# Patient Record
Sex: FEMALE | Race: WHITE | Employment: OTHER | URBAN - METROPOLITAN AREA
[De-identification: names, ages, dates, MRNs, and addresses within clinical notes are randomized per-mention and may not be internally consistent; named-entity substitution may affect disease eponyms.]

---

## 2017-02-09 PROBLEM — I34.0 NON-RHEUMATIC MITRAL REGURGITATION: Status: ACTIVE | Noted: 2017-02-09

## 2018-01-17 PROBLEM — T82.898A PROBLEM WITH DIALYSIS ACCESS (HCC): Chronic | Status: ACTIVE | Noted: 2018-01-17

## 2018-03-07 PROBLEM — N18.6 ESRD (END STAGE RENAL DISEASE) (HCC): Status: ACTIVE | Noted: 2018-03-07

## 2018-03-16 RX ORDER — SODIUM BICARBONATE 650 MG/1
650 TABLET ORAL 2 TIMES DAILY
COMMUNITY

## 2018-03-16 NOTE — PROGRESS NOTES
Álvaro 36 PRE-ADMISSION TESTING GENERAL INSTRUCTIONS- [facility]-phone number:[phone]    GENERAL INSTRUCTIONS  [x] Antibacterial Soap shower Night before and/or AM of Surgery  [] Abiodun wipe instruction sheet and wipes given. [x] Nothing by mouth after midnight, including gum, candy, mints, or water.   [] You may brush your teeth, gargle, but do NOT swallow water. []Hibiclens shower  the night before and the morning of surgery. Do not use             Hibiclens on your face or head. []No smoking, chewing tobacco, illegal drugs, or alcohol within 24 hours of your surgery. [x] Jewelry, valuables or body piercing's should not be brought to the hospital. All body and/or tongue piercing's must be removed prior to arriving to hospital.  ALL hair pins must be removed. [x] Do not wear makeup, lotions, powders, deodorant. Nail polish as directed by the nurse. [x] Arrange transportation to and from the hospital.  Arrange for someone to be with you for the remainder of the day and for 24 hours after your procedure due to having had anesthesia. [] Bring insurance card and photo ID.  [] Transfusion Bracelet: Please bring with you to hospital, day of surgery  [] Bring urine specimen day of surgery. Any small container is acceptable. [x] Use inhalers the morning of surgery and bring with you to hospital.   []Bring copy of living will or healthcare power of  papers to be placed in your electronic record. [] CPAP/BI-PAP: Please bring your machine if you are to spend the night in the hospital.     ENDOSCOPY INSTRUCTIONS:   [] Bowel prep instructions reviewed. [] Nothing by mouth after midnight, including gum, candy, mints, or water.  [] You may brush your teeth, gargle, but do NOT swallow water. [] Do not wear makeup, lotions, powders, deodorant. Nail polish as directed by the nurse.   [] Arrange transportation to and from the hospital.  Arrange for someone to be with you for the remainder of the day due to having had anesthesia. PARKING INSTRUCTIONS:   [x] Arrival [id number]  · [x] Parking lot 1 is located on [facility] (the corner of [facility] and [facility]). To enter, press the button and the gate will lift. A free token will be provided to exit the lot. One car per patient is allowed to park in this lot. All other cars are to park on [address] [place] [address] Street either in the parking garage or the handicap lot. [] Free  parking is available on [address] [place] [address]. · [] To reach the [facility] lobby from [address] [place] [address], upon entering the hospital, take elevator B to the 3rd floor. EDUCATION INSTRUCTIONS:      [] Knee or hip replacement booklet & exercise pamphlets given. [] Josias 77 placed in chart. [] Pre-admission Testing educational folder given  [] Incentive Spirometry,coughing & deep breathing exercises reviewed. []Medication information sheet(s)   []Fluoroscopy-Xray used in surgery reviewed with patient. Educational pamphlet placed in chart. [x]Pain: Post-op pain is normal and to be expected. You will be asked to rate your pain from 0-10(a zero is not acceptable-education is needed). Your post-op pain goal is:4  [x] Ask your nurse for your pain medication. [] Joint camp offered. [] Joint replacement booklets given. []Other     MEDICATION INSTRUCTIONS:   [x]Bring a complete list of your medications, please write the last time you took the medicine, give this list to the nurse. [x] Take the following medications the morning of surgery with 1-2 ounces of water: see med list  [] Stop herbal supplements and vitamins 5 days before your surgery. [] DO NOT take any diabetic medicine the morning of surgery. Follow instructions for insulin the day before surgery. [] If you are diabetic and your blood sugar is low or you feel symptomatic, you may drink 1-2 ounces of apple juice or take a glucose tablet.   The morning of your

## 2018-03-21 ENCOUNTER — ANESTHESIA EVENT (OUTPATIENT)
Dept: OPERATING ROOM | Age: 63
End: 2018-03-21
Payer: MEDICARE

## 2018-03-22 ENCOUNTER — ANESTHESIA (OUTPATIENT)
Dept: OPERATING ROOM | Age: 63
End: 2018-03-22
Payer: MEDICARE

## 2018-03-22 ENCOUNTER — TELEPHONE (OUTPATIENT)
Dept: VASCULAR SURGERY | Age: 63
End: 2018-03-22

## 2018-03-22 ENCOUNTER — HOSPITAL ENCOUNTER (OUTPATIENT)
Age: 63
Setting detail: OUTPATIENT SURGERY
Discharge: HOME | End: 2018-03-22
Attending: SURGERY | Admitting: SURGERY
Payer: MEDICARE

## 2018-03-22 VITALS
BODY MASS INDEX: 38.98 KG/M2 | WEIGHT: 220 LBS | OXYGEN SATURATION: 92 % | SYSTOLIC BLOOD PRESSURE: 109 MMHG | DIASTOLIC BLOOD PRESSURE: 65 MMHG | RESPIRATION RATE: 20 BRPM | HEIGHT: 63 IN | HEART RATE: 75 BPM | TEMPERATURE: 97.5 F

## 2018-03-22 VITALS
SYSTOLIC BLOOD PRESSURE: 104 MMHG | OXYGEN SATURATION: 99 % | RESPIRATION RATE: 21 BRPM | DIASTOLIC BLOOD PRESSURE: 61 MMHG

## 2018-03-22 DIAGNOSIS — N18.5 CRF (CHRONIC RENAL FAILURE), STAGE 5 (HCC): ICD-10-CM

## 2018-03-22 DIAGNOSIS — T82.898D PROBLEM WITH DIALYSIS ACCESS, SUBSEQUENT ENCOUNTER: Primary | Chronic | ICD-10-CM

## 2018-03-22 LAB
ANION GAP SERPL CALCULATED.3IONS-SCNC: 18 MMOL/L (ref 7–16)
BUN BLDV-MCNC: 24 MG/DL (ref 8–23)
CALCIUM SERPL-MCNC: 8 MG/DL (ref 8.6–10.2)
CHLORIDE BLD-SCNC: 94 MMOL/L (ref 98–107)
CO2: 30 MMOL/L (ref 22–29)
CREAT SERPL-MCNC: 3.2 MG/DL (ref 0.5–1)
GFR AFRICAN AMERICAN: 18
GFR NON-AFRICAN AMERICAN: 15 ML/MIN/1.73
GLUCOSE BLD-MCNC: 87 MG/DL (ref 74–109)
HCT VFR BLD CALC: 30.4 % (ref 34–48)
HEMOGLOBIN: 9.2 G/DL (ref 11.5–15.5)
MCH RBC QN AUTO: 32.5 PG (ref 26–35)
MCHC RBC AUTO-ENTMCNC: 30.3 % (ref 32–34.5)
MCV RBC AUTO: 107.4 FL (ref 80–99.9)
PDW BLD-RTO: 15.6 FL (ref 11.5–15)
PLATELET # BLD: 164 E9/L (ref 130–450)
PMV BLD AUTO: 10.5 FL (ref 7–12)
POTASSIUM REFLEX MAGNESIUM: 4 MMOL/L (ref 3.5–5)
RBC # BLD: 2.83 E12/L (ref 3.5–5.5)
SODIUM BLD-SCNC: 142 MMOL/L (ref 132–146)
WBC # BLD: 5.3 E9/L (ref 4.5–11.5)

## 2018-03-22 PROCEDURE — 6360000002 HC RX W HCPCS: Performed by: ANESTHESIOLOGY

## 2018-03-22 PROCEDURE — 2580000003 HC RX 258

## 2018-03-22 PROCEDURE — 6360000002 HC RX W HCPCS: Performed by: NURSE ANESTHETIST, CERTIFIED REGISTERED

## 2018-03-22 PROCEDURE — A4565 SLINGS: HCPCS | Performed by: SURGERY

## 2018-03-22 PROCEDURE — 7100000010 HC PHASE II RECOVERY - FIRST 15 MIN: Performed by: SURGERY

## 2018-03-22 PROCEDURE — 6360000002 HC RX W HCPCS: Performed by: SURGERY

## 2018-03-22 PROCEDURE — 36832 AV FISTULA REVISION OPEN: CPT | Performed by: SURGERY

## 2018-03-22 PROCEDURE — 3600000012 HC SURGERY LEVEL 2 ADDTL 15MIN: Performed by: SURGERY

## 2018-03-22 PROCEDURE — 7100000011 HC PHASE II RECOVERY - ADDTL 15 MIN: Performed by: SURGERY

## 2018-03-22 PROCEDURE — 3700000000 HC ANESTHESIA ATTENDED CARE: Performed by: SURGERY

## 2018-03-22 PROCEDURE — 3700000001 HC ADD 15 MINUTES (ANESTHESIA): Performed by: SURGERY

## 2018-03-22 PROCEDURE — 6360000002 HC RX W HCPCS

## 2018-03-22 PROCEDURE — 80048 BASIC METABOLIC PNL TOTAL CA: CPT

## 2018-03-22 PROCEDURE — 3600000002 HC SURGERY LEVEL 2 BASE: Performed by: SURGERY

## 2018-03-22 PROCEDURE — 2500000003 HC RX 250 WO HCPCS: Performed by: SURGERY

## 2018-03-22 PROCEDURE — 2580000003 HC RX 258: Performed by: NURSE ANESTHETIST, CERTIFIED REGISTERED

## 2018-03-22 PROCEDURE — 36415 COLL VENOUS BLD VENIPUNCTURE: CPT

## 2018-03-22 PROCEDURE — 85027 COMPLETE CBC AUTOMATED: CPT

## 2018-03-22 RX ORDER — OXYCODONE HYDROCHLORIDE AND ACETAMINOPHEN 5; 325 MG/1; MG/1
1 TABLET ORAL EVERY 4 HOURS PRN
Status: DISCONTINUED | OUTPATIENT
Start: 2018-03-22 | End: 2018-03-22 | Stop reason: HOSPADM

## 2018-03-22 RX ORDER — MIDAZOLAM HYDROCHLORIDE 1 MG/ML
2 INJECTION INTRAMUSCULAR; INTRAVENOUS ONCE
Status: COMPLETED | OUTPATIENT
Start: 2018-03-22 | End: 2018-03-22

## 2018-03-22 RX ORDER — OXYCODONE HYDROCHLORIDE AND ACETAMINOPHEN 5; 325 MG/1; MG/1
1 TABLET ORAL EVERY 6 HOURS PRN
Qty: 20 TABLET | Refills: 0 | Status: SHIPPED | OUTPATIENT
Start: 2018-03-22 | End: 2018-03-29

## 2018-03-22 RX ORDER — SODIUM CHLORIDE 9 MG/ML
INJECTION, SOLUTION INTRAVENOUS CONTINUOUS
Status: DISCONTINUED | OUTPATIENT
Start: 2018-03-22 | End: 2018-03-22 | Stop reason: HOSPADM

## 2018-03-22 RX ORDER — BUPIVACAINE HYDROCHLORIDE 2.5 MG/ML
INJECTION, SOLUTION EPIDURAL; INFILTRATION; INTRACAUDAL PRN
Status: DISCONTINUED | OUTPATIENT
Start: 2018-03-22 | End: 2018-03-22 | Stop reason: HOSPADM

## 2018-03-22 RX ORDER — QUINIDINE GLUCONATE 324 MG
2 TABLET, EXTENDED RELEASE ORAL DAILY
COMMUNITY
End: 2018-07-25 | Stop reason: HOSPADM

## 2018-03-22 RX ORDER — OXYCODONE HYDROCHLORIDE AND ACETAMINOPHEN 5; 325 MG/1; MG/1
2 TABLET ORAL EVERY 4 HOURS PRN
Status: DISCONTINUED | OUTPATIENT
Start: 2018-03-22 | End: 2018-03-22 | Stop reason: HOSPADM

## 2018-03-22 RX ORDER — SODIUM CHLORIDE 0.9 % (FLUSH) 0.9 %
10 SYRINGE (ML) INJECTION EVERY 12 HOURS SCHEDULED
Status: DISCONTINUED | OUTPATIENT
Start: 2018-03-22 | End: 2018-03-22 | Stop reason: HOSPADM

## 2018-03-22 RX ORDER — LIDOCAINE HYDROCHLORIDE 10 MG/ML
INJECTION, SOLUTION INFILTRATION; PERINEURAL PRN
Status: DISCONTINUED | OUTPATIENT
Start: 2018-03-22 | End: 2018-03-22 | Stop reason: HOSPADM

## 2018-03-22 RX ORDER — ROPIVACAINE HYDROCHLORIDE 5 MG/ML
30 INJECTION, SOLUTION EPIDURAL; INFILTRATION; PERINEURAL ONCE
Status: COMPLETED | OUTPATIENT
Start: 2018-03-22 | End: 2018-03-22

## 2018-03-22 RX ORDER — ONDANSETRON 2 MG/ML
4 INJECTION INTRAMUSCULAR; INTRAVENOUS EVERY 8 HOURS PRN
Status: DISCONTINUED | OUTPATIENT
Start: 2018-03-22 | End: 2018-03-22 | Stop reason: HOSPADM

## 2018-03-22 RX ORDER — CHOLECALCIFEROL (VITAMIN D3) 125 MCG
15 CAPSULE ORAL NIGHTLY
COMMUNITY

## 2018-03-22 RX ORDER — ACETAMINOPHEN 325 MG/1
650 TABLET ORAL EVERY 4 HOURS PRN
Status: DISCONTINUED | OUTPATIENT
Start: 2018-03-22 | End: 2018-03-22 | Stop reason: HOSPADM

## 2018-03-22 RX ORDER — PROPOFOL 10 MG/ML
INJECTION, EMULSION INTRAVENOUS CONTINUOUS PRN
Status: DISCONTINUED | OUTPATIENT
Start: 2018-03-22 | End: 2018-03-22 | Stop reason: SDUPTHER

## 2018-03-22 RX ORDER — SODIUM CHLORIDE 9 MG/ML
INJECTION, SOLUTION INTRAVENOUS CONTINUOUS PRN
Status: DISCONTINUED | OUTPATIENT
Start: 2018-03-22 | End: 2018-03-22 | Stop reason: SDUPTHER

## 2018-03-22 RX ORDER — SODIUM CHLORIDE 0.9 % (FLUSH) 0.9 %
10 SYRINGE (ML) INJECTION PRN
Status: DISCONTINUED | OUTPATIENT
Start: 2018-03-22 | End: 2018-03-22 | Stop reason: HOSPADM

## 2018-03-22 RX ORDER — FENTANYL CITRATE 50 UG/ML
100 INJECTION, SOLUTION INTRAMUSCULAR; INTRAVENOUS ONCE
Status: COMPLETED | OUTPATIENT
Start: 2018-03-22 | End: 2018-03-22

## 2018-03-22 RX ADMIN — SODIUM CHLORIDE: 9 INJECTION, SOLUTION INTRAVENOUS at 08:50

## 2018-03-22 RX ADMIN — ROPIVACAINE HYDROCHLORIDE 30 ML: 5 INJECTION, SOLUTION EPIDURAL; INFILTRATION; PERINEURAL at 08:44

## 2018-03-22 RX ADMIN — CEFAZOLIN SODIUM 2 G: 2 SOLUTION INTRAVENOUS at 08:52

## 2018-03-22 RX ADMIN — FENTANYL CITRATE 100 MCG: 50 INJECTION, SOLUTION INTRAMUSCULAR; INTRAVENOUS at 08:43

## 2018-03-22 RX ADMIN — PROPOFOL 25 MCG/KG/MIN: 10 INJECTION, EMULSION INTRAVENOUS at 08:50

## 2018-03-22 RX ADMIN — MIDAZOLAM 2 MG: 1 INJECTION INTRAMUSCULAR; INTRAVENOUS at 08:43

## 2018-03-22 ASSESSMENT — PULMONARY FUNCTION TESTS
PIF_VALUE: 28
PIF_VALUE: 19
PIF_VALUE: 0
PIF_VALUE: 19
PIF_VALUE: 0
PIF_VALUE: 54
PIF_VALUE: 0
PIF_VALUE: 19
PIF_VALUE: 0
PIF_VALUE: 0
PIF_VALUE: 19
PIF_VALUE: 0
PIF_VALUE: 19
PIF_VALUE: 0
PIF_VALUE: 19
PIF_VALUE: 18
PIF_VALUE: 1
PIF_VALUE: 0
PIF_VALUE: 1
PIF_VALUE: 19
PIF_VALUE: 0
PIF_VALUE: 19
PIF_VALUE: 0
PIF_VALUE: 19
PIF_VALUE: 19

## 2018-03-22 ASSESSMENT — PAIN - FUNCTIONAL ASSESSMENT: PAIN_FUNCTIONAL_ASSESSMENT: 0-10

## 2018-03-22 ASSESSMENT — PAIN SCALES - GENERAL
PAINLEVEL_OUTOF10: 0

## 2018-03-22 ASSESSMENT — PAIN DESCRIPTION - DESCRIPTORS: DESCRIPTORS: ACHING

## 2018-03-22 ASSESSMENT — PAIN DESCRIPTION - PAIN TYPE: TYPE: SURGICAL PAIN

## 2018-03-22 NOTE — H&P
Vascular Surgery History & Physical Exam      Chief Complaint: CRF    HISTORY OF PRESENT ILLNESS:                The patient is a 57 y.o. female who presents to the hospital for elective creation of a arteriovenous fistula. The patient denies any problems since the last office visit. IMPRESSION:   Active Hospital Problems    Diagnosis    Problem with dialysis access ([facility] [T82.898A]    CRF (chronic renal failure), stage 5 ([address] [N18.5]       PLAN:  Revision of a left arm arteriovenous fistula. I reviewed the procedure with the patient. I discussed the risks, benefits, and alternatives of the procedure. The patient understands and consents. All questions were answered. Patient Active Problem List   Diagnosis Code    Exacerbation of asthma J45. 0    Renal failure (ARF), acute on chronic (HCC) N17.9, N18.9    HTN (hypertension) I10    YUNIOR on CPAP G47.33, Z99.89    Hypomagnesemia E83.42    Hypocalcemia E83.51    Hypothyroid E03.9    Cellulitis of right lower extremity L03.115    Non-rheumatic mitral regurgitation I34.0    Respiratory arrest ([facility]) R09.2    Hypervolemia E87.70    Acute on chronic diastolic congestive heart failure ([facility]) I50.33    S/P MVR (mitral valve repair) Z98.890    S/P TVR (tricuspid valve repair) Z98.890    MANUEL (acute kidney injury) ([facility]) N17.9    CRF (chronic renal failure), stage 5 ([facility]) N18.5    Problem with dialysis access ([facility]) T82.898A    ESRD (end stage renal disease) ([facility]) N18.6       Past Medical History:   Diagnosis Date    (HFpEF) heart failure with preserved ejection fraction ([facility] [facility] 01/25/2017 4/11/17- echo- LVEF 55-60%, stage II DD, severely dilated LA, severe MR, mild TR, LVDD: 5.6 cm    Anemia     hx  / takes aranesp injections every 2 weeks    Asthma     well controlled with inhalers     Chronic kidney disease     stage 4    COPD (chronic obstructive pulmonary disease) ([facility])     Fibromyalgia     GERD (gastroesophageal reflux disease)     Hemodialysis patient [facility])     chato kim [place]    History of blood transfusion spring 2017    Hx of blood clots     h/o DVT in leg at age 25yrs    Hyperlipidemia     Hypertension     well controlled with medications     Kidney failure     Kidney stone     multiple issues    Polymyalgia rheumatica (HCC)     Restless leg syndrome     Short gut syndrome     Sleep apnea     uses CPAP    Thyroid disease     Wears dentures     upper partial        Past Surgical History:   Procedure Laterality Date    APPENDECTOMY      BREAST BIOPSY Right 2000    BRONCHOSCOPY  2010    CARDIAC CATHETERIZATION  02/17/2017    Dr Kym Light VALVE REPLACEMENT  03/21/2017    MVR, TVR    COLONOSCOPY      COLONOSCOPY  4/21/15    DIALYSIS FISTULA CREATION Left 77/82/6059    radiocephalic, [place]    DIALYSIS FISTULA CREATION Left 01/25/2018    brachioecephalic, ligation radiocephalic, [place]    ENDOSCOPY, COLON, DIAGNOSTIC      EYE SURGERY Right     CATARACT    INTESTINAL BYPASS  1973    for weight loss    KIDNEY STONE SURGERY      x 3    OTHER SURGICAL HISTORY Left 11/16/2017    AV fistula creation left arm    OTHER SURGICAL HISTORY  01/17/2018    Left arm fistulogram by Dr Kyara Pate.     SKIN BIOPSY Right 2000    RIGHT BREAST MOLE REMOVED    TRANSESOPHAGEAL ECHOCARDIOGRAM  02/03/2017    TUNNELED VENOUS CATHETER PLACEMENT Right     TUNNELED VENOUS PORT PLACEMENT Right     Powerport / x 4 / at right chest; since short gut syndrome received magnesium & sodium bicarb in past       Current Medications:     Current Facility-Administered Medications:     0.9 % sodium chloride infusion, , Intravenous, Continuous, Margie Monzon MD    sodium chloride flush 0.9 % injection 10 mL, 10 mL, Intravenous, 2 times per day, Margie Monzon MD    sodium chloride flush 0.9 % injection 10 mL, 10 mL, Intravenous, PRN, Margie Monzon MD    ceFAZolin (ANCEF) 2 g in dextrose 3 % 50 mL IVPB (duplex), 2 g, Intravenous, On Call to Harjit Rodrigez MD    Allergies:  Coumadin [warfarin sodium]; Quinine derivatives; and Other    Social History     Social History    Marital status:      Spouse name: N/A    Number of children: N/A    Years of education: N/A     Occupational History    retired      Social History Main Topics    Smoking status: Former Smoker     Packs/day: 2.00     Years: 10.00     Types: Cigarettes     Start date: 1/6/1989     Quit date: 2/1/1997    Smokeless tobacco: Never Used    Alcohol use No    Drug use: No    Sexual activity: Not on file     Other Topics Concern    Not on file     Social History Narrative    No narrative on file        Family History   Problem Relation Age of Onset    Obesity Mother     Cirrhosis Mother     Heart Failure Mother     High Blood Pressure Mother     Diabetes Father     High Blood Pressure Sister     Depression Sister     Diabetes Brother     High Blood Pressure Brother        REVIEW OF SYSTEMS:  The chart was reviewed.     PHYSICAL EXAM:    Vitals:    03/22/18 0635   BP: 107/62   Pulse: 76   Resp: 20   Temp: 97.3 °F (36.3 °C)   SpO2: 96%     CONSTITUTIONAL:  awake, alert, cooperative, no apparent distress, and appears stated age  NECK:  supple, symmetrical, trachea midline  LUNGS:  no increased work of breathing, good air exchange and clear to auscultation  CARDIOVASCULAR:  regular rate and rhythm  ABDOMEN:  soft, non-distended and non-tender    LABS:    Lab Results   Component Value Date    WBC 5.3 03/22/2018    HGB 9.2 (L) 03/22/2018    HCT 30.4 (L) 03/22/2018     03/22/2018    PROTIME 14.3 (H) 04/12/2017    INR 1.3 04/12/2017    APTT 30.4 04/10/2017    K 4.4 01/25/2018    BUN 23 01/25/2018    CREATININE 3.7 (H) 01/25/2018       RADIOLOGY:

## 2018-03-22 NOTE — OP NOTE
Miller Blair  1955      DATE OF PROCEDURE: 3/22/2018     SURGEON: Thea Handy M.D.     ASSISTANT: Sangeetha Moreno CFA     PREOPERATIVE DIAGNOSIS: Chronic renal failure, Stage 5, Problem with dialysis access. POSTOPERATIVE DIAGNOSIS: Same    OPERATION: Revision of left brachiocephalic arteriovenous fistula without thrombectomy     ANESTHESIA: Regional, MAC     ESTIMATED BLOOD LOSS: Minimal     COMPLICATIONS: None    DESCRIPTION OF PROCEDURE: The patient was identified and the procedure was confirmed. The left arm was prepped and draped in the usual sterile fashion. Lidocaine 1% mixed with 0.25% Marcaine was used for local anesthesia. A longitudinal skin incision was made in the upper arm over the cephalic vein fistula and carried down through the subcutaneous tissue. The cephalic vein was identified and dissected free from the surrounding tissues and branches were divided between silk ties. Medially and laterally, the subcutaneous tissue was incised and approximated under the vein to elevate it using a running Vicryl suture. Excess adipose tissue was excised under the skin. Hemostasis was obtained and the incision was irrigated with saline solution. The incision was approximated with Vicryl sutures and skin adhesive was applied over the incision in the operating room. Needle, sponge and instrument counts were reported as correct x2. The patient tolerated the procedure and was transferred to the recovery area in satisfactory condition.

## 2018-03-22 NOTE — ANESTHESIA PRE PROCEDURE
BY MOUTH at night. stop the lower dose 6/19/17  Yes Historical Provider, MD   furosemide (LASIX) 40 MG tablet Take 1 tablet by mouth daily  Patient taking differently: Take 40 mg by mouth nightly  4/14/17  Yes Elena Gupta MD   liothyronine (CYTOMEL) 5 MCG tablet Take 5 mcg by mouth daily   Yes Historical Provider, MD   acetaminophen (TYLENOL) 325 MG tablet Take 2 tablets by mouth every 4 hours as needed for Pain or Fever 3/30/17  Yes Madelin Willard CNP   carvedilol (COREG) 6.25 MG tablet Take 1 tablet by mouth 2 times daily (with meals)  Patient taking differently: Take 6.25 mg by mouth 2 times daily (with meals) Take morning of surgery with a sip of water 3/30/17  Yes Madelin Willard CNP   atorvastatin (LIPITOR) 20 MG tablet Take 20 mg by mouth nightly   Yes Historical Provider, MD   Coenzyme Q10 (COQ10 PO) Take by mouth nightly LD 3-16-18   Yes Historical Provider, MD   BIOTIN PO Take by mouth 2 times daily With keratin LD 3-16-18   Yes Historical Provider, MD   vitamin B-12 (CYANOCOBALAMIN) 1000 MCG tablet Take 1,000 mcg by mouth daily LD 3-16-18   Yes Historical Provider, MD   darbepoetin izabella-polysorbate (ARANESP, ALBUMIN FREE,) 60 MCG/ML injection Inject into the skin every 14 days Twice/per month  Dose given 3-16-18 at dialysis   Yes Historical Provider, MD   CPAP Machine MISC Please replace patients CPAP at 8 cm water pressure with heated humidification and C-Flex of 3. Patient states she is due for a new machine and hers is not working. Also provide masks, tubing, filters, head gear, and water chambers as needed. Diagnosis-YUNIOR  Faxed to [facility]  Length of need 99 months 9/23/16  Yes June Austin MD   folic acid (FOLVITE) 1 MG tablet Take 1 mg by mouth daily 3-16-18   Yes Historical Provider, MD   pregabalin (LYRICA) 50 MG capsule Take 50 mg by mouth 3 times daily Instructed to take am of procedure.    Yes Historical Provider, MD   Aspirin-Acetaminophen-Caffeine (EXCEDRIN EXTRA STRENGTH PO) Take by mouth as needed Ld 3-16-18    Historical Provider, MD   Omega-3 Fatty Acids (FISH OIL) 1200 MG CAPS Take by mouth daily LD 3-16-18    Historical Provider, MD   loperamide (IMODIUM) 2 MG capsule Take 2 mg by mouth as needed for Diarrhea    Historical Provider, MD   aspirin 81 MG EC tablet Take 1 tablet by mouth daily  Patient taking differently: Take 81 mg by mouth daily LD 3-21-18 3/30/17   Tam Flores CNP   vitamin D-3 (CHOLECALCIFEROL) 5000 UNITS TABS Take 5,000 Units by mouth 2 times daily LD 3-16-18    Historical Provider, MD       Current medications:    Current Facility-Administered Medications   Medication Dose Route Frequency Provider Last Rate Last Dose    0.9 % sodium chloride infusion   Intravenous Continuous Yadira Brown MD        sodium chloride flush 0.9 % injection 10 mL  10 mL Intravenous 2 times per day Yadira Brown MD        sodium chloride flush 0.9 % injection 10 mL  10 mL Intravenous PRN Yadira Brown MD        ceFAZolin (ANCEF) 2 g in dextrose 3 % 50 mL IVPB (duplex)  2 g Intravenous On Call to [address] [place] [address] [address], MD           Allergies: Allergies   Allergen Reactions    Coumadin [Warfarin Sodium] Hives    Quinine Derivatives Hives    Other      Patient states cannot take oral antibiotic dt she has short gut syndrome. ... Puts her into electrolyte inbalance       Problem List:    Patient Active Problem List   Diagnosis Code    Exacerbation of asthma J45. 0    Renal failure (ARF), acute on chronic (HCC) N17.9, N18.9    HTN (hypertension) I10    YUNIOR on CPAP G47.33, Z99.89    Hypomagnesemia E83.42    Hypocalcemia E83.51    Hypothyroid E03.9    Cellulitis of right lower extremity L03.115    Non-rheumatic mitral regurgitation I34.0    Respiratory arrest (HCC) R09.2    Hypervolemia E87.70    Acute on chronic diastolic congestive heart failure (HCC) I50.33    S/P MVR (mitral valve repair) Z98.890    S/P TVR (tricuspid valve repair) Z98.890    MANUEL (acute TRANSESOPHAGEAL ECHOCARDIOGRAM  02/03/2017    TUNNELED VENOUS CATHETER PLACEMENT Right     TUNNELED VENOUS PORT PLACEMENT Right     Powerport / x 4 / at right chest; since short gut syndrome received magnesium & sodium bicarb in past       Social History:    Social History   Substance Use Topics    Smoking status: Former Smoker     Packs/day: 2.00     Years: 10.00     Types: Cigarettes     Start date: 1/6/1989     Quit date: 2/1/1997    Smokeless tobacco: Never Used    Alcohol use No                                Counseling given: Not Answered      Vital Signs (Current):   Vitals:    03/16/18 1236 03/22/18 0635   BP:  107/62   Pulse:  76   Resp:  20   Temp:  36.3 °C (97.3 °F)   TempSrc:  Temporal   SpO2:  96%   Weight: 220 lb (99.8 kg) 220 lb (99.8 kg)   Height: 5' 3\" (1.6 m) 5' 3\" (1.6 m)                                              BP Readings from Last 3 Encounters:   03/22/18 107/62   03/15/18 112/64   03/07/18 (!) 98/58       NPO Status: Time of last liquid consumption: 2200                        Time of last solid consumption: 1800                        Date of last liquid consumption: 03/21/18                        Date of last solid food consumption: 03/21/18    BMI:   Wt Readings from Last 3 Encounters:   03/22/18 220 lb (99.8 kg)   03/15/18 220 lb (99.8 kg)   03/07/18 219 lb 12.8 oz (99.7 kg)     Body mass index is 38.97 kg/m².     CBC:   Lab Results   Component Value Date    WBC 5.3 03/22/2018    RBC 2.83 03/22/2018    HGB 9.2 03/22/2018    HCT 30.4 03/22/2018    .4 03/22/2018    RDW 15.6 03/22/2018     03/22/2018       CMP:   Lab Results   Component Value Date     01/25/2018    K 4.4 01/25/2018    CL 98 01/25/2018    CO2 29 01/25/2018    BUN 23 01/25/2018    CREATININE 3.7 01/25/2018    GFRAA 15 01/25/2018    LABGLOM 12 01/25/2018    GLUCOSE 87 01/25/2018    PROT 6.8 07/10/2017    CALCIUM 8.1 01/25/2018    BILITOT 0.2 07/10/2017    ALKPHOS 248 07/10/2017    AST 20 07/10/2017

## 2018-04-03 ENCOUNTER — OFFICE VISIT (OUTPATIENT)
Dept: VASCULAR SURGERY | Age: 63
End: 2018-04-03

## 2018-04-03 DIAGNOSIS — N18.5 CRF (CHRONIC RENAL FAILURE), STAGE 5 (HCC): Primary | ICD-10-CM

## 2018-04-03 PROCEDURE — 99024 POSTOP FOLLOW-UP VISIT: CPT | Performed by: SURGERY

## 2018-05-15 ENCOUNTER — TELEPHONE (OUTPATIENT)
Dept: VASCULAR SURGERY | Age: 63
End: 2018-05-15

## 2018-05-15 ENCOUNTER — OFFICE VISIT (OUTPATIENT)
Dept: VASCULAR SURGERY | Age: 63
End: 2018-05-15

## 2018-05-15 DIAGNOSIS — N18.6 ENCOUNTER REGARDING VASCULAR ACCESS FOR DIALYSIS FOR END-STAGE RENAL DISEASE (HCC): ICD-10-CM

## 2018-05-15 DIAGNOSIS — N18.5 CRF (CHRONIC RENAL FAILURE), STAGE 5 (HCC): Primary | ICD-10-CM

## 2018-05-15 DIAGNOSIS — Z99.2 ENCOUNTER REGARDING VASCULAR ACCESS FOR DIALYSIS FOR END-STAGE RENAL DISEASE (HCC): ICD-10-CM

## 2018-05-15 PROCEDURE — 99024 POSTOP FOLLOW-UP VISIT: CPT | Performed by: NURSE PRACTITIONER

## 2018-05-29 ENCOUNTER — OFFICE VISIT (OUTPATIENT)
Dept: NEUROLOGY | Age: 63
End: 2018-05-29
Payer: MEDICARE

## 2018-05-29 VITALS
OXYGEN SATURATION: 93 % | DIASTOLIC BLOOD PRESSURE: 78 MMHG | WEIGHT: 213.3 LBS | HEART RATE: 82 BPM | HEIGHT: 64 IN | SYSTOLIC BLOOD PRESSURE: 124 MMHG | BODY MASS INDEX: 36.41 KG/M2

## 2018-05-29 DIAGNOSIS — I63.10 CEREBROVASCULAR ACCIDENT (CVA) DUE TO EMBOLISM OF PRECEREBRAL ARTERY (HCC): ICD-10-CM

## 2018-05-29 DIAGNOSIS — M54.17 L-S RADICULOPATHY: Primary | ICD-10-CM

## 2018-05-29 PROCEDURE — 99215 OFFICE O/P EST HI 40 MIN: CPT | Performed by: CLINICAL NURSE SPECIALIST

## 2018-06-01 ENCOUNTER — HOSPITAL ENCOUNTER (EMERGENCY)
Age: 63
Discharge: ANOTHER HOSPITAL | End: 2018-06-02
Attending: EMERGENCY MEDICINE
Payer: MEDICARE

## 2018-06-01 ENCOUNTER — APPOINTMENT (OUTPATIENT)
Dept: ULTRASOUND IMAGING | Age: 63
End: 2018-06-01
Payer: MEDICARE

## 2018-06-01 DIAGNOSIS — T82.9XXA COMPLICATION OF ARTERIOVENOUS DIALYSIS FISTULA, INITIAL ENCOUNTER: Primary | ICD-10-CM

## 2018-06-01 DIAGNOSIS — R58 EXTRAVASATION OF BLOOD: ICD-10-CM

## 2018-06-01 LAB
ALBUMIN SERPL-MCNC: 3.2 G/DL (ref 3.5–5.2)
ALP BLD-CCNC: 174 U/L (ref 35–104)
ALT SERPL-CCNC: 11 U/L (ref 0–32)
ANION GAP SERPL CALCULATED.3IONS-SCNC: 10 MMOL/L (ref 7–16)
AST SERPL-CCNC: 21 U/L (ref 0–31)
BASOPHILS ABSOLUTE: 0.01 E9/L (ref 0–0.2)
BASOPHILS RELATIVE PERCENT: 0.3 % (ref 0–2)
BILIRUB SERPL-MCNC: 0.3 MG/DL (ref 0–1.2)
BUN BLDV-MCNC: 14 MG/DL (ref 8–23)
CALCIUM SERPL-MCNC: 7.9 MG/DL (ref 8.6–10.2)
CHLORIDE BLD-SCNC: 98 MMOL/L (ref 98–107)
CO2: 36 MMOL/L (ref 22–29)
CREAT SERPL-MCNC: 2.5 MG/DL (ref 0.5–1)
EOSINOPHILS ABSOLUTE: 0.05 E9/L (ref 0.05–0.5)
EOSINOPHILS RELATIVE PERCENT: 1.3 % (ref 0–6)
GFR AFRICAN AMERICAN: 23
GFR NON-AFRICAN AMERICAN: 19 ML/MIN/1.73
GLUCOSE BLD-MCNC: 85 MG/DL (ref 74–109)
HCT VFR BLD CALC: 29.8 % (ref 34–48)
HEMOGLOBIN: 9.2 G/DL (ref 11.5–15.5)
IMMATURE GRANULOCYTES #: 0.01 E9/L
IMMATURE GRANULOCYTES %: 0.3 % (ref 0–5)
LYMPHOCYTES ABSOLUTE: 1.11 E9/L (ref 1.5–4)
LYMPHOCYTES RELATIVE PERCENT: 29.7 % (ref 20–42)
MCH RBC QN AUTO: 33.5 PG (ref 26–35)
MCHC RBC AUTO-ENTMCNC: 30.9 % (ref 32–34.5)
MCV RBC AUTO: 108.4 FL (ref 80–99.9)
MONOCYTES ABSOLUTE: 0.35 E9/L (ref 0.1–0.95)
MONOCYTES RELATIVE PERCENT: 9.4 % (ref 2–12)
NEUTROPHILS ABSOLUTE: 2.21 E9/L (ref 1.8–7.3)
NEUTROPHILS RELATIVE PERCENT: 59 % (ref 43–80)
PDW BLD-RTO: 15.1 FL (ref 11.5–15)
PLATELET # BLD: 123 E9/L (ref 130–450)
PMV BLD AUTO: 11.6 FL (ref 7–12)
POTASSIUM SERPL-SCNC: 3.3 MMOL/L (ref 3.5–5)
RBC # BLD: 2.75 E12/L (ref 3.5–5.5)
SODIUM BLD-SCNC: 144 MMOL/L (ref 132–146)
TOTAL PROTEIN: 6.5 G/DL (ref 6.4–8.3)
WBC # BLD: 3.7 E9/L (ref 4.5–11.5)

## 2018-06-01 PROCEDURE — 93971 EXTREMITY STUDY: CPT

## 2018-06-01 PROCEDURE — 80053 COMPREHEN METABOLIC PANEL: CPT

## 2018-06-01 PROCEDURE — 99291 CRITICAL CARE FIRST HOUR: CPT

## 2018-06-01 PROCEDURE — 85025 COMPLETE CBC W/AUTO DIFF WBC: CPT

## 2018-06-01 ASSESSMENT — PAIN SCALES - GENERAL: PAINLEVEL_OUTOF10: 5

## 2018-06-02 ENCOUNTER — HOSPITAL ENCOUNTER (OUTPATIENT)
Age: 63
Discharge: HOME | End: 2018-06-02
Payer: MEDICARE

## 2018-06-02 ENCOUNTER — APPOINTMENT (OUTPATIENT)
Dept: CT IMAGING | Age: 63
End: 2018-06-02
Payer: MEDICARE

## 2018-06-02 ENCOUNTER — HOSPITAL ENCOUNTER (OUTPATIENT)
Age: 63
Setting detail: OBSERVATION
Discharge: HOME | End: 2018-06-03
Attending: EMERGENCY MEDICINE | Admitting: INTERNAL MEDICINE
Payer: MEDICARE

## 2018-06-02 VITALS
BODY MASS INDEX: 36.19 KG/M2 | OXYGEN SATURATION: 94 % | SYSTOLIC BLOOD PRESSURE: 110 MMHG | TEMPERATURE: 98.4 F | DIASTOLIC BLOOD PRESSURE: 66 MMHG | HEART RATE: 69 BPM | HEIGHT: 64 IN | RESPIRATION RATE: 18 BRPM | WEIGHT: 212 LBS

## 2018-06-02 DIAGNOSIS — T82.590A DIALYSIS AV FISTULA MALFUNCTION, INITIAL ENCOUNTER (HCC): ICD-10-CM

## 2018-06-02 DIAGNOSIS — M79.81 HEMATOMA, NONTRAUMATIC, SOFT TISSUE: ICD-10-CM

## 2018-06-02 DIAGNOSIS — D69.6 THROMBOCYTOPENIA (HCC): Primary | ICD-10-CM

## 2018-06-02 PROBLEM — T14.8XXA HEMATOMA: Status: ACTIVE | Noted: 2018-06-02

## 2018-06-02 PROBLEM — T82.9XXA COMPLICATION OF AV DIALYSIS FISTULA, INITIAL ENCOUNTER: Status: ACTIVE | Noted: 2018-06-02

## 2018-06-02 PROBLEM — S40.022A TRAUMATIC HEMATOMA OF LEFT UPPER ARM: Status: ACTIVE | Noted: 2018-06-02

## 2018-06-02 LAB
ABO/RH: NORMAL
ANTIBODY SCREEN: NORMAL
APTT: 36.6 SEC (ref 24.5–35.1)
BACTERIA: ABNORMAL /HPF
BASOPHILS ABSOLUTE: 0.01 E9/L (ref 0–0.2)
BASOPHILS ABSOLUTE: 0.02 E9/L (ref 0–0.2)
BASOPHILS RELATIVE PERCENT: 0.2 % (ref 0–2)
BASOPHILS RELATIVE PERCENT: 0.4 % (ref 0–2)
BILIRUBIN URINE: NEGATIVE
BLOOD, URINE: ABNORMAL
CLARITY: CLEAR
COLOR: YELLOW
EOSINOPHILS ABSOLUTE: 0.06 E9/L (ref 0.05–0.5)
EOSINOPHILS ABSOLUTE: 0.07 E9/L (ref 0.05–0.5)
EOSINOPHILS RELATIVE PERCENT: 1.4 % (ref 0–6)
EOSINOPHILS RELATIVE PERCENT: 1.5 % (ref 0–6)
EPITHELIAL CELLS, UA: ABNORMAL /HPF
FOLATE: >20 NG/ML (ref 4.8–24.2)
GLUCOSE URINE: NEGATIVE MG/DL
HCT VFR BLD CALC: 30.7 % (ref 34–48)
HCT VFR BLD CALC: 30.9 % (ref 34–48)
HEMOGLOBIN: 9 G/DL (ref 11.5–15.5)
HEMOGLOBIN: 9.4 G/DL (ref 11.5–15.5)
IMMATURE GRANULOCYTES #: 0.01 E9/L
IMMATURE GRANULOCYTES #: 0.02 E9/L
IMMATURE GRANULOCYTES %: 0.2 % (ref 0–5)
IMMATURE GRANULOCYTES %: 0.4 % (ref 0–5)
INR BLD: 1.6
KETONES, URINE: NEGATIVE MG/DL
LACTATE DEHYDROGENASE: 193 U/L (ref 135–214)
LEUKOCYTE ESTERASE, URINE: ABNORMAL
LYMPHOCYTES ABSOLUTE: 1.32 E9/L (ref 1.5–4)
LYMPHOCYTES ABSOLUTE: 1.4 E9/L (ref 1.5–4)
LYMPHOCYTES RELATIVE PERCENT: 28.4 % (ref 20–42)
LYMPHOCYTES RELATIVE PERCENT: 33.8 % (ref 20–42)
MCH RBC QN AUTO: 31.7 PG (ref 26–35)
MCH RBC QN AUTO: 32.9 PG (ref 26–35)
MCHC RBC AUTO-ENTMCNC: 29.1 % (ref 32–34.5)
MCHC RBC AUTO-ENTMCNC: 30.6 % (ref 32–34.5)
MCV RBC AUTO: 107.3 FL (ref 80–99.9)
MCV RBC AUTO: 108.8 FL (ref 80–99.9)
MONOCYTES ABSOLUTE: 0.36 E9/L (ref 0.1–0.95)
MONOCYTES ABSOLUTE: 0.4 E9/L (ref 0.1–0.95)
MONOCYTES RELATIVE PERCENT: 8.6 % (ref 2–12)
MONOCYTES RELATIVE PERCENT: 8.7 % (ref 2–12)
NEUTROPHILS ABSOLUTE: 2.3 E9/L (ref 1.8–7.3)
NEUTROPHILS ABSOLUTE: 2.81 E9/L (ref 1.8–7.3)
NEUTROPHILS RELATIVE PERCENT: 55.7 % (ref 43–80)
NEUTROPHILS RELATIVE PERCENT: 60.7 % (ref 43–80)
NITRITE, URINE: NEGATIVE
PDW BLD-RTO: 15 FL (ref 11.5–15)
PDW BLD-RTO: 15.1 FL (ref 11.5–15)
PH UA: 8.5 (ref 5–9)
PLATELET # BLD: 116 E9/L (ref 130–450)
PLATELET # BLD: 127 E9/L (ref 130–450)
PMV BLD AUTO: 10.5 FL (ref 7–12)
PMV BLD AUTO: 11.3 FL (ref 7–12)
PROTEIN UA: 100 MG/DL
PROTHROMBIN TIME: 18.3 SEC (ref 9.3–12.4)
RBC # BLD: 2.84 E12/L (ref 3.5–5.5)
RBC # BLD: 2.86 E12/L (ref 3.5–5.5)
RBC UA: ABNORMAL /HPF (ref 0–2)
SPECIFIC GRAVITY UA: 1.01 (ref 1–1.03)
UROBILINOGEN, URINE: 0.2 E.U./DL
VITAMIN B-12: 695 PG/ML (ref 211–946)
WBC # BLD: 4.1 E9/L (ref 4.5–11.5)
WBC # BLD: 4.6 E9/L (ref 4.5–11.5)
WBC UA: ABNORMAL /HPF (ref 0–5)

## 2018-06-02 PROCEDURE — 82607 VITAMIN B-12: CPT

## 2018-06-02 PROCEDURE — 99222 1ST HOSP IP/OBS MODERATE 55: CPT | Performed by: SURGERY

## 2018-06-02 PROCEDURE — 83615 LACTATE (LD) (LDH) ENZYME: CPT

## 2018-06-02 PROCEDURE — G0378 HOSPITAL OBSERVATION PER HR: HCPCS

## 2018-06-02 PROCEDURE — 85610 PROTHROMBIN TIME: CPT

## 2018-06-02 PROCEDURE — 6360000004 HC RX CONTRAST MEDICATION: Performed by: RADIOLOGY

## 2018-06-02 PROCEDURE — 73206 CT ANGIO UPR EXTRM W/O&W/DYE: CPT

## 2018-06-02 PROCEDURE — 81001 URINALYSIS AUTO W/SCOPE: CPT

## 2018-06-02 PROCEDURE — 85025 COMPLETE CBC W/AUTO DIFF WBC: CPT

## 2018-06-02 PROCEDURE — A0428 BLS: HCPCS

## 2018-06-02 PROCEDURE — 85730 THROMBOPLASTIN TIME PARTIAL: CPT

## 2018-06-02 PROCEDURE — 36415 COLL VENOUS BLD VENIPUNCTURE: CPT

## 2018-06-02 PROCEDURE — 99285 EMERGENCY DEPT VISIT HI MDM: CPT

## 2018-06-02 PROCEDURE — A0425 GROUND MILEAGE: HCPCS

## 2018-06-02 PROCEDURE — 86901 BLOOD TYPING SEROLOGIC RH(D): CPT

## 2018-06-02 PROCEDURE — 86900 BLOOD TYPING SEROLOGIC ABO: CPT

## 2018-06-02 PROCEDURE — 90935 HEMODIALYSIS ONE EVALUATION: CPT | Performed by: INTERNAL MEDICINE

## 2018-06-02 PROCEDURE — 82746 ASSAY OF FOLIC ACID SERUM: CPT

## 2018-06-02 PROCEDURE — 6370000000 HC RX 637 (ALT 250 FOR IP): Performed by: INTERNAL MEDICINE

## 2018-06-02 PROCEDURE — 86850 RBC ANTIBODY SCREEN: CPT

## 2018-06-02 PROCEDURE — 86022 PLATELET ANTIBODIES: CPT

## 2018-06-02 PROCEDURE — 84165 PROTEIN E-PHORESIS SERUM: CPT

## 2018-06-02 RX ORDER — LOPERAMIDE HYDROCHLORIDE 2 MG/1
2 CAPSULE ORAL PRN
Status: DISCONTINUED | OUTPATIENT
Start: 2018-06-02 | End: 2018-06-03 | Stop reason: HOSPADM

## 2018-06-02 RX ORDER — CLOPIDOGREL BISULFATE 75 MG/1
75 TABLET ORAL DAILY
Status: DISCONTINUED | OUTPATIENT
Start: 2018-06-02 | End: 2018-06-03 | Stop reason: HOSPADM

## 2018-06-02 RX ORDER — LANOLIN ALCOHOL/MO/W.PET/CERES
1600 CREAM (GRAM) TOPICAL 2 TIMES DAILY
Status: DISCONTINUED | OUTPATIENT
Start: 2018-06-02 | End: 2018-06-03 | Stop reason: HOSPADM

## 2018-06-02 RX ORDER — CALCIUM CARBONATE 200(500)MG
2 TABLET,CHEWABLE ORAL 2 TIMES DAILY
Status: DISCONTINUED | OUTPATIENT
Start: 2018-06-02 | End: 2018-06-03 | Stop reason: HOSPADM

## 2018-06-02 RX ORDER — CHOLECALCIFEROL (VITAMIN D3) 50 MCG
5000 TABLET ORAL 2 TIMES DAILY
Status: DISCONTINUED | OUTPATIENT
Start: 2018-06-02 | End: 2018-06-03 | Stop reason: HOSPADM

## 2018-06-02 RX ORDER — LIOTHYRONINE SODIUM 5 UG/1
5 TABLET ORAL DAILY
Status: DISCONTINUED | OUTPATIENT
Start: 2018-06-02 | End: 2018-06-03 | Stop reason: HOSPADM

## 2018-06-02 RX ORDER — ATORVASTATIN CALCIUM 20 MG/1
20 TABLET, FILM COATED ORAL NIGHTLY
Status: DISCONTINUED | OUTPATIENT
Start: 2018-06-02 | End: 2018-06-03 | Stop reason: HOSPADM

## 2018-06-02 RX ORDER — CARVEDILOL 6.25 MG/1
6.25 TABLET ORAL 2 TIMES DAILY WITH MEALS
Status: DISCONTINUED | OUTPATIENT
Start: 2018-06-02 | End: 2018-06-03 | Stop reason: HOSPADM

## 2018-06-02 RX ORDER — LANOLIN ALCOHOL/MO/W.PET/CERES
1000 CREAM (GRAM) TOPICAL DAILY
Status: DISCONTINUED | OUTPATIENT
Start: 2018-06-02 | End: 2018-06-03 | Stop reason: HOSPADM

## 2018-06-02 RX ORDER — PREGABALIN 50 MG/1
50 CAPSULE ORAL 3 TIMES DAILY
Status: DISCONTINUED | OUTPATIENT
Start: 2018-06-02 | End: 2018-06-03 | Stop reason: HOSPADM

## 2018-06-02 RX ORDER — SODIUM BICARBONATE 650 MG/1
1950 TABLET ORAL 2 TIMES DAILY
Status: DISCONTINUED | OUTPATIENT
Start: 2018-06-02 | End: 2018-06-03 | Stop reason: HOSPADM

## 2018-06-02 RX ORDER — MONTELUKAST SODIUM 10 MG/1
10 TABLET ORAL NIGHTLY
Status: DISCONTINUED | OUTPATIENT
Start: 2018-06-02 | End: 2018-06-03 | Stop reason: HOSPADM

## 2018-06-02 RX ORDER — TORSEMIDE 20 MG/1
20 TABLET ORAL DAILY
Status: DISCONTINUED | OUTPATIENT
Start: 2018-06-02 | End: 2018-06-03 | Stop reason: HOSPADM

## 2018-06-02 RX ORDER — FUROSEMIDE 40 MG/1
40 TABLET ORAL DAILY
Status: DISCONTINUED | OUTPATIENT
Start: 2018-06-02 | End: 2018-06-03 | Stop reason: HOSPADM

## 2018-06-02 RX ORDER — ASPIRIN 81 MG/1
81 TABLET ORAL DAILY
Status: DISCONTINUED | OUTPATIENT
Start: 2018-06-02 | End: 2018-06-03 | Stop reason: HOSPADM

## 2018-06-02 RX ORDER — CALCIUM CARBONATE 200(500)MG
3 TABLET,CHEWABLE ORAL 2 TIMES DAILY
COMMUNITY

## 2018-06-02 RX ORDER — FOLIC ACID 1 MG/1
1 TABLET ORAL DAILY
Status: DISCONTINUED | OUTPATIENT
Start: 2018-06-02 | End: 2018-06-03 | Stop reason: HOSPADM

## 2018-06-02 RX ORDER — ACETAMINOPHEN 325 MG/1
650 TABLET ORAL EVERY 4 HOURS PRN
Status: DISCONTINUED | OUTPATIENT
Start: 2018-06-02 | End: 2018-06-03 | Stop reason: HOSPADM

## 2018-06-02 RX ORDER — LANOLIN ALCOHOL/MO/W.PET/CERES
3 CREAM (GRAM) TOPICAL NIGHTLY PRN
Status: DISCONTINUED | OUTPATIENT
Start: 2018-06-02 | End: 2018-06-03 | Stop reason: HOSPADM

## 2018-06-02 RX ORDER — SODIUM CHLORIDE 0.9 % (FLUSH) 0.9 %
10 SYRINGE (ML) INJECTION PRN
Status: DISCONTINUED | OUTPATIENT
Start: 2018-06-02 | End: 2018-06-03 | Stop reason: HOSPADM

## 2018-06-02 RX ORDER — HYDROCODONE BITARTRATE AND ACETAMINOPHEN 5; 325 MG/1; MG/1
1 TABLET ORAL EVERY 4 HOURS PRN
Status: DISCONTINUED | OUTPATIENT
Start: 2018-06-02 | End: 2018-06-03 | Stop reason: HOSPADM

## 2018-06-02 RX ORDER — ROPINIROLE 0.5 MG/1
0.5 TABLET, FILM COATED ORAL NIGHTLY
Status: DISCONTINUED | OUTPATIENT
Start: 2018-06-02 | End: 2018-06-03 | Stop reason: HOSPADM

## 2018-06-02 RX ORDER — FENOFIBRATE 160 MG/1
160 TABLET ORAL NIGHTLY
Status: DISCONTINUED | OUTPATIENT
Start: 2018-06-02 | End: 2018-06-03 | Stop reason: HOSPADM

## 2018-06-02 RX ORDER — HYDRALAZINE HYDROCHLORIDE 20 MG/ML
10 INJECTION INTRAMUSCULAR; INTRAVENOUS ONCE
Status: DISCONTINUED | OUTPATIENT
Start: 2018-06-02 | End: 2018-06-02 | Stop reason: HOSPADM

## 2018-06-02 RX ADMIN — MAGNESIUM GLUCONATE 500 MG ORAL TABLET 1600 MG: 500 TABLET ORAL at 21:33

## 2018-06-02 RX ADMIN — CALCIUM CARBONATE (ANTACID) CHEW TAB 500 MG 1000 MG: 500 CHEW TAB at 21:33

## 2018-06-02 RX ADMIN — LEVOTHYROXINE SODIUM 137 MCG: 112 TABLET ORAL at 09:18

## 2018-06-02 RX ADMIN — ATORVASTATIN CALCIUM 20 MG: 20 TABLET, FILM COATED ORAL at 21:34

## 2018-06-02 RX ADMIN — ROPINIROLE 0.5 MG: 0.5 TABLET ORAL at 21:32

## 2018-06-02 RX ADMIN — PREGABALIN 50 MG: 50 CAPSULE ORAL at 09:18

## 2018-06-02 RX ADMIN — CALCIUM CARBONATE (ANTACID) CHEW TAB 500 MG 1000 MG: 500 CHEW TAB at 09:18

## 2018-06-02 RX ADMIN — Medication 1 MG: at 09:18

## 2018-06-02 RX ADMIN — MOMETASONE FUROATE AND FORMOTEROL FUMARATE DIHYDRATE 2 PUFF: 200; 5 AEROSOL RESPIRATORY (INHALATION) at 21:31

## 2018-06-02 RX ADMIN — CARVEDILOL 6.25 MG: 6.25 TABLET, FILM COATED ORAL at 09:17

## 2018-06-02 RX ADMIN — MELATONIN TAB 3 MG 3 MG: 3 TAB at 21:31

## 2018-06-02 RX ADMIN — Medication 5000 UNITS: at 21:32

## 2018-06-02 RX ADMIN — MOMETASONE FUROATE AND FORMOTEROL FUMARATE DIHYDRATE 2 PUFF: 200; 5 AEROSOL RESPIRATORY (INHALATION) at 09:17

## 2018-06-02 RX ADMIN — FUROSEMIDE 40 MG: 40 TABLET ORAL at 17:18

## 2018-06-02 RX ADMIN — PREGABALIN 50 MG: 50 CAPSULE ORAL at 21:30

## 2018-06-02 RX ADMIN — TORSEMIDE 20 MG: 20 TABLET ORAL at 15:29

## 2018-06-02 RX ADMIN — CARVEDILOL 6.25 MG: 6.25 TABLET, FILM COATED ORAL at 17:18

## 2018-06-02 RX ADMIN — CALCIUM ACETATE 667 MG: 667 CAPSULE ORAL at 17:18

## 2018-06-02 RX ADMIN — MONTELUKAST SODIUM 10 MG: 10 TABLET, FILM COATED ORAL at 21:33

## 2018-06-02 RX ADMIN — SODIUM BICARBONATE 1950 MG: 650 TABLET ORAL at 03:19

## 2018-06-02 RX ADMIN — SODIUM BICARBONATE 1950 MG: 650 TABLET ORAL at 21:33

## 2018-06-02 RX ADMIN — LIOTHYRONINE SODIUM 5 MCG: 5 TABLET ORAL at 09:17

## 2018-06-02 RX ADMIN — PREGABALIN 50 MG: 50 CAPSULE ORAL at 15:29

## 2018-06-02 RX ADMIN — IOPAMIDOL 100 ML: 755 INJECTION, SOLUTION INTRAVENOUS at 03:36

## 2018-06-02 ASSESSMENT — ENCOUNTER SYMPTOMS
COUGH: 0
BACK PAIN: 0
COLOR CHANGE: 1
BACK PAIN: 0
SHORTNESS OF BREATH: 0
COUGH: 0
CONSTIPATION: 0
NAUSEA: 0
SORE THROAT: 0
SHORTNESS OF BREATH: 0
ABDOMINAL PAIN: 0
ABDOMINAL PAIN: 0
VOMITING: 0
DIARRHEA: 0
BLOOD IN STOOL: 0
NAUSEA: 0
COLOR CHANGE: 1
VOMITING: 0

## 2018-06-02 ASSESSMENT — PAIN DESCRIPTION - LOCATION
LOCATION: ARM

## 2018-06-02 ASSESSMENT — PAIN DESCRIPTION - ORIENTATION
ORIENTATION: LEFT

## 2018-06-02 ASSESSMENT — PAIN DESCRIPTION - DESCRIPTORS
DESCRIPTORS: TENDER;DISCOMFORT
DESCRIPTORS: TENDER;DISCOMFORT
DESCRIPTORS: DISCOMFORT

## 2018-06-02 ASSESSMENT — PAIN SCALES - GENERAL
PAINLEVEL_OUTOF10: 8
PAINLEVEL_OUTOF10: 0
PAINLEVEL_OUTOF10: 7
PAINLEVEL_OUTOF10: 0
PAINLEVEL_OUTOF10: 3

## 2018-06-02 ASSESSMENT — PAIN DESCRIPTION - PAIN TYPE
TYPE: ACUTE PAIN

## 2018-06-02 NOTE — ED NOTES
Resting quietly at this time, resps easy and unlabored with no distress noted,  at bedside, awaiting results and disposition     Rebecca Su  06/02/18 0000

## 2018-06-03 VITALS
TEMPERATURE: 97.7 F | WEIGHT: 209.4 LBS | HEART RATE: 72 BPM | SYSTOLIC BLOOD PRESSURE: 122 MMHG | OXYGEN SATURATION: 95 % | HEIGHT: 64 IN | DIASTOLIC BLOOD PRESSURE: 58 MMHG | BODY MASS INDEX: 35.75 KG/M2 | RESPIRATION RATE: 20 BRPM

## 2018-06-03 LAB
BASOPHILS ABSOLUTE: 0.02 E9/L (ref 0–0.2)
BASOPHILS RELATIVE PERCENT: 0.5 % (ref 0–2)
EOSINOPHILS ABSOLUTE: 0.05 E9/L (ref 0.05–0.5)
EOSINOPHILS RELATIVE PERCENT: 1.3 % (ref 0–6)
HCT VFR BLD CALC: 30.4 % (ref 34–48)
HEMOGLOBIN: 9.2 G/DL (ref 11.5–15.5)
IMMATURE GRANULOCYTES #: 0.01 E9/L
IMMATURE GRANULOCYTES %: 0.3 % (ref 0–5)
LYMPHOCYTES ABSOLUTE: 0.92 E9/L (ref 1.5–4)
LYMPHOCYTES RELATIVE PERCENT: 23.3 % (ref 20–42)
MCH RBC QN AUTO: 32.5 PG (ref 26–35)
MCHC RBC AUTO-ENTMCNC: 30.3 % (ref 32–34.5)
MCV RBC AUTO: 107.4 FL (ref 80–99.9)
MONOCYTES ABSOLUTE: 0.32 E9/L (ref 0.1–0.95)
MONOCYTES RELATIVE PERCENT: 8.1 % (ref 2–12)
NEUTROPHILS ABSOLUTE: 2.63 E9/L (ref 1.8–7.3)
NEUTROPHILS RELATIVE PERCENT: 66.5 % (ref 43–80)
PDW BLD-RTO: 15.2 FL (ref 11.5–15)
PLATELET # BLD: 109 E9/L (ref 130–450)
PMV BLD AUTO: 11.5 FL (ref 7–12)
RBC # BLD: 2.83 E12/L (ref 3.5–5.5)
WBC # BLD: 4 E9/L (ref 4.5–11.5)

## 2018-06-03 PROCEDURE — 6370000000 HC RX 637 (ALT 250 FOR IP): Performed by: INTERNAL MEDICINE

## 2018-06-03 PROCEDURE — 99231 SBSQ HOSP IP/OBS SF/LOW 25: CPT | Performed by: SURGERY

## 2018-06-03 PROCEDURE — G0378 HOSPITAL OBSERVATION PER HR: HCPCS

## 2018-06-03 PROCEDURE — 36415 COLL VENOUS BLD VENIPUNCTURE: CPT

## 2018-06-03 PROCEDURE — 85025 COMPLETE CBC W/AUTO DIFF WBC: CPT

## 2018-06-03 RX ADMIN — CYANOCOBALAMIN TAB 1000 MCG 1000 MCG: 1000 TAB at 10:09

## 2018-06-03 RX ADMIN — CALCIUM ACETATE 667 MG: 667 CAPSULE ORAL at 10:06

## 2018-06-03 RX ADMIN — CLOPIDOGREL 75 MG: 75 TABLET, FILM COATED ORAL at 10:07

## 2018-06-03 RX ADMIN — ASPIRIN 81 MG: 81 TABLET, COATED ORAL at 10:07

## 2018-06-03 RX ADMIN — CARVEDILOL 6.25 MG: 6.25 TABLET, FILM COATED ORAL at 10:06

## 2018-06-03 RX ADMIN — Medication 1 MG: at 10:08

## 2018-06-03 RX ADMIN — LIOTHYRONINE SODIUM 5 MCG: 5 TABLET ORAL at 10:07

## 2018-06-03 RX ADMIN — MOMETASONE FUROATE AND FORMOTEROL FUMARATE DIHYDRATE 2 PUFF: 200; 5 AEROSOL RESPIRATORY (INHALATION) at 10:05

## 2018-06-03 RX ADMIN — SODIUM BICARBONATE 1950 MG: 650 TABLET ORAL at 10:05

## 2018-06-03 RX ADMIN — Medication 5000 UNITS: at 10:06

## 2018-06-03 RX ADMIN — CALCIUM CARBONATE (ANTACID) CHEW TAB 500 MG 1000 MG: 500 CHEW TAB at 10:06

## 2018-06-03 RX ADMIN — LEVOTHYROXINE SODIUM 137 MCG: 112 TABLET ORAL at 06:49

## 2018-06-03 RX ADMIN — TORSEMIDE 20 MG: 20 TABLET ORAL at 10:07

## 2018-06-03 RX ADMIN — PREGABALIN 50 MG: 50 CAPSULE ORAL at 10:07

## 2018-06-03 RX ADMIN — MAGNESIUM GLUCONATE 500 MG ORAL TABLET 1600 MG: 500 TABLET ORAL at 10:07

## 2018-06-03 ASSESSMENT — PAIN SCALES - GENERAL
PAINLEVEL_OUTOF10: 0
PAINLEVEL_OUTOF10: 0

## 2018-06-04 LAB — PATHOLOGIST REVIEW: NORMAL

## 2018-06-05 LAB — HEPARIN PF4 ANTIBODY: 0.13 OD

## 2018-06-06 LAB
ALBUMIN SERPL-MCNC: 3.2 G/DL (ref 3.5–4.7)
ALPHA-1-GLOBULIN: 0.3 G/DL (ref 0.2–0.4)
ALPHA-2-GLOBULIN: 0.5 G/FL (ref 0.5–1)
BETA GLOBULIN: 1.2 G/DL (ref 0.8–1.3)
ELECTROPHORESIS: ABNORMAL
GAMMA GLOBULIN: 1.5 G/DL (ref 0.7–1.6)
TOTAL PROTEIN: 6.7 G/DL (ref 6.4–8.3)

## 2018-06-14 ENCOUNTER — HOSPITAL ENCOUNTER (OUTPATIENT)
Dept: NEUROLOGY | Age: 63
Discharge: HOME | End: 2018-06-14
Payer: MEDICARE

## 2018-06-14 DIAGNOSIS — M54.17 L-S RADICULOPATHY: ICD-10-CM

## 2018-06-14 PROCEDURE — 95886 MUSC TEST DONE W/N TEST COMP: CPT

## 2018-06-14 PROCEDURE — 95912 NRV CNDJ TEST 11-12 STUDIES: CPT | Performed by: PSYCHIATRY & NEUROLOGY

## 2018-06-14 PROCEDURE — 95912 NRV CNDJ TEST 11-12 STUDIES: CPT

## 2018-06-14 PROCEDURE — 95886 MUSC TEST DONE W/N TEST COMP: CPT | Performed by: PSYCHIATRY & NEUROLOGY

## 2018-06-19 ENCOUNTER — OFFICE VISIT (OUTPATIENT)
Dept: VASCULAR SURGERY | Age: 63
End: 2018-06-19

## 2018-06-19 DIAGNOSIS — T82.898D PROBLEM WITH DIALYSIS ACCESS, SUBSEQUENT ENCOUNTER: Primary | ICD-10-CM

## 2018-06-19 PROCEDURE — 99024 POSTOP FOLLOW-UP VISIT: CPT | Performed by: SURGERY

## 2018-06-19 RX ORDER — ALBUTEROL SULFATE 0.63 MG/3ML
1 SOLUTION RESPIRATORY (INHALATION) EVERY 6 HOURS PRN
COMMUNITY
End: 2018-06-28 | Stop reason: SDUPTHER

## 2018-07-02 ENCOUNTER — HOSPITAL ENCOUNTER (EMERGENCY)
Age: 63
Discharge: HOME | End: 2018-07-02
Attending: EMERGENCY MEDICINE
Payer: MEDICARE

## 2018-07-02 ENCOUNTER — APPOINTMENT (OUTPATIENT)
Dept: GENERAL RADIOLOGY | Age: 63
End: 2018-07-02
Payer: MEDICARE

## 2018-07-02 ENCOUNTER — APPOINTMENT (OUTPATIENT)
Dept: CT IMAGING | Age: 63
End: 2018-07-02
Payer: MEDICARE

## 2018-07-02 VITALS
DIASTOLIC BLOOD PRESSURE: 55 MMHG | HEART RATE: 75 BPM | OXYGEN SATURATION: 96 % | WEIGHT: 211 LBS | RESPIRATION RATE: 16 BRPM | BODY MASS INDEX: 37.39 KG/M2 | SYSTOLIC BLOOD PRESSURE: 105 MMHG | HEIGHT: 63 IN | TEMPERATURE: 98.4 F

## 2018-07-02 DIAGNOSIS — S00.12XA PERIORBITAL ECCHYMOSIS OF LEFT EYE, INITIAL ENCOUNTER: ICD-10-CM

## 2018-07-02 DIAGNOSIS — S09.90XA CLOSED HEAD INJURY, INITIAL ENCOUNTER: Primary | ICD-10-CM

## 2018-07-02 DIAGNOSIS — N64.89 BREAST HEMATOMA: ICD-10-CM

## 2018-07-02 PROCEDURE — 72125 CT NECK SPINE W/O DYE: CPT

## 2018-07-02 PROCEDURE — 99284 EMERGENCY DEPT VISIT MOD MDM: CPT

## 2018-07-02 PROCEDURE — 70486 CT MAXILLOFACIAL W/O DYE: CPT

## 2018-07-02 PROCEDURE — 70450 CT HEAD/BRAIN W/O DYE: CPT

## 2018-07-02 PROCEDURE — 6370000000 HC RX 637 (ALT 250 FOR IP): Performed by: EMERGENCY MEDICINE

## 2018-07-02 RX ORDER — HYDROCODONE BITARTRATE AND ACETAMINOPHEN 5; 325 MG/1; MG/1
1-2 TABLET ORAL EVERY 6 HOURS PRN
Qty: 10 TABLET | Refills: 0 | Status: SHIPPED | OUTPATIENT
Start: 2018-07-02 | End: 2018-07-05

## 2018-07-02 RX ORDER — ACETAMINOPHEN 325 MG/1
650 TABLET ORAL ONCE
Status: COMPLETED | OUTPATIENT
Start: 2018-07-02 | End: 2018-07-02

## 2018-07-02 RX ORDER — HYDROCODONE BITARTRATE AND ACETAMINOPHEN 5; 325 MG/1; MG/1
1 TABLET ORAL ONCE
Status: COMPLETED | OUTPATIENT
Start: 2018-07-02 | End: 2018-07-02

## 2018-07-02 RX ADMIN — ACETAMINOPHEN 650 MG: 325 TABLET ORAL at 15:28

## 2018-07-02 RX ADMIN — HYDROCODONE BITARTRATE AND ACETAMINOPHEN 1 TABLET: 5; 325 TABLET ORAL at 15:28

## 2018-07-02 ASSESSMENT — PAIN SCALES - GENERAL: PAINLEVEL_OUTOF10: 8

## 2018-07-02 NOTE — ED NOTES
Patient verbally consented to being treated in hallway bed during triage questions.         Tara Angel RN  07/02/18 6177

## 2018-07-02 NOTE — ED PROVIDER NOTES
FIRST PROVIDER CONTACT ASSESSMENT NOTE      Department of Emergency Medicine   7/2/18  2:13 PM    Chief Complaint: Fall (reports fell over rug lastnight, +head -loc +bloodthinners (plavix). left shoulder, rib pain, neck, leg, and back pain)      History of Present Illness:   Tanya Coker is a 70 y.o. female who presents to the ED for A mechanical fall last night. Patient states that she hit the left side of her face. Patient denies LOC. Patient states that she does have a headache. Patient denies visual changes, nausea or vomiting. Patient is on Plavix. She is also complaining of left shoulder pain rib pain neck pain and back pain. Patient has been ambulatory since the fall. Medical History:  has a past medical history of (HFpEF) heart failure with preserved ejection fraction ([address] Anemia; Asthma; Chronic kidney disease; Complication of AV dialysis fistula, initial encounter; COPD (chronic obstructive pulmonary disease) ([facility] Fibromyalgia; GERD (gastroesophageal reflux disease); Hemodialysis patient [facility]); History of blood transfusion; Hx of blood clots; Hyperlipidemia; Hypertension; Kidney failure; Kidney stone; Polymyalgia rheumatica ([address] Restless leg syndrome; Short gut syndrome; Sleep apnea; Thyroid disease; Traumatic hematoma of left upper arm; and Wears dentures. Surgical History:  has a past surgical history that includes Appendectomy; Intestinal Bypass (1973); Kidney stone surgery; Colonoscopy; Endoscopy, colon, diagnostic; bronchoscopy (2010); Colonoscopy (4/21/15); Tunneled venous port placement (Right); transesophageal echocardiogram (02/03/2017); Cardiac catheterization (02/17/2017); eye surgery (Right); skin biopsy (Right, 2000); Breast biopsy (Right, 2000); Cardiac valve replacement (03/21/2017); Tunneled venous catheter placement (Right); other surgical history (Left, 11/16/2017); Dialysis fistula creation (Left, 11/16/2017); other surgical history (01/17/2018);  Dialysis fistula creation (Left, 01/25/2018); AV fistula repair (Left, 03/22/2018); and pr creat av fistula,autogenous graft (Left, 3/22/2018). Social History:  reports that she quit smoking about 21 years ago. Her smoking use included Cigarettes. She started smoking about 29 years ago. She has a 20.00 pack-year smoking history. She has never used smokeless tobacco. She reports that she does not drink alcohol or use drugs. Family History: family history includes Cirrhosis in her mother; Depression in her sister; Diabetes in her brother and father; Heart Failure in her mother; High Blood Pressure in her brother, mother, and sister; Obesity in her mother. *ALLERGIES*     Coumadin [warfarin sodium];  Quinine derivatives; and Other     Physical Exam:      VS:  BP (!) 105/55   Pulse 76   Temp 98.4 °F (36.9 °C) (Oral)   Resp 20   Ht 5' 3\" (1.6 m)   Wt 211 lb (95.7 kg)   SpO2 98%   BMI 37.38 kg/m²      Initial Plan of Care:  Initiate Treatment-Testing, Proceed toTreatment Area When Bed Available for ED Attending/MLP to Continue Care    -----------------END OF FIRST PROVIDER CONTACT ASSESSMENT NOTE--------------  Electronically signed by GABRIELLA Stephens CNP   DD: 7/2/18             GABRIELLA Stephens CNP  07/02/18 1417

## 2018-07-02 NOTE — ED NOTES
Bed:  HALL-02  Expected date:   Expected time:   Means of arrival:   Comments:  Bette Sandy, MALU  07/02/18 3490

## 2018-07-02 NOTE — ED PROVIDER NOTES
HPI:  7/2/18,   Time: 4:19 PM         Matthias Lopez is a 70 y.o. female presenting to the ED for Valuation after suffering a fall within the past day. Patient was walking and she tripped a carpet landing forward landing on her face. She complains of left-sided facial pain, mild headache and neck stiffness. She denies loss of consciousness, blurred vision, chest pain, palpitations, abdominal pain. She has some slight pain in the left breast where she developed a bruise. She does take Plavix and she denies abdominal pain, back pain, numbness or weakness in her upper or lower extremities. ROS:   Pertinent positives and negatives are stated within HPI, all other systems reviewed and are negative.  --------------------------------------------- PAST HISTORY ---------------------------------------------  Past Medical History:  has a past medical history of (HFpEF) heart failure with preserved ejection fraction ([facility] Anemia; Asthma; Chronic kidney disease; Complication of AV dialysis fistula, initial encounter; COPD (chronic obstructive pulmonary disease) ([facility] Fibromyalgia; GERD (gastroesophageal reflux disease); Hemodialysis patient [facility]); History of blood transfusion; Hx of blood clots; Hyperlipidemia; Hypertension; Kidney failure; Kidney stone; Polymyalgia rheumatica ([facility] Restless leg syndrome; Short gut syndrome; Sleep apnea; Thyroid disease; Traumatic hematoma of left upper arm; and Wears dentures. Past Surgical History:  has a past surgical history that includes Appendectomy; Intestinal Bypass (1973); Kidney stone surgery; Colonoscopy; Endoscopy, colon, diagnostic; bronchoscopy (2010); Colonoscopy (4/21/15); Tunneled venous port placement (Right); transesophageal echocardiogram (02/03/2017); Cardiac catheterization (02/17/2017); eye surgery (Right); skin biopsy (Right, 2000); Breast biopsy (Right, 2000); Cardiac valve replacement (03/21/2017);  Tunneled venous catheter placement (Right); other surgical history (Left, 11/16/2017); Dialysis fistula creation (Left, 11/16/2017); other surgical history (01/17/2018); Dialysis fistula creation (Left, 01/25/2018); AV fistula repair (Left, 03/22/2018); and pr creat av fistula,autogenous graft (Left, 3/22/2018). Social History:  reports that she quit smoking about 21 years ago. Her smoking use included Cigarettes. She started smoking about 29 years ago. She has a 20.00 pack-year smoking history. She has never used smokeless tobacco. She reports that she does not drink alcohol or use drugs. Family History: family history includes Cirrhosis in her mother; Depression in her sister; Diabetes in her brother and father; Heart Failure in her mother; High Blood Pressure in her brother, mother, and sister; Obesity in her mother. The patients home medications have been reviewed. Allergies: Coumadin [warfarin sodium]; Quinine derivatives; and Other    -------------------------------------------------- RESULTS -------------------------------------------------  All laboratory and radiology results have been personally reviewed by myself   LABS:  No results found for this visit on 07/02/18. RADIOLOGY:  Interpreted by Radiologist.  CT Facial Bones WO Contrast   Final Result   No fracture of the maxillofacial bones. CT Cervical Spine WO Contrast   Final Result   No evidence of fracture or dislocation of cervical spine. CT Head WO Contrast   Final Result   1. No evidence of intracranial hemorrhage, extra axial collection,   space-occupying mass lesion or mass effect, midline shift, or acute   territorial infarction. If there is strong clinical suspicion for   acute infarct of the brain, then MR imaging of the brain with   diffusion-weighted sequence may be obtained for further evaluation. 2. Mild soft tissue swelling overlying the left zygomatic process and   the left lateral periorbital region.    3. Chronic involutional changes and mild (NORCO) 5-325 MG per tablet 1 tablet (1 tablet Oral Given 7/2/18 9980)         Medical Decision Making:    Patient remains clinically stable and neurologically intact. Radiographic studies show no evidence of intracranial, cervical or facial abnormality. She'll be discharged home small prescription for analgesia and instructions to return should she develop worsening symptoms. Patient advised to return to the emergency department should symptoms worsen. Advised to contact primary care physician to secure follow-up appointment within the next 1-2 days. --------------------------------- IMPRESSION AND DISPOSITION ---------------------------------    IMPRESSION  1. Closed head injury, initial encounter    2. Breast hematoma    3.  Periorbital ecchymosis of left eye, initial encounter        DISPOSITION  Disposition: Discharge to home  Patient condition is good        Fahad Gorman DO  07/02/18 5841

## 2018-07-10 ENCOUNTER — TELEPHONE (OUTPATIENT)
Dept: VASCULAR SURGERY | Age: 63
End: 2018-07-10

## 2018-07-10 ENCOUNTER — OFFICE VISIT (OUTPATIENT)
Dept: VASCULAR SURGERY | Age: 63
End: 2018-07-10
Payer: MEDICARE

## 2018-07-10 DIAGNOSIS — Z99.2 ENCOUNTER REGARDING VASCULAR ACCESS FOR DIALYSIS FOR END-STAGE RENAL DISEASE (HCC): Primary | ICD-10-CM

## 2018-07-10 DIAGNOSIS — N18.6 ENCOUNTER REGARDING VASCULAR ACCESS FOR DIALYSIS FOR END-STAGE RENAL DISEASE (HCC): Primary | ICD-10-CM

## 2018-07-10 PROCEDURE — 1123F ACP DISCUSS/DSCN MKR DOCD: CPT | Performed by: NURSE PRACTITIONER

## 2018-07-10 PROCEDURE — G8400 PT W/DXA NO RESULTS DOC: HCPCS | Performed by: NURSE PRACTITIONER

## 2018-07-10 PROCEDURE — 1090F PRES/ABSN URINE INCON ASSESS: CPT | Performed by: NURSE PRACTITIONER

## 2018-07-10 PROCEDURE — G8417 CALC BMI ABV UP PARAM F/U: HCPCS | Performed by: NURSE PRACTITIONER

## 2018-07-10 PROCEDURE — G8598 ASA/ANTIPLAT THER USED: HCPCS | Performed by: SURGERY

## 2018-07-10 PROCEDURE — G8428 CUR MEDS NOT DOCUMENT: HCPCS | Performed by: NURSE PRACTITIONER

## 2018-07-10 PROCEDURE — G8400 PT W/DXA NO RESULTS DOC: HCPCS | Performed by: SURGERY

## 2018-07-10 PROCEDURE — 1036F TOBACCO NON-USER: CPT | Performed by: NURSE PRACTITIONER

## 2018-07-10 PROCEDURE — 99212 OFFICE O/P EST SF 10 MIN: CPT | Performed by: NURSE PRACTITIONER

## 2018-07-10 PROCEDURE — G8598 ASA/ANTIPLAT THER USED: HCPCS | Performed by: NURSE PRACTITIONER

## 2018-07-10 PROCEDURE — G8417 CALC BMI ABV UP PARAM F/U: HCPCS | Performed by: SURGERY

## 2018-07-10 PROCEDURE — G8428 CUR MEDS NOT DOCUMENT: HCPCS | Performed by: SURGERY

## 2018-07-10 PROCEDURE — 1123F ACP DISCUSS/DSCN MKR DOCD: CPT | Performed by: SURGERY

## 2018-07-10 PROCEDURE — 4040F PNEUMOC VAC/ADMIN/RCVD: CPT | Performed by: SURGERY

## 2018-07-10 PROCEDURE — 4040F PNEUMOC VAC/ADMIN/RCVD: CPT | Performed by: NURSE PRACTITIONER

## 2018-07-10 PROCEDURE — 1090F PRES/ABSN URINE INCON ASSESS: CPT | Performed by: SURGERY

## 2018-07-10 PROCEDURE — 3017F COLORECTAL CA SCREEN DOC REV: CPT | Performed by: SURGERY

## 2018-07-10 PROCEDURE — 1036F TOBACCO NON-USER: CPT | Performed by: SURGERY

## 2018-07-10 PROCEDURE — 3017F COLORECTAL CA SCREEN DOC REV: CPT | Performed by: NURSE PRACTITIONER

## 2018-07-10 RX ORDER — FENOFIBRATE 145 MG/1
145 TABLET, COATED ORAL
COMMUNITY
Start: 2018-06-19

## 2018-07-10 NOTE — PROGRESS NOTES
Vascular Surgery Outpatient Progress Note      Chief Complaint   Patient presents with    Vascular Access Problem     f/u fistula infiltration       HISTORY OF PRESENT ILLNESS:                The patient is a 70 y.o. female who returns for follow-up evaluation of her dialysis access following an infiltration at her dialysis facility. She reports that the pain and swelling have significantly improved since her last visit.      Past Medical History:        Diagnosis Date    (HFpEF) heart failure with preserved ejection fraction ([facility] [facility] 01/25/2017 4/11/17- echo- LVEF 55-60%, stage II DD, severely dilated LA, severe MR, mild TR, LVDD: 5.6 cm    Anemia     hx  / takes aranesp injections every 2 weeks    Asthma     well controlled with inhalers     Chronic kidney disease     stage 4    Complication of AV dialysis fistula, initial encounter 6/2/2018    COPD (chronic obstructive pulmonary disease) (HCC)     Fibromyalgia     GERD (gastroesophageal reflux disease)     Hemodialysis patient ([facility]     chato [place]    History of blood transfusion spring 2017    Hx of blood clots     h/o DVT in leg at age 25yrs    Hyperlipidemia     Hypertension     well controlled with medications     Kidney failure     Kidney stone     multiple issues    Polymyalgia rheumatica (HCC)     Restless leg syndrome     Short gut syndrome     Sleep apnea     uses CPAP    Thyroid disease     Traumatic hematoma of left upper arm 6/2/2018    Wears dentures     upper partial     Past Surgical History:        Procedure Laterality Date    APPENDECTOMY      AV FISTULA REPAIR Left 03/22/2018    Superficialization, Delatore    BREAST BIOPSY Right 2000    BRONCHOSCOPY  2010    CARDIAC CATHETERIZATION  02/17/2017    Dr Rajani Montesinos REPLACEMENT  03/21/2017    MVR, TVR    COLONOSCOPY      COLONOSCOPY  4/21/15    DIALYSIS FISTULA CREATION Left 51/58/5942    radiocephalic, Delatore    DIALYSIS FISTULA CREATION tablet Take 20 mg by mouth daily 2/18/18  Yes Historical Provider, MD   montelukast (SINGULAIR) 10 MG tablet Take 1 tablet by mouth nightly 3/1/18  Yes Neisha Villalobos MD   mometasone-formoterol (DULERA) 200-5 MCG/ACT inhaler Inhale 2 puffs into the lungs every 12 hours Use am of surgery   Yes Historical Provider, MD   Aspirin-Acetaminophen-Caffeine (EXCEDRIN EXTRA STRENGTH PO) Take by mouth as needed Ld 3-16-18   Yes Historical Provider, MD   Omega-3 Fatty Acids (FISH OIL) 1200 MG CAPS Take by mouth daily LD 3-16-18   Yes Historical Provider, MD   iron dextran complex (INFED) 50 MG/ML injection Inject 100 mg into the muscle once Twice per month   Yes Historical Provider, MD   loperamide (IMODIUM) 2 MG capsule Take 2 mg by mouth as needed for Diarrhea   Yes Historical Provider, MD   magnesium oxide (MAG-OX) 400 MG tablet Take 1,500 mg by mouth 2 times daily    Yes Historical Provider, MD   levothyroxine (SYNTHROID) 137 MCG tablet Take 137 mcg by mouth Daily   Yes Historical Provider, MD   calcium acetate (PHOSLO) 667 MG capsule Take by mouth 4 times daily   Yes Historical Provider, MD   clopidogrel (PLAVIX) 75 MG tablet TAKE ONE TABLET BY MOUTH EVERY DAY 5/23/17  Yes Historical Provider, MD   furosemide (LASIX) 40 MG tablet Take 1 tablet by mouth daily  Patient taking differently: Take 40 mg by mouth nightly  4/14/17  Yes Anjelica Roche MD   liothyronine (CYTOMEL) 5 MCG tablet Take 5 mcg by mouth daily   Yes Historical Provider, MD   acetaminophen (TYLENOL) 325 MG tablet Take 2 tablets by mouth every 4 hours as needed for Pain or Fever 3/30/17  Yes GABRIELLA Potts CNP   aspirin 81 MG EC tablet Take 1 tablet by mouth daily  Patient taking differently: Take 81 mg by mouth daily LD 3-21-18 3/30/17  Yes GABRIELLA Potts CNP   carvedilol (COREG) 6.25 MG tablet Take 1 tablet by mouth 2 times daily (with meals)  Patient taking differently: Take 6.25 mg by mouth Take morning of surgery with a sip of water No narrative on file        Family History   Problem Relation Age of Onset    Obesity Mother     Cirrhosis Mother     Heart Failure Mother     High Blood Pressure Mother     Diabetes Father     High Blood Pressure Sister     Depression Sister     Diabetes Brother     High Blood Pressure Brother        REVIEW OF SYSTEMS (New symptoms):    Eyes:      Blurred vision:  No [x]/Yes []               Diplopia:   No [x]/Yes []               Vision loss:       No [x]/Yes []   Ears, nose, throat:             Hearing loss:    No [x]/Yes []      Vertigo:   No [x]/Yes []                       Swallowing problem:  No [x]/Yes []               Nose bleeds:   No [x]/Yes []      Voice hoarseness:  No [x]/Yes []  Respiratory:             Cough:   No [x]/Yes []      Pleuritic chest pain:  No [x]/Yes []                        Dyspnea:   No [x]/Yes []      Wheezing:   No [x]/Yes []  Cardiovascular:             Angina:   No [x]/Yes []      Palpitations:   No [x]/Yes []          Claudication:    No [x]/Yes []      Leg swelling:   No [x]/Yes []  Gastrointestinal:             Nausea or vomiting:  No [x]/Yes []               Abdominal pain:  No [x]/Yes []                     Intestinal bleeding: No [x]/Yes []  Musculoskeletal:             Leg pain:   No [x]/Yes []      Back pain:   No [x]/Yes []                    Weakness:   No [x]/Yes []  Neurologic:             Numbness:   No [x]/Yes []      Paralysis:   No [x]/Yes []                       Headaches:   No [x]/Yes []  Hematologic, lymphatic:   Anemia:   No [x]/Yes []              Bleeding or bruising:  No []/Yes [x]              Fevers or chills: No [x]/Yes []  Endocrine:             Temp intolerance:   No [x]/Yes []                       Polydipsia, polyuria:  No [x]/Yes []  Skin:              Rash:    No [x]/Yes []      Ulcers:   No [x]/Yes []              Abnorm pigment: No [x]/Yes []  :              Frequency/urgency:  No [x]/Yes []      Hematuria:    No [x]/Yes []

## 2018-07-13 ENCOUNTER — OFFICE VISIT (OUTPATIENT)
Dept: NEUROLOGY | Age: 63
End: 2018-07-13
Payer: MEDICARE

## 2018-07-13 VITALS
HEIGHT: 63 IN | BODY MASS INDEX: 37.21 KG/M2 | OXYGEN SATURATION: 95 % | DIASTOLIC BLOOD PRESSURE: 66 MMHG | SYSTOLIC BLOOD PRESSURE: 118 MMHG | WEIGHT: 210 LBS | HEART RATE: 72 BPM

## 2018-07-13 DIAGNOSIS — M54.17 L-S RADICULOPATHY: Primary | ICD-10-CM

## 2018-07-13 DIAGNOSIS — I63.10 CEREBROVASCULAR ACCIDENT (CVA) DUE TO EMBOLISM OF PRECEREBRAL ARTERY (HCC): ICD-10-CM

## 2018-07-13 PROCEDURE — 1123F ACP DISCUSS/DSCN MKR DOCD: CPT | Performed by: CLINICAL NURSE SPECIALIST

## 2018-07-13 PROCEDURE — G8400 PT W/DXA NO RESULTS DOC: HCPCS | Performed by: CLINICAL NURSE SPECIALIST

## 2018-07-13 PROCEDURE — 1090F PRES/ABSN URINE INCON ASSESS: CPT | Performed by: CLINICAL NURSE SPECIALIST

## 2018-07-13 PROCEDURE — 99214 OFFICE O/P EST MOD 30 MIN: CPT | Performed by: CLINICAL NURSE SPECIALIST

## 2018-07-13 PROCEDURE — 4040F PNEUMOC VAC/ADMIN/RCVD: CPT | Performed by: CLINICAL NURSE SPECIALIST

## 2018-07-13 PROCEDURE — 1101F PT FALLS ASSESS-DOCD LE1/YR: CPT | Performed by: CLINICAL NURSE SPECIALIST

## 2018-07-13 PROCEDURE — 3017F COLORECTAL CA SCREEN DOC REV: CPT | Performed by: CLINICAL NURSE SPECIALIST

## 2018-07-13 PROCEDURE — 1036F TOBACCO NON-USER: CPT | Performed by: CLINICAL NURSE SPECIALIST

## 2018-07-13 PROCEDURE — G8417 CALC BMI ABV UP PARAM F/U: HCPCS | Performed by: CLINICAL NURSE SPECIALIST

## 2018-07-13 PROCEDURE — G8598 ASA/ANTIPLAT THER USED: HCPCS | Performed by: CLINICAL NURSE SPECIALIST

## 2018-07-13 PROCEDURE — G8428 CUR MEDS NOT DOCUMENT: HCPCS | Performed by: CLINICAL NURSE SPECIALIST

## 2018-07-13 NOTE — PROGRESS NOTES
Provider, MD   acetaminophen (TYLENOL) 325 MG tablet Take 2 tablets by mouth every 4 hours as needed for Pain or Fever Yes GABRIELLA Samuels CNP   aspirin 81 MG EC tablet Take 1 tablet by mouth daily  Patient taking differently: Take 81 mg by mouth daily LD 3-21-18 Yes GABRIELLA Samuels CNP   carvedilol (COREG) 6.25 MG tablet Take 1 tablet by mouth 2 times daily (with meals)  Patient taking differently: Take 6.25 mg by mouth Take morning of surgery with a sip of water Yes GABRIELLA Samuels CNP   atorvastatin (LIPITOR) 20 MG tablet Take 20 mg by mouth nightly  Yes Historical Provider, MD   Coenzyme Q10 (COQ10 PO) Take by mouth nightly LD 3-16-18 Yes Historical Provider, MD   BIOTIN PO Take by mouth 2 times daily With keratin LD 3-16-18 Yes Historical Provider, MD   vitamin B-12 (CYANOCOBALAMIN) 1000 MCG tablet Take 1,000 mcg by mouth daily LD 3-16-18 Yes Historical Provider, MD   darbepoetin izabella-polysorbate (ARANESP, ALBUMIN FREE,) 60 MCG/ML injection Inject into the skin every 14 days Twice/per month  Dose given 3-16-18 at dialysis Yes Historical Provider, MD   CPAP Machine MISC Please replace patients CPAP at 8 cm water pressure with heated humidification and C-Flex of 3. Patient states she is due for a new machine and hers is not working. Also provide masks, tubing, filters, head gear, and water chambers as needed. Diagnosis-YUNIOR  Faxed to [facility]  Length of need 99 months Yes Lencho Patterson MD   vitamin D-3 (CHOLECALCIFEROL) 5000 UNITS TABS Take 5,000 Units by mouth 2 times daily LD 3-16-18 Yes Historical Provider, MD   folic acid (FOLVITE) 1 MG tablet Take 1 mg by mouth daily 3-16-18 Yes Historical Provider, MD   pregabalin (LYRICA) 50 MG capsule Take 50 mg by mouth 3 times daily Instructed to take am of procedure.  Yes Historical Provider, MD   predniSONE (DELTASONE) 10 MG tablet 4 tabs x 2 days, 2 tabs x 2 days, 1 tab x 2 days  Lencho Patterson MD   Aspirin-Acetaminophen-Caffeine [facility] EXTRA STRENGTH PO) Take by mouth as needed Ld 3-16-18  Historical Provider, MD     Allergies as of 07/13/2018 - Review Complete 07/13/2018   Allergen Reaction Noted    Coumadin [warfarin sodium] Hives 08/24/2014    Quinine derivatives Hives 08/24/2014    Other  01/31/2017       Objective:   /66 (Site: Right Arm, Position: Sitting, Cuff Size: Medium Adult)   Pulse 72   Ht 5' 3\" (1.6 m)   Wt 210 lb (95.3 kg)   SpO2 95%   BMI 37.20 kg/m²      General appearance: alert, appears stated age and cooperative  Head: Normocephalic, without obvious abnormality, atraumatic  Neck: no adenopathy, no carotid bruit and limited ROM - dressing secure right chest  Lungs: clear to auscultation bilaterally  Heart: regular rate and rhythm  Extremities: no cyanosis or edema  Pulses: 2+ and symmetric  Skin: no rashes or lesions      Wearing sandals today with small heel    Mental Status: Alert, oriented, thought content appropriate    Speech: clear  Language: appropriate     Cranial Nerves:  I: smell    II: visual acuity     II: visual fields Full    II: pupils KELLY   III,VII: ptosis None   III,IV,VI: extraocular muscles  EOMI without nystagmus    V: mastication Normal   V: facial light touch sensation  Normal   V,VII: corneal reflex  Present   VII: facial muscle function - upper     VII: facial muscle function - lower Normal   VIII: hearing Normal   IX: soft palate elevation  Normal   IX,X: gag reflex Present   XI: trapezius strength  5/5   XI: sternocleidomastoid strength 5/5   XI: neck extension strength  5/5   XII: tongue strength  Normal     Motor:  Right   5/5              Left   5/5               Right Bicep  5/5           Left Bicep  5/5              Right Triceps   5/5       Left Triceps  5/5          Right Deltoid  5/5     Left Deltoid  5/5        Right IPS  5/5            Left IPS  4/5               Right Quadriceps  5/5          Left Quadriceps    4/5           Right Gastrocnemius    5/5    Left

## 2018-07-17 ENCOUNTER — TELEPHONE (OUTPATIENT)
Dept: NEUROLOGY | Age: 63
End: 2018-07-17

## 2018-07-17 NOTE — TELEPHONE ENCOUNTER
Per Malathi Muller [phone] MRI Lumbar Spine needs Clinical review notes sent with copy of card to [facility] [phone] ref# [id number]. Scheduled at [facility] for 7/26 @ 12:00. Patient notified of date/time/instructions and understood to arrive at 11:45.   Electronically signed by Asia Mathis on 7/17/2018 at 10:31 AM

## 2018-07-24 ENCOUNTER — OFFICE VISIT (OUTPATIENT)
Dept: VASCULAR SURGERY | Age: 63
End: 2018-07-24

## 2018-07-24 DIAGNOSIS — M79.89 LEFT UPPER EXTREMITY SWELLING: ICD-10-CM

## 2018-07-24 DIAGNOSIS — T82.898D PROBLEM WITH DIALYSIS ACCESS, SUBSEQUENT ENCOUNTER: Primary | ICD-10-CM

## 2018-07-24 PROCEDURE — 99024 POSTOP FOLLOW-UP VISIT: CPT | Performed by: SURGERY

## 2018-07-24 RX ORDER — CYCLOBENZAPRINE HCL 5 MG
5 TABLET ORAL PRN
COMMUNITY
End: 2018-07-25 | Stop reason: HOSPADM

## 2018-07-24 RX ORDER — LEVETIRACETAM 500 MG/1
500 TABLET ORAL DAILY
COMMUNITY
End: 2018-07-25 | Stop reason: HOSPADM

## 2018-07-24 NOTE — PROGRESS NOTES
7/24/2018    Ruby Greenraegan  1955    Chief Complaint   Patient presents with    Circulatory Problem     dog scratched access arm, has swelling (L) hand       Patient returns for Follow-up of her left AV access. She states that the fistulous functioning well however she's had progressive swelling of her left upper extremities. She denies any pain. Procedure Laterality Date    APPENDECTOMY      AV FISTULA REPAIR Left 03/22/2018    Superficialization, Delatore    BREAST BIOPSY Right 2000    BRONCHOSCOPY  2010    CARDIAC CATHETERIZATION  02/17/2017    Dr Onel Barker VALVE REPLACEMENT  03/21/2017    MVR, TVR    COLONOSCOPY      COLONOSCOPY  4/21/15    DIALYSIS FISTULA CREATION Left 18/10/2934    radiocephalic, Delatore    DIALYSIS FISTULA CREATION Left 01/25/2018    brachioecephalic, ligation radiocephalic, Delatore    ENDOSCOPY, COLON, DIAGNOSTIC      EYE SURGERY Right     CATARACT    INTESTINAL BYPASS  1973    for weight loss    KIDNEY STONE SURGERY      x 3    OTHER SURGICAL HISTORY Left 11/16/2017    AV fistula creation left arm    OTHER SURGICAL HISTORY  01/17/2018    Left arm fistulogram by Dr Jina Field.  CT CREAT AV FISTULA,AUTOGENOUS GRAFT Left 3/22/2018    AV FISTULA  REVISION LEFT ARM performed by Artem Márquez MD at [address] Right 2000    RIGHT BREAST MOLE REMOVED    TRANSESOPHAGEAL ECHOCARDIOGRAM  02/03/2017    TUNNELED VENOUS CATHETER PLACEMENT Right     TUNNELED VENOUS PORT PLACEMENT Right     Powerport / x 4 / at right chest; since short gut syndrome received magnesium & sodium bicarb in past       Physical Exam:  Significant edema of left upper extremity from the mid humeral region including the fingers. Strong thrill over the AV graft.            Right      Left   Brachial     Radial     Femoral     Popliteal     Dorsalis Pedis     Posterior Tibial     (3=normal, 2=diminished, 1=barely palpable, 4=widened)    Assessment:    Problem List Items

## 2018-07-25 ENCOUNTER — HOSPITAL ENCOUNTER (OUTPATIENT)
Dept: CARDIAC CATH/INVASIVE PROCEDURES | Age: 63
Discharge: HOME | End: 2018-07-25
Payer: MEDICARE

## 2018-07-25 VITALS
HEART RATE: 78 BPM | SYSTOLIC BLOOD PRESSURE: 105 MMHG | BODY MASS INDEX: 37.21 KG/M2 | DIASTOLIC BLOOD PRESSURE: 63 MMHG | WEIGHT: 210 LBS | TEMPERATURE: 98 F | HEIGHT: 63 IN | RESPIRATION RATE: 16 BRPM

## 2018-07-25 PROCEDURE — 36907 BALO ANGIOP CTR DIALYSIS SEG: CPT | Performed by: SURGERY

## 2018-07-25 PROCEDURE — 2709999900 HC NON-CHARGEABLE SUPPLY

## 2018-07-25 PROCEDURE — 6370000000 HC RX 637 (ALT 250 FOR IP): Performed by: SURGERY

## 2018-07-25 PROCEDURE — C1887 CATHETER, GUIDING: HCPCS

## 2018-07-25 PROCEDURE — 36901 INTRO CATH DIALYSIS CIRCUIT: CPT | Performed by: SURGERY

## 2018-07-25 PROCEDURE — 2500000003 HC RX 250 WO HCPCS

## 2018-07-25 PROCEDURE — C1769 GUIDE WIRE: HCPCS

## 2018-07-25 PROCEDURE — C1894 INTRO/SHEATH, NON-LASER: HCPCS

## 2018-07-25 PROCEDURE — C1725 CATH, TRANSLUMIN NON-LASER: HCPCS

## 2018-07-25 RX ORDER — ACETAMINOPHEN 325 MG/1
650 TABLET ORAL EVERY 4 HOURS PRN
Status: DISCONTINUED | OUTPATIENT
Start: 2018-07-25 | End: 2018-07-26 | Stop reason: HOSPADM

## 2018-07-25 RX ADMIN — ACETAMINOPHEN 650 MG: 325 TABLET, FILM COATED ORAL at 11:05

## 2018-07-25 ASSESSMENT — PAIN SCALES - GENERAL: PAINLEVEL_OUTOF10: 8

## 2018-07-25 NOTE — H&P
well controlled with inhalers     Chronic kidney disease     stage 4    Complication of AV dialysis fistula, initial encounter 6/2/2018    COPD (chronic obstructive pulmonary disease) (HCC)     Fibromyalgia     GERD (gastroesophageal reflux disease)     Hemodialysis patient ([facility] [address]     chato kim [place]    History of blood transfusion spring 2017    Hx of blood clots     h/o DVT in leg at age 25yrs    Hyperlipidemia     Hypertension     well controlled with medications     Kidney failure     Kidney stone     multiple issues    Polymyalgia rheumatica (HCC)     Restless leg syndrome     Short gut syndrome     Sleep apnea     uses CPAP    Thyroid disease     Traumatic hematoma of left upper arm 6/2/2018    Wears dentures     upper partial        Past Surgical History:   Procedure Laterality Date    APPENDECTOMY      AV FISTULA REPAIR Left 03/22/2018    Superficialization, Delatore    BREAST BIOPSY Right 2000    BRONCHOSCOPY  2010    CARDIAC CATHETERIZATION  02/17/2017    Dr [place] REPLACEMENT  03/21/2017    MVR, TVR    COLONOSCOPY      COLONOSCOPY  4/21/15    DIALYSIS FISTULA CREATION Left 78/76/3729    radiocephalic, Delatore    DIALYSIS FISTULA CREATION Left 01/25/2018    brachioecephalic, ligation radiocephalic, Delatore    ENDOSCOPY, COLON, DIAGNOSTIC      EYE SURGERY Right     CATARACT    INTESTINAL BYPASS  1973    for weight loss    KIDNEY STONE SURGERY      x 3    OTHER SURGICAL HISTORY Left 11/16/2017    AV fistula creation left arm    OTHER SURGICAL HISTORY  01/17/2018    Left arm fistulogram by Dr Michelle Emanuel.     VA CREAT AV FISTULA,AUTOGENOUS GRAFT Left 3/22/2018    AV FISTULA  REVISION LEFT ARM performed by Angel Schwab, MD at [address] Right 2000    RIGHT BREAST MOLE REMOVED    TRANSESOPHAGEAL ECHOCARDIOGRAM  02/03/2017    TUNNELED VENOUS CATHETER PLACEMENT Right     TUNNELED VENOUS PORT PLACEMENT Right     Powerport / x 4 / at (KEPPRA) 500 MG tablet, Take 500 mg by mouth daily, Disp: , Rfl:     Glucosamine-Chondroit-Vit C-Mn (GLUCOSAMINE-CHONDROITIN) TABS, Take 2 tablets by mouth daily, Disp: , Rfl:     iron dextran complex (INFED) 50 MG/ML injection, Inject 100 mg into the muscle once Twice per month, Disp: , Rfl:     BIOTIN PO, Take by mouth 2 times daily With keratin LD 3-16-18, Disp: , Rfl:     Current Facility-Administered Medications:     acetaminophen (TYLENOL) tablet 650 mg, 650 mg, Oral, Q4H PRN, Tiffany Owen MD, 650 mg at 07/25/18 1105    Allergies:  Coumadin [warfarin sodium]; Quinine derivatives; and Other    Social History     Social History    Marital status:      Spouse name: N/A    Number of children: N/A    Years of education: N/A     Occupational History    retired      Social History Main Topics    Smoking status: Former Smoker     Packs/day: 2.00     Years: 10.00     Types: Cigarettes     Start date: 1/6/1989     Quit date: 2/1/1997    Smokeless tobacco: Never Used    Alcohol use No    Drug use: No    Sexual activity: Not on file     Other Topics Concern    Not on file     Social History Narrative    No narrative on file        Family History   Problem Relation Age of Onset    Obesity Mother     Cirrhosis Mother     Heart Failure Mother     High Blood Pressure Mother     Diabetes Father     High Blood Pressure Sister     Depression Sister     Diabetes Brother     High Blood Pressure Brother        REVIEW OF SYSTEMS:  The chart was reviewed. PHYSICAL EXAM:    There were no vitals filed for this visit.   CONSTITUTIONAL:  awake, alert, cooperative, no apparent distress, and appears stated age  NECK:  supple, symmetrical, trachea midline  LUNGS:  no increased work of breathing, good air exchange and clear to auscultation  CARDIOVASCULAR:  regular rate and rhythm  ABDOMEN:  soft, non-distended and non-tender    LABS:    Lab Results   Component Value Date    WBC 4.0 (L) 06/03/2018    HGB 9.2 (L) 06/03/2018    HCT 30.4 (L) 06/03/2018     (L) 06/03/2018    PROTIME 18.3 (H) 06/02/2018    INR 1.6 06/02/2018    APTT 36.6 (H) 06/02/2018    K 3.3 (L) 06/01/2018    BUN 14 06/01/2018    CREATININE 2.5 (H) 06/01/2018

## 2018-07-25 NOTE — OP NOTE
Maynor Delgadillo  1955    Cardiovascular Lab    DATE OF PROCEDURE: 7/25/2018     PHYSICIAN: Freddie Gomes M.D.     ASSISTANT:      PRE-PROCEDURE DIAGNOSIS: Chronic renal failure, stage 5, problem with dialysis access [T82.892T]. POST-PROCEDURE DIAGNOSIS: Same    PROCEDURE: Left arteriovenous fistulogram (07029), central venous angioplasty (41146)     ANESTHESIA: Local     ESTIMATED BLOOD LOSS: Minimal     COMPLICATIONS: None    DESCRIPTION OF PROCEDURE: The patient was identified and the procedure was confirmed. The left arm was prepped and draped in the usual sterile fashion. Lidocaine 1% for local anesthesia. A micropuncture catheter was inserted into the vein. Serial images were obtained. The images identified a widely patent and matured cephalic vein from the access site to the junction with the axillary vein. The subclavian vein was widely patent and retrograde flow could be seen into the external jugular vein as well as retrograde towards the axillary and brachial veins. The brachiocephalic vein was patent and a narrowing was visualized at the junction with the superior vena cava. 2 indwelling catheters were seen coming from the right. The catheter was exchanged for a 7 Somali sheath and an 8 mm x 4 mm balloon was inflated at the stenosis. A completion venogram showed significant improvement and no residual stenosis. The sheath was removed and a nylon suture was placed to obtain hemostasis. A sterile dressing was applied to the puncture site. The patient tolerated the procedure and was transferred to the recovery area in satisfactory condition.

## 2018-07-27 DIAGNOSIS — M54.17 L-S RADICULOPATHY: ICD-10-CM

## 2018-07-31 ENCOUNTER — OFFICE VISIT (OUTPATIENT)
Dept: VASCULAR SURGERY | Age: 63
End: 2018-07-31

## 2018-07-31 DIAGNOSIS — N18.5 CRF (CHRONIC RENAL FAILURE), STAGE 5 (HCC): ICD-10-CM

## 2018-07-31 DIAGNOSIS — M79.89 LEFT UPPER EXTREMITY SWELLING: Primary | ICD-10-CM

## 2018-07-31 PROCEDURE — 99024 POSTOP FOLLOW-UP VISIT: CPT | Performed by: SURGERY

## 2018-08-09 ENCOUNTER — HOSPITAL ENCOUNTER (OUTPATIENT)
Age: 63
Setting detail: OUTPATIENT SURGERY
Discharge: HOME | End: 2018-08-09
Attending: SURGERY | Admitting: SURGERY
Payer: MEDICARE

## 2018-08-09 ENCOUNTER — ANESTHESIA (OUTPATIENT)
Dept: OPERATING ROOM | Age: 63
End: 2018-08-09
Payer: MEDICARE

## 2018-08-09 ENCOUNTER — TELEPHONE (OUTPATIENT)
Dept: NEUROLOGY | Age: 63
End: 2018-08-09

## 2018-08-09 ENCOUNTER — ANESTHESIA EVENT (OUTPATIENT)
Dept: OPERATING ROOM | Age: 63
End: 2018-08-09
Payer: MEDICARE

## 2018-08-09 VITALS — DIASTOLIC BLOOD PRESSURE: 60 MMHG | SYSTOLIC BLOOD PRESSURE: 103 MMHG | OXYGEN SATURATION: 98 %

## 2018-08-09 VITALS
SYSTOLIC BLOOD PRESSURE: 137 MMHG | TEMPERATURE: 97.8 F | BODY MASS INDEX: 36.86 KG/M2 | RESPIRATION RATE: 16 BRPM | WEIGHT: 208 LBS | HEIGHT: 63 IN | DIASTOLIC BLOOD PRESSURE: 73 MMHG | OXYGEN SATURATION: 91 % | HEART RATE: 72 BPM

## 2018-08-09 DIAGNOSIS — M54.17 L-S RADICULOPATHY: Primary | ICD-10-CM

## 2018-08-09 DIAGNOSIS — N18.5 CHRONIC RENAL FAILURE, STAGE 5 (HCC): Primary | ICD-10-CM

## 2018-08-09 LAB
ANION GAP SERPL CALCULATED.3IONS-SCNC: 9 MMOL/L (ref 7–16)
BASOPHILS ABSOLUTE: 0.02 E9/L (ref 0–0.2)
BASOPHILS RELATIVE PERCENT: 0.5 % (ref 0–2)
BUN BLDV-MCNC: 17 MG/DL (ref 8–23)
CALCIUM SERPL-MCNC: 8.4 MG/DL (ref 8.6–10.2)
CHLORIDE BLD-SCNC: 100 MMOL/L (ref 98–107)
CO2: 32 MMOL/L (ref 22–29)
CREAT SERPL-MCNC: 2.3 MG/DL (ref 0.5–1)
EOSINOPHILS ABSOLUTE: 0.08 E9/L (ref 0.05–0.5)
EOSINOPHILS RELATIVE PERCENT: 1.9 % (ref 0–6)
GFR AFRICAN AMERICAN: 26
GFR NON-AFRICAN AMERICAN: 21 ML/MIN/1.73
GLUCOSE BLD-MCNC: 84 MG/DL (ref 74–109)
HCT VFR BLD CALC: 33 % (ref 34–48)
HEMOGLOBIN: 9.9 G/DL (ref 11.5–15.5)
IMMATURE GRANULOCYTES #: 0.01 E9/L
IMMATURE GRANULOCYTES %: 0.2 % (ref 0–5)
LYMPHOCYTES ABSOLUTE: 1.25 E9/L (ref 1.5–4)
LYMPHOCYTES RELATIVE PERCENT: 30 % (ref 20–42)
MCH RBC QN AUTO: 31.4 PG (ref 26–35)
MCHC RBC AUTO-ENTMCNC: 30 % (ref 32–34.5)
MCV RBC AUTO: 104.8 FL (ref 80–99.9)
MONOCYTES ABSOLUTE: 0.32 E9/L (ref 0.1–0.95)
MONOCYTES RELATIVE PERCENT: 7.7 % (ref 2–12)
NEUTROPHILS ABSOLUTE: 2.48 E9/L (ref 1.8–7.3)
NEUTROPHILS RELATIVE PERCENT: 59.7 % (ref 43–80)
PDW BLD-RTO: 15.2 FL (ref 11.5–15)
PLATELET # BLD: 115 E9/L (ref 130–450)
PMV BLD AUTO: 11.8 FL (ref 7–12)
POTASSIUM SERPL-SCNC: 5.2 MMOL/L (ref 3.5–5)
RBC # BLD: 3.15 E12/L (ref 3.5–5.5)
SODIUM BLD-SCNC: 141 MMOL/L (ref 132–146)
WBC # BLD: 4.2 E9/L (ref 4.5–11.5)

## 2018-08-09 PROCEDURE — 3700000000 HC ANESTHESIA ATTENDED CARE: Performed by: SURGERY

## 2018-08-09 PROCEDURE — 3600000012 HC SURGERY LEVEL 2 ADDTL 15MIN: Performed by: SURGERY

## 2018-08-09 PROCEDURE — 3600000002 HC SURGERY LEVEL 2 BASE: Performed by: SURGERY

## 2018-08-09 PROCEDURE — 2709999900 HC NON-CHARGEABLE SUPPLY: Performed by: SURGERY

## 2018-08-09 PROCEDURE — 85025 COMPLETE CBC W/AUTO DIFF WBC: CPT

## 2018-08-09 PROCEDURE — 2500000003 HC RX 250 WO HCPCS: Performed by: SURGERY

## 2018-08-09 PROCEDURE — 36415 COLL VENOUS BLD VENIPUNCTURE: CPT

## 2018-08-09 PROCEDURE — 2580000003 HC RX 258

## 2018-08-09 PROCEDURE — 7100000011 HC PHASE II RECOVERY - ADDTL 15 MIN: Performed by: SURGERY

## 2018-08-09 PROCEDURE — 36590 REMOVAL TUNNELED CV CATH: CPT | Performed by: SURGERY

## 2018-08-09 PROCEDURE — 80048 BASIC METABOLIC PNL TOTAL CA: CPT

## 2018-08-09 PROCEDURE — 36589 REMOVAL TUNNELED CV CATH: CPT | Performed by: SURGERY

## 2018-08-09 PROCEDURE — 3700000001 HC ADD 15 MINUTES (ANESTHESIA): Performed by: SURGERY

## 2018-08-09 PROCEDURE — 7100000010 HC PHASE II RECOVERY - FIRST 15 MIN: Performed by: SURGERY

## 2018-08-09 PROCEDURE — 6360000002 HC RX W HCPCS

## 2018-08-09 RX ORDER — BUPIVACAINE HYDROCHLORIDE 2.5 MG/ML
INJECTION, SOLUTION EPIDURAL; INFILTRATION; INTRACAUDAL PRN
Status: DISCONTINUED | OUTPATIENT
Start: 2018-08-09 | End: 2018-08-09 | Stop reason: HOSPADM

## 2018-08-09 RX ORDER — SODIUM CHLORIDE 0.9 % (FLUSH) 0.9 %
10 SYRINGE (ML) INJECTION PRN
Status: DISCONTINUED | OUTPATIENT
Start: 2018-08-09 | End: 2018-08-09 | Stop reason: HOSPADM

## 2018-08-09 RX ORDER — SODIUM CHLORIDE 9 MG/ML
INJECTION, SOLUTION INTRAVENOUS CONTINUOUS PRN
Status: DISCONTINUED | OUTPATIENT
Start: 2018-08-09 | End: 2018-08-09 | Stop reason: SDUPTHER

## 2018-08-09 RX ORDER — LIDOCAINE HYDROCHLORIDE 10 MG/ML
INJECTION, SOLUTION EPIDURAL; INFILTRATION; INTRACAUDAL; PERINEURAL PRN
Status: DISCONTINUED | OUTPATIENT
Start: 2018-08-09 | End: 2018-08-09 | Stop reason: HOSPADM

## 2018-08-09 RX ORDER — OXYCODONE HYDROCHLORIDE AND ACETAMINOPHEN 5; 325 MG/1; MG/1
1 TABLET ORAL EVERY 6 HOURS PRN
Qty: 15 TABLET | Refills: 0 | Status: SHIPPED | OUTPATIENT
Start: 2018-08-09 | End: 2018-08-16

## 2018-08-09 RX ORDER — FENTANYL CITRATE 50 UG/ML
INJECTION, SOLUTION INTRAMUSCULAR; INTRAVENOUS PRN
Status: DISCONTINUED | OUTPATIENT
Start: 2018-08-09 | End: 2018-08-09 | Stop reason: SDUPTHER

## 2018-08-09 RX ORDER — SODIUM CHLORIDE 0.9 % (FLUSH) 0.9 %
10 SYRINGE (ML) INJECTION EVERY 12 HOURS SCHEDULED
Status: DISCONTINUED | OUTPATIENT
Start: 2018-08-09 | End: 2018-08-09 | Stop reason: HOSPADM

## 2018-08-09 RX ORDER — PROPOFOL 10 MG/ML
INJECTION, EMULSION INTRAVENOUS CONTINUOUS PRN
Status: DISCONTINUED | OUTPATIENT
Start: 2018-08-09 | End: 2018-08-09 | Stop reason: SDUPTHER

## 2018-08-09 RX ADMIN — SODIUM CHLORIDE: 9 INJECTION, SOLUTION INTRAVENOUS at 14:33

## 2018-08-09 RX ADMIN — PROPOFOL 55 MCG/KG/MIN: 10 INJECTION, EMULSION INTRAVENOUS at 14:40

## 2018-08-09 RX ADMIN — FENTANYL CITRATE 50 MCG: 50 INJECTION, SOLUTION INTRAMUSCULAR; INTRAVENOUS at 14:40

## 2018-08-09 ASSESSMENT — PULMONARY FUNCTION TESTS
PIF_VALUE: 0
PIF_VALUE: 45
PIF_VALUE: 0
PIF_VALUE: 1
PIF_VALUE: 0
PIF_VALUE: 1
PIF_VALUE: 0

## 2018-08-09 ASSESSMENT — PAIN DESCRIPTION - DESCRIPTORS: DESCRIPTORS: NAGGING;CONSTANT

## 2018-08-09 ASSESSMENT — PAIN SCALES - GENERAL
PAINLEVEL_OUTOF10: 0

## 2018-08-09 ASSESSMENT — PAIN - FUNCTIONAL ASSESSMENT: PAIN_FUNCTIONAL_ASSESSMENT: 0-10

## 2018-08-09 NOTE — ANESTHESIA PRE PROCEDURE
Department of Anesthesiology  Preprocedure Note       Name:  Katelin Fairchild   Age:  70 y.o.  :  1955                                          MRN:  [id number]         Date:  2018      Surgeon: Al Khan):  Angel Schwab, MD    Procedure: Procedure(s):  REMOVAL OF TESIO CATHETER      Medications prior to admission:   Prior to Admission medications    Medication Sig Start Date End Date Taking?  Authorizing Provider   Acetaminophen (TYLENOL ARTHRITIS PAIN PO) Take 3 tablets by mouth 2 times daily     Historical Provider, MD   Biotin w/ Vitamins C & E (HAIR/SKIN/NAILS PO) Take by mouth    Historical Provider, MD   fenofibrate (TRICOR) 145 MG tablet Take 145 mg by mouth three times a week Patient taking Mon, Wed, 18   Historical Provider, MD   albuterol (ACCUNEB) 0.63 MG/3ML nebulizer solution Take 3 mLs by nebulization every 4 hours as needed for Wheezing or Shortness of Breath DX: Moderate Persistent Asthma J45.4  Patient taking differently: Take 3 mLs by nebulization every 4 hours as needed for Wheezing or Shortness of Breath DX: Moderate Persistent Asthma J45.4 18   Noel Brantley MD   calcium carbonate (TUMS) 500 MG chewable tablet Take 2 tablets by mouth 2 times daily    Historical Provider, MD   melatonin 5 MG TABS tablet Take 5 mg by mouth nightly as needed    Historical Provider, MD   sodium bicarbonate 650 MG tablet Take 1,950 mg by mouth 2 times daily    Historical Provider, MD   rOPINIRole (REQUIP) 0.5 MG tablet Take 1 tablet by mouth nightly 3/15/18   Noel Brantley MD   torsemide (DEMADEX) 10 MG tablet Take 20 mg by mouth daily 18   Historical Provider, MD   montelukast (SINGULAIR) 10 MG tablet Take 1 tablet by mouth nightly 3/1/18   Noel Brantley MD   mometasone-formoterol (DULERA) 200-5 MCG/ACT inhaler Inhale 2 puffs into the lungs every 12 hours Use am of surgery    Historical Provider, MD   Aspirin-Acetaminophen-Caffeine (EXCEDRIN EXTRA STRENGTH PO) Take by mouth as needed Ld 3-16-18    Historical Provider, MD   Omega-3 Fatty Acids (FISH OIL) 1200 MG CAPS Take by mouth daily LD 3-16-18    Historical Provider, MD   iron dextran complex (INFED) 50 MG/ML injection Inject 100 mg into the muscle once Twice per month    Historical Provider, MD   loperamide (IMODIUM) 2 MG capsule Take 2 mg by mouth as needed for Diarrhea    Historical Provider, MD   magnesium oxide (MAG-OX) 400 MG tablet Take 1,500 mg by mouth 2 times daily     Historical Provider, MD   levothyroxine (SYNTHROID) 137 MCG tablet Take 137 mcg by mouth Daily    Historical Provider, MD   calcium acetate (PHOSLO) 667 MG capsule Take by mouth 4 times daily    Historical Provider, MD   clopidogrel (PLAVIX) 75 MG tablet TAKE ONE TABLET BY MOUTH EVERY DAY 5/23/17   Historical Provider, MD   furosemide (LASIX) 40 MG tablet Take 1 tablet by mouth daily  Patient taking differently: Take 40 mg by mouth nightly  4/14/17   Chante Hood MD   liothyronine (CYTOMEL) 5 MCG tablet Take 5 mcg by mouth daily    Historical Provider, MD   aspirin 81 MG EC tablet Take 1 tablet by mouth daily  Patient taking differently: Take 81 mg by mouth daily LD 3-21-18 3/30/17   GABRIELLA Barajas CNP   carvedilol (COREG) 6.25 MG tablet Take 1 tablet by mouth 2 times daily (with meals)  Patient taking differently: Take 25 mg by mouth three times a week Take morning of surgery with a sip of water 3/30/17   GABRIELLA Barajas CNP   atorvastatin (LIPITOR) 20 MG tablet Take 20 mg by mouth nightly     Historical Provider, MD   Coenzyme Q10 (COQ10 PO) Take by mouth nightly LD 3-16-18    Historical Provider, MD   vitamin B-12 (CYANOCOBALAMIN) 1000 MCG tablet Take 1,000 mcg by mouth daily LD 3-16-18    Historical Provider, MD   darbepoetin izabella-polysorbate (ARANESP, ALBUMIN FREE,) 60 MCG/ML injection Inject into the skin every 14 days Twice/per month  Dose given 3-16-18 at dialysis    Historical Provider, MD   CPAP Machine MISC Please replace patients CPAP at 8 cm water pressure with heated humidification and C-Flex of 3. Patient states she is due for a new machine and hers is not working. Also provide masks, tubing, filters, head gear, and water chambers as needed. Diagnosis-YUNIOR  Faxed to [facility]  Length of need 99 months 9/23/16   Jeff Ramsay MD   vitamin D-3 (CHOLECALCIFEROL) 5000 UNITS TABS Take 5,000 Units by mouth 2 times daily LD 3-16-18    Historical Provider, MD   folic acid (FOLVITE) 1 MG tablet Take 1 mg by mouth daily 3-16-18    Historical Provider, MD   pregabalin (LYRICA) 50 MG capsule Take 50 mg by mouth 3 times daily Instructed to take am of procedure. Historical Provider, MD       Current medications:    No current facility-administered medications for this visit. No current outpatient prescriptions on file. Facility-Administered Medications Ordered in Other Visits   Medication Dose Route Frequency Provider Last Rate Last Dose    sodium chloride flush 0.9 % injection 10 mL  10 mL Intravenous 2 times per day Alberta August, APRN - CNP        sodium chloride flush 0.9 % injection 10 mL  10 mL Intravenous PRN Alberta August, APRN - CNP        ceFAZolin (ANCEF) 2 g in dextrose 5 % 50 mL IVPB  2 g Intravenous On Call to [facility], APRN - CNP           Allergies: Allergies   Allergen Reactions    Coumadin [Warfarin Sodium] Hives    Quinine Derivatives Hives    Other      Patient states cannot take oral antibiotic dt she has short gut syndrome. ... Puts her into electrolyte inbalance       Problem List:    Patient Active Problem List   Diagnosis Code    Exacerbation of asthma J45. 901    Renal failure (ARF), acute on chronic (HCC) N17.9, N18.9    HTN (hypertension) I10    YUNIOR on CPAP G47.33, Z99.89    Hypomagnesemia E83.42    Hypocalcemia E83.51    Hypothyroid E03.9    Cellulitis of right lower extremity L03.115    Non-rheumatic mitral regurgitation I34.0    Respiratory arrest (HCC) R09.2    Hypervolemia E87.70    Acute on chronic diastolic congestive heart failure ([facility]) I50.33    S/P MVR (mitral valve repair) Z98.890    S/P TVR (tricuspid valve repair) Z98.890    MANUEL (acute kidney injury) ([facility] N17.9    Chronic renal failure, stage 5 ([facility]) N18.5    Problem with dialysis access ([facility]) G02.784S    ESRD (end stage renal disease) ([facility]) N18.6    Thrombocytopenia ([facility]) D69.6    Hematoma T14. 8XXA    Traumatic hematoma of left upper arm P18.551E    Complication of AV dialysis fistula, initial encounter T82. 9XXA       Past Medical History:        Diagnosis Date    (HFpEF) heart failure with preserved ejection fraction [facility] 01/25/2017 4/11/17- echo- LVEF 55-60%, stage II DD, severely dilated LA, severe MR, mild TR, LVDD: 5.6 cm    Anemia     hx  / takes aranesp injections every 2 weeks    Asthma     well controlled with inhalers     Chronic kidney disease     stage 4    Complication of AV dialysis fistula, initial encounter 6/2/2018    COPD (chronic obstructive pulmonary disease) ([facility])     Fibromyalgia     GERD (gastroesophageal reflux disease)     Hemodialysis patient ([facility]     chato Tarango    History of blood transfusion spring 2017    Hx of blood clots     h/o DVT in leg at age 25yrs    Hyperlipidemia     Hypertension     well controlled with medications     Kidney failure     Kidney stone     multiple issues    Polymyalgia rheumatica ([facility])     Restless leg syndrome     Short gut syndrome     Sleep apnea     uses CPAP    Thyroid disease     Traumatic hematoma of left upper arm 6/2/2018    Wears dentures     upper partial       Past Surgical History:        Procedure Laterality Date    APPENDECTOMY      AV FISTULA REPAIR Left 03/22/2018    Superficialization, Delatore    BREAST BIOPSY Right 2000    BRONCHOSCOPY  2010    CARDIAC CATHETERIZATION  02/17/2017    Dr Coronel Apt REPLACEMENT  03/21/2017    MVR, TVR    COLONOSCOPY      COLONOSCOPY Results   Component Value Date     08/09/2018    K 5.2 08/09/2018    K 4.0 03/22/2018     08/09/2018    CO2 32 08/09/2018    BUN 17 08/09/2018    CREATININE 2.3 08/09/2018    GFRAA 26 08/09/2018    LABGLOM 21 08/09/2018    GLUCOSE 84 08/09/2018    PROT 6.7 06/02/2018    CALCIUM 8.4 08/09/2018    BILITOT 0.3 06/01/2018    ALKPHOS 174 06/01/2018    AST 21 06/01/2018    ALT 11 06/01/2018       POC Tests: No results for input(s): POCGLU, POCNA, POCK, POCCL, POCBUN, POCHEMO, POCHCT in the last 72 hours. Coags:   Lab Results   Component Value Date    PROTIME 18.3 06/02/2018    INR 1.6 06/02/2018    APTT 36.6 06/02/2018       HCG (If Applicable): No results found for: PREGTESTUR, PREGSERUM, HCG, HCGQUANT     ABGs: No results found for: PHART, PO2ART, KFP7RBB, NKK1ELF, BEART, U8PSLUIG     Type & Screen (If Applicable):  No results found for: LABABO, LABRH     Bilateral carotid artery ultrasound 7-  Peak systolic and end diastolic velocities, ICA/CCA ratios and   antegrade vertebral artery flow as above. See above for details fo the   examination and below for internal carotid artery interpretation   guidelines. 1. No areas of estimated luminal stenosis greater than 0-49 percent   within the bilateral internal carotid arteries. EKG 3-7-2018  Sinus  Rhythm   -RSR(V1) -nondiagnostic.    -  Negative precordial T-waves  -Probably normal -consider anteroseptal ischemia.      PROBABLY NORMAL FOR AGE    ECHO-4/13/17  Findings      Left Ventricle   Left ventricular size is grossly normal.   Normal systolic function with LVEF estimated at 65%.   Abnormal (paradoxical) motion consistent with post cardiac surgery   Indeterminate diastolic function      Right Ventricle   RV not well visualized.      Left Atrium   Normal left atrium by volume index(28ml/m2)      Right Atrium   Normal right atrium.      Mitral Valve   S/p Mitral valve repair.   Mild mitral regurgitation is present.   Mean gradient=9mmHg,

## 2018-08-09 NOTE — H&P
 AV FISTULA REPAIR Left 03/22/2018    Superficialization, Delatore    BREAST BIOPSY Right 2000    BRONCHOSCOPY  2010    CARDIAC CATHETERIZATION  02/17/2017    Dr Selestino Goldmann REPLACEMENT  03/21/2017    MVR, TVR    COLONOSCOPY      COLONOSCOPY  4/21/15    DIALYSIS FISTULA CREATION Left 87/73/1284    radiocephalic, Delatore    DIALYSIS FISTULA CREATION Left 01/25/2018    brachioecephalic, ligation radiocephalic, Delatore    ENDOSCOPY, COLON, DIAGNOSTIC      EYE SURGERY Right     CATARACT    INTESTINAL BYPASS  1973    for weight loss    KIDNEY STONE SURGERY      x 3    OTHER SURGICAL HISTORY Left 11/16/2017    AV fistula creation left arm    OTHER SURGICAL HISTORY  01/17/2018    Left arm fistulogram by Dr Sherree Schilder.  [place] CREAT AV FISTULA,AUTOGENOUS GRAFT Left 3/22/2018    AV FISTULA  REVISION LEFT ARM performed by Shauna Kim MD at [address] Right 2000    RIGHT BREAST MOLE REMOVED    TRANSESOPHAGEAL ECHOCARDIOGRAM  02/03/2017    TUNNELED VENOUS CATHETER PLACEMENT Right     TUNNELED VENOUS PORT PLACEMENT Right     Powerport / x 4 / at right chest; since short gut syndrome received magnesium & sodium bicarb in past       Current Medications:     Current Facility-Administered Medications:     sodium chloride flush 0.9 % injection 10 mL, 10 mL, Intravenous, 2 times per day, GABRIELLA Beyer - CNP    sodium chloride flush 0.9 % injection 10 mL, 10 mL, Intravenous, PRN, GABRIELLA Beyer CNP    ceFAZolin (ANCEF) 2 g in dextrose 5 % 50 mL IVPB, 2 g, Intravenous, On Call to [address], APRN - CNP    Allergies:  Coumadin [warfarin sodium];  Quinine derivatives; and Other    Social History     Social History    Marital status:      Spouse name: N/A    Number of children: N/A    Years of education: N/A     Occupational History    retired      Social History Main Topics    Smoking status: Former Smoker     Packs/day: 2.00     Years: 10.00 Types: Cigarettes     Start date: 1/6/1989     Quit date: 2/1/1997    Smokeless tobacco: Never Used    Alcohol use No    Drug use: No    Sexual activity: Not on file     Other Topics Concern    Not on file     Social History Narrative    No narrative on file        Family History   Problem Relation Age of Onset    Obesity Mother     Cirrhosis Mother     Heart Failure Mother     High Blood Pressure Mother     Diabetes Father     High Blood Pressure Sister     Depression Sister     Diabetes Brother     High Blood Pressure Brother        REVIEW OF SYSTEMS:  The chart was reviewed.     PHYSICAL EXAM:    Vitals:    08/09/18 1019   BP: (!) 157/75   Pulse: 69   Resp: 20   Temp: 97.8 °F (36.6 °C)   SpO2: 96%     CONSTITUTIONAL:  awake, alert, cooperative, no apparent distress, and appears stated age  NECK:  supple, symmetrical, trachea midline  LUNGS:  no increased work of breathing, good air exchange  CARDIOVASCULAR:  regular rate and rhythm  ABDOMEN:  soft, non-distended and non-tender  L UE: + bruit, + thrill over AVF     LABS:    Lab Results   Component Value Date    WBC 4.2 (L) 08/09/2018    HGB 9.9 (L) 08/09/2018    HCT 33.0 (L) 08/09/2018     (L) 08/09/2018    PROTIME 18.3 (H) 06/02/2018    INR 1.6 06/02/2018    APTT 36.6 (H) 06/02/2018    K 5.2 (H) 08/09/2018    BUN 17 08/09/2018    CREATININE 2.3 (H) 08/09/2018       RADIOLOGY:

## 2018-08-09 NOTE — PROGRESS NOTES
Pt up to BR, steady gait with use of cane, SOB with exertion,  /84, O2 94% room air, notified pema Sánchez for pt to use albuterol inhaler.

## 2018-08-09 NOTE — PROGRESS NOTES
[phone]- Nurse calls into surgery. Dr. Michelle Emanuel will e-scribe prescription to patient's pharmacy.

## 2018-08-09 NOTE — ANESTHESIA POSTPROCEDURE EVALUATION
Department of Anesthesiology  Postprocedure Note    Patient: Peg Farfan  MRN: [id number]  YOB: 1955  Date of evaluation: 8/9/2018  Time:  4:19 PM     Procedure Summary     Date:  08/09/18 Room / Location:  [facility]  / [facility]    Anesthesia Start:   Anesthesia Stop:      Procedure:  REMOVAL OF TESIO CATHETER (N/A ) Diagnosis:  (CHRONIC RENAL FAILURE)    Surgeon:  Yaz Bates MD Responsible Provider:      Anesthesia Type:  MAC ASA Status:  4          Anesthesia Type: MAC    Harris Phase I: Harris Score: 10    Harris Phase II: Harris Score: 10    Last vitals: Reviewed and per EMR flowsheets.        Anesthesia Post Evaluation    Patient location during evaluation: PACU  Patient participation: complete - patient participated  Level of consciousness: awake  Pain score: 3  Airway patency: patent  Nausea & Vomiting: no nausea and no vomiting  Complications: no  Cardiovascular status: blood pressure returned to baseline  Respiratory status: acceptable  Hydration status: euvolemic

## 2018-08-13 ENCOUNTER — TELEPHONE (OUTPATIENT)
Dept: NEUROLOGY | Age: 63
End: 2018-08-13

## 2018-08-22 ENCOUNTER — TELEPHONE (OUTPATIENT)
Dept: VASCULAR SURGERY | Age: 63
End: 2018-08-22

## 2018-09-18 ENCOUNTER — HOSPITAL ENCOUNTER (OUTPATIENT)
Age: 63
Discharge: HOME | End: 2018-09-18
Payer: MEDICARE

## 2018-09-18 ENCOUNTER — HOSPITAL ENCOUNTER (OUTPATIENT)
Age: 63
Discharge: HOME | End: 2018-09-20
Payer: MEDICARE

## 2018-09-18 ENCOUNTER — HOSPITAL ENCOUNTER (OUTPATIENT)
Dept: GENERAL RADIOLOGY | Age: 63
Discharge: HOME | End: 2018-09-20
Payer: MEDICARE

## 2018-09-18 DIAGNOSIS — R06.02 SHORTNESS OF BREATH: ICD-10-CM

## 2018-09-18 LAB
ALBUMIN SERPL-MCNC: 3.4 G/DL (ref 3.5–5.2)
ALP BLD-CCNC: 244 U/L (ref 35–104)
ALT SERPL-CCNC: 11 U/L (ref 0–32)
ANION GAP SERPL CALCULATED.3IONS-SCNC: 12 MMOL/L (ref 7–16)
AST SERPL-CCNC: 20 U/L (ref 0–31)
BILIRUB SERPL-MCNC: 0.5 MG/DL (ref 0–1.2)
BUN BLDV-MCNC: 24 MG/DL (ref 8–23)
CALCIUM SERPL-MCNC: 8.3 MG/DL (ref 8.6–10.2)
CHLORIDE BLD-SCNC: 99 MMOL/L (ref 98–107)
CHOLESTEROL, FASTING: 109 MG/DL (ref 0–199)
CO2: 32 MMOL/L (ref 22–29)
CREAT SERPL-MCNC: 2.9 MG/DL (ref 0.5–1)
GFR AFRICAN AMERICAN: 20
GFR NON-AFRICAN AMERICAN: 16 ML/MIN/1.73
GLUCOSE FASTING: 82 MG/DL (ref 74–109)
HDLC SERPL-MCNC: 41 MG/DL
LACTIC ACID: 0.8 MMOL/L (ref 0.5–2.2)
LDL CHOLESTEROL CALCULATED: 44 MG/DL (ref 0–99)
POTASSIUM SERPL-SCNC: 4.6 MMOL/L (ref 3.5–5)
SODIUM BLD-SCNC: 143 MMOL/L (ref 132–146)
TOTAL CK: 47 U/L (ref 20–180)
TOTAL PROTEIN: 7.2 G/DL (ref 6.4–8.3)
TRIGLYCERIDE, FASTING: 122 MG/DL (ref 0–149)
VITAMIN D 25-HYDROXY: 38 NG/ML (ref 30–100)
VLDLC SERPL CALC-MCNC: 24 MG/DL

## 2018-09-18 PROCEDURE — 80053 COMPREHEN METABOLIC PANEL: CPT

## 2018-09-18 PROCEDURE — 71046 X-RAY EXAM CHEST 2 VIEWS: CPT

## 2018-09-18 PROCEDURE — 83605 ASSAY OF LACTIC ACID: CPT

## 2018-09-18 PROCEDURE — 82550 ASSAY OF CK (CPK): CPT

## 2018-09-18 PROCEDURE — 80061 LIPID PANEL: CPT

## 2018-09-18 PROCEDURE — 36415 COLL VENOUS BLD VENIPUNCTURE: CPT

## 2018-09-18 PROCEDURE — 82306 VITAMIN D 25 HYDROXY: CPT

## 2018-10-10 ENCOUNTER — TELEPHONE (OUTPATIENT)
Dept: VASCULAR SURGERY | Age: 63
End: 2018-10-10

## 2018-10-10 NOTE — TELEPHONE ENCOUNTER
Pt phoned stating dialysis has infiltrated and veins in chest are bulging, scheduled (L) fistulogram possible intervention 10/17.

## 2018-10-17 ENCOUNTER — HOSPITAL ENCOUNTER (OUTPATIENT)
Dept: CARDIAC CATH/INVASIVE PROCEDURES | Age: 63
Discharge: HOME | End: 2018-10-17
Payer: MEDICARE

## 2018-10-17 VITALS
SYSTOLIC BLOOD PRESSURE: 150 MMHG | HEART RATE: 81 BPM | HEIGHT: 63 IN | DIASTOLIC BLOOD PRESSURE: 79 MMHG | BODY MASS INDEX: 35.44 KG/M2 | TEMPERATURE: 97.8 F | OXYGEN SATURATION: 97 % | WEIGHT: 200 LBS

## 2018-10-17 PROCEDURE — 36901 INTRO CATH DIALYSIS CIRCUIT: CPT | Performed by: SURGERY

## 2018-10-17 PROCEDURE — 2500000003 HC RX 250 WO HCPCS

## 2018-10-17 PROCEDURE — 36907 BALO ANGIOP CTR DIALYSIS SEG: CPT | Performed by: SURGERY

## 2018-10-17 PROCEDURE — C1769 GUIDE WIRE: HCPCS

## 2018-10-17 PROCEDURE — C1894 INTRO/SHEATH, NON-LASER: HCPCS

## 2018-10-17 PROCEDURE — 2709999900 HC NON-CHARGEABLE SUPPLY

## 2018-10-17 PROCEDURE — C1725 CATH, TRANSLUMIN NON-LASER: HCPCS

## 2018-10-17 NOTE — H&P
TRANSESOPHAGEAL ECHOCARDIOGRAM  02/03/2017    TUNNELED VENOUS CATHETER PLACEMENT Right     TUNNELED VENOUS PORT PLACEMENT Right     Powerport / x 4 / at right chest; since short gut syndrome received magnesium & sodium bicarb in past       Current Medications:     Current Outpatient Prescriptions:     albuterol (ACCUNEB) 0.63 MG/3ML nebulizer solution, Take 3 mLs by nebulization every 4 hours as needed for Wheezing or Shortness of Breath DX: Moderate Persistent Asthma J45.4, Disp: 120 vial, Rfl: 6    montelukast (SINGULAIR) 10 MG tablet, Take 1 tablet by mouth nightly, Disp: 30 tablet, Rfl: 6    mometasone-formoterol (DULERA) 200-5 MCG/ACT inhaler, Inhale 2 puffs into the lungs every 12 hours Use am of surgery, Disp: 1 Inhaler, Rfl: 6    albuterol sulfate HFA (VENTOLIN HFA) 108 (90 Base) MCG/ACT inhaler, Inhale 2 puffs into the lungs every 6 hours as needed for Wheezing, Disp: 1 Inhaler, Rfl: 6    rOPINIRole (REQUIP) 0.5 MG tablet, Take 1 tablet by mouth nightly, Disp: 30 tablet, Rfl: 5    Acetaminophen (TYLENOL ARTHRITIS PAIN PO), Take 3 tablets by mouth 2 times daily , Disp: , Rfl:     Biotin w/ Vitamins C & E (HAIR/SKIN/NAILS PO), Take by mouth, Disp: , Rfl:     fenofibrate (TRICOR) 145 MG tablet, Take 145 mg by mouth three times a week Patient taking Mon, Wed, Fri, Disp: , Rfl:     calcium carbonate (TUMS) 500 MG chewable tablet, Take 2 tablets by mouth 2 times daily, Disp: , Rfl:     melatonin 5 MG TABS tablet, Take 5 mg by mouth nightly as needed, Disp: , Rfl:     sodium bicarbonate 650 MG tablet, Take 1,950 mg by mouth 2 times daily, Disp: , Rfl:     torsemide (DEMADEX) 10 MG tablet, Take 20 mg by mouth daily, Disp: , Rfl: 11    Omega-3 Fatty Acids (FISH OIL) 1200 MG CAPS, Take by mouth daily LD 3-16-18, Disp: , Rfl:     loperamide (IMODIUM) 2 MG capsule, Take 2 mg by mouth as needed for Diarrhea, Disp: , Rfl:     magnesium oxide (MAG-OX) 400 MG tablet, Take 1,500 mg by mouth 2 times daily ,

## 2018-10-23 ENCOUNTER — OFFICE VISIT (OUTPATIENT)
Dept: VASCULAR SURGERY | Age: 63
End: 2018-10-23

## 2018-10-23 DIAGNOSIS — T82.898D PROBLEM WITH DIALYSIS ACCESS, SUBSEQUENT ENCOUNTER: Primary | ICD-10-CM

## 2018-10-23 PROCEDURE — 99024 POSTOP FOLLOW-UP VISIT: CPT | Performed by: SURGERY

## 2018-11-08 ENCOUNTER — OFFICE VISIT (OUTPATIENT)
Dept: NEUROLOGY | Age: 63
End: 2018-11-08
Payer: MEDICARE

## 2018-11-08 VITALS
HEART RATE: 71 BPM | SYSTOLIC BLOOD PRESSURE: 113 MMHG | HEIGHT: 63 IN | DIASTOLIC BLOOD PRESSURE: 67 MMHG | OXYGEN SATURATION: 95 % | RESPIRATION RATE: 14 BRPM | BODY MASS INDEX: 35.56 KG/M2 | WEIGHT: 200.7 LBS | TEMPERATURE: 98 F

## 2018-11-08 DIAGNOSIS — M54.17 L-S RADICULOPATHY: Primary | ICD-10-CM

## 2018-11-08 DIAGNOSIS — I63.10 CEREBROVASCULAR ACCIDENT (CVA) DUE TO EMBOLISM OF PRECEREBRAL ARTERY (HCC): ICD-10-CM

## 2018-11-08 PROCEDURE — G8598 ASA/ANTIPLAT THER USED: HCPCS | Performed by: CLINICAL NURSE SPECIALIST

## 2018-11-08 PROCEDURE — 1123F ACP DISCUSS/DSCN MKR DOCD: CPT | Performed by: CLINICAL NURSE SPECIALIST

## 2018-11-08 PROCEDURE — G8400 PT W/DXA NO RESULTS DOC: HCPCS | Performed by: CLINICAL NURSE SPECIALIST

## 2018-11-08 PROCEDURE — G8484 FLU IMMUNIZE NO ADMIN: HCPCS | Performed by: CLINICAL NURSE SPECIALIST

## 2018-11-08 PROCEDURE — 4040F PNEUMOC VAC/ADMIN/RCVD: CPT | Performed by: CLINICAL NURSE SPECIALIST

## 2018-11-08 PROCEDURE — G8417 CALC BMI ABV UP PARAM F/U: HCPCS | Performed by: CLINICAL NURSE SPECIALIST

## 2018-11-08 PROCEDURE — 1101F PT FALLS ASSESS-DOCD LE1/YR: CPT | Performed by: CLINICAL NURSE SPECIALIST

## 2018-11-08 PROCEDURE — 1036F TOBACCO NON-USER: CPT | Performed by: CLINICAL NURSE SPECIALIST

## 2018-11-08 PROCEDURE — 1090F PRES/ABSN URINE INCON ASSESS: CPT | Performed by: CLINICAL NURSE SPECIALIST

## 2018-11-08 PROCEDURE — G8427 DOCREV CUR MEDS BY ELIG CLIN: HCPCS | Performed by: CLINICAL NURSE SPECIALIST

## 2018-11-08 PROCEDURE — 99214 OFFICE O/P EST MOD 30 MIN: CPT | Performed by: CLINICAL NURSE SPECIALIST

## 2018-11-08 PROCEDURE — 3017F COLORECTAL CA SCREEN DOC REV: CPT | Performed by: CLINICAL NURSE SPECIALIST

## 2018-11-08 RX ORDER — TORSEMIDE 20 MG/1
20 TABLET ORAL DAILY
Status: ON HOLD | COMMUNITY
Start: 2018-09-20 | End: 2020-01-01 | Stop reason: HOSPADM

## 2018-11-08 NOTE — PROGRESS NOTES
(DEMADEX) 20 MG tablet  Yes Historical Provider, MD   albuterol (ACCUNEB) 0.63 MG/3ML nebulizer solution Take 3 mLs by nebulization every 4 hours as needed for Wheezing or Shortness of Breath DX: Moderate Persistent Asthma J45.4 Yes Sylvia Pineda MD   montelukast (SINGULAIR) 10 MG tablet Take 1 tablet by mouth nightly Yes Luis Herman MD   mometasone-formoterol (DULERA) 200-5 MCG/ACT inhaler Inhale 2 puffs into the lungs every 12 hours Use am of surgery Yes Luis Herman MD   albuterol sulfate HFA (VENTOLIN HFA) 108 (90 Base) MCG/ACT inhaler Inhale 2 puffs into the lungs every 6 hours as needed for Wheezing Yes Luis Herman MD   rOPINIRole (REQUIP) 0.5 MG tablet Take 1 tablet by mouth nightly Yes Luis Herman MD   Methoxy PEG-Epoetin Beta (MIRCERA) 75 MCG/0.3ML SOSY Infuse 75 mcg intravenously every 14 days Yes Historical Provider, MD   Acetaminophen (TYLENOL ARTHRITIS PAIN PO) Take 3 tablets by mouth 2 times daily  Yes Historical Provider, MD   Biotin w/ Vitamins C & E (HAIR/SKIN/NAILS PO) Take by mouth Yes Historical Provider, MD   fenofibrate (TRICOR) 145 MG tablet Take 145 mg by mouth three times a week Patient taking Mon, Wed, Fri Yes Historical Provider, MD   calcium carbonate (TUMS) 500 MG chewable tablet Take 2 tablets by mouth 2 times daily Yes Historical Provider, MD   melatonin 5 MG TABS tablet Take 5 mg by mouth nightly as needed Yes Historical Provider, MD   sodium bicarbonate 650 MG tablet Take 1,950 mg by mouth 2 times daily Yes Historical Provider, MD   Aspirin-Acetaminophen-Caffeine (EXCEDRIN EXTRA STRENGTH PO) Take by mouth as needed Ld 3-16-18 Yes Historical Provider, MD   Omega-3 Fatty Acids (FISH OIL) 1200 MG CAPS Take by mouth daily LD 3-16-18 Yes Historical Provider, MD   iron dextran complex (INFED) 50 MG/ML injection Inject 100 mg into the muscle once Twice per month Yes Historical Provider, MD   loperamide (IMODIUM) 2 MG capsule Take 2 mg by mouth as needed

## 2018-11-13 ENCOUNTER — OFFICE VISIT (OUTPATIENT)
Dept: VASCULAR SURGERY | Age: 63
End: 2018-11-13

## 2018-11-13 DIAGNOSIS — M79.89 LEFT UPPER EXTREMITY SWELLING: Primary | ICD-10-CM

## 2018-11-13 DIAGNOSIS — T82.898D PROBLEM WITH DIALYSIS ACCESS, SUBSEQUENT ENCOUNTER: ICD-10-CM

## 2018-11-13 PROCEDURE — 99024 POSTOP FOLLOW-UP VISIT: CPT | Performed by: SURGERY

## 2018-11-14 ENCOUNTER — TELEPHONE (OUTPATIENT)
Dept: VASCULAR SURGERY | Age: 63
End: 2018-11-14

## 2018-11-19 ENCOUNTER — HOSPITAL ENCOUNTER (OUTPATIENT)
Dept: CARDIOLOGY | Age: 63
Discharge: HOME | End: 2018-11-19
Payer: MEDICARE

## 2018-11-19 PROCEDURE — G0365 VESSEL MAPPING HEMO ACCESS: HCPCS

## 2018-11-21 ENCOUNTER — HOSPITAL ENCOUNTER (OUTPATIENT)
Dept: CARDIAC CATH/INVASIVE PROCEDURES | Age: 63
Discharge: HOME | End: 2018-11-21
Payer: MEDICARE

## 2018-11-21 VITALS
TEMPERATURE: 98.4 F | WEIGHT: 196 LBS | HEART RATE: 74 BPM | DIASTOLIC BLOOD PRESSURE: 71 MMHG | BODY MASS INDEX: 34.73 KG/M2 | HEIGHT: 63 IN | OXYGEN SATURATION: 94 % | SYSTOLIC BLOOD PRESSURE: 144 MMHG

## 2018-11-21 PROCEDURE — C1874 STENT, COATED/COV W/DEL SYS: HCPCS

## 2018-11-21 PROCEDURE — 36908 STENT PLMT CTR DIALYSIS SEG: CPT | Performed by: SURGERY

## 2018-11-21 PROCEDURE — 2500000003 HC RX 250 WO HCPCS

## 2018-11-21 PROCEDURE — 36901 INTRO CATH DIALYSIS CIRCUIT: CPT | Performed by: SURGERY

## 2018-11-21 PROCEDURE — C1887 CATHETER, GUIDING: HCPCS

## 2018-11-21 PROCEDURE — 2709999900 HC NON-CHARGEABLE SUPPLY

## 2018-11-21 PROCEDURE — 6360000002 HC RX W HCPCS

## 2018-11-21 PROCEDURE — C1894 INTRO/SHEATH, NON-LASER: HCPCS

## 2018-11-21 NOTE — H&P
mild TR, LVDD: 5.6 cm    Anemia     hx  / takes aranesp injections every 2 weeks    Asthma     well controlled with inhalers     Chronic kidney disease     stage 4    Complication of AV dialysis fistula, initial encounter 6/2/2018    COPD (chronic obstructive pulmonary disease) (HCC)     Fibromyalgia     GERD (gastroesophageal reflux disease)     Hemodialysis patient ([facility]     chato Tarango    History of blood transfusion spring 2017    Hx of blood clots     h/o DVT in leg at age 25yrs    Hyperlipidemia     Hypertension     well controlled with medications     Kidney failure     Kidney stone     multiple issues    Polymyalgia rheumatica (HCC)     Restless leg syndrome     Short gut syndrome     Sleep apnea     uses CPAP    Thyroid disease     Traumatic hematoma of left upper arm 6/2/2018    Wears dentures     upper partial        Past Surgical History:   Procedure Laterality Date    APPENDECTOMY      AV FISTULA REPAIR Left 03/22/2018    Superficialization, Delatore    BREAST BIOPSY Right 2000    BRONCHOSCOPY  2010    CARDIAC CATHETERIZATION  02/17/2017    Dr Nava Ratel REPLACEMENT  03/21/2017    MVR, TVR    COLONOSCOPY      COLONOSCOPY  4/21/15    DIALYSIS FISTULA CREATION Left 97/08/3934    radiocephalic, Delatore    DIALYSIS FISTULA CREATION Left 01/25/2018    brachioecephalic, ligation radiocephalic, Delatore    ENDOSCOPY, COLON, DIAGNOSTIC      EYE SURGERY Right     CATARACT    INTESTINAL BYPASS  1973    for weight loss    KIDNEY STONE SURGERY      x 3    OTHER SURGICAL HISTORY Left 11/16/2017    AV fistula creation left arm    OTHER SURGICAL HISTORY  01/17/2018    Left arm fistulogram by Dr Rigoberto Hernandez.     OTHER SURGICAL HISTORY  10/17/2018    Dr Tracey-Left arm fistuloplasty    [place] CREAT AV FISTULA,AUTOGENOUS GRAFT Left 3/22/2018    AV FISTULA  REVISION LEFT ARM performed by Gregory Lama MD at [facility] [address] TUNNELED CV CATH Right

## 2018-11-27 ENCOUNTER — OFFICE VISIT (OUTPATIENT)
Dept: VASCULAR SURGERY | Age: 63
End: 2018-11-27

## 2018-11-27 DIAGNOSIS — M79.89 LEFT UPPER EXTREMITY SWELLING: Primary | ICD-10-CM

## 2018-11-27 DIAGNOSIS — T82.898D PROBLEM WITH DIALYSIS ACCESS, SUBSEQUENT ENCOUNTER: ICD-10-CM

## 2018-11-27 PROCEDURE — 99024 POSTOP FOLLOW-UP VISIT: CPT | Performed by: SURGERY

## 2018-11-27 NOTE — PROGRESS NOTES
Left 57/91/4263    Brachiocephalic covered stent, iCast, 10 mm x 38 mm       Physical Exam:  The incision(s) are healing without evidence of infection. Heart rhythm is regular. The left arm swelling has significantly improved. The fistula is soft and patent. Right      Left   Brachial     Radial     Femoral     Popliteal     Dorsalis Pedis     Posterior Tibial     (3=normal, 2=diminished, 1=barely palpable, 4=widened)    Problem List Items Addressed This Visit     Problem with dialysis access ([facility] (Chronic)      Other Visit Diagnoses     Left upper extremity swelling    -  Primary          I reviewed with the patient that normal activities can be resumed as tolerated. I asked her to call with any new swelling.

## 2018-12-10 ENCOUNTER — OFFICE VISIT (OUTPATIENT)
Dept: NEUROSURGERY | Age: 63
End: 2018-12-10
Payer: MEDICARE

## 2018-12-10 VITALS
WEIGHT: 197 LBS | SYSTOLIC BLOOD PRESSURE: 114 MMHG | HEART RATE: 86 BPM | DIASTOLIC BLOOD PRESSURE: 66 MMHG | HEIGHT: 63 IN | BODY MASS INDEX: 34.91 KG/M2

## 2018-12-10 DIAGNOSIS — M54.9 MUSCULOSKELETAL BACK PAIN: Primary | ICD-10-CM

## 2018-12-10 PROCEDURE — 3017F COLORECTAL CA SCREEN DOC REV: CPT | Performed by: NEUROLOGICAL SURGERY

## 2018-12-10 PROCEDURE — G8417 CALC BMI ABV UP PARAM F/U: HCPCS | Performed by: NEUROLOGICAL SURGERY

## 2018-12-10 PROCEDURE — 1090F PRES/ABSN URINE INCON ASSESS: CPT | Performed by: NEUROLOGICAL SURGERY

## 2018-12-10 PROCEDURE — G8427 DOCREV CUR MEDS BY ELIG CLIN: HCPCS | Performed by: NEUROLOGICAL SURGERY

## 2018-12-10 PROCEDURE — 99204 OFFICE O/P NEW MOD 45 MIN: CPT | Performed by: NEUROLOGICAL SURGERY

## 2018-12-10 PROCEDURE — G8484 FLU IMMUNIZE NO ADMIN: HCPCS | Performed by: NEUROLOGICAL SURGERY

## 2018-12-10 PROCEDURE — 1101F PT FALLS ASSESS-DOCD LE1/YR: CPT | Performed by: NEUROLOGICAL SURGERY

## 2018-12-10 ASSESSMENT — ENCOUNTER SYMPTOMS
SHORTNESS OF BREATH: 0
BACK PAIN: 1
PHOTOPHOBIA: 0
TROUBLE SWALLOWING: 0
ABDOMINAL PAIN: 0

## 2018-12-10 NOTE — PROGRESS NOTES
FISTULA,AUTOGENOUS GRAFT Left 3/22/2018    AV FISTULA  REVISION LEFT ARM performed by Shanique Shoemaker MD at [facility] [place] [address] TUNNELED CV CATH Right 8/9/2018    REMOVAL OF TESIO CATHETER, REMOVAL OF MEDIPORT performed by Shanique Shoemaker MD at [address] Right 2000    RIGHT BREAST MOLE REMOVED    TRANSESOPHAGEAL ECHOCARDIOGRAM  02/03/2017    TUNNELED VENOUS CATHETER PLACEMENT Right     TUNNELED VENOUS PORT PLACEMENT Right     Powerport / x 4 / at right chest; since short gut syndrome received magnesium & sodium bicarb in past    VASC UPPER EXTREMITY VENOUS  UNI Left 35/82/8299    Brachiocephalic covered stent, iCast, 10 mm x 38 mm      Family History   Problem Relation Age of Onset    Obesity Mother     Cirrhosis Mother     Heart Failure Mother     High Blood Pressure Mother     Diabetes Father     High Blood Pressure Sister     Depression Sister     Diabetes Brother     High Blood Pressure Brother       Social History     Social History    Marital status:      Spouse name: N/A    Number of children: N/A    Years of education: N/A     Occupational History    retired      Social History Main Topics    Smoking status: Former Smoker     Packs/day: 2.00     Years: 8.00     Types: Cigarettes     Start date: 1/6/1989     Quit date: 2/1/1997    Smokeless tobacco: Never Used    Alcohol use No    Drug use: No    Sexual activity: Not on file     Other Topics Concern    Not on file     Social History Narrative    No narrative on file       Medications:   Current Outpatient Prescriptions   Medication Sig Dispense Refill    torsemide (DEMADEX) 20 MG tablet       albuterol (ACCUNEB) 0.63 MG/3ML nebulizer solution Take 3 mLs by nebulization every 4 hours as needed for Wheezing or Shortness of Breath DX: Moderate Persistent Asthma J45.4 120 vial 6    montelukast (SINGULAIR) 10 MG tablet Take 1 tablet by mouth nightly 30 tablet 6    mometasone-formoterol (DULERA) 200-5 MCG/ACT

## 2018-12-13 ENCOUNTER — OFFICE VISIT (OUTPATIENT)
Dept: CARDIOLOGY CLINIC | Age: 63
End: 2018-12-13
Payer: MEDICARE

## 2018-12-13 VITALS
HEIGHT: 63 IN | WEIGHT: 194.6 LBS | HEART RATE: 74 BPM | SYSTOLIC BLOOD PRESSURE: 112 MMHG | DIASTOLIC BLOOD PRESSURE: 60 MMHG | RESPIRATION RATE: 18 BRPM | BODY MASS INDEX: 34.48 KG/M2

## 2018-12-13 DIAGNOSIS — Z98.890 S/P MVR (MITRAL VALVE REPAIR): ICD-10-CM

## 2018-12-13 DIAGNOSIS — I38 VHD (VALVULAR HEART DISEASE): Primary | ICD-10-CM

## 2018-12-13 DIAGNOSIS — Z98.890 S/P TVR (TRICUSPID VALVE REPAIR): ICD-10-CM

## 2018-12-13 DIAGNOSIS — N18.6 ESRD (END STAGE RENAL DISEASE) (HCC): ICD-10-CM

## 2018-12-13 PROCEDURE — G8417 CALC BMI ABV UP PARAM F/U: HCPCS | Performed by: INTERNAL MEDICINE

## 2018-12-13 PROCEDURE — 93000 ELECTROCARDIOGRAM COMPLETE: CPT | Performed by: INTERNAL MEDICINE

## 2018-12-13 PROCEDURE — G8427 DOCREV CUR MEDS BY ELIG CLIN: HCPCS | Performed by: INTERNAL MEDICINE

## 2018-12-13 PROCEDURE — G8400 PT W/DXA NO RESULTS DOC: HCPCS | Performed by: INTERNAL MEDICINE

## 2018-12-13 PROCEDURE — 99214 OFFICE O/P EST MOD 30 MIN: CPT | Performed by: INTERNAL MEDICINE

## 2018-12-13 PROCEDURE — 3017F COLORECTAL CA SCREEN DOC REV: CPT | Performed by: INTERNAL MEDICINE

## 2018-12-13 PROCEDURE — 1101F PT FALLS ASSESS-DOCD LE1/YR: CPT | Performed by: INTERNAL MEDICINE

## 2018-12-13 PROCEDURE — 4040F PNEUMOC VAC/ADMIN/RCVD: CPT | Performed by: INTERNAL MEDICINE

## 2018-12-13 PROCEDURE — 1123F ACP DISCUSS/DSCN MKR DOCD: CPT | Performed by: INTERNAL MEDICINE

## 2018-12-13 PROCEDURE — 1036F TOBACCO NON-USER: CPT | Performed by: INTERNAL MEDICINE

## 2018-12-13 PROCEDURE — 1090F PRES/ABSN URINE INCON ASSESS: CPT | Performed by: INTERNAL MEDICINE

## 2018-12-13 PROCEDURE — G8598 ASA/ANTIPLAT THER USED: HCPCS | Performed by: INTERNAL MEDICINE

## 2018-12-13 PROCEDURE — G8484 FLU IMMUNIZE NO ADMIN: HCPCS | Performed by: INTERNAL MEDICINE

## 2018-12-14 ENCOUNTER — HOSPITAL ENCOUNTER (OUTPATIENT)
Dept: GENERAL RADIOLOGY | Age: 63
Discharge: HOME | End: 2018-12-16
Payer: MEDICARE

## 2018-12-14 ENCOUNTER — HOSPITAL ENCOUNTER (OUTPATIENT)
Age: 63
Discharge: HOME | End: 2018-12-16
Payer: MEDICARE

## 2018-12-14 DIAGNOSIS — R52 PAIN: ICD-10-CM

## 2018-12-14 PROCEDURE — 71046 X-RAY EXAM CHEST 2 VIEWS: CPT

## 2018-12-17 NOTE — PROGRESS NOTES
intravenously every 14 days   Acetaminophen (TYLENOL ARTHRITIS PAIN PO) Take 3 tablets by mouth 2 times daily    Biotin w/ Vitamins C & E (HAIR/SKIN/NAILS PO) Take by mouth   fenofibrate (TRICOR) 145 MG tablet Take 145 mg by mouth three times a week Patient taking Mon, Wed, Fri   calcium carbonate (TUMS) 500 MG chewable tablet Take 2 tablets by mouth 2 times daily   melatonin 5 MG TABS tablet Take 5 mg by mouth nightly as needed   sodium bicarbonate 650 MG tablet Take 650 mg by mouth 2 times daily    Aspirin-Acetaminophen-Caffeine (EXCEDRIN EXTRA STRENGTH PO) Take by mouth as needed Ld 3-16-18   Omega-3 Fatty Acids (FISH OIL) 1200 MG CAPS Take by mouth daily LD 3-16-18   iron dextran complex (INFED) 50 MG/ML injection Inject 100 mg into the muscle once Twice per month   loperamide (IMODIUM) 2 MG capsule Take 2 mg by mouth as needed for Diarrhea   magnesium oxide (MAG-OX) 400 MG tablet Take 500 mg by mouth daily    levothyroxine (SYNTHROID) 137 MCG tablet Take 137 mcg by mouth Daily   calcium acetate (PHOSLO) 667 MG capsule Take by mouth 4 times daily   clopidogrel (PLAVIX) 75 MG tablet TAKE ONE TABLET BY MOUTH EVERY DAY   furosemide (LASIX) 40 MG tablet Take 1 tablet by mouth daily   liothyronine (CYTOMEL) 5 MCG tablet Take 5 mcg by mouth daily   aspirin 81 MG EC tablet Take 1 tablet by mouth daily   carvedilol (COREG) 6.25 MG tablet Take 1 tablet by mouth 2 times daily (with meals)   atorvastatin (LIPITOR) 20 MG tablet Take 20 mg by mouth nightly    Coenzyme Q10 (COQ10 PO) Take by mouth nightly LD 3-16-18   vitamin B-12 (CYANOCOBALAMIN) 1000 MCG tablet Take 1,000 mcg by mouth daily LD 3-16-18   darbepoetin izabella-polysorbate (ARANESP, ALBUMIN FREE,) 60 MCG/ML injection Inject into the skin every 14 days Twice/per month  Dose given 3-16-18 at dialysis   CPAP Machine MISC Please replace patients CPAP at 8 cm water pressure with heated humidification and C-Flex of 3.  Patient states she is due for a new machine and hers

## 2019-01-01 ENCOUNTER — HOSPITAL ENCOUNTER (OUTPATIENT)
Age: 64
Discharge: HOME | End: 2019-12-30
Payer: MEDICARE

## 2019-01-01 LAB
ALBUMIN SERPL-MCNC: 3.1 G/DL (ref 3.5–5.2)
ALP BLD-CCNC: 121 U/L (ref 35–104)
ALT SERPL-CCNC: 15 U/L (ref 0–32)
ANION GAP SERPL CALCULATED.3IONS-SCNC: 11 MMOL/L (ref 7–16)
AST SERPL-CCNC: 23 U/L (ref 0–31)
BASOPHILS ABSOLUTE: 0.03 E9/L (ref 0–0.2)
BASOPHILS RELATIVE PERCENT: 0.7 % (ref 0–2)
BILIRUB SERPL-MCNC: 0.4 MG/DL (ref 0–1.2)
BUN BLDV-MCNC: 26 MG/DL (ref 8–23)
CALCIUM SERPL-MCNC: 8.8 MG/DL (ref 8.6–10.2)
CHLORIDE BLD-SCNC: 99 MMOL/L (ref 98–107)
CHOLESTEROL, TOTAL: 90 MG/DL (ref 0–199)
CO2: 33 MMOL/L (ref 22–29)
CREAT SERPL-MCNC: 3 MG/DL (ref 0.5–1)
EOSINOPHILS ABSOLUTE: 0.05 E9/L (ref 0.05–0.5)
EOSINOPHILS RELATIVE PERCENT: 1.2 % (ref 0–6)
GFR AFRICAN AMERICAN: 19
GFR NON-AFRICAN AMERICAN: 16 ML/MIN/1.73
GLUCOSE BLD-MCNC: 68 MG/DL (ref 74–99)
HCT VFR BLD CALC: 38.5 % (ref 34–48)
HDLC SERPL-MCNC: 41 MG/DL
HEMOGLOBIN: 11 G/DL (ref 11.5–15.5)
HEPATITIS C ANTIBODY INTERPRETATION: NORMAL
IMMATURE GRANULOCYTES #: 0.01 E9/L
IMMATURE GRANULOCYTES %: 0.2 % (ref 0–5)
LDL CHOLESTEROL CALCULATED: 32 MG/DL (ref 0–99)
LYMPHOCYTES ABSOLUTE: 1.32 E9/L (ref 1.5–4)
LYMPHOCYTES RELATIVE PERCENT: 31 % (ref 20–42)
MCH RBC QN AUTO: 33.7 PG (ref 26–35)
MCHC RBC AUTO-ENTMCNC: 28.6 % (ref 32–34.5)
MCV RBC AUTO: 118.1 FL (ref 80–99.9)
MONOCYTES ABSOLUTE: 0.36 E9/L (ref 0.1–0.95)
MONOCYTES RELATIVE PERCENT: 8.5 % (ref 2–12)
NEUTROPHILS ABSOLUTE: 2.49 E9/L (ref 1.8–7.3)
NEUTROPHILS RELATIVE PERCENT: 58.4 % (ref 43–80)
PDW BLD-RTO: 16.4 FL (ref 11.5–15)
PLATELET # BLD: 119 E9/L (ref 130–450)
PMV BLD AUTO: 11.7 FL (ref 7–12)
POIKILOCYTES: ABNORMAL
POTASSIUM SERPL-SCNC: 3.9 MMOL/L (ref 3.5–5)
RBC # BLD: 3.26 E12/L (ref 3.5–5.5)
SMUDGE CELLS: ABNORMAL
SODIUM BLD-SCNC: 143 MMOL/L (ref 132–146)
TOTAL PROTEIN: 7.1 G/DL (ref 6.4–8.3)
TRIGL SERPL-MCNC: 86 MG/DL (ref 0–149)
TSH SERPL DL<=0.05 MIU/L-ACNC: 1.65 UIU/ML (ref 0.27–4.2)
VLDLC SERPL CALC-MCNC: 17 MG/DL
WBC # BLD: 4.3 E9/L (ref 4.5–11.5)

## 2019-01-01 PROCEDURE — 80053 COMPREHEN METABOLIC PANEL: CPT

## 2019-01-01 PROCEDURE — 85025 COMPLETE CBC W/AUTO DIFF WBC: CPT

## 2019-01-01 PROCEDURE — 36415 COLL VENOUS BLD VENIPUNCTURE: CPT

## 2019-01-01 PROCEDURE — 83036 HEMOGLOBIN GLYCOSYLATED A1C: CPT

## 2019-01-01 PROCEDURE — 86803 HEPATITIS C AB TEST: CPT

## 2019-01-01 PROCEDURE — 80061 LIPID PANEL: CPT

## 2019-01-01 PROCEDURE — 84443 ASSAY THYROID STIM HORMONE: CPT

## 2019-02-07 ENCOUNTER — OFFICE VISIT (OUTPATIENT)
Dept: NEUROLOGY | Age: 64
End: 2019-02-07
Payer: MEDICARE

## 2019-02-07 VITALS
BODY MASS INDEX: 33.27 KG/M2 | HEIGHT: 63 IN | WEIGHT: 187.8 LBS | DIASTOLIC BLOOD PRESSURE: 76 MMHG | TEMPERATURE: 97.5 F | RESPIRATION RATE: 16 BRPM | SYSTOLIC BLOOD PRESSURE: 125 MMHG | HEART RATE: 75 BPM | OXYGEN SATURATION: 99 %

## 2019-02-07 DIAGNOSIS — I63.10 CEREBROVASCULAR ACCIDENT (CVA) DUE TO EMBOLISM OF PRECEREBRAL ARTERY (HCC): ICD-10-CM

## 2019-02-07 DIAGNOSIS — M54.17 L-S RADICULOPATHY: Primary | ICD-10-CM

## 2019-02-07 PROCEDURE — 3017F COLORECTAL CA SCREEN DOC REV: CPT | Performed by: CLINICAL NURSE SPECIALIST

## 2019-02-07 PROCEDURE — G8400 PT W/DXA NO RESULTS DOC: HCPCS | Performed by: CLINICAL NURSE SPECIALIST

## 2019-02-07 PROCEDURE — G8484 FLU IMMUNIZE NO ADMIN: HCPCS | Performed by: CLINICAL NURSE SPECIALIST

## 2019-02-07 PROCEDURE — 1036F TOBACCO NON-USER: CPT | Performed by: CLINICAL NURSE SPECIALIST

## 2019-02-07 PROCEDURE — 99214 OFFICE O/P EST MOD 30 MIN: CPT | Performed by: CLINICAL NURSE SPECIALIST

## 2019-02-07 PROCEDURE — G8417 CALC BMI ABV UP PARAM F/U: HCPCS | Performed by: CLINICAL NURSE SPECIALIST

## 2019-02-07 PROCEDURE — 4040F PNEUMOC VAC/ADMIN/RCVD: CPT | Performed by: CLINICAL NURSE SPECIALIST

## 2019-02-07 PROCEDURE — 1101F PT FALLS ASSESS-DOCD LE1/YR: CPT | Performed by: CLINICAL NURSE SPECIALIST

## 2019-02-07 PROCEDURE — 1123F ACP DISCUSS/DSCN MKR DOCD: CPT | Performed by: CLINICAL NURSE SPECIALIST

## 2019-02-07 PROCEDURE — G8598 ASA/ANTIPLAT THER USED: HCPCS | Performed by: CLINICAL NURSE SPECIALIST

## 2019-02-07 PROCEDURE — G8427 DOCREV CUR MEDS BY ELIG CLIN: HCPCS | Performed by: CLINICAL NURSE SPECIALIST

## 2019-02-07 PROCEDURE — 1090F PRES/ABSN URINE INCON ASSESS: CPT | Performed by: CLINICAL NURSE SPECIALIST

## 2019-02-07 RX ORDER — BACLOFEN 10 MG/1
10 TABLET ORAL NIGHTLY
Qty: 30 TABLET | Refills: 1 | Status: SHIPPED | OUTPATIENT
Start: 2019-02-07 | End: 2019-04-10 | Stop reason: SDUPTHER

## 2019-03-19 ENCOUNTER — HOSPITAL ENCOUNTER (OUTPATIENT)
Age: 64
Discharge: HOME | End: 2019-03-19
Payer: MEDICARE

## 2019-03-19 LAB
ALBUMIN SERPL-MCNC: 3.1 G/DL (ref 3.5–5.2)
ALP BLD-CCNC: 211 U/L (ref 35–104)
ALT SERPL-CCNC: 15 U/L (ref 0–32)
ANION GAP SERPL CALCULATED.3IONS-SCNC: 12 MMOL/L (ref 7–16)
ANISOCYTOSIS: ABNORMAL
AST SERPL-CCNC: 33 U/L (ref 0–31)
BASOPHILS ABSOLUTE: 0.03 E9/L (ref 0–0.2)
BASOPHILS RELATIVE PERCENT: 0.7 % (ref 0–2)
BILIRUB SERPL-MCNC: 0.5 MG/DL (ref 0–1.2)
BUN BLDV-MCNC: 29 MG/DL (ref 8–23)
CALCIUM SERPL-MCNC: 8.2 MG/DL (ref 8.6–10.2)
CHLORIDE BLD-SCNC: 101 MMOL/L (ref 98–107)
CO2: 27 MMOL/L (ref 22–29)
CREAT SERPL-MCNC: 2.9 MG/DL (ref 0.5–1)
EOSINOPHILS ABSOLUTE: 0.07 E9/L (ref 0.05–0.5)
EOSINOPHILS RELATIVE PERCENT: 1.6 % (ref 0–6)
GFR AFRICAN AMERICAN: 20
GFR NON-AFRICAN AMERICAN: 16 ML/MIN/1.73
GLUCOSE BLD-MCNC: 80 MG/DL (ref 74–99)
HCT VFR BLD CALC: 34.1 % (ref 34–48)
HEMOGLOBIN: 9.9 G/DL (ref 11.5–15.5)
IMMATURE GRANULOCYTES #: 0.02 E9/L
IMMATURE GRANULOCYTES %: 0.5 % (ref 0–5)
LYMPHOCYTES ABSOLUTE: 1.1 E9/L (ref 1.5–4)
LYMPHOCYTES RELATIVE PERCENT: 25.1 % (ref 20–42)
MAGNESIUM: 1.9 MG/DL (ref 1.6–2.6)
MCH RBC QN AUTO: 33 PG (ref 26–35)
MCHC RBC AUTO-ENTMCNC: 29 % (ref 32–34.5)
MCV RBC AUTO: 113.7 FL (ref 80–99.9)
MONOCYTES ABSOLUTE: 0.38 E9/L (ref 0.1–0.95)
MONOCYTES RELATIVE PERCENT: 8.7 % (ref 2–12)
NEUTROPHILS ABSOLUTE: 2.78 E9/L (ref 1.8–7.3)
NEUTROPHILS RELATIVE PERCENT: 63.4 % (ref 43–80)
PDW BLD-RTO: 15.7 FL (ref 11.5–15)
PLATELET # BLD: 113 E9/L (ref 130–450)
PMV BLD AUTO: 12.1 FL (ref 7–12)
POLYCHROMASIA: ABNORMAL
POTASSIUM SERPL-SCNC: 4.2 MMOL/L (ref 3.5–5)
RBC # BLD: 3 E12/L (ref 3.5–5.5)
SODIUM BLD-SCNC: 140 MMOL/L (ref 132–146)
TOTAL PROTEIN: 7 G/DL (ref 6.4–8.3)
TSH SERPL DL<=0.05 MIU/L-ACNC: 2.89 UIU/ML (ref 0.27–4.2)
VITAMIN D 25-HYDROXY: 27 NG/ML (ref 30–100)
WBC # BLD: 4.4 E9/L (ref 4.5–11.5)

## 2019-03-19 PROCEDURE — 83735 ASSAY OF MAGNESIUM: CPT

## 2019-03-19 PROCEDURE — 85025 COMPLETE CBC W/AUTO DIFF WBC: CPT

## 2019-03-19 PROCEDURE — 36415 COLL VENOUS BLD VENIPUNCTURE: CPT

## 2019-03-19 PROCEDURE — 80053 COMPREHEN METABOLIC PANEL: CPT

## 2019-03-19 PROCEDURE — 84443 ASSAY THYROID STIM HORMONE: CPT

## 2019-03-19 PROCEDURE — 82306 VITAMIN D 25 HYDROXY: CPT

## 2019-04-10 RX ORDER — BACLOFEN 10 MG/1
10 TABLET ORAL NIGHTLY
Qty: 30 TABLET | Refills: 1 | Status: SHIPPED | OUTPATIENT
Start: 2019-04-10 | End: 2019-05-30 | Stop reason: SDUPTHER

## 2019-04-11 NOTE — PROGRESS NOTES
14 [facility]    [address] [facility]  [address]  Dept: [phone]        Patient:  DARIEL Contreras 1955    Date of Service:  19     Requesting Physician:  Heather Gonzales., GABRIELLA*    Reason for Consult:      Patient presents with complaints of right back pain that started 1.5 years ago    HISTORY OF PRESENT ILLNESS:      Pain is intermittent and is described as cramping. Pain does not radiate to both lower extremities. She  has weakness of the both lower extremities and does not have bladder or bowel dysfunction. Alleviating factors include: massage and lidocaine rub. Aggravating factors include:  Laying on the right side. She has been on anticoagulation medications to include ASA and Plavix and has not been on herbal supplements. She is not diabetic. Imaging:     Lumbar MRI  -       EMG 2018 -    Electrodiagnostic examination of both legs disclosed evidence diagnostic of the following---     1. 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     2.  Left-sided lumbosacral motor radiculopathies---as noted by the abnormal needle testing of the paraspinals. However, these radiculopathies were not further defined with the normal needle examination of the proximal musculature of that leg.     3. Suspected bilateral lateral femoral cutaneous sensory neuropathies---that is, meralgia parestheticas. Such neuropathies are usually related to compression of the sensory nerve at the lateral inguinal ligament.     There were no other peripheral neuropathies. There were no other motor radiculopathies. Sensory radiculopathies can not be evaluated by electrodiagnostic means.     Clinically, the patient presented with pains in her left lateral thigh as well as paresthesias in both feet and calves.   On brief neurological examination, I found decreased sensation along the left lateral femoral cutaneous nerve distribution but no other motor or sensory compromise. Her left thigh discomforts are related to the meralgia paresthetica. Her peripheral neuropathy is likely uremic---from her long-standing renal disease. However, radicular disease may also be a contributing factor. I recommend imaging studies of the lumbosacral spine.     Clinical correlation was highly advised    Previous treatments: chiropracter.       Past Medical History:   Diagnosis Date    (HFpEF) heart failure with preserved ejection fraction ([facility] 01/25/2017 4/11/17- echo- LVEF 55-60%, stage II DD, severely dilated LA, severe MR, mild TR, LVDD: 5.6 cm    Anemia     hx  / takes aranesp injections every 2 weeks    Asthma     well controlled with inhalers     Chronic kidney disease     stage 4    Complication of AV dialysis fistula, initial encounter 6/2/2018    COPD (chronic obstructive pulmonary disease) (HCC)     Fibromyalgia     GERD (gastroesophageal reflux disease)     Hemodialysis patient ([facility]     chato ashley Tarango    History of blood transfusion spring 2017    Hx of blood clots     h/o DVT in leg at age 25yrs    Hyperlipidemia     Hypertension     well controlled with medications     Kidney failure     Kidney stone     multiple issues    Polymyalgia rheumatica (HCC)     Restless leg syndrome     Short gut syndrome     Sleep apnea     uses CPAP    Thyroid disease     Traumatic hematoma of left upper arm 6/2/2018    Wears dentures     upper partial       Past Surgical History:   Procedure Laterality Date    APPENDECTOMY      AV FISTULA REPAIR Left 03/22/2018    Superficialization, Delatore    AV FISTULA REPAIR  11/21/2018    Dr Brendan Santana 10x38 iCast Subclavian    BREAST BIOPSY Right 2000    BRONCHOSCOPY  2010    CARDIAC CATHETERIZATION  02/17/2017    Dr Stephanie Nguyen REPLACEMENT  03/21/2017    MVR, TVR    COLONOSCOPY      COLONOSCOPY  4/21/15    DIALYSIS FISTULA CREATION Left 76/26/5698    radiocephalic, Delatore    DIALYSIS FISTULA CREATION Left 01/25/2018    brachioecephalic, ligation radiocephalic, Delatore    ENDOSCOPY, COLON, DIAGNOSTIC      EYE SURGERY Right     CATARACT    INTESTINAL BYPASS  1973    for weight loss    KIDNEY STONE SURGERY      x 3    OTHER SURGICAL HISTORY Left 11/16/2017    AV fistula creation left arm    OTHER SURGICAL HISTORY  01/17/2018    Left arm fistulogram by Dr Vicky Au.  OTHER SURGICAL HISTORY  10/17/2018    Dr Tracey-Left arm fistuloplasty    HI CREAT AV FISTULA,AUTOGENOUS GRAFT Left 3/22/2018    AV FISTULA  REVISION LEFT ARM performed by Figueroa Valladares MD at [facility] TUNNELED CV CATH Right 8/9/2018    REMOVAL OF TESIO CATHETER, REMOVAL OF MEDIPORT performed by Figueroa Valladares MD at [address] Right 2000    RIGHT BREAST MOLE REMOVED    TRANSESOPHAGEAL ECHOCARDIOGRAM  02/03/2017    TUNNELED VENOUS CATHETER PLACEMENT Right     TUNNELED VENOUS PORT PLACEMENT Right     Powerport / x 4 / at right chest; since short gut syndrome received magnesium & sodium bicarb in past    VASC UPPER EXTREMITY VENOUS  UNI Left 13/32/4202    Brachiocephalic covered stent, iCast, 10 mm x 38 mm       Prior to Admission medications    Medication Sig Start Date End Date Taking? Authorizing Provider   FERROUS BISGLYCINATE CHELATE PO Take by mouth   Yes Historical Provider, MD   lidocaine-prilocaine (EMLA) 2.5-2.5 % cream APPLY SMALL AMOUNT TO ACCESS SITE (AVF) 1 TO 2 HOURS BEFORE DIALYSIS.  COVER WITH OCCLUSIVE DRESSING (SARAN WRAP) 3/27/19  Yes Historical Provider, MD   carvedilol (COREG) 25 MG tablet TAKE 1 TABLET BY MOUTH THREE TIMES A WEEK 4/6/19  Yes Historical Provider, MD   baclofen (LIORESAL) 10 MG tablet Take 1 tablet by mouth nightly 4/10/19  Yes GABRIELLA Guerrero - CNP   rOPINIRole (REQUIP) 0.5 MG tablet TAKE 1 TABLET BY MOUTH NIGHTLY 3/11/19  Yes Dante Vanegas MD   torsemide (DEMADEX) 20 MG tablet Take 20 mg by mg by mouth 3 times daily Instructed to take am of procedure. Yes Historical Provider, MD       Allergies   Allergen Reactions    Coumadin [Warfarin Sodium] Hives    Quinine Derivatives Hives    Other      Patient states cannot take oral antibiotic dt she has short gut syndrome. ...  Puts her into electrolyte inbalance       Social History     Socioeconomic History    Marital status:      Spouse name: Not on file    Number of children: Not on file    Years of education: Not on file    Highest education level: Not on file   Occupational History    Occupation: retired   Social Needs    Financial resource strain: Not on file    Food insecurity:     Worry: Not on file     Inability: Not on file   Decisionlink needs:     Medical: Not on file     Non-medical: Not on file   Tobacco Use    Smoking status: Former Smoker     Packs/day: 2.00     Years: 8.00     Pack years: 16.00     Types: Cigarettes     Start date: 1989     Last attempt to quit: 1997     Years since quittin.2    Smokeless tobacco: Never Used   Substance and Sexual Activity    Alcohol use: No    Drug use: No    Sexual activity: Not on file   Lifestyle    Physical activity:     Days per week: Not on file     Minutes per session: Not on file    Stress: Not on file   Relationships    Social connections:     Talks on phone: Not on file     Gets together: Not on file     Attends Mosque service: Not on file     Active member of club or organization: Not on file     Attends meetings of clubs or organizations: Not on file     Relationship status: Not on file    Intimate partner violence:     Fear of current or ex partner: Not on file     Emotionally abused: Not on file     Physically abused: Not on file     Forced sexual activity: Not on file   Other Topics Concern    Not on file   Social History Narrative    1-2 cups coffee daily       Family History   Problem Relation Age of Onset    Obesity Mother     Cirrhosis Gastrocnemius5/5  Right Ant Tibialis5/5  Left Ant Tibialis5/5    Reflexes:    Right Quadriceps reflex0-1+  Left Quadriceps reflex0-1+  Right Achilles reflex0-1+  Left Achilles reflex0-1+  Gait:normal station    Dermatology:    Skin:no rashes or lesions noted    Assessment/Plan:  Myofascial rigth-sided LBP   EMG documented neuropathy as well as LS radiculopathy and meralgia paresthetica               Lumbar MRI demonstrated mild canal stenosis   HTN, DLD, ESRD on dialysis M-W-F, THROMBOCYTOPENIA 113k on 3/19/2019, Short gut syndrome, Polymyalgia rheumatica, YUNIOR, anemia, asthma, COPD  On ASA and PLAVIX s/p MVR and TVR                            Reviewed referral documents and imaging  OARRS report reviewed  Rt lumbar TPI under US guidance  PT  Patient encouraged to stay active and to lose weight  Treatment plan discussed with the patient including medication and procedure side effects     CC:  Referring physician    BELINDA Buitrago.

## 2019-04-12 ENCOUNTER — OFFICE VISIT (OUTPATIENT)
Dept: PAIN MANAGEMENT | Age: 64
End: 2019-04-12
Payer: MEDICARE

## 2019-04-12 VITALS
HEART RATE: 88 BPM | WEIGHT: 188 LBS | HEIGHT: 63 IN | BODY MASS INDEX: 33.31 KG/M2 | TEMPERATURE: 98.6 F | SYSTOLIC BLOOD PRESSURE: 132 MMHG | RESPIRATION RATE: 16 BRPM | OXYGEN SATURATION: 94 % | DIASTOLIC BLOOD PRESSURE: 74 MMHG

## 2019-04-12 DIAGNOSIS — M54.50 CHRONIC RIGHT-SIDED LOW BACK PAIN WITHOUT SCIATICA: ICD-10-CM

## 2019-04-12 DIAGNOSIS — G89.4 CHRONIC PAIN SYNDROME: Primary | ICD-10-CM

## 2019-04-12 DIAGNOSIS — M79.10 MYALGIA: ICD-10-CM

## 2019-04-12 DIAGNOSIS — G89.29 CHRONIC RIGHT-SIDED LOW BACK PAIN WITHOUT SCIATICA: ICD-10-CM

## 2019-04-12 PROCEDURE — G8400 PT W/DXA NO RESULTS DOC: HCPCS | Performed by: PAIN MEDICINE

## 2019-04-12 PROCEDURE — G8417 CALC BMI ABV UP PARAM F/U: HCPCS | Performed by: PAIN MEDICINE

## 2019-04-12 PROCEDURE — 1090F PRES/ABSN URINE INCON ASSESS: CPT | Performed by: PAIN MEDICINE

## 2019-04-12 PROCEDURE — G8427 DOCREV CUR MEDS BY ELIG CLIN: HCPCS | Performed by: PAIN MEDICINE

## 2019-04-12 PROCEDURE — G8598 ASA/ANTIPLAT THER USED: HCPCS | Performed by: PAIN MEDICINE

## 2019-04-12 PROCEDURE — 1123F ACP DISCUSS/DSCN MKR DOCD: CPT | Performed by: PAIN MEDICINE

## 2019-04-12 PROCEDURE — 99204 OFFICE O/P NEW MOD 45 MIN: CPT | Performed by: PAIN MEDICINE

## 2019-04-12 PROCEDURE — 4040F PNEUMOC VAC/ADMIN/RCVD: CPT | Performed by: PAIN MEDICINE

## 2019-04-12 PROCEDURE — 3017F COLORECTAL CA SCREEN DOC REV: CPT | Performed by: PAIN MEDICINE

## 2019-04-12 PROCEDURE — 1036F TOBACCO NON-USER: CPT | Performed by: PAIN MEDICINE

## 2019-04-12 RX ORDER — LIDOCAINE AND PRILOCAINE 25; 25 MG/G; MG/G
CREAM TOPICAL
Refills: 12 | COMMUNITY
Start: 2019-03-27 | End: 2020-01-01

## 2019-04-12 RX ORDER — CARVEDILOL 12.5 MG/1
12.5 TABLET ORAL
Refills: 3 | COMMUNITY
Start: 2019-04-06 | End: 2020-01-01 | Stop reason: ALTCHOICE

## 2019-04-12 NOTE — PROGRESS NOTES
Brenna Licea presents to the [facility] on 4/12/2019. Ethan Pollard is complaining of pain rt. Sided back. The pain is intermittent with movement/ lying downThe pain is described as aching/cramps. Pain is rated on her best day at a , on her worst day at a 10, and on average at a on the VAS scale. She took her last dose of none. Any procedures since your last visit    Pacemaker or defibrilator: No managed by none. She has been on anticoagulation medications to include ASA and is managed by PCP.       /74   Pulse 88   Temp 98.6 °F (37 °C) (Oral)   Resp 16   Ht 5' 3\" (1.6 m)   Wt 188 lb (85.3 kg)   SpO2 94%   BMI 33.30 kg/m²

## 2019-04-18 ENCOUNTER — PROCEDURE VISIT (OUTPATIENT)
Dept: PAIN MANAGEMENT | Age: 64
End: 2019-04-18
Payer: MEDICARE

## 2019-04-18 VITALS
WEIGHT: 188 LBS | OXYGEN SATURATION: 94 % | BODY MASS INDEX: 33.31 KG/M2 | DIASTOLIC BLOOD PRESSURE: 72 MMHG | SYSTOLIC BLOOD PRESSURE: 148 MMHG | HEART RATE: 78 BPM | RESPIRATION RATE: 16 BRPM | TEMPERATURE: 98.7 F | HEIGHT: 63 IN

## 2019-04-18 DIAGNOSIS — M79.10 MYALGIA: Primary | ICD-10-CM

## 2019-04-18 PROCEDURE — 76942 ECHO GUIDE FOR BIOPSY: CPT | Performed by: PAIN MEDICINE

## 2019-04-18 PROCEDURE — 20552 NJX 1/MLT TRIGGER POINT 1/2: CPT | Performed by: PAIN MEDICINE

## 2019-04-18 RX ORDER — BUPIVACAINE HYDROCHLORIDE 2.5 MG/ML
10 INJECTION, SOLUTION EPIDURAL; INFILTRATION; INTRACAUDAL ONCE
Status: COMPLETED | OUTPATIENT
Start: 2019-04-18 | End: 2019-04-18

## 2019-04-18 RX ADMIN — BUPIVACAINE HYDROCHLORIDE 10 ML: 2.5 INJECTION, SOLUTION EPIDURAL; INFILTRATION; INTRACAUDAL at 14:21

## 2019-04-18 NOTE — PROGRESS NOTES
Rusty Hashimoto presents to the [facility] on 4/18/2019. Storm Brennan is complaining of pain rt. Lower back. The pain is intermittent when she lays on rt. Side. The pain is described as cramping Pain is rated on her best day at a 6, on her worst day at a 10, and on average at a 6 on the VAS scale. She took her last dose of     Any procedures since your last visit. Pacemaker or defibrilator: No managed by none. She has been on anticoagulation medications to include Plavix and is managed by PCP.       BP (!) 148/72   Pulse 78   Temp 98.7 °F (37.1 °C) (Oral)   Resp 16   Ht 5' 3\" (1.6 m)   Wt 188 lb (85.3 kg)   SpO2 94%   BMI 33.30 kg/m²

## 2019-05-02 NOTE — PROGRESS NOTES
uremic---from her long-standing renal disease.  However, radicular disease may also be a contributing factor.  I recommend imaging studies of the lumbosacral spine.     Clinical correlation was highly advised       Potential Aberrant Drug-Related Behavior:      Urine Drug Screening:    OARRS report:    Past Medical History:   Diagnosis Date    [facility]EF[facility]25/2017 4/11/17- echo- LVEF 55-60%, stage II DD, severely dilated LA, severe MR, mild TR, LVDD: 5.6 cm    Anemia     hx  / takes aranesp injections every 2 weeks    Asthma     well controlled with inhalers     Chronic kidney disease     stage 4    Complication of AV dialysis fistula, initial encounter 6/2/2018    COPD (chronic obstructive pulmonary disease) (HCC)     Fibromyalgia     GERD (gastroesophageal reflux disease)     Hemodialysis patient ([facility]     chato Tarango    History of blood transfusion spring 2017    Hx of blood clots     h/o DVT in leg at age 25yrs    Hyperlipidemia     Hypertension     well controlled with medications     Kidney failure     Kidney stone     multiple issues    Polymyalgia rheumatica (HCC)     Restless leg syndrome     Short gut syndrome     Sleep apnea     uses CPAP    Thyroid disease     Traumatic hematoma of left upper arm 6/2/2018    Wears dentures     upper partial       Past Surgical History:   Procedure Laterality Date    APPENDECTOMY      AV FISTULA REPAIR Left 03/22/2018    Superficialization, Delatore    AV FISTULA REPAIR  11/21/2018    Dr Lul Bonilla 10x38 iCast Subclavian    BREAST BIOPSY Right 2000    BRONCHOSCOPY  2010    CARDIAC CATHETERIZATION  02/17/2017    Dr Ila Fothergill REPLACEMENT  03/21/2017    MVR, TVR    COLONOSCOPY      COLONOSCOPY  4/21/15    DIALYSIS FISTULA CREATION Left 81/13/8758    radiocephalic, Delatore    DIALYSIS FISTULA CREATION Left 01/25/2018    brachioecephalic, ligation radiocephalic, Kyung    ENDOSCOPY, COLON, DIAGNOSTIC      EYE SURGERY Right     CATARACT    INTESTINAL BYPASS  1973    for weight loss    KIDNEY STONE SURGERY      x 3    OTHER SURGICAL HISTORY Left 11/16/2017    AV fistula creation left arm    OTHER SURGICAL HISTORY  01/17/2018    Left arm fistulogram by Dr Lesa Chaney.  OTHER SURGICAL HISTORY  10/17/2018    Dr Tracey-Left arm fistuloplasty    VT CREAT AV FISTULA,AUTOGENOUS GRAFT Left 3/22/2018    AV FISTULA  REVISION LEFT ARM performed by Alli Ngo MD [facility] TUNNELED CV CATH Right 8/9/2018    REMOVAL OF TESIO CATHETER, REMOVAL OF MEDIPORT performed by Alli Ngo MD at [address] Right 2000    RIGHT BREAST MOLE REMOVED    TRANSESOPHAGEAL ECHOCARDIOGRAM  02/03/2017    TUNNELED VENOUS CATHETER PLACEMENT Right     TUNNELED VENOUS PORT PLACEMENT Right     Powerport / x 4 / at right chest; since short gut syndrome received magnesium & sodium bicarb in past    VASC UPPER EXTREMITY VENOUS  UNI Left 66/81/5169    Brachiocephalic covered stent, iCast, 10 mm x 38 mm       Prior to Admission medications    Medication Sig Start Date End Date Taking? Authorizing Provider   montelukast (SINGULAIR) 10 MG tablet Take 1 tablet by mouth nightly 4/30/19  Yes Jaime Oconnor MD   predniSONE (DELTASONE) 10 MG tablet 4 tabs x 3 days, 3 tabs x 3 days, 2 tabs x 3 days, 1 tab x 3 days 4/15/19  Yes Jaime Oconnor MD   FERROUS BISGLYCINATE CHELATE PO Take by mouth   Yes Historical Provider, MD   lidocaine-prilocaine (EMLA) 2.5-2.5 % cream APPLY SMALL AMOUNT TO ACCESS SITE (AVF) 1 TO 2 HOURS BEFORE DIALYSIS.  COVER WITH OCCLUSIVE DRESSING (SARAN WRAP) 3/27/19  Yes Historical Provider, MD   carvedilol (COREG) 25 MG tablet TAKE 1 TABLET BY MOUTH THREE TIMES A WEEK 4/6/19  Yes Historical Provider, MD   baclofen (LIORESAL) 10 MG tablet Take 1 tablet by mouth nightly 4/10/19  Yes Tracy Rojas, APRN - CNP   rOPINIRole (Evonnie Siemens) 0.5 MG tablet TAKE 1 TABLET BY MOUTH NIGHTLY 3/11/19  Yes Dima Randhawa MD   torsemide (DEMADEX) 20 MG tablet Take 20 mg by mouth daily  9/20/18  Yes Historical Provider, MD   albuterol (ACCUNEB) 0.63 MG/3ML nebulizer solution Take 3 mLs by nebulization every 4 hours as needed for Wheezing or Shortness of Breath DX: Moderate Persistent Asthma J45.4 9/28/18  Yes Dima Randhawa MD   mometasone-formoterol (DULERA) 200-5 MCG/ACT inhaler Inhale 2 puffs into the lungs every 12 hours Use am of surgery 9/28/18  Yes Dima Randhawa MD   albuterol sulfate HFA (VENTOLIN HFA) 108 (90 Base) MCG/ACT inhaler Inhale 2 puffs into the lungs every 6 hours as needed for Wheezing 9/28/18  Yes Dima Randhawa MD   Methoxy PEG-Epoetin Beta (MIRCERA) 75 MCG/0.3ML SOSY Infuse 75 mcg intravenously every 14 days   Yes Historical Provider, MD   Acetaminophen (TYLENOL ARTHRITIS PAIN PO) Take 3 tablets by mouth 2 times daily    Yes Historical Provider, MD   Biotin w/ Vitamins C & E (HAIR/SKIN/NAILS PO) Take by mouth   Yes Historical Provider, MD   fenofibrate (TRICOR) 145 MG tablet Take 145 mg by mouth three times a week Patient taking Mon, Wed, Fri 6/19/18  Yes Historical Provider, MD   calcium carbonate (TUMS) 500 MG chewable tablet Take 2 tablets by mouth 2 times daily   Yes Historical Provider, MD   melatonin 5 MG TABS tablet Take 5 mg by mouth nightly as needed   Yes Historical Provider, MD   sodium bicarbonate 650 MG tablet Take 650 mg by mouth 2 times daily    Yes Historical Provider, MD   Aspirin-Acetaminophen-Caffeine (EXCEDRIN EXTRA STRENGTH PO) Take by mouth as needed Ld 3-16-18   Yes Historical Provider, MD   Omega-3 Fatty Acids (FISH OIL) 1200 MG CAPS Take by mouth daily LD 3-16-18   Yes Historical Provider, MD   iron dextran complex (INFED) 50 MG/ML injection Inject 100 mg into the muscle once Twice per month   Yes Historical Provider, MD   loperamide (IMODIUM) 2 MG capsule Take 2 mg by mouth as needed for Diarrhea   Yes Historical Provider, MD   magnesium oxide (MAG-OX) 400 MG tablet Take 500 mg by mouth daily    Yes Historical Provider, MD   levothyroxine (SYNTHROID) 137 MCG tablet Take 137 mcg by mouth Daily   Yes Historical Provider, MD   calcium acetate (PHOSLO) 667 MG capsule Take by mouth 4 times daily   Yes Historical Provider, MD   clopidogrel (PLAVIX) 75 MG tablet TAKE ONE TABLET BY MOUTH EVERY DAY 5/23/17  Yes Historical Provider, MD   furosemide (LASIX) 40 MG tablet Take 1 tablet by mouth daily  Patient taking differently: Take 40 mg by mouth nightly  4/14/17  Yes Lupe Schilling MD   liothyronine (CYTOMEL) 5 MCG tablet Take 5 mcg by mouth daily   Yes Historical Provider, MD   aspirin 81 MG EC tablet Take 1 tablet by mouth daily  Patient taking differently: Take 81 mg by mouth daily LD 3-21-18 3/30/17  Yes GABRIELLA Cuenca CNP   atorvastatin (LIPITOR) 20 MG tablet Take 20 mg by mouth nightly    Yes Historical Provider, MD   Coenzyme Q10 (COQ10 PO) Take by mouth nightly LD 3-16-18   Yes Historical Provider, MD   vitamin B-12 (CYANOCOBALAMIN) 1000 MCG tablet Take 1,000 mcg by mouth daily LD 3-16-18   Yes Historical Provider, MD   darbepoetin izabella-polysorbate (ARANESP, ALBUMIN FREE,) 60 MCG/ML injection Inject into the skin every 14 days Twice/per month  Dose given 3-16-18 at dialysis   Yes Historical Provider, MD   CPAP Machine MISC Please replace patients CPAP at 8 cm water pressure with heated humidification and C-Flex of 3. Patient states she is due for a new machine and hers is not working. Also provide masks, tubing, filters, head gear, and water chambers as needed.   Diagnosis-YUNIOR  Faxed to [facility]  Length of need 99 months 9/23/16  Yes Génesis Altman MD   vitamin D-3 (CHOLECALCIFEROL) 5000 UNITS TABS Take 5,000 Units by mouth 2 times daily LD 3-16-18   Yes Historical Provider, MD   folic acid (FOLVITE) 1 MG tablet Take 1 mg by mouth daily 3-16-18   Yes Historical Provider, MD   pregabalin Obesity Mother     Cirrhosis Mother     Heart Failure Mother     High Blood Pressure Mother     Diabetes Father     High Blood Pressure Sister     Depression Sister     Diabetes Brother     High Blood Pressure Brother        REVIEW OF SYSTEMS:     Gurwinder Sofia denies fever/chills, chest pain, shortness of breath, new bowel or bladder complaints. All other review of systems was negative. PHYSICAL EXAMINATION:      /60   Pulse 88   Temp 98.3 °F (36.8 °C)   Resp 18   Ht 5' 3\" (1.6 m)   Wt 188 lb (85.3 kg)   SpO2 94%   BMI 33.30 kg/m²     General:       General appearance:pleasant and well-hydrated, in no distress and A & O x3  Build:Overweight  Function:Rises from a seated position with difficulty, mild     HEENT:     Head:normocephalic, atraumatic  Pupils:regular, round, equal  Sclera: icterus absent     Lungs:     Breathing:normal breathing pattern     Abdomen:     Shape:non-distended  Tenderness:none  Guarding:none     Lumbar spine:     Spine inspection:normal   CVA tenderness:No   Palpation:tenderness paravertebral muscles, left negative, right positive.   Range of motion:abnormal mildly in lateral bending, flexion, extension rotation bilateral and is painful.     Musculoskeletal:     Trigger points in Paraveteral:present, right  SI joint tenderness:negative right, negative left              ED test:not done right, not done             left  Piriformis tenderness:negative right, negative left  Trochanteric bursa tenderness:negative right, negative left  SLR:negative right, negative left, sitting      Extremities:     Tremors:None bilaterally upper and lower  Intact:Yes  Varicose veins:absent   Pulses:present Lt radial  Cyanosis:none  Edema:none x all 4 extremities     Neurological:     Sensory:decreased to light touch in stocking distribution  Motor:                Right Quadriceps5/5          Left Quadriceps5/5           Right Gastrocnemius5/5    Left Gastrocnemius5/5  Right Ant Tibialis5/5  Left Ant Tibialis5/5     Reflexes:    Right Quadriceps reflex0-1+  Left Quadriceps reflex0-1+  Right Achilles reflex0-1+  Left Achilles reflex0-1+  Gait:normal station     Dermatology:     Skin:no rashes or lesions noted     Assessment/Plan:  Myofascial rigth-sided LBP, no relief with lumbar TPI              EMG documented neuropathy as well as LS radiculopathy and meralgia paresthetica               Lumbar MRI demonstrated mild canal stenosis   HTN, DLD, ESRD on dialysis M-W-F, THROMBOCYTOPENIA 113k on 3/19/2019, Short gut syndrome, Polymyalgia rheumatica, YUNIOR, anemia, asthma, COPD  On ASA and PLAVIX s/p MVR and TVR                            Rt lumbar TPI under US guidance without relief of symptoms  PT - she has not yet started and was encouraged to do so, ext referral done previously as she lives in [place] (she would like to trial the TENS first due to her busy schedule with dialysis)  TENS unit ordered  Patient encouraged to stay active and to lose weight  Treatment plan discussed with the patient including medication and procedure side effects     Cc:  Referring physician    BELINDA Brantley.

## 2019-05-10 ENCOUNTER — OFFICE VISIT (OUTPATIENT)
Dept: PAIN MANAGEMENT | Age: 64
End: 2019-05-10
Payer: MEDICARE

## 2019-05-10 VITALS
RESPIRATION RATE: 18 BRPM | TEMPERATURE: 98.3 F | OXYGEN SATURATION: 94 % | WEIGHT: 188 LBS | BODY MASS INDEX: 33.31 KG/M2 | SYSTOLIC BLOOD PRESSURE: 124 MMHG | HEART RATE: 88 BPM | DIASTOLIC BLOOD PRESSURE: 60 MMHG | HEIGHT: 63 IN

## 2019-05-10 DIAGNOSIS — G89.29 CHRONIC RIGHT-SIDED LOW BACK PAIN WITHOUT SCIATICA: ICD-10-CM

## 2019-05-10 DIAGNOSIS — M54.50 CHRONIC RIGHT-SIDED LOW BACK PAIN WITHOUT SCIATICA: ICD-10-CM

## 2019-05-10 DIAGNOSIS — G89.4 CHRONIC PAIN SYNDROME: Primary | ICD-10-CM

## 2019-05-10 DIAGNOSIS — M79.10 MYALGIA: ICD-10-CM

## 2019-05-10 PROCEDURE — 1090F PRES/ABSN URINE INCON ASSESS: CPT | Performed by: PAIN MEDICINE

## 2019-05-10 PROCEDURE — G8427 DOCREV CUR MEDS BY ELIG CLIN: HCPCS | Performed by: PAIN MEDICINE

## 2019-05-10 PROCEDURE — G8598 ASA/ANTIPLAT THER USED: HCPCS | Performed by: PAIN MEDICINE

## 2019-05-10 PROCEDURE — 3017F COLORECTAL CA SCREEN DOC REV: CPT | Performed by: PAIN MEDICINE

## 2019-05-10 PROCEDURE — G8417 CALC BMI ABV UP PARAM F/U: HCPCS | Performed by: PAIN MEDICINE

## 2019-05-10 PROCEDURE — 99213 OFFICE O/P EST LOW 20 MIN: CPT | Performed by: PAIN MEDICINE

## 2019-05-10 PROCEDURE — 1036F TOBACCO NON-USER: CPT | Performed by: PAIN MEDICINE

## 2019-05-10 PROCEDURE — 4040F PNEUMOC VAC/ADMIN/RCVD: CPT | Performed by: PAIN MEDICINE

## 2019-05-10 PROCEDURE — G8400 PT W/DXA NO RESULTS DOC: HCPCS | Performed by: PAIN MEDICINE

## 2019-05-10 PROCEDURE — 1123F ACP DISCUSS/DSCN MKR DOCD: CPT | Performed by: PAIN MEDICINE

## 2019-05-10 NOTE — PROGRESS NOTES
Priscila Plunkett presents to the [facility] on 5/10/2019. Valley Anger is complaining of pain in the lower back . The pain is constant. The pain is described as cramping . Pain is rated on her best day at a 1, on her worst day at a 1, and on average at a 1 on the VAS scale. She took her last dose of Lyrica      Any procedures since your last visit: Yes, with 0 % relief. Pacemaker or defibrilator: No managed by none.     She has been on anticoagulation medications to include ASA and Plavix and is managed by .brandon      /60   Pulse 88   Temp 98.3 °F (36.8 °C)   Resp 18   Ht 5' 3\" (1.6 m)   Wt 188 lb (85.3 kg)   SpO2 94%   BMI 33.30 kg/m²

## 2019-05-14 ENCOUNTER — TELEPHONE (OUTPATIENT)
Dept: PAIN MANAGEMENT | Age: 64
End: 2019-05-14

## 2019-05-14 NOTE — TELEPHONE ENCOUNTER
Giuseppe Shaw from [facility] called, medicare will not cover a TENS unit for low back pain. I did call the patient, message left for her to call the office.

## 2019-05-16 ENCOUNTER — HOSPITAL ENCOUNTER (OUTPATIENT)
Dept: OTHER | Age: 64
Discharge: HOME | End: 2019-05-16
Payer: MEDICARE

## 2019-05-16 PROCEDURE — 99211 OFF/OP EST MAY X REQ PHY/QHP: CPT

## 2019-05-16 NOTE — PROGRESS NOTES
Patient and  present for renal transplant education session. Viewed videos renal transplantation. , Discussion followed; Transplant programs ( providied list of transplant programs to contact insurance for in network facilities), multiple listing, /living/shared paired donation, wait list, surgery, care after, mediations, side effects, infection, rejection.

## 2019-05-30 ENCOUNTER — OFFICE VISIT (OUTPATIENT)
Dept: NEUROLOGY | Age: 64
End: 2019-05-30
Payer: MEDICARE

## 2019-05-30 VITALS
RESPIRATION RATE: 16 BRPM | HEIGHT: 63 IN | OXYGEN SATURATION: 95 % | WEIGHT: 187.5 LBS | BODY MASS INDEX: 33.22 KG/M2 | DIASTOLIC BLOOD PRESSURE: 64 MMHG | SYSTOLIC BLOOD PRESSURE: 112 MMHG | HEART RATE: 71 BPM

## 2019-05-30 DIAGNOSIS — I63.10 CEREBROVASCULAR ACCIDENT (CVA) DUE TO EMBOLISM OF PRECEREBRAL ARTERY (HCC): ICD-10-CM

## 2019-05-30 DIAGNOSIS — M54.17 L-S RADICULOPATHY: Primary | ICD-10-CM

## 2019-05-30 PROCEDURE — 1123F ACP DISCUSS/DSCN MKR DOCD: CPT | Performed by: CLINICAL NURSE SPECIALIST

## 2019-05-30 PROCEDURE — G8400 PT W/DXA NO RESULTS DOC: HCPCS | Performed by: CLINICAL NURSE SPECIALIST

## 2019-05-30 PROCEDURE — 3017F COLORECTAL CA SCREEN DOC REV: CPT | Performed by: CLINICAL NURSE SPECIALIST

## 2019-05-30 PROCEDURE — 4040F PNEUMOC VAC/ADMIN/RCVD: CPT | Performed by: CLINICAL NURSE SPECIALIST

## 2019-05-30 PROCEDURE — G8417 CALC BMI ABV UP PARAM F/U: HCPCS | Performed by: CLINICAL NURSE SPECIALIST

## 2019-05-30 PROCEDURE — G8427 DOCREV CUR MEDS BY ELIG CLIN: HCPCS | Performed by: CLINICAL NURSE SPECIALIST

## 2019-05-30 PROCEDURE — G8598 ASA/ANTIPLAT THER USED: HCPCS | Performed by: CLINICAL NURSE SPECIALIST

## 2019-05-30 PROCEDURE — 1090F PRES/ABSN URINE INCON ASSESS: CPT | Performed by: CLINICAL NURSE SPECIALIST

## 2019-05-30 PROCEDURE — 1036F TOBACCO NON-USER: CPT | Performed by: CLINICAL NURSE SPECIALIST

## 2019-05-30 PROCEDURE — 99214 OFFICE O/P EST MOD 30 MIN: CPT | Performed by: CLINICAL NURSE SPECIALIST

## 2019-05-30 RX ORDER — BACLOFEN 20 MG/1
20 TABLET ORAL NIGHTLY
Qty: 30 TABLET | Refills: 2 | Status: SHIPPED | OUTPATIENT
Start: 2019-05-30 | End: 2019-08-16

## 2019-05-30 NOTE — PROGRESS NOTES
Thea Reeves is a 70 y.o. right handed woman    Patient was evaluated by neurology in 2017 after suffering 2 GTC seizures. CT Head at the time was unremarkable for an acute stroke, but an embolic event was suspected. She was admitted for valve replacement surgery and an embolic stroke was suspected. She was maintained on Keppra for almost a year, but because of her lack of recurrent events and need to trim medications, this was discontinued and she has done well. She continues on Lyrica TID for her fibromyalgia     She has noticed left leg weakness   She had therapy with minimal improvements   We set her up with EMG for suspected neuropathy and LS radiculopathy     Neuropathy was demonstrated as well as LS radiculopathy and meralgia paresthetica     MRI demonstrated mild canal stenosis but she continues to report severe pains    She requested NS evaluation and they did not feel her pains were explained by her stenosis     We set her up with [address] and she tried epidurals without success   She was referred to PT with TENS but lost the script -- I reprinted today     No right leg weakness or pains    No arm weakness    No trouble chewing or swallowing    She is now receiving HD three times a week following her valve replacement surgery     No chest pain or palpitations  No SOB  No vertigo, lightheadedness or loss of consciousness  No incontinence of bowels or bladder  No itching or bruising appreciated  No numbness, tingling or focal arm/leg weakness    ROS otherwise negative     Prior to Visit Medications    Medication Sig Taking?  Authorizing Provider   baclofen (LIORESAL) 20 MG tablet Take 1 tablet by mouth nightly Yes Radha Staley APRN - CNS   montelukast (SINGULAIR) 10 MG tablet Take 1 tablet by mouth nightly Yes Rachell Fish MD   predniSONE (DELTASONE) 10 MG tablet 4 tabs x 3 days, 3 tabs x 3 days, 2 tabs x 3 days, 1 tab x 3 days Yes aRchell Fish MD   FERROUS BISGLYCINATE CHELATE PO Take by mouth Yes Historical Provider, MD   lidocaine-prilocaine (EMLA) 2.5-2.5 % cream APPLY SMALL AMOUNT TO ACCESS SITE (AVF) 1 TO 2 HOURS BEFORE DIALYSIS.  COVER WITH OCCLUSIVE DRESSING (SARAN WRAP) Yes Historical Provider, MD   carvedilol (COREG) 25 MG tablet TAKE 1 TABLET BY MOUTH THREE TIMES A WEEK Yes Historical Provider, MD   rOPINIRole (REQUIP) 0.5 MG tablet TAKE 1 TABLET BY MOUTH NIGHTLY Yes Tonya Jones MD   torsemide (DEMADEX) 20 MG tablet Take 20 mg by mouth daily  Yes Historical Provider, MD   albuterol (ACCUNEB) 0.63 MG/3ML nebulizer solution Take 3 mLs by nebulization every 4 hours as needed for Wheezing or Shortness of Breath DX: Moderate Persistent Asthma J45.4 Yes Tonya Jones MD   mometasone-formoterol (DULERA) 200-5 MCG/ACT inhaler Inhale 2 puffs into the lungs every 12 hours Use am of surgery Yes Tonya Jones MD   albuterol sulfate HFA (VENTOLIN HFA) 108 (90 Base) MCG/ACT inhaler Inhale 2 puffs into the lungs every 6 hours as needed for Wheezing Yes Toyna Jones MD   Methoxy PEG-Epoetin Beta (MIRCERA) 75 MCG/0.3ML SOSY Infuse 75 mcg intravenously every 14 days Yes Historical Provider, MD   Acetaminophen (TYLENOL ARTHRITIS PAIN PO) Take 3 tablets by mouth 2 times daily  Yes Historical Provider, MD   Biotin w/ Vitamins C & E (HAIR/SKIN/NAILS PO) Take by mouth Yes Historical Provider, MD   fenofibrate (TRICOR) 145 MG tablet Take 145 mg by mouth three times a week Patient taking Mon, Wed, Fri Yes Historical Provider, MD   calcium carbonate (TUMS) 500 MG chewable tablet Take 2 tablets by mouth 2 times daily Yes Historical Provider, MD   melatonin 5 MG TABS tablet Take 5 mg by mouth nightly as needed Yes Historical Provider, MD   sodium bicarbonate 650 MG tablet Take 650 mg by mouth 2 times daily  Yes Historical Provider, MD   Aspirin-Acetaminophen-Caffeine (EXCEDRIN EXTRA STRENGTH PO) Take by mouth as needed Ld 3-16-18 Yes Historical Provider, MD   Omega-3 Fatty Acids 5/5           Left Bicep  5/5              Right Triceps   5/5       Left Triceps  5/5          Right Deltoid  5/5     Left Deltoid  5/5        Right IPS  5/5            Left IPS  4/5               Right Quadriceps  5/5          Left Quadriceps    4/5           Right Gastrocnemius    5/5    Left Gastrocnemius   5/5  Right Ant Tibialis   5/5  Left Ant Tibialis   5/5     Normal bulk and tone     Sensory:  LT and PP normal  Vibration minimally decreased in ankle right   Moderately decreased left     Coordination:   FN, FFM and LELIA normal  HS normal    Gait:  Gait appears hesitant but steady     DTR:   Right Brachioradialis reflex 0  Left Brachioradialis reflex 0  Right Biceps reflex 0  Left Biceps reflex 0  Right Quadriceps reflex 1+  Left Quadriceps reflex 1+  Right Achilles reflex 0  Left Achilles reflex 0    No Song's     Laboratory/Radiology:     CBC with Differential:    Lab Results   Component Value Date    WBC 4.4 2019    RBC 3.00 2019    HGB 9.9 2019    HCT 34.1 2019     2019    .7 2019    MCH 33.0 2019    MCHC 29.0 2019    RDW 15.7 2019    LYMPHOPCT 25.1 2019    MONOPCT 8.7 2019    BASOPCT 0.7 2019    MONOSABS 0.38 2019    LYMPHSABS 1.10 2019    EOSABS 0.07 2019    BASOSABS 0.03 2019     CMP:    Lab Results   Component Value Date     2019    K 4.2 2019    K 4.0 2018     2019    CO2 27 2019    BUN 29 2019    CREATININE 2.9 2019    GFRAA 20 2019    LABGLOM 16 2019    GLUCOSE 80 2019    PROT 7.0 2019    LABALBU 3.1 2019    CALCIUM 8.2 2019    BILITOT 0.5 2019    ALKPHOS 211 2019    AST 33 2019    ALT 15 2019     EM.  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   2.  Left-sided lumbosacral motor radiculopathies---as noted by the abnormal needle testing of the paraspinals. However, these radiculopathies were not further defined with the normal needle examination of the proximal musculature of that leg. 3.  Suspected bilateral lateral femoral cutaneous sensory neuropathies---that is, meralgia parestheticas. Such neuropathies are usually related to compression of the sensory nerve at the lateral inguinal ligament. MRI L-spine:  Mild canal stenosis noted     I independently reviewed the lab studies at today's appointment. Assessment:     Left leg weakness may be as a result of her embolic event during her hospitalization in 2017   EMG was consistent with a meralgia paresthetica, radiculopathy as well as peripheral neuropathy    MRI demonstrated mild stenosis despite her severe pains     Two post-infarct seizures during her hospitalization in March 2017   Thankfully without recurrence off Keppra   I suspect she is seeing some benefit from her TID Lyrica dose as well -- originally prescribed for fibromyalgia     Movable lump noted low back -- ? Fatty tumor vs cyst -- no longer present     Plan: Will try increasing Baclofen to 20mg at this time     I reprinted her PT script from pain management     Call with continued issues     RTO afterwards     Gini Peabody  9:56 AM  5/30/2019    I spent 25 minutes with the patient, with 50% or more counseling them on their diagnosis, diagnostic workup and treatment options.

## 2019-07-01 ENCOUNTER — TELEPHONE (OUTPATIENT)
Dept: VASCULAR SURGERY | Age: 64
End: 2019-07-01

## 2019-08-16 ENCOUNTER — OFFICE VISIT (OUTPATIENT)
Dept: NEUROLOGY | Age: 64
End: 2019-08-16
Payer: MEDICARE

## 2019-08-16 VITALS
DIASTOLIC BLOOD PRESSURE: 71 MMHG | HEIGHT: 63 IN | BODY MASS INDEX: 33.33 KG/M2 | SYSTOLIC BLOOD PRESSURE: 123 MMHG | WEIGHT: 188.1 LBS | OXYGEN SATURATION: 90 % | RESPIRATION RATE: 16 BRPM | HEART RATE: 79 BPM

## 2019-08-16 DIAGNOSIS — M54.17 L-S RADICULOPATHY: Primary | ICD-10-CM

## 2019-08-16 PROCEDURE — 4040F PNEUMOC VAC/ADMIN/RCVD: CPT | Performed by: CLINICAL NURSE SPECIALIST

## 2019-08-16 PROCEDURE — 99214 OFFICE O/P EST MOD 30 MIN: CPT | Performed by: CLINICAL NURSE SPECIALIST

## 2019-08-16 PROCEDURE — G8400 PT W/DXA NO RESULTS DOC: HCPCS | Performed by: CLINICAL NURSE SPECIALIST

## 2019-08-16 PROCEDURE — 3017F COLORECTAL CA SCREEN DOC REV: CPT | Performed by: CLINICAL NURSE SPECIALIST

## 2019-08-16 PROCEDURE — 1036F TOBACCO NON-USER: CPT | Performed by: CLINICAL NURSE SPECIALIST

## 2019-08-16 PROCEDURE — 1123F ACP DISCUSS/DSCN MKR DOCD: CPT | Performed by: CLINICAL NURSE SPECIALIST

## 2019-08-16 PROCEDURE — G8598 ASA/ANTIPLAT THER USED: HCPCS | Performed by: CLINICAL NURSE SPECIALIST

## 2019-08-16 PROCEDURE — 1090F PRES/ABSN URINE INCON ASSESS: CPT | Performed by: CLINICAL NURSE SPECIALIST

## 2019-08-16 PROCEDURE — G8427 DOCREV CUR MEDS BY ELIG CLIN: HCPCS | Performed by: CLINICAL NURSE SPECIALIST

## 2019-08-16 PROCEDURE — G8417 CALC BMI ABV UP PARAM F/U: HCPCS | Performed by: CLINICAL NURSE SPECIALIST

## 2019-08-16 RX ORDER — DANTROLENE SODIUM 25 MG/1
25 CAPSULE ORAL 2 TIMES DAILY
Qty: 60 CAPSULE | Refills: 2 | Status: SHIPPED | OUTPATIENT
Start: 2019-08-16 | End: 2019-12-13 | Stop reason: SDUPTHER

## 2019-08-16 NOTE — PROGRESS NOTES
Right Deltoid  5/5     Left Deltoid  5/5        Right IPS  5/5            Left IPS  4/5               Right Quadriceps  5/5          Left Quadriceps    4/5           Right Gastrocnemius    5/5    Left Gastrocnemius   5/5  Right Ant Tibialis   5/5  Left Ant Tibialis   5/5     Normal bulk and tone     Sensory:  LT and PP normal  Vibration minimally decreased in ankle right   Moderately decreased left     Coordination:   FN, FFM and LELIA normal  HS normal    Gait:  Gait appears hesitant but steady     DTR:   Right Brachioradialis reflex 0  Left Brachioradialis reflex 0  Right Biceps reflex 0  Left Biceps reflex 0  Right Quadriceps reflex 1+  Left Quadriceps reflex 1+  Right Achilles reflex 0  Left Achilles reflex 0    No Song's     Laboratory/Radiology:     CBC with Differential:    Lab Results   Component Value Date    WBC 4.4 2019    RBC 3.00 2019    HGB 9.9 2019    HCT 34.1 2019     2019    .7 2019    MCH 33.0 2019    MCHC 29.0 2019    RDW 15.7 2019    LYMPHOPCT 25.1 2019    MONOPCT 8.7 2019    BASOPCT 0.7 2019    MONOSABS 0.38 2019    LYMPHSABS 1.10 2019    EOSABS 0.07 2019    BASOSABS 0.03 2019     CMP:    Lab Results   Component Value Date     2019    K 4.2 2019    K 4.0 2018     2019    CO2 27 2019    BUN 29 2019    CREATININE 2.9 2019    GFRAA 20 2019    LABGLOM 16 2019    GLUCOSE 80 2019    PROT 7.0 2019    LABALBU 3.1 2019    CALCIUM 8.2 2019    BILITOT 0.5 2019    ALKPHOS 211 2019    AST 33 2019    ALT 15 2019     EM.  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 2.  Left-sided lumbosacral motor radiculopathies---as noted by the abnormal needle testing of the paraspinals.   However, these radiculopathies were not further defined with the normal needle examination of the proximal musculature of that leg. 3.  Suspected bilateral lateral femoral cutaneous sensory neuropathies---that is, meralgia parestheticas. Such neuropathies are usually related to compression of the sensory nerve at the lateral inguinal ligament. MRI L-spine:  Mild canal stenosis noted     I independently reviewed the lab studies at today's appointment. Assessment:     Left leg weakness may be as a result of her embolic event during her hospitalization in 2017   EMG was consistent with a meralgia paresthetica, radiculopathy as well as peripheral neuropathy    MRI demonstrated mild stenosis despite her severe pains     Radicular pains improved with TENS and therapy   Returned after its discontinuation   Will follow wit PM&R for alternative ideas/plans     Two post-infarct seizures during her hospitalization in March 2017   Thankfully without recurrence off Keppra   I suspect she is seeing some benefit from her TID Lyrica dose as well -- originally prescribed for fibromyalgia     Plan: Will try switching to Dantrium due to Baclofen tolerability    Follow with PM&R    Call with continued issues     RTO afterwards     Yuridia King  12:09 PM  8/16/2019    I spent 25 minutes with the patient, with 50% or more counseling them on their diagnosis, diagnostic workup and treatment options.

## 2019-08-27 NOTE — PROGRESS NOTES
[facility] PRE-ADMISSION TESTING INSTRUCTIONS    The Preadmission Testing patient is instructed accordingly using the following criteria (check applicable):    ARRIVAL INSTRUCTIONS:  [x] Parking the day of Surgery is located in the Main Entrance lot. Upon entering the door, make an immediate right to the surgery reception desk    [] [phone] is available Monday through Friday 6 am to 6 pm    [x] Bring photo ID and insurance card    [] Bring in a copy of Living will or Durable Power of  papers. [x] Please be sure to arrange for responsible adult to provide transportation to and from the hospital    [x] Please arrange for responsible adult to be with you for the 24 hour period post procedure due to having anesthesia      GENERAL INSTRUCTIONS:    [x] Nothing by mouth after midnight, including gum, candy, mints or water    [x] You may brush your teeth, but do not swallow any water    [x] Take medications as instructed with 1-2 oz of water    [x] Stop herbal supplements and vitamins 5 days prior to procedure    [x] Follow preop dosing of blood thinners per physician instructions    [] Take 1/2 dose of evening insulin, but no insulin after midnight    [] No oral diabetic medications after midnight    [] If diabetic and have low blood sugar or feel symptomatic, take 1-2oz apple juice only    [x] Bring inhalers day of surgery    [] Bring C-PAP/ Bi-Pap day of surgery    [] Bring urine specimen day of surgery    [x] Shower or bath with soap, lather and rinse well, AM of Surgery, no lotion, powders or creams to surgical site    [] Follow bowel prep as instructed per surgeon    [x] No tobacco products within 24 hours of surgery     [x] No alcohol or illegal drug use within 24 hours of surgery.     [x] Jewelry, body piercing's, eyeglasses, contact lenses and dentures are not permitted into surgery (bring cases)      [x] Please do not wear any nail polish, make up or hair

## 2019-08-28 ENCOUNTER — PREP FOR PROCEDURE (OUTPATIENT)
Dept: GASTROENTEROLOGY | Age: 64
End: 2019-08-28

## 2019-08-28 RX ORDER — SODIUM CHLORIDE 0.9 % (FLUSH) 0.9 %
10 SYRINGE (ML) INJECTION EVERY 12 HOURS SCHEDULED
Status: CANCELLED | OUTPATIENT
Start: 2019-08-28

## 2019-08-28 RX ORDER — SODIUM CHLORIDE 0.9 % (FLUSH) 0.9 %
10 SYRINGE (ML) INJECTION PRN
Status: CANCELLED | OUTPATIENT
Start: 2019-08-28

## 2019-08-28 RX ORDER — SODIUM CHLORIDE 9 MG/ML
INJECTION, SOLUTION INTRAVENOUS CONTINUOUS
Status: CANCELLED | OUTPATIENT
Start: 2019-08-28

## 2019-09-03 ENCOUNTER — ANESTHESIA (OUTPATIENT)
Dept: ENDOSCOPY | Age: 64
End: 2019-09-03
Payer: MEDICARE

## 2019-09-03 ENCOUNTER — HOSPITAL ENCOUNTER (OUTPATIENT)
Age: 64
Setting detail: OUTPATIENT SURGERY
Discharge: HOME | End: 2019-09-03
Attending: INTERNAL MEDICINE | Admitting: INTERNAL MEDICINE
Payer: MEDICARE

## 2019-09-03 ENCOUNTER — ANESTHESIA EVENT (OUTPATIENT)
Dept: ENDOSCOPY | Age: 64
End: 2019-09-03
Payer: MEDICARE

## 2019-09-03 VITALS
BODY MASS INDEX: 33.31 KG/M2 | DIASTOLIC BLOOD PRESSURE: 62 MMHG | HEART RATE: 68 BPM | RESPIRATION RATE: 18 BRPM | OXYGEN SATURATION: 92 % | TEMPERATURE: 97.2 F | HEIGHT: 63 IN | WEIGHT: 188 LBS | SYSTOLIC BLOOD PRESSURE: 135 MMHG

## 2019-09-03 VITALS — OXYGEN SATURATION: 93 % | DIASTOLIC BLOOD PRESSURE: 50 MMHG | SYSTOLIC BLOOD PRESSURE: 93 MMHG

## 2019-09-03 LAB — POTASSIUM SERPL-SCNC: 3.7 MMOL/L (ref 3.5–5)

## 2019-09-03 PROCEDURE — 36415 COLL VENOUS BLD VENIPUNCTURE: CPT

## 2019-09-03 PROCEDURE — 3700000000 HC ANESTHESIA ATTENDED CARE: Performed by: INTERNAL MEDICINE

## 2019-09-03 PROCEDURE — 7100000011 HC PHASE II RECOVERY - ADDTL 15 MIN: Performed by: INTERNAL MEDICINE

## 2019-09-03 PROCEDURE — 3700000001 HC ADD 15 MINUTES (ANESTHESIA): Performed by: INTERNAL MEDICINE

## 2019-09-03 PROCEDURE — 2500000003 HC RX 250 WO HCPCS: Performed by: NURSE ANESTHETIST, CERTIFIED REGISTERED

## 2019-09-03 PROCEDURE — 2709999900 HC NON-CHARGEABLE SUPPLY: Performed by: INTERNAL MEDICINE

## 2019-09-03 PROCEDURE — 6360000002 HC RX W HCPCS: Performed by: NURSE ANESTHETIST, CERTIFIED REGISTERED

## 2019-09-03 PROCEDURE — 7100000010 HC PHASE II RECOVERY - FIRST 15 MIN: Performed by: INTERNAL MEDICINE

## 2019-09-03 PROCEDURE — 84132 ASSAY OF SERUM POTASSIUM: CPT

## 2019-09-03 PROCEDURE — 3609027000 HC COLONOSCOPY: Performed by: INTERNAL MEDICINE

## 2019-09-03 PROCEDURE — 2580000003 HC RX 258: Performed by: INTERNAL MEDICINE

## 2019-09-03 RX ORDER — SODIUM CHLORIDE 0.9 % (FLUSH) 0.9 %
10 SYRINGE (ML) INJECTION EVERY 12 HOURS SCHEDULED
Status: DISCONTINUED | OUTPATIENT
Start: 2019-09-03 | End: 2019-09-03 | Stop reason: HOSPADM

## 2019-09-03 RX ORDER — SODIUM CHLORIDE 0.9 % (FLUSH) 0.9 %
10 SYRINGE (ML) INJECTION PRN
Status: DISCONTINUED | OUTPATIENT
Start: 2019-09-03 | End: 2019-09-03 | Stop reason: HOSPADM

## 2019-09-03 RX ORDER — SODIUM CHLORIDE 9 MG/ML
INJECTION, SOLUTION INTRAVENOUS CONTINUOUS
Status: DISCONTINUED | OUTPATIENT
Start: 2019-09-03 | End: 2019-09-03 | Stop reason: HOSPADM

## 2019-09-03 RX ORDER — PROPOFOL 10 MG/ML
INJECTION, EMULSION INTRAVENOUS PRN
Status: DISCONTINUED | OUTPATIENT
Start: 2019-09-03 | End: 2019-09-03 | Stop reason: SDUPTHER

## 2019-09-03 RX ORDER — LIDOCAINE HYDROCHLORIDE 10 MG/ML
INJECTION, SOLUTION EPIDURAL; INFILTRATION; INTRACAUDAL; PERINEURAL PRN
Status: DISCONTINUED | OUTPATIENT
Start: 2019-09-03 | End: 2019-09-03 | Stop reason: SDUPTHER

## 2019-09-03 RX ADMIN — PHENYLEPHRINE HYDROCHLORIDE 100 MCG: 10 INJECTION INTRAVENOUS at 11:01

## 2019-09-03 RX ADMIN — PROPOFOL 20 MG: 10 INJECTION, EMULSION INTRAVENOUS at 11:15

## 2019-09-03 RX ADMIN — PROPOFOL 20 MG: 10 INJECTION, EMULSION INTRAVENOUS at 10:59

## 2019-09-03 RX ADMIN — PROPOFOL 20 MG: 10 INJECTION, EMULSION INTRAVENOUS at 11:21

## 2019-09-03 RX ADMIN — PROPOFOL 20 MG: 10 INJECTION, EMULSION INTRAVENOUS at 10:41

## 2019-09-03 RX ADMIN — PROPOFOL 30 MG: 10 INJECTION, EMULSION INTRAVENOUS at 10:36

## 2019-09-03 RX ADMIN — PHENYLEPHRINE HYDROCHLORIDE 100 MCG: 10 INJECTION INTRAVENOUS at 11:17

## 2019-09-03 RX ADMIN — PROPOFOL 20 MG: 10 INJECTION, EMULSION INTRAVENOUS at 10:29

## 2019-09-03 RX ADMIN — PROPOFOL 25 MG: 10 INJECTION, EMULSION INTRAVENOUS at 10:42

## 2019-09-03 RX ADMIN — PROPOFOL 25 MG: 10 INJECTION, EMULSION INTRAVENOUS at 10:47

## 2019-09-03 RX ADMIN — PROPOFOL 50 MG: 10 INJECTION, EMULSION INTRAVENOUS at 10:35

## 2019-09-03 RX ADMIN — PROPOFOL 5 MG: 10 INJECTION, EMULSION INTRAVENOUS at 10:53

## 2019-09-03 RX ADMIN — PROPOFOL 25 MG: 10 INJECTION, EMULSION INTRAVENOUS at 10:44

## 2019-09-03 RX ADMIN — SODIUM CHLORIDE: 9 INJECTION, SOLUTION INTRAVENOUS at 10:29

## 2019-09-03 RX ADMIN — PROPOFOL 20 MG: 10 INJECTION, EMULSION INTRAVENOUS at 11:07

## 2019-09-03 RX ADMIN — LIDOCAINE HYDROCHLORIDE 20 MG: 10 INJECTION, SOLUTION EPIDURAL; INFILTRATION; INTRACAUDAL; PERINEURAL at 10:29

## 2019-09-03 ASSESSMENT — PAIN SCALES - GENERAL
PAINLEVEL_OUTOF10: 0
PAINLEVEL_OUTOF10: 0

## 2019-09-03 ASSESSMENT — PAIN - FUNCTIONAL ASSESSMENT: PAIN_FUNCTIONAL_ASSESSMENT: 0-10

## 2019-09-03 NOTE — ANESTHESIA PRE PROCEDURE
Department of Anesthesiology  Preprocedure Note       Name:  Diana Dupree   Age:  75 y.o.  :  1955                                          MRN:  [id number]         Date:  9/3/2019      Surgeon: Padmini Brush):  Quentin Dumnot MD    Procedure: Procedure(s):  REMOVAL OF TESIO CATHETER      Medications prior to admission:   Prior to Admission medications    Medication Sig Start Date End Date Taking? Authorizing Provider   dantrolene (DANTRIUM) 25 MG capsule Take 1 capsule by mouth 2 times daily 19   GABRIELLA Dela Cruz - CNS   rOPINIRole (REQUIP) 0.5 MG tablet Take 1 tablet by mouth nightly 19   Angelica Thompson MD   montelukast (SINGULAIR) 10 MG tablet Take 1 tablet by mouth nightly 19   Angelica Thompson MD   FERROUS BISGLYCINATE CHELATE PO Take by mouth    Historical Provider, MD   lidocaine-prilocaine (EMLA) 2.5-2.5 % cream APPLY SMALL AMOUNT TO ACCESS SITE (AVF) 1 TO 2 HOURS BEFORE DIALYSIS.  COVER WITH OCCLUSIVE DRESSING (SARAN WRAP) 3/27/19   Historical Provider, MD   carvedilol (COREG) 25 MG tablet Take 25 mg by mouth Takes mon-wed-19   Historical Provider, MD   torsemide (DEMADEX) 20 MG tablet Take 20 mg by mouth daily  18   Historical Provider, MD   albuterol (ACCUNEB) 0.63 MG/3ML nebulizer solution Take 3 mLs by nebulization every 4 hours as needed for Wheezing or Shortness of Breath DX: Moderate Persistent Asthma J45.4 18   Angelica Thompson MD   mometasone-formoterol (DULERA) 200-5 MCG/ACT inhaler Inhale 2 puffs into the lungs every 12 hours Use am of surgery 18   Angelica Thompson MD   albuterol sulfate HFA (VENTOLIN HFA) 108 (90 Base) MCG/ACT inhaler Inhale 2 puffs into the lungs every 6 hours as needed for Wheezing 18   Angelica Thompson MD   Methoxy PEG-Epoetin Beta (MIRCERA) 75 MCG/0.3ML SOSY Infuse 75 mcg intravenously every 14 days    Historical Provider, MD   Acetaminophen (TYLENOL ARTHRITIS PAIN PO) Take 3 tablets by mouth 2 times mouth daily Ld 8/28/2019    Historical Provider, MD   darbepoetin izabella-polysorbate (ARANESP, ALBUMIN FREE,) 60 MCG/ML injection Inject into the skin every 14 days Twice/per month  Dose given 3-16-18 at dialysis    Historical Provider, MD   CPAP Machine MISC Please replace patients CPAP at 8 cm water pressure with heated humidification and C-Flex of 3. Patient states she is due for a new machine and hers is not working. Also provide masks, tubing, filters, head gear, and water chambers as needed. Diagnosis-YUNIOR  Faxed to [facility]  Length of need 99 months 9/23/16   Angelica Thompson MD   vitamin D-3 (CHOLECALCIFEROL) 5000 UNITS TABS Take 5,000 Units by mouth 2 times daily LD 8/28/2019    Historical Provider, MD   folic acid (FOLVITE) 1 MG tablet Take 1 mg by mouth daily Ld 8/28/2019    Historical Provider, MD   pregabalin (LYRICA) 50 MG capsule Take 50 mg by mouth 3 times daily Instructed to take am of procedure. Historical Provider, MD       Current medications:    Current Facility-Administered Medications   Medication Dose Route Frequency Provider Last Rate Last Dose    0.9 % sodium chloride infusion   Intravenous Continuous Quentin Dumont MD        sodium chloride flush 0.9 % injection 10 mL  10 mL Intravenous 2 times per day Quentin Dumont MD        sodium chloride flush 0.9 % injection 10 mL  10 mL Intravenous PRN Quentin Dumont MD           Allergies: Allergies   Allergen Reactions    Coumadin [Warfarin Sodium] Hives    Quinine Derivatives Hives    Other      Patient states cannot take oral antibiotic dt she has short gut syndrome. ... Puts her into electrolyte inbalance       Problem List:    Patient Active Problem List   Diagnosis Code    Exacerbation of asthma J45. 901    Renal failure (ARF), acute on chronic (HCC) N17.9, N18.9    HTN (hypertension) I10    YUNIOR on CPAP G47.33, Z99.89    Hypomagnesemia E83.42    Hypocalcemia E83.51    Hypothyroid E03.9    Cellulitis of right lower iCast Subclavian    BREAST BIOPSY Right     BRONCHOSCOPY  2010    CARDIAC CATHETERIZATION  2017    Dr Austin Parekh REPLACEMENT  2017    MVR, TVR    COLONOSCOPY      COLONOSCOPY  4/21/15    DIALYSIS FISTULA CREATION Left     radiocephalic, [place]    DIALYSIS FISTULA CREATION Left 2018    brachioecephalic, ligation radiocephalic, Delatore    ENDOSCOPY, COLON, DIAGNOSTIC      EYE SURGERY Right     CATARACT    INTESTINAL BYPASS  1973    for weight loss    KIDNEY STONE SURGERY      x 3    OTHER SURGICAL HISTORY Left 2017    AV fistula creation left arm    OTHER SURGICAL HISTORY  2018    Left arm fistulogram by Dr Wilner Romero.  OTHER SURGICAL HISTORY  10/17/2018    Dr Tracey-Left arm fistuloplasty    PA CREAT AV FISTULA,AUTOGENOUS GRAFT Left 3/22/2018    AV FISTULA  REVISION LEFT ARM [facility] [place] [address] TUNNELED CV CATH Right 2018    REMOVAL OF TESIO CATHETER, REMOVAL OF MEDIPORT performed by Jennifer Diaz MD at [address] [facility] Right     RIGHT BREAST MOLE REMOVED    TRANSESOPHAGEAL ECHOCARDIOGRAM  2017    TUNNELED VENOUS CATHETER PLACEMENT Right     TUNNELED VENOUS PORT PLACEMENT Right     Powerport / x 4 / at right chest; since short gut syndrome received magnesium & sodium bicarb in past    VASC UPPER EXTREMITY VENOUS  UNI Left     Brachiocephalic covered stent, iCast, 10 mm x 38 mm       Social History:    Social History     Tobacco Use    Smoking status: Former Smoker     Packs/day: 2.00     Years: 8.00     Pack years: 16.00     Types: Cigarettes     Start date: 1989     Last attempt to quit: 1997     Years since quittin.6    Smokeless tobacco: Never Used   Substance Use Topics    Alcohol use:  No                                Counseling given: Not Answered      Vital Signs (Current):   Vitals:    19 1459   Weight: 186 lb (84.4 kg)   Height: COPD:  sleep apnea: on CPAP,  asthma (Inhaler used this AM):                           ROS comment: Respiratory arrest    Cardiovascular:  Exercise tolerance: good (>4 METS),   (+) hypertension:, CABG/stent (MVR and TVR in 9/35):, CHF: diastolic, hyperlipidemia      ECG reviewed  Rhythm: regular  Rate: normal  Echocardiogram reviewed         Beta Blocker:  Dose within 24 Hrs      ROS comment: EF 65%     Neuro/Psych:   (+) seizures: well controlled,              ROS comment: Pt states had one seizure after open heart procedure none since  Fibromyalgia GI/Hepatic/Renal:   (+) GERD: well controlled, renal disease (Last dialysis on 3/21/17): ESRD and dialysis, bowel prep,          ROS comment: AV fistula left arm . Endo/Other:    (+) hypothyroidism, blood dyscrasia: anemia, arthritis: OA., .                  ROS comment: obese Abdominal:   (+) obese,         Vascular: negative vascular ROS. Anesthesia Plan      MAC     ASA 4       Induction: intravenous. Anesthetic plan and risks discussed with patient. Plan discussed with CRNA.                   Shanta Mclean MD   9/3/2019

## 2019-09-05 ENCOUNTER — OFFICE VISIT (OUTPATIENT)
Dept: PHYSICAL MEDICINE AND REHAB | Age: 64
End: 2019-09-05
Payer: MEDICARE

## 2019-09-05 VITALS
HEIGHT: 63 IN | SYSTOLIC BLOOD PRESSURE: 122 MMHG | HEART RATE: 74 BPM | WEIGHT: 188 LBS | BODY MASS INDEX: 33.31 KG/M2 | DIASTOLIC BLOOD PRESSURE: 64 MMHG | OXYGEN SATURATION: 94 %

## 2019-09-05 DIAGNOSIS — M79.18 LUMBAR MUSCLE PAIN: ICD-10-CM

## 2019-09-05 DIAGNOSIS — G89.29 CHRONIC RIGHT-SIDED LOW BACK PAIN WITHOUT SCIATICA: ICD-10-CM

## 2019-09-05 DIAGNOSIS — M54.50 CHRONIC RIGHT-SIDED LOW BACK PAIN WITHOUT SCIATICA: ICD-10-CM

## 2019-09-05 DIAGNOSIS — M79.18 MYOFASCIAL PAIN: Primary | ICD-10-CM

## 2019-09-05 PROCEDURE — 99205 OFFICE O/P NEW HI 60 MIN: CPT | Performed by: PHYSICAL MEDICINE & REHABILITATION
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
## 2019-09-05 NOTE — PATIENT INSTRUCTIONS
- For most healthy adults, the Department of Health and Human Services recommends these exercise guidelines:  1. Aerobic activity - get at least 150 minutes of moderate aerobic activity or 75 minutes of vigorous aerobic activity a week, or a combination of moderate and vigorous activity. The guidelines suggest that you spread out this exercise during the course of a week. Greater amounts of exercise will provide even greater health benefit. But even small amounts of physical activity are helpful. Being active for short periods of time throughout the day can add up to provide health benefit. 2.  Strength training - do strength training exercises for all major muscle groups at least 2 times a week. Aim to do a single set of each exercise, using the weight resistance level heavy enough to tire your muscles after about 12 to 15 repetitions. *Moderate aerobic exercise includes activities such as brisk walking, swimming, and mowing the lawn. Vigorous aerobic exercise includes activities such as running and aerobic dancing. Strength training can include use of weight machines, your own body weight, resistance tubing or resistance paddles in the water, or activities such as rockclimbing. *As a general goal, aim for at least 30 minutes of moderate physical activity every day. If you want to lose weight, maintain weight loss, or meet specific fitness goals; you need to exercise even more. *Reduce sitting time throughout the day is important, too. The more hours you sit each day, the higher your risk of metabolic problems. Sitting too much can negatively impact your health and longevity, even if you get the recommended amount of daily physical activity. - Please browse the website <nchpad.org> for valuable information on becoming more healthy and physically active. Also, visit Simpli.fi.Struq. org/14weeks/ for a free, personalized, web-based physical activity and nutrition program!  - The 4 key ingredients to healthy aging are sleep, diet, exercise, and social interaction.   - Keep up with physical activity to ease pain. Physical therapy and Home Exercise Program (HEP) will be very beneficial.  - We discussed at length how it may hurt when beginning to move the back, but that this is unlikely to hurt it further. Movement is usually better than complete inactivity.    - If you are over 65, sign up for Silver Sneakers (not covered by medicare)  - Consider trying beginner yoga which can be found on [employer]. - Remember, some pain during or after physical activity may hurt you but it will not cause harm to you!    - Follow up in 2 months. - You can contact Dr. Franck Briseno for non-urgent questions or comments by phone at [phone] or via [phone] [employer] messaging. Please allow 3-4 days for a response. - If there is a medical emergency, please go to nearest emergency department. - Please consider positively reviewing Dr. Franck Briseno and [facility] ([facility]) online.

## 2019-11-05 ENCOUNTER — OFFICE VISIT (OUTPATIENT)
Dept: CARDIOLOGY CLINIC | Age: 64
End: 2019-11-05
Payer: MEDICARE

## 2019-11-05 VITALS
HEART RATE: 70 BPM | BODY MASS INDEX: 33.68 KG/M2 | RESPIRATION RATE: 16 BRPM | SYSTOLIC BLOOD PRESSURE: 122 MMHG | DIASTOLIC BLOOD PRESSURE: 60 MMHG | WEIGHT: 190.1 LBS | HEIGHT: 63 IN

## 2019-11-05 DIAGNOSIS — I10 HYPERTENSION, UNSPECIFIED TYPE: Primary | ICD-10-CM

## 2019-11-05 DIAGNOSIS — R00.2 PALPITATION: ICD-10-CM

## 2019-11-05 DIAGNOSIS — I50.32 CHRONIC HEART FAILURE WITH PRESERVED EJECTION FRACTION (HCC): ICD-10-CM

## 2019-11-05 PROBLEM — I50.30 (HFPEF) HEART FAILURE WITH PRESERVED EJECTION FRACTION (HCC): Status: ACTIVE | Noted: 2017-01-25

## 2019-11-05 PROCEDURE — 99214 OFFICE O/P EST MOD 30 MIN: CPT | Performed by: INTERNAL MEDICINE

## 2019-11-05 PROCEDURE — 93000 ELECTROCARDIOGRAM COMPLETE: CPT | Performed by: INTERNAL MEDICINE

## 2019-11-05 RX ORDER — AMIODARONE HYDROCHLORIDE 200 MG/1
200 TABLET ORAL
COMMUNITY
End: 2019-11-05

## 2019-11-07 ENCOUNTER — OFFICE VISIT (OUTPATIENT)
Dept: PHYSICAL MEDICINE AND REHAB | Age: 64
End: 2019-11-07
Payer: MEDICARE

## 2019-11-07 VITALS
HEIGHT: 63 IN | SYSTOLIC BLOOD PRESSURE: 120 MMHG | HEART RATE: 70 BPM | BODY MASS INDEX: 33.66 KG/M2 | DIASTOLIC BLOOD PRESSURE: 62 MMHG | WEIGHT: 190 LBS | OXYGEN SATURATION: 94 %

## 2019-11-07 DIAGNOSIS — M79.18 MYOFASCIAL PAIN: ICD-10-CM

## 2019-11-07 DIAGNOSIS — M54.50 CHRONIC RIGHT-SIDED LOW BACK PAIN WITHOUT SCIATICA: Primary | ICD-10-CM

## 2019-11-07 DIAGNOSIS — M79.18 LUMBAR MUSCLE PAIN: ICD-10-CM

## 2019-11-07 DIAGNOSIS — G89.29 CHRONIC RIGHT-SIDED LOW BACK PAIN WITHOUT SCIATICA: Primary | ICD-10-CM

## 2019-11-07 PROCEDURE — 3017F COLORECTAL CA SCREEN DOC REV: CPT | Performed by: PHYSICAL MEDICINE & REHABILITATION

## 2019-11-07 PROCEDURE — G8400 PT W/DXA NO RESULTS DOC: HCPCS | Performed by: PHYSICAL MEDICINE & REHABILITATION

## 2019-11-07 PROCEDURE — G8484 FLU IMMUNIZE NO ADMIN: HCPCS | Performed by: PHYSICAL MEDICINE & REHABILITATION

## 2019-11-07 PROCEDURE — 4040F PNEUMOC VAC/ADMIN/RCVD: CPT | Performed by: PHYSICAL MEDICINE & REHABILITATION

## 2019-11-07 PROCEDURE — G8598 ASA/ANTIPLAT THER USED: HCPCS | Performed by: PHYSICAL MEDICINE & REHABILITATION

## 2019-11-07 PROCEDURE — G8427 DOCREV CUR MEDS BY ELIG CLIN: HCPCS | Performed by: PHYSICAL MEDICINE & REHABILITATION

## 2019-11-07 PROCEDURE — 1036F TOBACCO NON-USER: CPT | Performed by: PHYSICAL MEDICINE & REHABILITATION

## 2019-11-07 PROCEDURE — 1090F PRES/ABSN URINE INCON ASSESS: CPT | Performed by: PHYSICAL MEDICINE & REHABILITATION

## 2019-11-07 PROCEDURE — 99214 OFFICE O/P EST MOD 30 MIN: CPT | Performed by: PHYSICAL MEDICINE & REHABILITATION

## 2019-11-07 PROCEDURE — 1123F ACP DISCUSS/DSCN MKR DOCD: CPT | Performed by: PHYSICAL MEDICINE & REHABILITATION

## 2019-11-07 PROCEDURE — G8417 CALC BMI ABV UP PARAM F/U: HCPCS | Performed by: PHYSICAL MEDICINE & REHABILITATION

## 2019-11-07 RX ORDER — CARVEDILOL 25 MG/1
TABLET ORAL
Refills: 3 | Status: ON HOLD | COMMUNITY
Start: 2019-10-22 | End: 2020-01-01 | Stop reason: ALTCHOICE

## 2019-12-02 DIAGNOSIS — R00.2 PALPITATION: ICD-10-CM

## 2019-12-06 ENCOUNTER — TELEPHONE (OUTPATIENT)
Dept: CARDIOLOGY CLINIC | Age: 64
End: 2019-12-06

## 2019-12-06 NOTE — TELEPHONE ENCOUNTER
Left message for patient to contact office. ----- Message from Clay Andrew MD sent at 12/6/2019  1:17 PM EST -----  No evidence of atrial fibrillation. A few short runs of fast heartbeats and occasional extra beats. If her palpitations are bothersome we can increase the coreg. It looks like shes only taking 12.5 mg once daily m/w/f. I would suggest taking 6.25 mg BID every day as long as BP tolerates it - I assume she was not taking it on dialysis days.

## 2019-12-13 ENCOUNTER — OFFICE VISIT (OUTPATIENT)
Dept: NEUROLOGY | Age: 64
End: 2019-12-13
Payer: MEDICARE

## 2019-12-13 VITALS
RESPIRATION RATE: 17 BRPM | OXYGEN SATURATION: 96 % | HEART RATE: 85 BPM | BODY MASS INDEX: 33.4 KG/M2 | HEIGHT: 63 IN | DIASTOLIC BLOOD PRESSURE: 64 MMHG | SYSTOLIC BLOOD PRESSURE: 109 MMHG | WEIGHT: 188.49 LBS

## 2019-12-13 DIAGNOSIS — M54.17 L-S RADICULOPATHY: Primary | ICD-10-CM

## 2019-12-13 DIAGNOSIS — I63.10 CEREBROVASCULAR ACCIDENT (CVA) DUE TO EMBOLISM OF PRECEREBRAL ARTERY (HCC): ICD-10-CM

## 2019-12-13 PROCEDURE — 3017F COLORECTAL CA SCREEN DOC REV: CPT | Performed by: CLINICAL NURSE SPECIALIST

## 2019-12-13 PROCEDURE — G8598 ASA/ANTIPLAT THER USED: HCPCS | Performed by: CLINICAL NURSE SPECIALIST

## 2019-12-13 PROCEDURE — 1123F ACP DISCUSS/DSCN MKR DOCD: CPT | Performed by: CLINICAL NURSE SPECIALIST

## 2019-12-13 PROCEDURE — 4040F PNEUMOC VAC/ADMIN/RCVD: CPT | Performed by: CLINICAL NURSE SPECIALIST

## 2019-12-13 PROCEDURE — 1090F PRES/ABSN URINE INCON ASSESS: CPT | Performed by: CLINICAL NURSE SPECIALIST

## 2019-12-13 PROCEDURE — G8417 CALC BMI ABV UP PARAM F/U: HCPCS | Performed by: CLINICAL NURSE SPECIALIST

## 2019-12-13 PROCEDURE — 99214 OFFICE O/P EST MOD 30 MIN: CPT | Performed by: CLINICAL NURSE SPECIALIST

## 2019-12-13 PROCEDURE — 1036F TOBACCO NON-USER: CPT | Performed by: CLINICAL NURSE SPECIALIST

## 2019-12-13 PROCEDURE — G8400 PT W/DXA NO RESULTS DOC: HCPCS | Performed by: CLINICAL NURSE SPECIALIST

## 2019-12-13 PROCEDURE — G8427 DOCREV CUR MEDS BY ELIG CLIN: HCPCS | Performed by: CLINICAL NURSE SPECIALIST

## 2019-12-13 PROCEDURE — G8484 FLU IMMUNIZE NO ADMIN: HCPCS | Performed by: CLINICAL NURSE SPECIALIST

## 2019-12-13 RX ORDER — DANTROLENE SODIUM 50 MG/1
50 CAPSULE ORAL 2 TIMES DAILY
Qty: 60 CAPSULE | Refills: 2 | Status: SHIPPED
Start: 2019-12-13 | End: 2020-01-01 | Stop reason: SDUPTHER

## 2020-01-01 ENCOUNTER — CARE COORDINATION (OUTPATIENT)
Dept: CASE MANAGEMENT | Age: 65
End: 2020-01-01

## 2020-01-01 ENCOUNTER — HOSPITAL ENCOUNTER (EMERGENCY)
Age: 65
Discharge: HOME | End: 2020-09-29
Payer: MEDICARE

## 2020-01-01 ENCOUNTER — CARE COORDINATION (OUTPATIENT)
Dept: CARE COORDINATION | Age: 65
End: 2020-01-01

## 2020-01-01 ENCOUNTER — APPOINTMENT (OUTPATIENT)
Dept: ULTRASOUND IMAGING | Age: 65
DRG: 391 | End: 2020-01-01
Payer: MEDICARE

## 2020-01-01 ENCOUNTER — APPOINTMENT (OUTPATIENT)
Dept: CT IMAGING | Age: 65
DRG: 391 | End: 2020-01-01
Payer: MEDICARE

## 2020-01-01 ENCOUNTER — HOSPITAL ENCOUNTER (OUTPATIENT)
Age: 65
Discharge: HOME | End: 2020-02-27
Payer: MEDICARE

## 2020-01-01 ENCOUNTER — HOSPITAL ENCOUNTER (INPATIENT)
Age: 65
LOS: 2 days | Discharge: HOME | DRG: 312 | End: 2020-11-30
Attending: EMERGENCY MEDICINE | Admitting: INTERNAL MEDICINE
Payer: MEDICARE

## 2020-01-01 ENCOUNTER — APPOINTMENT (OUTPATIENT)
Dept: GENERAL RADIOLOGY | Age: 65
End: 2020-01-01
Payer: MEDICARE

## 2020-01-01 ENCOUNTER — APPOINTMENT (OUTPATIENT)
Dept: GENERAL RADIOLOGY | Age: 65
DRG: 391 | End: 2020-01-01
Payer: MEDICARE

## 2020-01-01 ENCOUNTER — HOSPITAL ENCOUNTER (INPATIENT)
Age: 65
LOS: 10 days | DRG: 391 | End: 2020-12-17
Attending: EMERGENCY MEDICINE | Admitting: INTERNAL MEDICINE
Payer: MEDICARE

## 2020-01-01 ENCOUNTER — APPOINTMENT (OUTPATIENT)
Dept: GENERAL RADIOLOGY | Age: 65
DRG: 291 | End: 2020-01-01
Payer: MEDICARE

## 2020-01-01 ENCOUNTER — HOSPITAL ENCOUNTER (OUTPATIENT)
Age: 65
Discharge: HOME | End: 2020-03-05

## 2020-01-01 ENCOUNTER — TELEPHONE (OUTPATIENT)
Dept: CARDIOLOGY CLINIC | Age: 65
End: 2020-01-01

## 2020-01-01 ENCOUNTER — OFFICE VISIT (OUTPATIENT)
Dept: NEUROLOGY | Age: 65
End: 2020-01-01
Payer: MEDICARE

## 2020-01-01 ENCOUNTER — APPOINTMENT (OUTPATIENT)
Dept: GENERAL RADIOLOGY | Age: 65
DRG: 312 | End: 2020-01-01
Payer: MEDICARE

## 2020-01-01 ENCOUNTER — OFFICE VISIT (OUTPATIENT)
Dept: CARDIOLOGY CLINIC | Age: 65
End: 2020-01-01
Payer: MEDICARE

## 2020-01-01 ENCOUNTER — HOSPITAL ENCOUNTER (INPATIENT)
Age: 65
LOS: 3 days | Discharge: HOME | DRG: 291 | End: 2020-10-24
Attending: EMERGENCY MEDICINE | Admitting: INTERNAL MEDICINE
Payer: MEDICARE

## 2020-01-01 ENCOUNTER — HOSPITAL ENCOUNTER (OUTPATIENT)
Dept: CT IMAGING | Age: 65
Discharge: HOME | End: 2020-01-23
Payer: MEDICARE

## 2020-01-01 VITALS
RESPIRATION RATE: 10 BRPM | DIASTOLIC BLOOD PRESSURE: 32 MMHG | OXYGEN SATURATION: 70 % | HEIGHT: 63 IN | HEART RATE: 75 BPM | WEIGHT: 228.84 LBS | TEMPERATURE: 93.2 F | SYSTOLIC BLOOD PRESSURE: 71 MMHG | BODY MASS INDEX: 40.55 KG/M2

## 2020-01-01 VITALS
SYSTOLIC BLOOD PRESSURE: 90 MMHG | OXYGEN SATURATION: 98 % | BODY MASS INDEX: 29.7 KG/M2 | HEART RATE: 73 BPM | WEIGHT: 167.6 LBS | RESPIRATION RATE: 16 BRPM | DIASTOLIC BLOOD PRESSURE: 60 MMHG | HEIGHT: 63 IN

## 2020-01-01 VITALS
HEIGHT: 63 IN | DIASTOLIC BLOOD PRESSURE: 60 MMHG | RESPIRATION RATE: 18 BRPM | WEIGHT: 174.38 LBS | SYSTOLIC BLOOD PRESSURE: 102 MMHG | HEART RATE: 110 BPM | OXYGEN SATURATION: 97 % | BODY MASS INDEX: 30.9 KG/M2 | TEMPERATURE: 98.3 F

## 2020-01-01 VITALS
DIASTOLIC BLOOD PRESSURE: 60 MMHG | BODY MASS INDEX: 31.01 KG/M2 | HEART RATE: 104 BPM | HEIGHT: 63 IN | RESPIRATION RATE: 18 BRPM | WEIGHT: 175 LBS | TEMPERATURE: 97.8 F | SYSTOLIC BLOOD PRESSURE: 100 MMHG | OXYGEN SATURATION: 96 %

## 2020-01-01 VITALS
RESPIRATION RATE: 18 BRPM | HEIGHT: 63 IN | BODY MASS INDEX: 33.13 KG/M2 | WEIGHT: 187 LBS | TEMPERATURE: 98.4 F | DIASTOLIC BLOOD PRESSURE: 72 MMHG | HEART RATE: 79 BPM | OXYGEN SATURATION: 97 % | SYSTOLIC BLOOD PRESSURE: 122 MMHG

## 2020-01-01 VITALS
HEIGHT: 63 IN | WEIGHT: 187 LBS | OXYGEN SATURATION: 99 % | HEART RATE: 80 BPM | TEMPERATURE: 97.7 F | BODY MASS INDEX: 33.13 KG/M2 | DIASTOLIC BLOOD PRESSURE: 55 MMHG | RESPIRATION RATE: 18 BRPM | SYSTOLIC BLOOD PRESSURE: 96 MMHG

## 2020-01-01 VITALS
WEIGHT: 172 LBS | SYSTOLIC BLOOD PRESSURE: 107 MMHG | TEMPERATURE: 97.5 F | BODY MASS INDEX: 30.48 KG/M2 | HEIGHT: 63 IN | DIASTOLIC BLOOD PRESSURE: 52 MMHG | HEART RATE: 77 BPM | RESPIRATION RATE: 16 BRPM

## 2020-01-01 LAB
ABO/RH: NORMAL
ABO/RH: NORMAL
ALBUMIN SERPL-MCNC: 1.4 G/DL (ref 3.5–5.2)
ALBUMIN SERPL-MCNC: 1.5 G/DL (ref 3.5–5.2)
ALBUMIN SERPL-MCNC: 1.7 G/DL (ref 3.5–5.2)
ALBUMIN SERPL-MCNC: 1.8 G/DL (ref 3.5–5.2)
ALBUMIN SERPL-MCNC: 1.8 G/DL (ref 3.5–5.2)
ALBUMIN SERPL-MCNC: 2 G/DL (ref 3.5–5.2)
ALBUMIN SERPL-MCNC: 2.1 G/DL (ref 3.5–5.2)
ALBUMIN SERPL-MCNC: 2.2 G/DL (ref 3.5–5.2)
ALBUMIN SERPL-MCNC: 3.6 G/DL (ref 3.5–5.2)
ALP BLD-CCNC: 141 U/L (ref 35–104)
ALP BLD-CCNC: 143 U/L (ref 35–104)
ALP BLD-CCNC: 149 U/L (ref 35–104)
ALP BLD-CCNC: 155 U/L (ref 35–104)
ALP BLD-CCNC: 159 U/L (ref 35–104)
ALP BLD-CCNC: 159 U/L (ref 35–104)
ALP BLD-CCNC: 161 U/L (ref 35–104)
ALP BLD-CCNC: 164 U/L (ref 35–104)
ALP BLD-CCNC: 165 U/L (ref 35–104)
ALP BLD-CCNC: 166 U/L (ref 35–104)
ALP BLD-CCNC: 171 U/L (ref 35–104)
ALP BLD-CCNC: 177 U/L (ref 35–104)
ALP BLD-CCNC: 181 U/L (ref 35–104)
ALP BLD-CCNC: 182 U/L (ref 35–104)
ALP BLD-CCNC: 183 U/L (ref 35–104)
ALP BLD-CCNC: 186 U/L (ref 35–104)
ALP BLD-CCNC: 215 U/L (ref 35–104)
ALPHA-1 ANTITRYPSIN: 151 MG/DL (ref 90–200)
ALT SERPL-CCNC: 13 U/L (ref 0–32)
ALT SERPL-CCNC: 14 U/L (ref 0–32)
ALT SERPL-CCNC: 16 U/L (ref 0–32)
ALT SERPL-CCNC: 17 U/L (ref 0–32)
ALT SERPL-CCNC: 21 U/L (ref 0–32)
ALT SERPL-CCNC: 22 U/L (ref 0–32)
ALT SERPL-CCNC: 22 U/L (ref 0–32)
ALT SERPL-CCNC: 24 U/L (ref 0–32)
ALT SERPL-CCNC: 25 U/L (ref 0–32)
ALT SERPL-CCNC: 26 U/L (ref 0–32)
ALT SERPL-CCNC: 27 U/L (ref 0–32)
ALT SERPL-CCNC: 27 U/L (ref 0–32)
ALT SERPL-CCNC: 29 U/L (ref 0–32)
ALT SERPL-CCNC: 31 U/L (ref 0–32)
ALT SERPL-CCNC: 31 U/L (ref 0–32)
AMMONIA: 109 UMOL/L (ref 11–51)
ANION GAP SERPL CALCULATED.3IONS-SCNC: 10 MMOL/L (ref 7–16)
ANION GAP SERPL CALCULATED.3IONS-SCNC: 10 MMOL/L (ref 7–16)
ANION GAP SERPL CALCULATED.3IONS-SCNC: 11 MMOL/L (ref 7–16)
ANION GAP SERPL CALCULATED.3IONS-SCNC: 11 MMOL/L (ref 7–16)
ANION GAP SERPL CALCULATED.3IONS-SCNC: 12 MMOL/L (ref 7–16)
ANION GAP SERPL CALCULATED.3IONS-SCNC: 14 MMOL/L (ref 7–16)
ANION GAP SERPL CALCULATED.3IONS-SCNC: 15 MMOL/L (ref 7–16)
ANION GAP SERPL CALCULATED.3IONS-SCNC: 15 MMOL/L (ref 7–16)
ANION GAP SERPL CALCULATED.3IONS-SCNC: 17 MMOL/L (ref 7–16)
ANION GAP SERPL CALCULATED.3IONS-SCNC: 17 MMOL/L (ref 7–16)
ANION GAP SERPL CALCULATED.3IONS-SCNC: 20 MMOL/L (ref 7–16)
ANION GAP SERPL CALCULATED.3IONS-SCNC: 3 MMOL/L (ref 7–16)
ANION GAP SERPL CALCULATED.3IONS-SCNC: 5 MMOL/L (ref 7–16)
ANION GAP SERPL CALCULATED.3IONS-SCNC: 5 MMOL/L (ref 7–16)
ANION GAP SERPL CALCULATED.3IONS-SCNC: 6 MMOL/L (ref 7–16)
ANION GAP SERPL CALCULATED.3IONS-SCNC: 6 MMOL/L (ref 7–16)
ANION GAP SERPL CALCULATED.3IONS-SCNC: 7 MMOL/L (ref 7–16)
ANION GAP SERPL CALCULATED.3IONS-SCNC: 8 MMOL/L (ref 7–16)
ANION GAP SERPL CALCULATED.3IONS-SCNC: 9 MMOL/L (ref 7–16)
ANISOCYTOSIS: ABNORMAL
ANTI-MITOCHONDRIAL AB, IFA: NEGATIVE
ANTI-NUCLEAR ANTIBODY (ANA): NEGATIVE
ANTI-NUCLEAR ANTIBODY (ANA): NEGATIVE
ANTIBODY SCREEN: NORMAL
ANTIBODY SCREEN: NORMAL
APPEARANCE FLUID: NORMAL
APTT: 92.6 SEC (ref 24.5–35.1)
AST SERPL-CCNC: 22 U/L (ref 0–31)
AST SERPL-CCNC: 23 U/L (ref 0–31)
AST SERPL-CCNC: 23 U/L (ref 0–31)
AST SERPL-CCNC: 27 U/L (ref 0–31)
AST SERPL-CCNC: 31 U/L (ref 0–31)
AST SERPL-CCNC: 33 U/L (ref 0–31)
AST SERPL-CCNC: 35 U/L (ref 0–31)
AST SERPL-CCNC: 37 U/L (ref 0–31)
AST SERPL-CCNC: 37 U/L (ref 0–31)
AST SERPL-CCNC: 38 U/L (ref 0–31)
AST SERPL-CCNC: 38 U/L (ref 0–31)
AST SERPL-CCNC: 42 U/L (ref 0–31)
AST SERPL-CCNC: 43 U/L (ref 0–31)
AST SERPL-CCNC: 54 U/L (ref 0–31)
AST SERPL-CCNC: 55 U/L (ref 0–31)
B.E.: -1.6 MMOL/L (ref -3–3)
B.E.: -2.7 MMOL/L (ref -3–3)
B.E.: -4.5 MMOL/L (ref -3–3)
BACTERIA: ABNORMAL /HPF
BASOPHILS ABSOLUTE: 0 E9/L (ref 0–0.2)
BASOPHILS ABSOLUTE: 0.01 E9/L (ref 0–0.2)
BASOPHILS ABSOLUTE: 0.02 E9/L (ref 0–0.2)
BASOPHILS ABSOLUTE: 0.05 E9/L (ref 0–0.2)
BASOPHILS RELATIVE PERCENT: 0 % (ref 0–2)
BASOPHILS RELATIVE PERCENT: 0.1 % (ref 0–2)
BASOPHILS RELATIVE PERCENT: 0.7 % (ref 0–2)
BILIRUB SERPL-MCNC: 0.3 MG/DL (ref 0–1.2)
BILIRUB SERPL-MCNC: 0.4 MG/DL (ref 0–1.2)
BILIRUB SERPL-MCNC: 0.4 MG/DL (ref 0–1.2)
BILIRUB SERPL-MCNC: 0.6 MG/DL (ref 0–1.2)
BILIRUB SERPL-MCNC: 0.8 MG/DL (ref 0–1.2)
BILIRUB SERPL-MCNC: 0.9 MG/DL (ref 0–1.2)
BILIRUB SERPL-MCNC: 1 MG/DL (ref 0–1.2)
BILIRUB SERPL-MCNC: 1 MG/DL (ref 0–1.2)
BILIRUB SERPL-MCNC: 1.1 MG/DL (ref 0–1.2)
BILIRUB SERPL-MCNC: 1.2 MG/DL (ref 0–1.2)
BILIRUB SERPL-MCNC: 1.3 MG/DL (ref 0–1.2)
BILIRUB SERPL-MCNC: 1.3 MG/DL (ref 0–1.2)
BILIRUB SERPL-MCNC: 2.1 MG/DL (ref 0–1.2)
BILIRUB SERPL-MCNC: 2.2 MG/DL (ref 0–1.2)
BILIRUBIN URINE: NEGATIVE
BLOOD BANK DISPENSE STATUS: NORMAL
BLOOD BANK PRODUCT CODE: NORMAL
BLOOD CULTURE, ROUTINE: NORMAL
BLOOD CULTURE, ROUTINE: NORMAL
BLOOD, URINE: ABNORMAL
BODY FLUID CULTURE, STERILE: NORMAL
BPU ID: NORMAL
BUN BLDV-MCNC: 10 MG/DL (ref 8–23)
BUN BLDV-MCNC: 11 MG/DL (ref 8–23)
BUN BLDV-MCNC: 12 MG/DL (ref 8–23)
BUN BLDV-MCNC: 13 MG/DL (ref 8–23)
BUN BLDV-MCNC: 14 MG/DL (ref 8–23)
BUN BLDV-MCNC: 14 MG/DL (ref 8–23)
BUN BLDV-MCNC: 15 MG/DL (ref 8–23)
BUN BLDV-MCNC: 15 MG/DL (ref 8–23)
BUN BLDV-MCNC: 16 MG/DL (ref 8–23)
BUN BLDV-MCNC: 17 MG/DL (ref 8–23)
BUN BLDV-MCNC: 18 MG/DL (ref 8–23)
BUN BLDV-MCNC: 20 MG/DL (ref 8–23)
BUN BLDV-MCNC: 22 MG/DL (ref 8–23)
BUN BLDV-MCNC: 25 MG/DL (ref 8–23)
BUN BLDV-MCNC: 27 MG/DL (ref 8–23)
BUN BLDV-MCNC: 28 MG/DL (ref 8–23)
BUN BLDV-MCNC: 8 MG/DL (ref 8–23)
BUN BLDV-MCNC: 9 MG/DL (ref 8–23)
C DIFF TOXIN/ANTIGEN: NORMAL
C-REACTIVE PROTEIN: 11.9 MG/DL (ref 0–0.4)
CALCIUM IONIZED: 1.18 MMOL/L (ref 1.15–1.33)
CALCIUM IONIZED: 1.2 MMOL/L (ref 1.15–1.33)
CALCIUM IONIZED: 1.2 MMOL/L (ref 1.15–1.33)
CALCIUM IONIZED: 1.22 MMOL/L (ref 1.15–1.33)
CALCIUM IONIZED: 1.25 MMOL/L (ref 1.15–1.33)
CALCIUM SERPL-MCNC: 7.3 MG/DL (ref 8.6–10.2)
CALCIUM SERPL-MCNC: 7.4 MG/DL (ref 8.6–10.2)
CALCIUM SERPL-MCNC: 7.5 MG/DL (ref 8.6–10.2)
CALCIUM SERPL-MCNC: 7.6 MG/DL (ref 8.6–10.2)
CALCIUM SERPL-MCNC: 7.7 MG/DL (ref 8.6–10.2)
CALCIUM SERPL-MCNC: 7.8 MG/DL (ref 8.6–10.2)
CALCIUM SERPL-MCNC: 7.9 MG/DL (ref 8.6–10.2)
CALCIUM SERPL-MCNC: 8 MG/DL (ref 8.6–10.2)
CALCIUM SERPL-MCNC: 8 MG/DL (ref 8.6–10.2)
CALCIUM SERPL-MCNC: 8.1 MG/DL (ref 8.6–10.2)
CALCIUM SERPL-MCNC: 8.2 MG/DL (ref 8.6–10.2)
CALCIUM SERPL-MCNC: 8.2 MG/DL (ref 8.6–10.2)
CALCIUM SERPL-MCNC: 8.4 MG/DL (ref 8.6–10.2)
CALCIUM SERPL-MCNC: 8.4 MG/DL (ref 8.6–10.2)
CALCIUM SERPL-MCNC: 8.5 MG/DL (ref 8.6–10.2)
CALCIUM SERPL-MCNC: 8.9 MG/DL (ref 8.6–10.2)
CELL COUNT FLUID TYPE: NORMAL
CHLORIDE BLD-SCNC: 101 MMOL/L (ref 98–107)
CHLORIDE BLD-SCNC: 101 MMOL/L (ref 98–107)
CHLORIDE BLD-SCNC: 102 MMOL/L (ref 98–107)
CHLORIDE BLD-SCNC: 103 MMOL/L (ref 98–107)
CHLORIDE BLD-SCNC: 104 MMOL/L (ref 98–107)
CHLORIDE BLD-SCNC: 105 MMOL/L (ref 98–107)
CHLORIDE BLD-SCNC: 106 MMOL/L (ref 98–107)
CHLORIDE BLD-SCNC: 107 MMOL/L (ref 98–107)
CHLORIDE BLD-SCNC: 108 MMOL/L (ref 98–107)
CHLORIDE BLD-SCNC: 109 MMOL/L (ref 98–107)
CHLORIDE BLD-SCNC: 110 MMOL/L (ref 98–107)
CHLORIDE BLD-SCNC: 110 MMOL/L (ref 98–107)
CHLORIDE BLD-SCNC: 96 MMOL/L (ref 98–107)
CHLORIDE BLD-SCNC: 96 MMOL/L (ref 98–107)
CHLORIDE BLD-SCNC: 97 MMOL/L (ref 98–107)
CHLORIDE BLD-SCNC: 98 MMOL/L (ref 98–107)
CHLORIDE BLD-SCNC: 99 MMOL/L (ref 98–107)
CHOLESTEROL, TOTAL: 96 MG/DL (ref 0–199)
CLARITY: ABNORMAL
CLOTEST: NORMAL
CO2: 19 MMOL/L (ref 22–29)
CO2: 19 MMOL/L (ref 22–29)
CO2: 20 MMOL/L (ref 22–29)
CO2: 21 MMOL/L (ref 22–29)
CO2: 22 MMOL/L (ref 22–29)
CO2: 23 MMOL/L (ref 22–29)
CO2: 24 MMOL/L (ref 22–29)
CO2: 25 MMOL/L (ref 22–29)
CO2: 26 MMOL/L (ref 22–29)
CO2: 27 MMOL/L (ref 22–29)
CO2: 27 MMOL/L (ref 22–29)
CO2: 29 MMOL/L (ref 22–29)
CO2: 29 MMOL/L (ref 22–29)
CO2: 32 MMOL/L (ref 22–29)
CO2: 33 MMOL/L (ref 22–29)
COHB: 1.7 % (ref 0–1.5)
COHB: 1.7 % (ref 0–1.5)
COHB: 2.1 % (ref 0–1.5)
COLOR FLUID: NORMAL
COLOR: YELLOW
CORTISOL TOTAL: 12.76 MCG/DL (ref 2.68–18.4)
CORTISOL TOTAL: 13.13 MCG/DL (ref 2.68–18.4)
CORTISOL TOTAL: 13.21 MCG/DL (ref 2.68–18.4)
CORTISOL TOTAL: 17.31 MCG/DL (ref 2.68–18.4)
CORTISOL TOTAL: 7.57 MCG/DL (ref 2.68–18.4)
CREAT SERPL-MCNC: 1.8 MG/DL (ref 0.5–1)
CREAT SERPL-MCNC: 2.1 MG/DL (ref 0.5–1)
CREAT SERPL-MCNC: 2.6 MG/DL (ref 0.5–1)
CREAT SERPL-MCNC: 2.7 MG/DL (ref 0.5–1)
CREAT SERPL-MCNC: 2.8 MG/DL (ref 0.5–1)
CREAT SERPL-MCNC: 2.8 MG/DL (ref 0.5–1)
CREAT SERPL-MCNC: 2.9 MG/DL (ref 0.5–1)
CREAT SERPL-MCNC: 3.1 MG/DL (ref 0.5–1)
CREAT SERPL-MCNC: 3.1 MG/DL (ref 0.5–1)
CREAT SERPL-MCNC: 3.2 MG/DL (ref 0.5–1)
CREAT SERPL-MCNC: 3.4 MG/DL (ref 0.5–1)
CREAT SERPL-MCNC: 3.5 MG/DL (ref 0.5–1)
CREAT SERPL-MCNC: 3.5 MG/DL (ref 0.5–1)
CREAT SERPL-MCNC: 3.6 MG/DL (ref 0.5–1)
CREAT SERPL-MCNC: 3.7 MG/DL (ref 0.5–1)
CREAT SERPL-MCNC: 3.7 MG/DL (ref 0.5–1)
CREAT SERPL-MCNC: 3.8 MG/DL (ref 0.5–1)
CREAT SERPL-MCNC: 3.8 MG/DL (ref 0.5–1)
CREAT SERPL-MCNC: 3.9 MG/DL (ref 0.5–1)
CREAT SERPL-MCNC: 4 MG/DL (ref 0.5–1)
CREAT SERPL-MCNC: 4.1 MG/DL (ref 0.5–1)
CREAT SERPL-MCNC: 4.2 MG/DL (ref 0.5–1)
CREAT SERPL-MCNC: 4.2 MG/DL (ref 0.5–1)
CREAT SERPL-MCNC: 4.3 MG/DL (ref 0.5–1)
CREAT SERPL-MCNC: 4.3 MG/DL (ref 0.5–1)
CREAT SERPL-MCNC: 4.4 MG/DL (ref 0.5–1)
CREAT SERPL-MCNC: 4.5 MG/DL (ref 0.5–1)
CREAT SERPL-MCNC: 4.6 MG/DL (ref 0.5–1)
CREAT SERPL-MCNC: 4.7 MG/DL (ref 0.5–1)
CREAT SERPL-MCNC: 4.9 MG/DL (ref 0.5–1)
CREAT SERPL-MCNC: 5.3 MG/DL (ref 0.5–1)
CREAT SERPL-MCNC: 5.5 MG/DL (ref 0.5–1)
CRITICAL: ABNORMAL
CRYPTOSPORIDIUM ANTIGEN STOOL: NORMAL
CULTURE, BLOOD 2: NORMAL
CULTURE, BLOOD 2: NORMAL
CULTURE, STOOL: NORMAL
DATE ANALYZED: ABNORMAL
DATE OF COLLECTION: ABNORMAL
DESCRIPTION BLOOD BANK: NORMAL
EKG ATRIAL RATE: 68 BPM
EKG ATRIAL RATE: 77 BPM
EKG ATRIAL RATE: 81 BPM
EKG ATRIAL RATE: 96 BPM
EKG ATRIAL RATE: 96 BPM
EKG P AXIS: 49 DEGREES
EKG P AXIS: 53 DEGREES
EKG P AXIS: 66 DEGREES
EKG P-R INTERVAL: 126 MS
EKG P-R INTERVAL: 142 MS
EKG P-R INTERVAL: 144 MS
EKG Q-T INTERVAL: 320 MS
EKG Q-T INTERVAL: 372 MS
EKG Q-T INTERVAL: 374 MS
EKG Q-T INTERVAL: 374 MS
EKG Q-T INTERVAL: 498 MS
EKG QRS DURATION: 108 MS
EKG QRS DURATION: 116 MS
EKG QRS DURATION: 122 MS
EKG QRS DURATION: 144 MS
EKG QRS DURATION: 82 MS
EKG QTC CALCULATION (BAZETT): 414 MS
EKG QTC CALCULATION (BAZETT): 457 MS
EKG QTC CALCULATION (BAZETT): 462 MS
EKG QTC CALCULATION (BAZETT): 472 MS
EKG QTC CALCULATION (BAZETT): 563 MS
EKG R AXIS: 10 DEGREES
EKG R AXIS: 16 DEGREES
EKG R AXIS: 2 DEGREES
EKG R AXIS: 40 DEGREES
EKG R AXIS: 7 DEGREES
EKG T AXIS: -174 DEGREES
EKG T AXIS: 162 DEGREES
EKG T AXIS: 40 DEGREES
EKG T AXIS: 54 DEGREES
EKG T AXIS: 67 DEGREES
EKG VENTRICULAR RATE: 101 BPM
EKG VENTRICULAR RATE: 77 BPM
EKG VENTRICULAR RATE: 90 BPM
EKG VENTRICULAR RATE: 93 BPM
EKG VENTRICULAR RATE: 96 BPM
EOSINOPHILS ABSOLUTE: 0 E9/L (ref 0.05–0.5)
EOSINOPHILS ABSOLUTE: 0.01 E9/L (ref 0.05–0.5)
EOSINOPHILS ABSOLUTE: 0.01 E9/L (ref 0.05–0.5)
EOSINOPHILS ABSOLUTE: 0.02 E9/L (ref 0.05–0.5)
EOSINOPHILS ABSOLUTE: 0.03 E9/L (ref 0.05–0.5)
EOSINOPHILS ABSOLUTE: 0.04 E9/L (ref 0.05–0.5)
EOSINOPHILS ABSOLUTE: 0.07 E9/L (ref 0.05–0.5)
EOSINOPHILS ABSOLUTE: 0.09 E9/L (ref 0.05–0.5)
EOSINOPHILS ABSOLUTE: 0.09 E9/L (ref 0.05–0.5)
EOSINOPHILS ABSOLUTE: 0.13 E9/L (ref 0.05–0.5)
EOSINOPHILS RELATIVE PERCENT: 0 % (ref 0–6)
EOSINOPHILS RELATIVE PERCENT: 0.1 % (ref 0–6)
EOSINOPHILS RELATIVE PERCENT: 0.2 % (ref 0–6)
EOSINOPHILS RELATIVE PERCENT: 0.4 % (ref 0–6)
EOSINOPHILS RELATIVE PERCENT: 0.5 % (ref 0–6)
EOSINOPHILS RELATIVE PERCENT: 0.6 % (ref 0–6)
EOSINOPHILS RELATIVE PERCENT: 0.6 % (ref 0–6)
EOSINOPHILS RELATIVE PERCENT: 0.8 % (ref 0–6)
EPITHELIAL CELLS, UA: ABNORMAL /HPF
FERRITIN: 1084 NG/ML
FERRITIN: 2068 NG/ML
FERRITIN: 2543 NG/ML
FIBRINOGEN: 143 MG/DL (ref 225–540)
FLUID TYPE: NORMAL
FOLATE: 16.2 NG/ML (ref 4.8–24.2)
GFR AFRICAN AMERICAN: 10
GFR AFRICAN AMERICAN: 11
GFR AFRICAN AMERICAN: 12
GFR AFRICAN AMERICAN: 13
GFR AFRICAN AMERICAN: 14
GFR AFRICAN AMERICAN: 15
GFR AFRICAN AMERICAN: 16
GFR AFRICAN AMERICAN: 17
GFR AFRICAN AMERICAN: 18
GFR AFRICAN AMERICAN: 18
GFR AFRICAN AMERICAN: 20
GFR AFRICAN AMERICAN: 21
GFR AFRICAN AMERICAN: 22
GFR AFRICAN AMERICAN: 28
GFR AFRICAN AMERICAN: 34
GFR AFRICAN AMERICAN: 9
GFR NON-AFRICAN AMERICAN: 10 ML/MIN/1.73
GFR NON-AFRICAN AMERICAN: 11 ML/MIN/1.73
GFR NON-AFRICAN AMERICAN: 12 ML/MIN/1.73
GFR NON-AFRICAN AMERICAN: 13 ML/MIN/1.73
GFR NON-AFRICAN AMERICAN: 14 ML/MIN/1.73
GFR NON-AFRICAN AMERICAN: 15 ML/MIN/1.73
GFR NON-AFRICAN AMERICAN: 15 ML/MIN/1.73
GFR NON-AFRICAN AMERICAN: 16 ML/MIN/1.73
GFR NON-AFRICAN AMERICAN: 17 ML/MIN/1.73
GFR NON-AFRICAN AMERICAN: 17 ML/MIN/1.73
GFR NON-AFRICAN AMERICAN: 18 ML/MIN/1.73
GFR NON-AFRICAN AMERICAN: 18 ML/MIN/1.73
GFR NON-AFRICAN AMERICAN: 23 ML/MIN/1.73
GFR NON-AFRICAN AMERICAN: 28 ML/MIN/1.73
GFR NON-AFRICAN AMERICAN: 8 ML/MIN/1.73
GFR NON-AFRICAN AMERICAN: 8 ML/MIN/1.73
GFR NON-AFRICAN AMERICAN: 9 ML/MIN/1.73
GIARDIA ANTIGEN STOOL: NORMAL
GLUCOSE BLD-MCNC: 101 MG/DL (ref 74–99)
GLUCOSE BLD-MCNC: 103 MG/DL (ref 74–99)
GLUCOSE BLD-MCNC: 104 MG/DL (ref 74–99)
GLUCOSE BLD-MCNC: 109 MG/DL (ref 74–99)
GLUCOSE BLD-MCNC: 110 MG/DL (ref 74–99)
GLUCOSE BLD-MCNC: 112 MG/DL (ref 74–99)
GLUCOSE BLD-MCNC: 115 MG/DL (ref 74–99)
GLUCOSE BLD-MCNC: 117 MG/DL (ref 74–99)
GLUCOSE BLD-MCNC: 117 MG/DL (ref 74–99)
GLUCOSE BLD-MCNC: 118 MG/DL (ref 74–99)
GLUCOSE BLD-MCNC: 122 MG/DL (ref 74–99)
GLUCOSE BLD-MCNC: 129 MG/DL (ref 74–99)
GLUCOSE BLD-MCNC: 130 MG/DL (ref 74–99)
GLUCOSE BLD-MCNC: 131 MG/DL (ref 74–99)
GLUCOSE BLD-MCNC: 133 MG/DL (ref 74–99)
GLUCOSE BLD-MCNC: 134 MG/DL (ref 74–99)
GLUCOSE BLD-MCNC: 136 MG/DL (ref 74–99)
GLUCOSE BLD-MCNC: 137 MG/DL (ref 74–99)
GLUCOSE BLD-MCNC: 140 MG/DL (ref 74–99)
GLUCOSE BLD-MCNC: 142 MG/DL (ref 74–99)
GLUCOSE BLD-MCNC: 145 MG/DL (ref 74–99)
GLUCOSE BLD-MCNC: 148 MG/DL (ref 74–99)
GLUCOSE BLD-MCNC: 156 MG/DL (ref 74–99)
GLUCOSE BLD-MCNC: 162 MG/DL (ref 74–99)
GLUCOSE BLD-MCNC: 163 MG/DL (ref 74–99)
GLUCOSE BLD-MCNC: 74 MG/DL (ref 74–99)
GLUCOSE BLD-MCNC: 76 MG/DL (ref 74–99)
GLUCOSE BLD-MCNC: 81 MG/DL (ref 74–99)
GLUCOSE BLD-MCNC: 81 MG/DL (ref 74–99)
GLUCOSE BLD-MCNC: 82 MG/DL (ref 74–99)
GLUCOSE BLD-MCNC: 82 MG/DL (ref 74–99)
GLUCOSE BLD-MCNC: 83 MG/DL (ref 74–99)
GLUCOSE BLD-MCNC: 83 MG/DL (ref 74–99)
GLUCOSE BLD-MCNC: 95 MG/DL (ref 74–99)
GLUCOSE URINE: NEGATIVE MG/DL
GLUCOSE, FLUID: 91 MG/DL
GRAM STAIN ORDERABLE: NORMAL
GRAM STAIN RESULT: NORMAL
HAV AB SERPL IA-ACNC: POSITIVE
HAV IGM SER IA-ACNC: NORMAL
HBA1C MFR BLD: <4 %
HBV SURFACE AB TITR SER: NORMAL {TITER}
HCO3: 22.9 MMOL/L (ref 22–26)
HCO3: 24.9 MMOL/L (ref 22–26)
HCO3: 25.1 MMOL/L (ref 22–26)
HCT VFR BLD CALC: 17.3 % (ref 34–48)
HCT VFR BLD CALC: 19.8 % (ref 34–48)
HCT VFR BLD CALC: 22.2 % (ref 34–48)
HCT VFR BLD CALC: 22.5 % (ref 34–48)
HCT VFR BLD CALC: 23 % (ref 34–48)
HCT VFR BLD CALC: 23.5 % (ref 34–48)
HCT VFR BLD CALC: 25.6 % (ref 34–48)
HCT VFR BLD CALC: 25.7 % (ref 34–48)
HCT VFR BLD CALC: 26.4 % (ref 34–48)
HCT VFR BLD CALC: 26.6 % (ref 34–48)
HCT VFR BLD CALC: 27.3 % (ref 34–48)
HCT VFR BLD CALC: 27.6 % (ref 34–48)
HCT VFR BLD CALC: 27.7 % (ref 34–48)
HCT VFR BLD CALC: 28.4 % (ref 34–48)
HCT VFR BLD CALC: 28.5 % (ref 34–48)
HCT VFR BLD CALC: 28.5 % (ref 34–48)
HCT VFR BLD CALC: 28.7 % (ref 34–48)
HCT VFR BLD CALC: 28.9 % (ref 34–48)
HCT VFR BLD CALC: 33.6 % (ref 34–48)
HCT VFR BLD CALC: 34.5 % (ref 34–48)
HCT VFR BLD CALC: 35.5 % (ref 34–48)
HDLC SERPL-MCNC: 36 MG/DL
HEMATOCRIT FLUID: 4 %
HEMOGLOBIN: 10.3 G/DL (ref 11.5–15.5)
HEMOGLOBIN: 10.6 G/DL (ref 11.5–15.5)
HEMOGLOBIN: 11 G/DL (ref 11.5–15.5)
HEMOGLOBIN: 5.7 G/DL (ref 11.5–15.5)
HEMOGLOBIN: 6.5 G/DL (ref 11.5–15.5)
HEMOGLOBIN: 7 G/DL (ref 11.5–15.5)
HEMOGLOBIN: 7.2 G/DL (ref 11.5–15.5)
HEMOGLOBIN: 7.6 G/DL (ref 11.5–15.5)
HEMOGLOBIN: 7.6 G/DL (ref 11.5–15.5)
HEMOGLOBIN: 7.8 G/DL (ref 11.5–15.5)
HEMOGLOBIN: 8.2 G/DL (ref 11.5–15.5)
HEMOGLOBIN: 8.4 G/DL (ref 11.5–15.5)
HEMOGLOBIN: 8.6 G/DL (ref 11.5–15.5)
HEMOGLOBIN: 8.7 G/DL (ref 11.5–15.5)
HEMOGLOBIN: 8.9 G/DL (ref 11.5–15.5)
HEMOGLOBIN: 8.9 G/DL (ref 11.5–15.5)
HEMOGLOBIN: 9.1 G/DL (ref 11.5–15.5)
HEMOGLOBIN: 9.2 G/DL (ref 11.5–15.5)
HEMOGLOBIN: 9.6 G/DL (ref 11.5–15.5)
HEPARIN PF4 ANTIBODY: 0.1 OD
HEPATITIS B CORE IGM ANTIBODY: NORMAL
HEPATITIS B SURFACE ANTIGEN INTERPRETATION: NORMAL
HEPATITIS B SURFACE ANTIGEN INTERPRETATION: NORMAL
HEPATITIS C ANTIBODY INTERPRETATION: NORMAL
HEPATITIS C ANTIBODY INTERPRETATION: NORMAL
HHB: 1.8 % (ref 0–5)
HHB: 14.6 % (ref 0–5)
HHB: 2.4 % (ref 0–5)
HYPOCHROMIA: ABNORMAL
IGA: 852 MG/DL (ref 70–400)
IGE: 39 KU/L
IGG 1: 1120 MG/DL (ref 240–1118)
IGG 2: 520 MG/DL (ref 124–549)
IGG 3: 233 MG/DL (ref 21–134)
IGG 4: 98 MG/DL (ref 1–123)
IGG: 1951 MG/DL (ref 700–1600)
IGM: 133 MG/DL (ref 40–230)
IMMATURE GRANULOCYTES #: 0.02 E9/L
IMMATURE GRANULOCYTES #: 0.07 E9/L
IMMATURE GRANULOCYTES #: 0.08 E9/L
IMMATURE GRANULOCYTES #: 0.12 E9/L
IMMATURE GRANULOCYTES #: 0.15 E9/L
IMMATURE GRANULOCYTES #: 0.15 E9/L
IMMATURE GRANULOCYTES #: 0.16 E9/L
IMMATURE GRANULOCYTES #: 0.21 E9/L
IMMATURE GRANULOCYTES #: 0.28 E9/L
IMMATURE GRANULOCYTES %: 0.3 % (ref 0–5)
IMMATURE GRANULOCYTES %: 0.5 % (ref 0–5)
IMMATURE GRANULOCYTES %: 0.7 % (ref 0–5)
IMMATURE GRANULOCYTES %: 0.8 % (ref 0–5)
IMMATURE GRANULOCYTES %: 0.8 % (ref 0–5)
IMMATURE GRANULOCYTES %: 0.9 % (ref 0–5)
IMMATURE GRANULOCYTES %: 1 % (ref 0–5)
IMMATURE GRANULOCYTES %: 1.3 % (ref 0–5)
IMMATURE RETIC FRACT: 25.6 % (ref 3–15.9)
IMMATURE RETIC FRACT: 30.8 % (ref 3–15.9)
INR BLD: 1.2
INR BLD: 1.3
INR BLD: 8.4
IRON SATURATION: 108 % (ref 15–50)
IRON SATURATION: 46 % (ref 15–50)
IRON SATURATION: 99 % (ref 15–50)
IRON: 54 MCG/DL (ref 37–145)
IRON: 70 MCG/DL (ref 37–145)
IRON: 79 MCG/DL (ref 37–145)
KETONES, URINE: ABNORMAL MG/DL
LAB: ABNORMAL
LACTATE DEHYDROGENASE: 157 U/L (ref 135–214)
LACTIC ACID, SEPSIS: 2.9 MMOL/L (ref 0.5–1.9)
LACTIC ACID, SEPSIS: 4.3 MMOL/L (ref 0.5–1.9)
LACTIC ACID: 1.8 MMOL/L (ref 0.5–2.2)
LACTIC ACID: 1.8 MMOL/L (ref 0.5–2.2)
LACTIC ACID: 1.9 MMOL/L (ref 0.5–2.2)
LACTIC ACID: 2.1 MMOL/L (ref 0.5–2.2)
LACTIC ACID: 2.1 MMOL/L (ref 0.5–2.2)
LACTIC ACID: 2.4 MMOL/L (ref 0.5–2.2)
LACTIC ACID: 2.5 MMOL/L (ref 0.5–2.2)
LACTIC ACID: 2.6 MMOL/L (ref 0.5–2.2)
LACTIC ACID: 2.7 MMOL/L (ref 0.5–2.2)
LACTIC ACID: 2.8 MMOL/L (ref 0.5–2.2)
LACTIC ACID: 3 MMOL/L (ref 0.5–2.2)
LACTIC ACID: 3.2 MMOL/L (ref 0.5–2.2)
LACTIC ACID: 3.2 MMOL/L (ref 0.5–2.2)
LACTIC ACID: 3.8 MMOL/L (ref 0.5–2.2)
LACTIC ACID: 4 MMOL/L (ref 0.5–2.2)
LACTIC ACID: 4.3 MMOL/L (ref 0.5–2.2)
LACTIC ACID: 4.6 MMOL/L (ref 0.5–2.2)
LACTIC ACID: 5.6 MMOL/L (ref 0.5–2.2)
LD, FLUID: 436 U/L
LDL CHOLESTEROL CALCULATED: 34 MG/DL (ref 0–99)
LEUKOCYTE ESTERASE, URINE: ABNORMAL
LIPASE: 8 U/L (ref 13–60)
LV EF: 73 %
LVEF MODALITY: NORMAL
LYMPHOCYTES ABSOLUTE: 0.76 E9/L (ref 1.5–4)
LYMPHOCYTES ABSOLUTE: 0.77 E9/L (ref 1.5–4)
LYMPHOCYTES ABSOLUTE: 0.83 E9/L (ref 1.5–4)
LYMPHOCYTES ABSOLUTE: 1.13 E9/L (ref 1.5–4)
LYMPHOCYTES ABSOLUTE: 1.37 E9/L (ref 1.5–4)
LYMPHOCYTES ABSOLUTE: 1.58 E9/L (ref 1.5–4)
LYMPHOCYTES ABSOLUTE: 1.6 E9/L (ref 1.5–4)
LYMPHOCYTES ABSOLUTE: 1.61 E9/L (ref 1.5–4)
LYMPHOCYTES ABSOLUTE: 1.82 E9/L (ref 1.5–4)
LYMPHOCYTES ABSOLUTE: 1.86 E9/L (ref 1.5–4)
LYMPHOCYTES ABSOLUTE: 1.87 E9/L (ref 1.5–4)
LYMPHOCYTES ABSOLUTE: 2.42 E9/L (ref 1.5–4)
LYMPHOCYTES RELATIVE PERCENT: 10.9 % (ref 20–42)
LYMPHOCYTES RELATIVE PERCENT: 11.7 % (ref 20–42)
LYMPHOCYTES RELATIVE PERCENT: 12.3 % (ref 20–42)
LYMPHOCYTES RELATIVE PERCENT: 14.7 % (ref 20–42)
LYMPHOCYTES RELATIVE PERCENT: 2.6 % (ref 20–42)
LYMPHOCYTES RELATIVE PERCENT: 22.9 % (ref 20–42)
LYMPHOCYTES RELATIVE PERCENT: 5.5 % (ref 20–42)
LYMPHOCYTES RELATIVE PERCENT: 6.4 % (ref 20–42)
LYMPHOCYTES RELATIVE PERCENT: 8.4 % (ref 20–42)
LYMPHOCYTES RELATIVE PERCENT: 9 % (ref 20–42)
LYMPHOCYTES RELATIVE PERCENT: 9.2 % (ref 20–42)
LYMPHOCYTES RELATIVE PERCENT: 9.3 % (ref 20–42)
Lab: ABNORMAL
Lab: NORMAL
MAGNESIUM: 1.6 MG/DL (ref 1.6–2.6)
MAGNESIUM: 1.7 MG/DL (ref 1.6–2.6)
MAGNESIUM: 1.8 MG/DL (ref 1.6–2.6)
MAGNESIUM: 1.9 MG/DL (ref 1.6–2.6)
MAGNESIUM: 2 MG/DL (ref 1.6–2.6)
MAGNESIUM: 2.1 MG/DL (ref 1.6–2.6)
MAGNESIUM: 2.2 MG/DL (ref 1.6–2.6)
MAGNESIUM: 2.3 MG/DL (ref 1.6–2.6)
MCH RBC QN AUTO: 33.3 PG (ref 26–35)
MCH RBC QN AUTO: 33.4 PG (ref 26–35)
MCH RBC QN AUTO: 33.6 PG (ref 26–35)
MCH RBC QN AUTO: 33.7 PG (ref 26–35)
MCH RBC QN AUTO: 33.7 PG (ref 26–35)
MCH RBC QN AUTO: 33.8 PG (ref 26–35)
MCH RBC QN AUTO: 33.9 PG (ref 26–35)
MCH RBC QN AUTO: 33.9 PG (ref 26–35)
MCH RBC QN AUTO: 34.1 PG (ref 26–35)
MCH RBC QN AUTO: 34.1 PG (ref 26–35)
MCH RBC QN AUTO: 34.2 PG (ref 26–35)
MCH RBC QN AUTO: 34.3 PG (ref 26–35)
MCH RBC QN AUTO: 34.3 PG (ref 26–35)
MCH RBC QN AUTO: 34.5 PG (ref 26–35)
MCH RBC QN AUTO: 35 PG (ref 26–35)
MCH RBC QN AUTO: 35.1 PG (ref 26–35)
MCH RBC QN AUTO: 35.2 PG (ref 26–35)
MCHC RBC AUTO-ENTMCNC: 29.5 % (ref 32–34.5)
MCHC RBC AUTO-ENTMCNC: 29.9 % (ref 32–34.5)
MCHC RBC AUTO-ENTMCNC: 29.9 % (ref 32–34.5)
MCHC RBC AUTO-ENTMCNC: 30.1 % (ref 32–34.5)
MCHC RBC AUTO-ENTMCNC: 30.5 % (ref 32–34.5)
MCHC RBC AUTO-ENTMCNC: 31.1 % (ref 32–34.5)
MCHC RBC AUTO-ENTMCNC: 31.3 % (ref 32–34.5)
MCHC RBC AUTO-ENTMCNC: 31.3 % (ref 32–34.5)
MCHC RBC AUTO-ENTMCNC: 31.5 % (ref 32–34.5)
MCHC RBC AUTO-ENTMCNC: 31.7 % (ref 32–34.5)
MCHC RBC AUTO-ENTMCNC: 32.2 % (ref 32–34.5)
MCHC RBC AUTO-ENTMCNC: 32.3 % (ref 32–34.5)
MCHC RBC AUTO-ENTMCNC: 32.7 % (ref 32–34.5)
MCHC RBC AUTO-ENTMCNC: 32.8 % (ref 32–34.5)
MCHC RBC AUTO-ENTMCNC: 32.9 % (ref 32–34.5)
MCHC RBC AUTO-ENTMCNC: 33.7 % (ref 32–34.5)
MCHC RBC AUTO-ENTMCNC: 33.7 % (ref 32–34.5)
MCV RBC AUTO: 101.1 FL (ref 80–99.9)
MCV RBC AUTO: 102.1 FL (ref 80–99.9)
MCV RBC AUTO: 102.5 FL (ref 80–99.9)
MCV RBC AUTO: 103 FL (ref 80–99.9)
MCV RBC AUTO: 103.1 FL (ref 80–99.9)
MCV RBC AUTO: 104 FL (ref 80–99.9)
MCV RBC AUTO: 105.7 FL (ref 80–99.9)
MCV RBC AUTO: 107.3 FL (ref 80–99.9)
MCV RBC AUTO: 107.6 FL (ref 80–99.9)
MCV RBC AUTO: 107.9 FL (ref 80–99.9)
MCV RBC AUTO: 108.8 FL (ref 80–99.9)
MCV RBC AUTO: 109.5 FL (ref 80–99.9)
MCV RBC AUTO: 112.7 FL (ref 80–99.9)
MCV RBC AUTO: 113.1 FL (ref 80–99.9)
MCV RBC AUTO: 115.3 FL (ref 80–99.9)
MCV RBC AUTO: 117 FL (ref 80–99.9)
MCV RBC AUTO: 118.8 FL (ref 80–99.9)
METER GLUCOSE: 99 MG/DL (ref 74–99)
METHB: 0.3 % (ref 0–1.5)
MODE: ABNORMAL
MONOCYTE, FLUID: 30 %
MONOCYTES ABSOLUTE: 0.52 E9/L (ref 0.1–0.95)
MONOCYTES ABSOLUTE: 0.56 E9/L (ref 0.1–0.95)
MONOCYTES ABSOLUTE: 0.6 E9/L (ref 0.1–0.95)
MONOCYTES ABSOLUTE: 0.62 E9/L (ref 0.1–0.95)
MONOCYTES ABSOLUTE: 0.69 E9/L (ref 0.1–0.95)
MONOCYTES ABSOLUTE: 0.74 E9/L (ref 0.1–0.95)
MONOCYTES ABSOLUTE: 0.79 E9/L (ref 0.1–0.95)
MONOCYTES ABSOLUTE: 0.86 E9/L (ref 0.1–0.95)
MONOCYTES ABSOLUTE: 0.9 E9/L (ref 0.1–0.95)
MONOCYTES ABSOLUTE: 0.96 E9/L (ref 0.1–0.95)
MONOCYTES ABSOLUTE: 1.01 E9/L (ref 0.1–0.95)
MONOCYTES ABSOLUTE: 1.11 E9/L (ref 0.1–0.95)
MONOCYTES RELATIVE PERCENT: 10.7 % (ref 2–12)
MONOCYTES RELATIVE PERCENT: 2.1 % (ref 2–12)
MONOCYTES RELATIVE PERCENT: 3.8 % (ref 2–12)
MONOCYTES RELATIVE PERCENT: 4.3 % (ref 2–12)
MONOCYTES RELATIVE PERCENT: 4.5 % (ref 2–12)
MONOCYTES RELATIVE PERCENT: 4.8 % (ref 2–12)
MONOCYTES RELATIVE PERCENT: 5 % (ref 2–12)
MONOCYTES RELATIVE PERCENT: 5.7 % (ref 2–12)
MONOCYTES RELATIVE PERCENT: 6.6 % (ref 2–12)
MONOCYTES RELATIVE PERCENT: 6.8 % (ref 2–12)
MRSA CULTURE ONLY: NORMAL
NEUTROPHIL, FLUID: 70 %
NEUTROPHILS ABSOLUTE: 10.68 E9/L (ref 1.8–7.3)
NEUTROPHILS ABSOLUTE: 10.75 E9/L (ref 1.8–7.3)
NEUTROPHILS ABSOLUTE: 12.51 E9/L (ref 1.8–7.3)
NEUTROPHILS ABSOLUTE: 12.76 E9/L (ref 1.8–7.3)
NEUTROPHILS ABSOLUTE: 12.85 E9/L (ref 1.8–7.3)
NEUTROPHILS ABSOLUTE: 12.96 E9/L (ref 1.8–7.3)
NEUTROPHILS ABSOLUTE: 13.01 E9/L (ref 1.8–7.3)
NEUTROPHILS ABSOLUTE: 13.44 E9/L (ref 1.8–7.3)
NEUTROPHILS ABSOLUTE: 14.67 E9/L (ref 1.8–7.3)
NEUTROPHILS ABSOLUTE: 16.38 E9/L (ref 1.8–7.3)
NEUTROPHILS ABSOLUTE: 27.26 E9/L (ref 1.8–7.3)
NEUTROPHILS ABSOLUTE: 4.47 E9/L (ref 1.8–7.3)
NEUTROPHILS RELATIVE PERCENT: 64.8 % (ref 43–80)
NEUTROPHILS RELATIVE PERCENT: 79 % (ref 43–80)
NEUTROPHILS RELATIVE PERCENT: 80.3 % (ref 43–80)
NEUTROPHILS RELATIVE PERCENT: 80.9 % (ref 43–80)
NEUTROPHILS RELATIVE PERCENT: 82.1 % (ref 43–80)
NEUTROPHILS RELATIVE PERCENT: 84.7 % (ref 43–80)
NEUTROPHILS RELATIVE PERCENT: 85.2 % (ref 43–80)
NEUTROPHILS RELATIVE PERCENT: 86 % (ref 43–80)
NEUTROPHILS RELATIVE PERCENT: 86.2 % (ref 43–80)
NEUTROPHILS RELATIVE PERCENT: 86.6 % (ref 43–80)
NEUTROPHILS RELATIVE PERCENT: 88.4 % (ref 43–80)
NEUTROPHILS RELATIVE PERCENT: 94.2 % (ref 43–80)
NITRITE, URINE: NEGATIVE
NUCLEATED CELLS FLUID: 2919 /UL
O2 CONTENT: 11.2 ML/DL
O2 CONTENT: 9.6 ML/DL
O2 CONTENT: 9.8 ML/DL
O2 SATURATION: 85 % (ref 92–98.5)
O2 SATURATION: 97.6 % (ref 92–98.5)
O2 SATURATION: 98.2 % (ref 92–98.5)
O2HB: 83 % (ref 94–97)
O2HB: 95.6 % (ref 94–97)
O2HB: 96.2 % (ref 94–97)
OPERATOR ID: 316
OPERATOR ID: 46
OPERATOR ID: ABNORMAL
PAPPENHEIMER BODIES: ABNORMAL
PARATHYROID HORMONE INTACT: 47 PG/ML (ref 15–65)
PATHOLOGIST REVIEW: NORMAL
PATIENT TEMP: 37 C
PCO2: 52.7 MMHG (ref 35–45)
PCO2: 54.7 MMHG (ref 35–45)
PCO2: 62 MMHG (ref 35–45)
PDW BLD-RTO: 15.3 FL (ref 11.5–15)
PDW BLD-RTO: 15.4 FL (ref 11.5–15)
PDW BLD-RTO: 15.7 FL (ref 11.5–15)
PDW BLD-RTO: 16.5 FL (ref 11.5–15)
PDW BLD-RTO: 16.5 FL (ref 11.5–15)
PDW BLD-RTO: 18.9 FL (ref 11.5–15)
PDW BLD-RTO: 19 FL (ref 11.5–15)
PDW BLD-RTO: 19.4 FL (ref 11.5–15)
PDW BLD-RTO: 19.6 FL (ref 11.5–15)
PDW BLD-RTO: 19.9 FL (ref 11.5–15)
PDW BLD-RTO: 19.9 FL (ref 11.5–15)
PDW BLD-RTO: 20 FL (ref 11.5–15)
PDW BLD-RTO: 20.2 FL (ref 11.5–15)
PDW BLD-RTO: 21.2 FL (ref 11.5–15)
PDW BLD-RTO: 21.2 FL (ref 11.5–15)
PDW BLD-RTO: 21.4 FL (ref 11.5–15)
PDW BLD-RTO: 21.6 FL (ref 11.5–15)
PH BLOOD GAS: 7.23 (ref 7.35–7.45)
PH BLOOD GAS: 7.24 (ref 7.35–7.45)
PH BLOOD GAS: 7.29 (ref 7.35–7.45)
PH UA: 5.5 (ref 5–9)
PHOSPHORUS: 2.6 MG/DL (ref 2.5–4.5)
PHOSPHORUS: 2.7 MG/DL (ref 2.5–4.5)
PHOSPHORUS: 3 MG/DL (ref 2.5–4.5)
PHOSPHORUS: 3 MG/DL (ref 2.5–4.5)
PHOSPHORUS: 3.2 MG/DL (ref 2.5–4.5)
PHOSPHORUS: 3.6 MG/DL (ref 2.5–4.5)
PHOSPHORUS: 3.6 MG/DL (ref 2.5–4.5)
PLATELET # BLD: 101 E9/L (ref 130–450)
PLATELET # BLD: 116 E9/L (ref 130–450)
PLATELET # BLD: 131 E9/L (ref 130–450)
PLATELET # BLD: 145 E9/L (ref 130–450)
PLATELET # BLD: 147 E9/L (ref 130–450)
PLATELET # BLD: 155 E9/L (ref 130–450)
PLATELET # BLD: 157 E9/L (ref 130–450)
PLATELET # BLD: 158 E9/L (ref 130–450)
PLATELET # BLD: 168 E9/L (ref 130–450)
PLATELET # BLD: 196 E9/L (ref 130–450)
PLATELET # BLD: 238 E9/L (ref 130–450)
PLATELET # BLD: 36 E9/L (ref 130–450)
PLATELET # BLD: 37 E9/L (ref 130–450)
PLATELET # BLD: 59 E9/L (ref 130–450)
PLATELET # BLD: 62 E9/L (ref 130–450)
PLATELET # BLD: 67 E9/L (ref 130–450)
PLATELET # BLD: 71 E9/L (ref 130–450)
PLATELET CONFIRMATION: NORMAL
PMV BLD AUTO: 10.1 FL (ref 7–12)
PMV BLD AUTO: 10.8 FL (ref 7–12)
PMV BLD AUTO: 10.8 FL (ref 7–12)
PMV BLD AUTO: 11.3 FL (ref 7–12)
PMV BLD AUTO: 11.4 FL (ref 7–12)
PMV BLD AUTO: 11.6 FL (ref 7–12)
PMV BLD AUTO: 11.8 FL (ref 7–12)
PMV BLD AUTO: 11.9 FL (ref 7–12)
PMV BLD AUTO: 12.2 FL (ref 7–12)
PMV BLD AUTO: 12.4 FL (ref 7–12)
PMV BLD AUTO: 12.5 FL (ref 7–12)
PMV BLD AUTO: 12.5 FL (ref 7–12)
PMV BLD AUTO: 12.6 FL (ref 7–12)
PMV BLD AUTO: 12.7 FL (ref 7–12)
PMV BLD AUTO: ABNORMAL FL (ref 7–12)
PO2: 128.7 MMHG (ref 75–100)
PO2: 154.5 MMHG (ref 75–100)
PO2: 57.4 MMHG (ref 75–100)
POIKILOCYTES: ABNORMAL
POIKILOCYTES: ABNORMAL
POLYCHROMASIA: ABNORMAL
POTASSIUM REFLEX MAGNESIUM: 2.4 MMOL/L (ref 3.5–5)
POTASSIUM REFLEX MAGNESIUM: 2.8 MMOL/L (ref 3.5–5)
POTASSIUM REFLEX MAGNESIUM: 2.9 MMOL/L (ref 3.5–5)
POTASSIUM REFLEX MAGNESIUM: 3.1 MMOL/L (ref 3.5–5)
POTASSIUM REFLEX MAGNESIUM: 3.2 MMOL/L (ref 3.5–5)
POTASSIUM REFLEX MAGNESIUM: 3.3 MMOL/L (ref 3.5–5)
POTASSIUM REFLEX MAGNESIUM: 3.3 MMOL/L (ref 3.5–5)
POTASSIUM REFLEX MAGNESIUM: 3.4 MMOL/L (ref 3.5–5)
POTASSIUM REFLEX MAGNESIUM: 3.5 MMOL/L (ref 3.5–5)
POTASSIUM REFLEX MAGNESIUM: 3.7 MMOL/L (ref 3.5–5)
POTASSIUM REFLEX MAGNESIUM: 3.8 MMOL/L (ref 3.5–5)
POTASSIUM REFLEX MAGNESIUM: 3.9 MMOL/L (ref 3.5–5)
POTASSIUM REFLEX MAGNESIUM: 4 MMOL/L (ref 3.5–5)
POTASSIUM REFLEX MAGNESIUM: 4.1 MMOL/L (ref 3.5–5)
POTASSIUM REFLEX MAGNESIUM: 4.2 MMOL/L (ref 3.5–5)
POTASSIUM SERPL-SCNC: 1.6 MMOL/L (ref 3.5–5)
POTASSIUM SERPL-SCNC: 2.4 MMOL/L (ref 3.5–5)
POTASSIUM SERPL-SCNC: 2.7 MMOL/L (ref 3.5–5)
POTASSIUM SERPL-SCNC: 2.8 MMOL/L (ref 3.5–5)
POTASSIUM SERPL-SCNC: 3.1 MMOL/L (ref 3.5–5)
POTASSIUM SERPL-SCNC: 3.1 MMOL/L (ref 3.5–5)
POTASSIUM SERPL-SCNC: 3.3 MMOL/L (ref 3.5–5)
POTASSIUM SERPL-SCNC: 3.5 MMOL/L (ref 3.5–5)
POTASSIUM SERPL-SCNC: 3.5 MMOL/L (ref 3.5–5)
POTASSIUM SERPL-SCNC: 3.6 MMOL/L (ref 3.5–5)
POTASSIUM SERPL-SCNC: 3.8 MMOL/L (ref 3.5–5)
POTASSIUM SERPL-SCNC: 4 MMOL/L (ref 3.5–5)
POTASSIUM SERPL-SCNC: 4.1 MMOL/L (ref 3.5–5)
POTASSIUM SERPL-SCNC: 4.2 MMOL/L (ref 3.5–5)
POTASSIUM SERPL-SCNC: 4.6 MMOL/L (ref 3.5–5)
POTASSIUM SERPL-SCNC: 4.7 MMOL/L (ref 3.5–5)
POTASSIUM SERPL-SCNC: 4.9 MMOL/L (ref 3.5–5)
PRO-BNP: 3794 PG/ML (ref 0–125)
PROTEIN UA: 30 MG/DL
PROTHROMBIN TIME: 100.5 SEC (ref 9.3–12.4)
PROTHROMBIN TIME: 14.8 SEC (ref 9.3–12.4)
PROTHROMBIN TIME: 15.1 SEC (ref 9.3–12.4)
RBC # BLD: 1.68 E12/L (ref 3.5–5.5)
RBC # BLD: 1.92 E12/L (ref 3.5–5.5)
RBC # BLD: 2.04 E12/L (ref 3.5–5.5)
RBC # BLD: 2.1 E12/L (ref 3.5–5.5)
RBC # BLD: 2.22 E12/L (ref 3.5–5.5)
RBC # BLD: 2.26 E12/L (ref 3.5–5.5)
RBC # BLD: 2.4 E12/L (ref 3.5–5.5)
RBC # BLD: 2.46 E12/L (ref 3.5–5.5)
RBC # BLD: 2.47 E12/L (ref 3.5–5.5)
RBC # BLD: 2.61 E12/L (ref 3.5–5.5)
RBC # BLD: 2.64 E12/L (ref 3.5–5.5)
RBC # BLD: 2.66 E12/L (ref 3.5–5.5)
RBC # BLD: 2.7 E12/L (ref 3.5–5.5)
RBC # BLD: 2.78 E12/L (ref 3.5–5.5)
RBC # BLD: 3.06 E12/L (ref 3.5–5.5)
RBC # BLD: 3.14 E12/L (ref 3.5–5.5)
RBC # BLD: 3.29 E12/L (ref 3.5–5.5)
RBC FLUID: NORMAL /UL
RBC UA: ABNORMAL /HPF (ref 0–2)
REPORT: NORMAL
RETIC HGB EQUIVALENT: 32.9 PG (ref 28.2–36.6)
RETIC HGB EQUIVALENT: 38.3 PG (ref 28.2–36.6)
RETICULOCYTE ABSOLUTE COUNT: 0.07 E12/L
RETICULOCYTE ABSOLUTE COUNT: 0.08 E12/L
RETICULOCYTE COUNT PCT: 2.9 % (ref 0.4–1.9)
RETICULOCYTE COUNT PCT: 3.2 % (ref 0.4–1.9)
SMOOTH MUSCLE ANTIBODY: NEGATIVE
SMUDGE CELLS: ABNORMAL
SODIUM BLD-SCNC: 131 MMOL/L (ref 132–146)
SODIUM BLD-SCNC: 133 MMOL/L (ref 132–146)
SODIUM BLD-SCNC: 134 MMOL/L (ref 132–146)
SODIUM BLD-SCNC: 135 MMOL/L (ref 132–146)
SODIUM BLD-SCNC: 136 MMOL/L (ref 132–146)
SODIUM BLD-SCNC: 137 MMOL/L (ref 132–146)
SODIUM BLD-SCNC: 138 MMOL/L (ref 132–146)
SODIUM BLD-SCNC: 139 MMOL/L (ref 132–146)
SODIUM BLD-SCNC: 140 MMOL/L (ref 132–146)
SODIUM BLD-SCNC: 141 MMOL/L (ref 132–146)
SODIUM BLD-SCNC: 142 MMOL/L (ref 132–146)
SOURCE, BLOOD GAS: ABNORMAL
SPECIFIC GRAVITY UA: 1.01 (ref 1–1.03)
T3 FREE: 0.9 PG/ML (ref 2–4.4)
T4 FREE: 0.75 NG/DL (ref 0.93–1.7)
T4 FREE: 1.11 NG/DL (ref 0.93–1.7)
TARGET CELLS: ABNORMAL
TARGET CELLS: ABNORMAL
THB: 7 G/DL (ref 11.5–16.5)
THB: 8.1 G/DL (ref 11.5–16.5)
THB: 8.2 G/DL (ref 11.5–16.5)
THIS TEST SENT TO: NORMAL
TIME ANALYZED: 2138
TIME ANALYZED: 749
TIME ANALYZED: 933
TOTAL IRON BINDING CAPACITY: 153 MCG/DL (ref 250–450)
TOTAL IRON BINDING CAPACITY: 50 MCG/DL (ref 250–450)
TOTAL IRON BINDING CAPACITY: 80 MCG/DL (ref 250–450)
TOTAL PROTEIN: 5.3 G/DL (ref 6.4–8.3)
TOTAL PROTEIN: 5.4 G/DL (ref 6.4–8.3)
TOTAL PROTEIN: 5.4 G/DL (ref 6.4–8.3)
TOTAL PROTEIN: 5.5 G/DL (ref 6.4–8.3)
TOTAL PROTEIN: 5.6 G/DL (ref 6.4–8.3)
TOTAL PROTEIN: 5.7 G/DL (ref 6.4–8.3)
TOTAL PROTEIN: 5.7 G/DL (ref 6.4–8.3)
TOTAL PROTEIN: 5.8 G/DL (ref 6.4–8.3)
TOTAL PROTEIN: 5.8 G/DL (ref 6.4–8.3)
TOTAL PROTEIN: 5.9 G/DL (ref 6.4–8.3)
TOTAL PROTEIN: 5.9 G/DL (ref 6.4–8.3)
TOTAL PROTEIN: 6.1 G/DL (ref 6.4–8.3)
TOTAL PROTEIN: 6.2 G/DL (ref 6.4–8.3)
TOTAL PROTEIN: 6.4 G/DL (ref 6.4–8.3)
TOTAL PROTEIN: 6.4 G/DL (ref 6.4–8.3)
TOTAL PROTEIN: 6.7 G/DL (ref 6.4–8.3)
TOTAL PROTEIN: 7.7 G/DL (ref 6.4–8.3)
TRIGL SERPL-MCNC: 131 MG/DL (ref 0–149)
TROPONIN: 0.01 NG/ML (ref 0–0.03)
TROPONIN: 0.02 NG/ML (ref 0–0.03)
TROPONIN: 0.02 NG/ML (ref 0–0.03)
TSH SERPL DL<=0.05 MIU/L-ACNC: 1.15 UIU/ML (ref 0.27–4.2)
TSH SERPL DL<=0.05 MIU/L-ACNC: 3.43 UIU/ML (ref 0.27–4.2)
TSH SERPL DL<=0.05 MIU/L-ACNC: 4.26 UIU/ML (ref 0.27–4.2)
TSH SERPL DL<=0.05 MIU/L-ACNC: 6.43 UIU/ML (ref 0.27–4.2)
UROBILINOGEN, URINE: 0.2 E.U./DL
VITAMIN B-12: 986 PG/ML (ref 211–946)
VITAMIN D 25-HYDROXY: 66 NG/ML (ref 30–100)
VLDLC SERPL CALC-MCNC: 26 MG/DL
WBC # BLD: 12.1 E9/L (ref 4.5–11.5)
WBC # BLD: 12.5 E9/L (ref 4.5–11.5)
WBC # BLD: 12.6 E9/L (ref 4.5–11.5)
WBC # BLD: 14.8 E9/L (ref 4.5–11.5)
WBC # BLD: 15 E9/L (ref 4.5–11.5)
WBC # BLD: 15.2 E9/L (ref 4.5–11.5)
WBC # BLD: 15.9 E9/L (ref 4.5–11.5)
WBC # BLD: 16.5 E9/L (ref 4.5–11.5)
WBC # BLD: 16.7 E9/L (ref 4.5–11.5)
WBC # BLD: 17.3 E9/L (ref 4.5–11.5)
WBC # BLD: 17.4 E9/L (ref 4.5–11.5)
WBC # BLD: 17.8 E9/L (ref 4.5–11.5)
WBC # BLD: 19.2 E9/L (ref 4.5–11.5)
WBC # BLD: 28.9 E9/L (ref 4.5–11.5)
WBC # BLD: 4.5 E9/L (ref 4.5–11.5)
WBC # BLD: 6.3 E9/L (ref 4.5–11.5)
WBC # BLD: 6.9 E9/L (ref 4.5–11.5)
WBC UA: >20 /HPF (ref 0–5)

## 2020-01-01 PROCEDURE — G8427 DOCREV CUR MEDS BY ELIG CLIN: HCPCS | Performed by: CLINICAL NURSE SPECIALIST

## 2020-01-01 PROCEDURE — 90935 HEMODIALYSIS ONE EVALUATION: CPT

## 2020-01-01 PROCEDURE — 2500000003 HC RX 250 WO HCPCS: Performed by: INTERNAL MEDICINE

## 2020-01-01 PROCEDURE — 89051 BODY FLUID CELL COUNT: CPT

## 2020-01-01 PROCEDURE — 97530 THERAPEUTIC ACTIVITIES: CPT

## 2020-01-01 PROCEDURE — 6370000000 HC RX 637 (ALT 250 FOR IP): Performed by: STUDENT IN AN ORGANIZED HEALTH CARE EDUCATION/TRAINING PROGRAM

## 2020-01-01 PROCEDURE — 6360000002 HC RX W HCPCS: Performed by: STUDENT IN AN ORGANIZED HEALTH CARE EDUCATION/TRAINING PROGRAM

## 2020-01-01 PROCEDURE — 6360000002 HC RX W HCPCS: Performed by: INTERNAL MEDICINE

## 2020-01-01 PROCEDURE — P9047 ALBUMIN (HUMAN), 25%, 50ML: HCPCS | Performed by: INTERNAL MEDICINE

## 2020-01-01 PROCEDURE — 6370000000 HC RX 637 (ALT 250 FOR IP): Performed by: INTERNAL MEDICINE

## 2020-01-01 PROCEDURE — 99214 OFFICE O/P EST MOD 30 MIN: CPT | Performed by: INTERNAL MEDICINE

## 2020-01-01 PROCEDURE — 82533 TOTAL CORTISOL: CPT

## 2020-01-01 PROCEDURE — 87449 NOS EACH ORGANISM AG IA: CPT

## 2020-01-01 PROCEDURE — 87328 CRYPTOSPORIDIUM AG IA: CPT

## 2020-01-01 PROCEDURE — 36592 COLLECT BLOOD FROM PICC: CPT

## 2020-01-01 PROCEDURE — 80053 COMPREHEN METABOLIC PANEL: CPT

## 2020-01-01 PROCEDURE — 84484 ASSAY OF TROPONIN QUANT: CPT

## 2020-01-01 PROCEDURE — 85610 PROTHROMBIN TIME: CPT

## 2020-01-01 PROCEDURE — 4040F PNEUMOC VAC/ADMIN/RCVD: CPT | Performed by: CLINICAL NURSE SPECIALIST

## 2020-01-01 PROCEDURE — 87040 BLOOD CULTURE FOR BACTERIA: CPT

## 2020-01-01 PROCEDURE — 85013 SPUN MICROHEMATOCRIT: CPT

## 2020-01-01 PROCEDURE — 74177 CT ABD & PELVIS W/CONTRAST: CPT

## 2020-01-01 PROCEDURE — 82330 ASSAY OF CALCIUM: CPT

## 2020-01-01 PROCEDURE — 94660 CPAP INITIATION&MGMT: CPT

## 2020-01-01 PROCEDURE — 84100 ASSAY OF PHOSPHORUS: CPT

## 2020-01-01 PROCEDURE — 94640 AIRWAY INHALATION TREATMENT: CPT

## 2020-01-01 PROCEDURE — 1200000000 HC SEMI PRIVATE

## 2020-01-01 PROCEDURE — 2580000003 HC RX 258: Performed by: EMERGENCY MEDICINE

## 2020-01-01 PROCEDURE — 94664 DEMO&/EVAL PT USE INHALER: CPT

## 2020-01-01 PROCEDURE — 36415 COLL VENOUS BLD VENIPUNCTURE: CPT

## 2020-01-01 PROCEDURE — P9047 ALBUMIN (HUMAN), 25%, 50ML: HCPCS

## 2020-01-01 PROCEDURE — 6360000002 HC RX W HCPCS

## 2020-01-01 PROCEDURE — 84439 ASSAY OF FREE THYROXINE: CPT

## 2020-01-01 PROCEDURE — 73521 X-RAY EXAM HIPS BI 2 VIEWS: CPT

## 2020-01-01 PROCEDURE — 87205 SMEAR GRAM STAIN: CPT

## 2020-01-01 PROCEDURE — 82962 GLUCOSE BLOOD TEST: CPT

## 2020-01-01 PROCEDURE — 2500000003 HC RX 250 WO HCPCS: Performed by: STUDENT IN AN ORGANIZED HEALTH CARE EDUCATION/TRAINING PROGRAM

## 2020-01-01 PROCEDURE — P9035 PLATELET PHERES LEUKOREDUCED: HCPCS

## 2020-01-01 PROCEDURE — 83735 ASSAY OF MAGNESIUM: CPT

## 2020-01-01 PROCEDURE — 86038 ANTINUCLEAR ANTIBODIES: CPT

## 2020-01-01 PROCEDURE — 93010 ELECTROCARDIOGRAM REPORT: CPT | Performed by: INTERNAL MEDICINE

## 2020-01-01 PROCEDURE — 82728 ASSAY OF FERRITIN: CPT

## 2020-01-01 PROCEDURE — 2580000003 HC RX 258: Performed by: INTERNAL MEDICINE

## 2020-01-01 PROCEDURE — 80074 ACUTE HEPATITIS PANEL: CPT

## 2020-01-01 PROCEDURE — 86022 PLATELET ANTIBODIES: CPT

## 2020-01-01 PROCEDURE — 86140 C-REACTIVE PROTEIN: CPT

## 2020-01-01 PROCEDURE — 81001 URINALYSIS AUTO W/SCOPE: CPT

## 2020-01-01 PROCEDURE — 92610 EVALUATE SWALLOWING FUNCTION: CPT | Performed by: SPEECH-LANGUAGE PATHOLOGIST

## 2020-01-01 PROCEDURE — 84450 TRANSFERASE (AST) (SGOT): CPT

## 2020-01-01 PROCEDURE — 92526 ORAL FUNCTION THERAPY: CPT | Performed by: SPEECH-LANGUAGE PATHOLOGIST

## 2020-01-01 PROCEDURE — 83605 ASSAY OF LACTIC ACID: CPT

## 2020-01-01 PROCEDURE — 87102 FUNGUS ISOLATION CULTURE: CPT

## 2020-01-01 PROCEDURE — 83550 IRON BINDING TEST: CPT

## 2020-01-01 PROCEDURE — 71045 X-RAY EXAM CHEST 1 VIEW: CPT

## 2020-01-01 PROCEDURE — 82607 VITAMIN B-12: CPT

## 2020-01-01 PROCEDURE — 85027 COMPLETE CBC AUTOMATED: CPT

## 2020-01-01 PROCEDURE — 87070 CULTURE OTHR SPECIMN AEROBIC: CPT

## 2020-01-01 PROCEDURE — 2580000003 HC RX 258: Performed by: STUDENT IN AN ORGANIZED HEALTH CARE EDUCATION/TRAINING PROGRAM

## 2020-01-01 PROCEDURE — 3017F COLORECTAL CA SCREEN DOC REV: CPT | Performed by: CLINICAL NURSE SPECIALIST

## 2020-01-01 PROCEDURE — 84443 ASSAY THYROID STIM HORMONE: CPT

## 2020-01-01 PROCEDURE — 85025 COMPLETE CBC W/AUTO DIFF WBC: CPT

## 2020-01-01 PROCEDURE — 37799 UNLISTED PX VASCULAR SURGERY: CPT

## 2020-01-01 PROCEDURE — 83540 ASSAY OF IRON: CPT

## 2020-01-01 PROCEDURE — 85018 HEMOGLOBIN: CPT

## 2020-01-01 PROCEDURE — 2000000000 HC ICU R&B

## 2020-01-01 PROCEDURE — 80048 BASIC METABOLIC PNL TOTAL CA: CPT

## 2020-01-01 PROCEDURE — 99254 IP/OBS CNSLTJ NEW/EST MOD 60: CPT | Performed by: INTERNAL MEDICINE

## 2020-01-01 PROCEDURE — 2700000000 HC OXYGEN THERAPY PER DAY

## 2020-01-01 PROCEDURE — 73564 X-RAY EXAM KNEE 4 OR MORE: CPT

## 2020-01-01 PROCEDURE — 96368 THER/DIAG CONCURRENT INF: CPT

## 2020-01-01 PROCEDURE — 82785 ASSAY OF IGE: CPT

## 2020-01-01 PROCEDURE — 2060000000 HC ICU INTERMEDIATE R&B

## 2020-01-01 PROCEDURE — 96366 THER/PROPH/DIAG IV INF ADDON: CPT

## 2020-01-01 PROCEDURE — 86900 BLOOD TYPING SEROLOGIC ABO: CPT

## 2020-01-01 PROCEDURE — 05HM33Z INSERTION OF INFUSION DEVICE INTO RIGHT INTERNAL JUGULAR VEIN, PERCUTANEOUS APPROACH: ICD-10-PCS | Performed by: INTERNAL MEDICINE

## 2020-01-01 PROCEDURE — 87045 FECES CULTURE AEROBIC BACT: CPT

## 2020-01-01 PROCEDURE — G8400 PT W/DXA NO RESULTS DOC: HCPCS | Performed by: CLINICAL NURSE SPECIALIST

## 2020-01-01 PROCEDURE — 82947 ASSAY GLUCOSE BLOOD QUANT: CPT

## 2020-01-01 PROCEDURE — 90935 HEMODIALYSIS ONE EVALUATION: CPT | Performed by: INTERNAL MEDICINE

## 2020-01-01 PROCEDURE — G8417 CALC BMI ABV UP PARAM F/U: HCPCS | Performed by: CLINICAL NURSE SPECIALIST

## 2020-01-01 PROCEDURE — 88305 TISSUE EXAM BY PATHOLOGIST: CPT

## 2020-01-01 PROCEDURE — 36620 INSERTION CATHETER ARTERY: CPT

## 2020-01-01 PROCEDURE — 83690 ASSAY OF LIPASE: CPT

## 2020-01-01 PROCEDURE — 85045 AUTOMATED RETICULOCYTE COUNT: CPT

## 2020-01-01 PROCEDURE — 82306 VITAMIN D 25 HYDROXY: CPT

## 2020-01-01 PROCEDURE — 93000 ELECTROCARDIOGRAM COMPLETE: CPT | Performed by: INTERNAL MEDICINE

## 2020-01-01 PROCEDURE — 86708 HEPATITIS A ANTIBODY: CPT

## 2020-01-01 PROCEDURE — 1123F ACP DISCUSS/DSCN MKR DOCD: CPT | Performed by: CLINICAL NURSE SPECIALIST

## 2020-01-01 PROCEDURE — 96365 THER/PROPH/DIAG IV INF INIT: CPT

## 2020-01-01 PROCEDURE — 87340 HEPATITIS B SURFACE AG IA: CPT

## 2020-01-01 PROCEDURE — 0W9B3ZZ DRAINAGE OF LEFT PLEURAL CAVITY, PERCUTANEOUS APPROACH: ICD-10-PCS | Performed by: INTERNAL MEDICINE

## 2020-01-01 PROCEDURE — 86923 COMPATIBILITY TEST ELECTRIC: CPT

## 2020-01-01 PROCEDURE — 82140 ASSAY OF AMMONIA: CPT

## 2020-01-01 PROCEDURE — 82247 BILIRUBIN TOTAL: CPT

## 2020-01-01 PROCEDURE — 85730 THROMBOPLASTIN TIME PARTIAL: CPT

## 2020-01-01 PROCEDURE — 96374 THER/PROPH/DIAG INJ IV PUSH: CPT

## 2020-01-01 PROCEDURE — 87081 CULTURE SCREEN ONLY: CPT

## 2020-01-01 PROCEDURE — 6360000002 HC RX W HCPCS: Performed by: EMERGENCY MEDICINE

## 2020-01-01 PROCEDURE — 99285 EMERGENCY DEPT VISIT HI MDM: CPT

## 2020-01-01 PROCEDURE — 6370000000 HC RX 637 (ALT 250 FOR IP): Performed by: FAMILY MEDICINE

## 2020-01-01 PROCEDURE — 87116 MYCOBACTERIA CULTURE: CPT

## 2020-01-01 PROCEDURE — 93005 ELECTROCARDIOGRAM TRACING: CPT | Performed by: PHYSICIAN ASSISTANT

## 2020-01-01 PROCEDURE — 82103 ALPHA-1-ANTITRYPSIN TOTAL: CPT

## 2020-01-01 PROCEDURE — 88112 CYTOPATH CELL ENHANCE TECH: CPT

## 2020-01-01 PROCEDURE — 93005 ELECTROCARDIOGRAM TRACING: CPT | Performed by: STUDENT IN AN ORGANIZED HEALTH CARE EDUCATION/TRAINING PROGRAM

## 2020-01-01 PROCEDURE — 99284 EMERGENCY DEPT VISIT MOD MDM: CPT

## 2020-01-01 PROCEDURE — 87329 GIARDIA AG IA: CPT

## 2020-01-01 PROCEDURE — 86803 HEPATITIS C AB TEST: CPT

## 2020-01-01 PROCEDURE — 83883 ASSAY NEPHELOMETRY NOT SPEC: CPT

## 2020-01-01 PROCEDURE — 87015 SPECIMEN INFECT AGNT CONCNTJ: CPT

## 2020-01-01 PROCEDURE — 83986 ASSAY PH BODY FLUID NOS: CPT

## 2020-01-01 PROCEDURE — 99214 OFFICE O/P EST MOD 30 MIN: CPT | Performed by: CLINICAL NURSE SPECIALIST

## 2020-01-01 PROCEDURE — 87324 CLOSTRIDIUM AG IA: CPT

## 2020-01-01 PROCEDURE — 83970 ASSAY OF PARATHORMONE: CPT

## 2020-01-01 PROCEDURE — 86850 RBC ANTIBODY SCREEN: CPT

## 2020-01-01 PROCEDURE — 6360000002 HC RX W HCPCS: Performed by: FAMILY MEDICINE

## 2020-01-01 PROCEDURE — 81596 NFCT DS CHRNC HCV 6 ASSAYS: CPT

## 2020-01-01 PROCEDURE — 6360000004 HC RX CONTRAST MEDICATION: Performed by: RADIOLOGY

## 2020-01-01 PROCEDURE — 36430 TRANSFUSION BLD/BLD COMPNT: CPT

## 2020-01-01 PROCEDURE — 85384 FIBRINOGEN ACTIVITY: CPT

## 2020-01-01 PROCEDURE — C1751 CATH, INF, PER/CENT/MIDLINE: HCPCS

## 2020-01-01 PROCEDURE — 1090F PRES/ABSN URINE INCON ASSESS: CPT | Performed by: CLINICAL NURSE SPECIALIST

## 2020-01-01 PROCEDURE — 82977 ASSAY OF GGT: CPT

## 2020-01-01 PROCEDURE — 84481 FREE ASSAY (FT-3): CPT

## 2020-01-01 PROCEDURE — 5A1D70Z PERFORMANCE OF URINARY FILTRATION, INTERMITTENT, LESS THAN 6 HOURS PER DAY: ICD-10-PCS | Performed by: INTERNAL MEDICINE

## 2020-01-01 PROCEDURE — 97166 OT EVAL MOD COMPLEX 45 MIN: CPT

## 2020-01-01 PROCEDURE — 82172 ASSAY OF APOLIPOPROTEIN: CPT

## 2020-01-01 PROCEDURE — 71250 CT THORAX DX C-: CPT

## 2020-01-01 PROCEDURE — 1036F TOBACCO NON-USER: CPT | Performed by: CLINICAL NURSE SPECIALIST

## 2020-01-01 PROCEDURE — 80061 LIPID PANEL: CPT

## 2020-01-01 PROCEDURE — 82465 ASSAY BLD/SERUM CHOLESTEROL: CPT

## 2020-01-01 PROCEDURE — 06HM33Z INSERTION OF INFUSION DEVICE INTO RIGHT FEMORAL VEIN, PERCUTANEOUS APPROACH: ICD-10-PCS | Performed by: INTERNAL MEDICINE

## 2020-01-01 PROCEDURE — 82746 ASSAY OF FOLIC ACID SERUM: CPT

## 2020-01-01 PROCEDURE — 99221 1ST HOSP IP/OBS SF/LOW 40: CPT | Performed by: PHYSICIAN ASSISTANT

## 2020-01-01 PROCEDURE — 99283 EMERGENCY DEPT VISIT LOW MDM: CPT

## 2020-01-01 PROCEDURE — APPSS60 APP SPLIT SHARED TIME 46-60 MINUTES: Performed by: PHYSICIAN ASSISTANT

## 2020-01-01 PROCEDURE — 72100 X-RAY EXAM L-S SPINE 2/3 VWS: CPT

## 2020-01-01 PROCEDURE — 82784 ASSAY IGA/IGD/IGG/IGM EACH: CPT

## 2020-01-01 PROCEDURE — 87206 SMEAR FLUORESCENT/ACID STAI: CPT

## 2020-01-01 PROCEDURE — 83010 ASSAY OF HAPTOGLOBIN QUANT: CPT

## 2020-01-01 PROCEDURE — 6370000000 HC RX 637 (ALT 250 FOR IP): Performed by: NURSE PRACTITIONER

## 2020-01-01 PROCEDURE — 83615 LACTATE (LD) (LDH) ENZYME: CPT

## 2020-01-01 PROCEDURE — 86255 FLUORESCENT ANTIBODY SCREEN: CPT

## 2020-01-01 PROCEDURE — 99222 1ST HOSP IP/OBS MODERATE 55: CPT | Performed by: INTERNAL MEDICINE

## 2020-01-01 PROCEDURE — 93931 UPPER EXTREMITY STUDY: CPT

## 2020-01-01 PROCEDURE — 96361 HYDRATE IV INFUSION ADD-ON: CPT

## 2020-01-01 PROCEDURE — 99233 SBSQ HOSP IP/OBS HIGH 50: CPT | Performed by: INTERNAL MEDICINE

## 2020-01-01 PROCEDURE — 84460 ALANINE AMINO (ALT) (SGPT): CPT

## 2020-01-01 PROCEDURE — 84478 ASSAY OF TRIGLYCERIDES: CPT

## 2020-01-01 PROCEDURE — 85014 HEMATOCRIT: CPT

## 2020-01-01 PROCEDURE — 93005 ELECTROCARDIOGRAM TRACING: CPT | Performed by: EMERGENCY MEDICINE

## 2020-01-01 PROCEDURE — 99222 1ST HOSP IP/OBS MODERATE 55: CPT | Performed by: STUDENT IN AN ORGANIZED HEALTH CARE EDUCATION/TRAINING PROGRAM

## 2020-01-01 PROCEDURE — 86706 HEP B SURFACE ANTIBODY: CPT

## 2020-01-01 PROCEDURE — 82805 BLOOD GASES W/O2 SATURATION: CPT

## 2020-01-01 PROCEDURE — 32555 ASPIRATE PLEURA W/ IMAGING: CPT

## 2020-01-01 PROCEDURE — 93971 EXTREMITY STUDY: CPT

## 2020-01-01 PROCEDURE — P9016 RBC LEUKOCYTES REDUCED: HCPCS

## 2020-01-01 PROCEDURE — 87077 CULTURE AEROBIC IDENTIFY: CPT

## 2020-01-01 PROCEDURE — 83880 ASSAY OF NATRIURETIC PEPTIDE: CPT

## 2020-01-01 PROCEDURE — 86901 BLOOD TYPING SEROLOGIC RH(D): CPT

## 2020-01-01 PROCEDURE — APPSS30 APP SPLIT SHARED TIME 16-30 MINUTES: Performed by: PHYSICIAN ASSISTANT

## 2020-01-01 PROCEDURE — 82787 IGG 1 2 3 OR 4 EACH: CPT

## 2020-01-01 PROCEDURE — 93306 TTE W/DOPPLER COMPLETE: CPT

## 2020-01-01 PROCEDURE — 93970 EXTREMITY STUDY: CPT

## 2020-01-01 RX ORDER — METOPROLOL SUCCINATE 25 MG/1
12.5 TABLET, EXTENDED RELEASE ORAL
Status: DISCONTINUED | OUTPATIENT
Start: 2020-01-01 | End: 2020-01-01

## 2020-01-01 RX ORDER — 0.9 % SODIUM CHLORIDE 0.9 %
500 INTRAVENOUS SOLUTION INTRAVENOUS ONCE
Status: COMPLETED | OUTPATIENT
Start: 2020-01-01 | End: 2020-01-01

## 2020-01-01 RX ORDER — ALBUTEROL SULFATE 0.63 MG/3ML
0.63 SOLUTION RESPIRATORY (INHALATION) 4 TIMES DAILY PRN
Status: DISCONTINUED | OUTPATIENT
Start: 2020-01-01 | End: 2020-01-01 | Stop reason: HOSPADM

## 2020-01-01 RX ORDER — ROPINIROLE 0.5 MG/1
0.5 TABLET, FILM COATED ORAL NIGHTLY
Status: DISCONTINUED | OUTPATIENT
Start: 2020-01-01 | End: 2020-01-01 | Stop reason: HOSPADM

## 2020-01-01 RX ORDER — POTASSIUM CHLORIDE 29.8 MG/ML
20 INJECTION INTRAVENOUS ONCE
Status: COMPLETED | OUTPATIENT
Start: 2020-01-01 | End: 2020-01-01

## 2020-01-01 RX ORDER — PREGABALIN 50 MG/1
50 CAPSULE ORAL 3 TIMES DAILY
Status: DISCONTINUED | OUTPATIENT
Start: 2020-01-01 | End: 2020-01-01

## 2020-01-01 RX ORDER — FOLIC ACID 1 MG/1
1 TABLET ORAL DAILY
Status: DISCONTINUED | OUTPATIENT
Start: 2020-01-01 | End: 2020-01-01 | Stop reason: HOSPADM

## 2020-01-01 RX ORDER — FENOFIBRATE 160 MG/1
160 TABLET ORAL DAILY
Status: DISCONTINUED | OUTPATIENT
Start: 2020-01-01 | End: 2020-01-01 | Stop reason: HOSPADM

## 2020-01-01 RX ORDER — ALBUMIN (HUMAN) 12.5 G/50ML
25 SOLUTION INTRAVENOUS ONCE
Status: COMPLETED | OUTPATIENT
Start: 2020-01-01 | End: 2020-01-01

## 2020-01-01 RX ORDER — HEPARIN SODIUM 10000 [USP'U]/ML
5000 INJECTION, SOLUTION INTRAVENOUS; SUBCUTANEOUS EVERY 8 HOURS
Status: DISCONTINUED | OUTPATIENT
Start: 2020-01-01 | End: 2020-01-01

## 2020-01-01 RX ORDER — MONTELUKAST SODIUM 10 MG/1
10 TABLET ORAL DAILY
Status: DISCONTINUED | OUTPATIENT
Start: 2020-01-01 | End: 2020-01-01 | Stop reason: HOSPADM

## 2020-01-01 RX ORDER — LANOLIN ALCOHOL/MO/W.PET/CERES
1000 CREAM (GRAM) TOPICAL DAILY
Status: DISCONTINUED | OUTPATIENT
Start: 2020-01-01 | End: 2020-01-01 | Stop reason: HOSPADM

## 2020-01-01 RX ORDER — MIDODRINE HYDROCHLORIDE 5 MG/1
10 TABLET ORAL
Status: DISCONTINUED | OUTPATIENT
Start: 2020-01-01 | End: 2020-01-01

## 2020-01-01 RX ORDER — SODIUM CHLORIDE 9 MG/ML
INJECTION, SOLUTION INTRAVENOUS CONTINUOUS
Status: DISCONTINUED | OUTPATIENT
Start: 2020-01-01 | End: 2020-01-01

## 2020-01-01 RX ORDER — FERROUS SULFATE 325(65) MG
325 TABLET ORAL 2 TIMES DAILY
Status: DISCONTINUED | OUTPATIENT
Start: 2020-01-01 | End: 2020-01-01 | Stop reason: HOSPADM

## 2020-01-01 RX ORDER — ALBUTEROL SULFATE 90 UG/1
2 AEROSOL, METERED RESPIRATORY (INHALATION) EVERY 6 HOURS PRN
Status: DISCONTINUED | OUTPATIENT
Start: 2020-01-01 | End: 2020-01-01

## 2020-01-01 RX ORDER — POTASSIUM CHLORIDE 20 MEQ/1
40 TABLET, EXTENDED RELEASE ORAL
Status: COMPLETED | OUTPATIENT
Start: 2020-01-01 | End: 2020-01-01

## 2020-01-01 RX ORDER — MINERAL OIL AND WHITE PETROLATUM 150; 830 MG/G; MG/G
OINTMENT OPHTHALMIC PRN
Status: DISCONTINUED | OUTPATIENT
Start: 2020-01-01 | End: 2020-01-01 | Stop reason: HOSPADM

## 2020-01-01 RX ORDER — POTASSIUM CHLORIDE 29.8 MG/ML
20 INJECTION INTRAVENOUS
Status: COMPLETED | OUTPATIENT
Start: 2020-01-01 | End: 2020-01-01

## 2020-01-01 RX ORDER — DANTROLENE SODIUM 50 MG/1
50 CAPSULE ORAL 2 TIMES DAILY
Qty: 60 CAPSULE | Refills: 2 | Status: SHIPPED
Start: 2020-01-01 | End: 2020-01-01

## 2020-01-01 RX ORDER — SODIUM BICARBONATE 650 MG/1
650 TABLET ORAL 2 TIMES DAILY
Status: DISCONTINUED | OUTPATIENT
Start: 2020-01-01 | End: 2020-01-01 | Stop reason: HOSPADM

## 2020-01-01 RX ORDER — MIDODRINE HYDROCHLORIDE 10 MG/1
10 TABLET ORAL
Qty: 90 TABLET | Refills: 3 | Status: SHIPPED | OUTPATIENT
Start: 2020-01-01

## 2020-01-01 RX ORDER — ALBUMIN (HUMAN) 12.5 G/50ML
50 SOLUTION INTRAVENOUS
Status: COMPLETED | OUTPATIENT
Start: 2020-01-01 | End: 2020-01-01

## 2020-01-01 RX ORDER — PANTOPRAZOLE SODIUM 40 MG/1
40 TABLET, DELAYED RELEASE ORAL
Status: DISCONTINUED | OUTPATIENT
Start: 2020-01-01 | End: 2020-01-01 | Stop reason: HOSPADM

## 2020-01-01 RX ORDER — LIOTHYRONINE SODIUM 5 UG/1
5 TABLET ORAL DAILY
Status: DISCONTINUED | OUTPATIENT
Start: 2020-01-01 | End: 2020-01-01 | Stop reason: HOSPADM

## 2020-01-01 RX ORDER — ALBUTEROL SULFATE 90 UG/1
2 AEROSOL, METERED RESPIRATORY (INHALATION) EVERY 6 HOURS PRN
Status: DISCONTINUED | OUTPATIENT
Start: 2020-01-01 | End: 2020-01-01 | Stop reason: SDUPTHER

## 2020-01-01 RX ORDER — ACETAMINOPHEN 325 MG/1
650 TABLET ORAL EVERY 6 HOURS PRN
Status: DISCONTINUED | OUTPATIENT
Start: 2020-01-01 | End: 2020-01-01 | Stop reason: HOSPADM

## 2020-01-01 RX ORDER — BUDESONIDE 0.5 MG/2ML
0.5 INHALANT ORAL 2 TIMES DAILY
Status: DISCONTINUED | OUTPATIENT
Start: 2020-01-01 | End: 2020-01-01 | Stop reason: HOSPADM

## 2020-01-01 RX ORDER — MAGNESIUM SULFATE IN WATER 40 MG/ML
2 INJECTION, SOLUTION INTRAVENOUS ONCE
Status: COMPLETED | OUTPATIENT
Start: 2020-01-01 | End: 2020-01-01

## 2020-01-01 RX ORDER — ACETAMINOPHEN 325 MG/1
650 TABLET ORAL EVERY 4 HOURS PRN
Status: DISCONTINUED | OUTPATIENT
Start: 2020-01-01 | End: 2020-01-01 | Stop reason: HOSPADM

## 2020-01-01 RX ORDER — PREGABALIN 25 MG/1
25 CAPSULE ORAL 2 TIMES DAILY
Status: DISCONTINUED | OUTPATIENT
Start: 2020-01-01 | End: 2020-01-01 | Stop reason: HOSPADM

## 2020-01-01 RX ORDER — MORPHINE SULFATE 2 MG/ML
1 INJECTION, SOLUTION INTRAMUSCULAR; INTRAVENOUS ONCE
Status: COMPLETED | OUTPATIENT
Start: 2020-01-01 | End: 2020-01-01

## 2020-01-01 RX ORDER — POTASSIUM CHLORIDE 7.45 MG/ML
10 INJECTION INTRAVENOUS ONCE
Status: COMPLETED | OUTPATIENT
Start: 2020-01-01 | End: 2020-01-01

## 2020-01-01 RX ORDER — ADENOSINE 3 MG/ML
INJECTION, SOLUTION INTRAVENOUS
Status: COMPLETED
Start: 2020-01-01 | End: 2020-01-01

## 2020-01-01 RX ORDER — METOPROLOL SUCCINATE 25 MG/1
12.5 TABLET, EXTENDED RELEASE ORAL DAILY
Status: DISCONTINUED | OUTPATIENT
Start: 2020-01-01 | End: 2020-01-01

## 2020-01-01 RX ORDER — POTASSIUM CHLORIDE 29.8 MG/ML
20 INJECTION INTRAVENOUS ONCE
Status: DISCONTINUED | OUTPATIENT
Start: 2020-01-01 | End: 2020-01-01 | Stop reason: CLARIF

## 2020-01-01 RX ORDER — ALBUMIN (HUMAN) 12.5 G/50ML
SOLUTION INTRAVENOUS
Status: COMPLETED
Start: 2020-01-01 | End: 2020-01-01

## 2020-01-01 RX ORDER — MIDODRINE HYDROCHLORIDE 5 MG/1
20 TABLET ORAL
Status: DISCONTINUED | OUTPATIENT
Start: 2020-01-01 | End: 2020-01-01 | Stop reason: HOSPADM

## 2020-01-01 RX ORDER — MIDODRINE HYDROCHLORIDE 5 MG/1
10 TABLET ORAL
Status: DISCONTINUED | OUTPATIENT
Start: 2020-01-01 | End: 2020-01-01 | Stop reason: HOSPADM

## 2020-01-01 RX ORDER — LANOLIN ALCOHOL/MO/W.PET/CERES
15 CREAM (GRAM) TOPICAL NIGHTLY
Status: DISCONTINUED | OUTPATIENT
Start: 2020-01-01 | End: 2020-01-01 | Stop reason: HOSPADM

## 2020-01-01 RX ORDER — SODIUM BICARBONATE 650 MG/1
1300 TABLET ORAL 3 TIMES DAILY
Status: DISCONTINUED | OUTPATIENT
Start: 2020-01-01 | End: 2020-01-01 | Stop reason: HOSPADM

## 2020-01-01 RX ORDER — CALCIUM ACETATE 667 MG/1
667 CAPSULE ORAL
Status: DISCONTINUED | OUTPATIENT
Start: 2020-01-01 | End: 2020-01-01 | Stop reason: HOSPADM

## 2020-01-01 RX ORDER — CHOLESTYRAMINE 4 G/9G
1 POWDER, FOR SUSPENSION ORAL 2 TIMES DAILY
Status: DISCONTINUED | OUTPATIENT
Start: 2020-01-01 | End: 2020-01-01 | Stop reason: HOSPADM

## 2020-01-01 RX ORDER — ASPIRIN 81 MG/1
81 TABLET ORAL DAILY
Status: DISCONTINUED | OUTPATIENT
Start: 2020-01-01 | End: 2020-01-01 | Stop reason: HOSPADM

## 2020-01-01 RX ORDER — POTASSIUM CHLORIDE 20 MEQ/1
40 TABLET, EXTENDED RELEASE ORAL ONCE
Status: COMPLETED | OUTPATIENT
Start: 2020-01-01 | End: 2020-01-01

## 2020-01-01 RX ORDER — POTASSIUM CHLORIDE 20 MEQ/1
20 TABLET, EXTENDED RELEASE ORAL 2 TIMES DAILY WITH MEALS
Status: COMPLETED | OUTPATIENT
Start: 2020-01-01 | End: 2020-01-01

## 2020-01-01 RX ORDER — 0.9 % SODIUM CHLORIDE 0.9 %
500 INTRAVENOUS SOLUTION INTRAVENOUS ONCE
Status: DISCONTINUED | OUTPATIENT
Start: 2020-01-01 | End: 2020-01-01

## 2020-01-01 RX ORDER — 0.9 % SODIUM CHLORIDE 0.9 %
250 INTRAVENOUS SOLUTION INTRAVENOUS ONCE
Status: COMPLETED | OUTPATIENT
Start: 2020-01-01 | End: 2020-01-01

## 2020-01-01 RX ORDER — POTASSIUM CHLORIDE 7.45 MG/ML
10 INJECTION INTRAVENOUS
Status: DISPENSED | OUTPATIENT
Start: 2020-01-01 | End: 2020-01-01

## 2020-01-01 RX ORDER — 0.9 % SODIUM CHLORIDE 0.9 %
20 INTRAVENOUS SOLUTION INTRAVENOUS ONCE
Status: DISCONTINUED | OUTPATIENT
Start: 2020-01-01 | End: 2020-01-01 | Stop reason: HOSPADM

## 2020-01-01 RX ORDER — ATORVASTATIN CALCIUM 20 MG/1
20 TABLET, FILM COATED ORAL
Status: DISCONTINUED | OUTPATIENT
Start: 2020-01-01 | End: 2020-01-01 | Stop reason: HOSPADM

## 2020-01-01 RX ORDER — LORAZEPAM 2 MG/ML
1 INJECTION INTRAMUSCULAR
Status: DISCONTINUED | OUTPATIENT
Start: 2020-01-01 | End: 2020-01-01 | Stop reason: HOSPADM

## 2020-01-01 RX ORDER — CALCIUM GLUCONATE 94 MG/ML
1 INJECTION, SOLUTION INTRAVENOUS ONCE
Status: COMPLETED | OUTPATIENT
Start: 2020-01-01 | End: 2020-01-01

## 2020-01-01 RX ORDER — MORPHINE SULFATE 2 MG/ML
2 INJECTION, SOLUTION INTRAMUSCULAR; INTRAVENOUS
Status: DISCONTINUED | OUTPATIENT
Start: 2020-01-01 | End: 2020-01-01 | Stop reason: HOSPADM

## 2020-01-01 RX ORDER — POTASSIUM CHLORIDE 7.45 MG/ML
10 INJECTION INTRAVENOUS
Status: COMPLETED | OUTPATIENT
Start: 2020-01-01 | End: 2020-01-01

## 2020-01-01 RX ORDER — POTASSIUM CHLORIDE 29.8 MG/ML
20 INJECTION INTRAVENOUS
Status: DISCONTINUED | OUTPATIENT
Start: 2020-01-01 | End: 2020-01-01

## 2020-01-01 RX ORDER — PREGABALIN 50 MG/1
50 CAPSULE ORAL 3 TIMES DAILY
Status: DISCONTINUED | OUTPATIENT
Start: 2020-01-01 | End: 2020-01-01 | Stop reason: HOSPADM

## 2020-01-01 RX ORDER — LANOLIN ALCOHOL/MO/W.PET/CERES
6 CREAM (GRAM) TOPICAL NIGHTLY PRN
Status: DISCONTINUED | OUTPATIENT
Start: 2020-01-01 | End: 2020-01-01 | Stop reason: HOSPADM

## 2020-01-01 RX ORDER — LIDOCAINE AND PRILOCAINE 25; 25 MG/G; MG/G
CREAM TOPICAL PRN
COMMUNITY

## 2020-01-01 RX ORDER — DANTROLENE SODIUM 50 MG/1
CAPSULE ORAL
Qty: 60 CAPSULE | Refills: 0 | Status: ON HOLD
Start: 2020-01-01 | End: 2020-01-01 | Stop reason: HOSPADM

## 2020-01-01 RX ORDER — FUROSEMIDE 40 MG/1
40 TABLET ORAL 2 TIMES DAILY
Status: ON HOLD | COMMUNITY
End: 2020-01-01 | Stop reason: HOSPADM

## 2020-01-01 RX ORDER — ROPINIROLE 0.5 MG/1
0.5 TABLET, FILM COATED ORAL NIGHTLY PRN
Status: DISCONTINUED | OUTPATIENT
Start: 2020-01-01 | End: 2020-01-01 | Stop reason: HOSPADM

## 2020-01-01 RX ORDER — ALBUTEROL SULFATE 2.5 MG/3ML
2.5 SOLUTION RESPIRATORY (INHALATION) EVERY 6 HOURS PRN
Status: DISCONTINUED | OUTPATIENT
Start: 2020-01-01 | End: 2020-01-01 | Stop reason: HOSPADM

## 2020-01-01 RX ORDER — COSYNTROPIN 0.25 MG/ML
250 INJECTION, POWDER, FOR SOLUTION INTRAMUSCULAR; INTRAVENOUS ONCE
Status: DISCONTINUED | OUTPATIENT
Start: 2020-01-01 | End: 2020-01-01

## 2020-01-01 RX ORDER — ARFORMOTEROL TARTRATE 15 UG/2ML
15 SOLUTION RESPIRATORY (INHALATION) 2 TIMES DAILY
Status: DISCONTINUED | OUTPATIENT
Start: 2020-01-01 | End: 2020-01-01 | Stop reason: HOSPADM

## 2020-01-01 RX ORDER — BUDESONIDE AND FORMOTEROL FUMARATE DIHYDRATE 80; 4.5 UG/1; UG/1
2 AEROSOL RESPIRATORY (INHALATION) 2 TIMES DAILY
Status: DISCONTINUED | OUTPATIENT
Start: 2020-01-01 | End: 2020-01-01 | Stop reason: CLARIF

## 2020-01-01 RX ORDER — COSYNTROPIN 0.25 MG/ML
250 INJECTION, POWDER, FOR SOLUTION INTRAMUSCULAR; INTRAVENOUS ONCE
Status: COMPLETED | OUTPATIENT
Start: 2020-01-01 | End: 2020-01-01

## 2020-01-01 RX ORDER — LIDOCAINE HYDROCHLORIDE 10 MG/ML
INJECTION, SOLUTION INFILTRATION; PERINEURAL
Status: DISPENSED
Start: 2020-01-01 | End: 2020-01-01

## 2020-01-01 RX ORDER — METOPROLOL SUCCINATE 25 MG/1
12.5 TABLET, EXTENDED RELEASE ORAL DAILY
Qty: 30 TABLET | Refills: 3 | Status: ON HOLD | OUTPATIENT
Start: 2020-01-01 | End: 2020-01-01

## 2020-01-01 RX ORDER — LACTULOSE 10 G/15ML
30 SOLUTION ORAL 3 TIMES DAILY
Status: DISCONTINUED | OUTPATIENT
Start: 2020-01-01 | End: 2020-01-01

## 2020-01-01 RX ORDER — FENOFIBRATE 145 MG/1
145 TABLET, COATED ORAL EVERY MORNING
Status: DISCONTINUED | OUTPATIENT
Start: 2020-01-01 | End: 2020-01-01 | Stop reason: CLARIF

## 2020-01-01 RX ORDER — LACTULOSE 10 G/15ML
30 SOLUTION ORAL 3 TIMES DAILY
Status: DISCONTINUED | OUTPATIENT
Start: 2020-01-01 | End: 2020-01-01 | Stop reason: HOSPADM

## 2020-01-01 RX ORDER — ACETAMINOPHEN 325 MG/1
650 TABLET ORAL ONCE
Status: COMPLETED | OUTPATIENT
Start: 2020-01-01 | End: 2020-01-01

## 2020-01-01 RX ORDER — ADENOSINE 3 MG/ML
6 INJECTION, SOLUTION INTRAVENOUS ONCE
Status: COMPLETED | OUTPATIENT
Start: 2020-01-01 | End: 2020-01-01

## 2020-01-01 RX ORDER — CALCIUM ACETATE 667 MG/1
667 CAPSULE ORAL 4 TIMES DAILY
Status: DISCONTINUED | OUTPATIENT
Start: 2020-01-01 | End: 2020-01-01 | Stop reason: HOSPADM

## 2020-01-01 RX ORDER — FENOFIBRATE 160 MG/1
160 TABLET ORAL
Status: DISCONTINUED | OUTPATIENT
Start: 2020-01-01 | End: 2020-01-01 | Stop reason: HOSPADM

## 2020-01-01 RX ORDER — SODIUM CHLORIDE 0.9 % (FLUSH) 0.9 %
10 SYRINGE (ML) INJECTION PRN
Status: DISCONTINUED | OUTPATIENT
Start: 2020-01-01 | End: 2020-01-01 | Stop reason: HOSPADM

## 2020-01-01 RX ORDER — SODIUM BICARBONATE 650 MG/1
650 TABLET ORAL 2 TIMES DAILY
Status: DISCONTINUED | OUTPATIENT
Start: 2020-01-01 | End: 2020-01-01 | Stop reason: ALTCHOICE

## 2020-01-01 RX ORDER — CHOLECALCIFEROL (VITAMIN D3) 50 MCG
5000 TABLET ORAL 2 TIMES DAILY
Status: DISCONTINUED | OUTPATIENT
Start: 2020-01-01 | End: 2020-01-01 | Stop reason: HOSPADM

## 2020-01-01 RX ORDER — ATORVASTATIN CALCIUM 20 MG/1
20 TABLET, FILM COATED ORAL
Status: DISCONTINUED | OUTPATIENT
Start: 2020-01-01 | End: 2020-01-01

## 2020-01-01 RX ORDER — POTASSIUM CHLORIDE 29.8 MG/ML
INJECTION INTRAVENOUS
Status: COMPLETED
Start: 2020-01-01 | End: 2020-01-01

## 2020-01-01 RX ORDER — CALCIUM CARBONATE 200(500)MG
3 TABLET,CHEWABLE ORAL 2 TIMES DAILY
Status: DISCONTINUED | OUTPATIENT
Start: 2020-01-01 | End: 2020-01-01 | Stop reason: HOSPADM

## 2020-01-01 RX ORDER — SODIUM CHLORIDE 0.9 % (FLUSH) 0.9 %
10 SYRINGE (ML) INJECTION EVERY 12 HOURS SCHEDULED
Status: DISCONTINUED | OUTPATIENT
Start: 2020-01-01 | End: 2020-01-01 | Stop reason: HOSPADM

## 2020-01-01 RX ORDER — VITAMIN B COMPLEX
5000 TABLET ORAL 2 TIMES DAILY
Status: DISCONTINUED | OUTPATIENT
Start: 2020-01-01 | End: 2020-01-01 | Stop reason: HOSPADM

## 2020-01-01 RX ORDER — POTASSIUM CHLORIDE 20 MEQ/1
20 TABLET, EXTENDED RELEASE ORAL 2 TIMES DAILY WITH MEALS
Status: DISPENSED | OUTPATIENT
Start: 2020-01-01 | End: 2020-01-01

## 2020-01-01 RX ORDER — CALCIUM ACETATE 667 MG/1
667 CAPSULE ORAL
Status: DISCONTINUED | OUTPATIENT
Start: 2020-01-01 | End: 2020-01-01

## 2020-01-01 RX ORDER — ATORVASTATIN CALCIUM 20 MG/1
20 TABLET, FILM COATED ORAL NIGHTLY
Status: DISCONTINUED | OUTPATIENT
Start: 2020-01-01 | End: 2020-01-01 | Stop reason: HOSPADM

## 2020-01-01 RX ORDER — LANOLIN ALCOHOL/MO/W.PET/CERES
10 CREAM (GRAM) TOPICAL NIGHTLY PRN
Status: DISCONTINUED | OUTPATIENT
Start: 2020-01-01 | End: 2020-01-01 | Stop reason: HOSPADM

## 2020-01-01 RX ORDER — 0.9 % SODIUM CHLORIDE 0.9 %
20 INTRAVENOUS SOLUTION INTRAVENOUS ONCE
Status: COMPLETED | OUTPATIENT
Start: 2020-01-01 | End: 2020-01-01

## 2020-01-01 RX ORDER — METOPROLOL SUCCINATE 25 MG/1
12.5 TABLET, EXTENDED RELEASE ORAL DAILY
Status: DISCONTINUED | OUTPATIENT
Start: 2020-01-01 | End: 2020-01-01 | Stop reason: HOSPADM

## 2020-01-01 RX ADMIN — LEVOTHYROXINE SODIUM 137 MCG: 25 TABLET ORAL at 07:15

## 2020-01-01 RX ADMIN — ASPIRIN 81 MG: 81 TABLET, COATED ORAL at 08:06

## 2020-01-01 RX ADMIN — CALCIUM ACETATE 667 MG: 667 CAPSULE ORAL at 11:23

## 2020-01-01 RX ADMIN — POTASSIUM CHLORIDE 40 MEQ: 20 TABLET, EXTENDED RELEASE ORAL at 15:40

## 2020-01-01 RX ADMIN — ACETAMINOPHEN 650 MG: 325 TABLET ORAL at 09:36

## 2020-01-01 RX ADMIN — PANTOPRAZOLE SODIUM 40 MG: 40 TABLET, DELAYED RELEASE ORAL at 06:07

## 2020-01-01 RX ADMIN — SODIUM BICARBONATE 1300 MG: 650 TABLET ORAL at 14:52

## 2020-01-01 RX ADMIN — POTASSIUM CHLORIDE 10 MEQ: 7.46 INJECTION, SOLUTION INTRAVENOUS at 10:23

## 2020-01-01 RX ADMIN — ASPIRIN 81 MG: 81 TABLET, COATED ORAL at 11:24

## 2020-01-01 RX ADMIN — ACETAMINOPHEN 650 MG: 325 TABLET ORAL at 00:15

## 2020-01-01 RX ADMIN — ALBUMIN (HUMAN) 25 G: 0.25 INJECTION, SOLUTION INTRAVENOUS at 07:28

## 2020-01-01 RX ADMIN — PSYLLIUM HUSK 1 PACKET: 3.4 GRANULE ORAL at 08:12

## 2020-01-01 RX ADMIN — CHOLESTYRAMINE 4 G: 4 POWDER, FOR SUSPENSION ORAL at 10:16

## 2020-01-01 RX ADMIN — SODIUM BICARBONATE 650 MG: 650 TABLET ORAL at 08:22

## 2020-01-01 RX ADMIN — FOLIC ACID 1 MG: 1 TABLET ORAL at 12:24

## 2020-01-01 RX ADMIN — ROPINIROLE HYDROCHLORIDE 0.5 MG: 0.5 TABLET, FILM COATED ORAL at 22:04

## 2020-01-01 RX ADMIN — MONTELUKAST SODIUM 10 MG: 10 TABLET, FILM COATED ORAL at 22:19

## 2020-01-01 RX ADMIN — SODIUM CHLORIDE: 9 INJECTION, SOLUTION INTRAVENOUS at 18:33

## 2020-01-01 RX ADMIN — PANTOPRAZOLE SODIUM 40 MG: 40 TABLET, DELAYED RELEASE ORAL at 05:53

## 2020-01-01 RX ADMIN — MIDODRINE HYDROCHLORIDE 20 MG: 5 TABLET ORAL at 16:45

## 2020-01-01 RX ADMIN — MAGNESIUM OXIDE TAB 400 MG (241.3 MG ELEMENTAL MG) 400 MG: 400 (241.3 MG) TAB at 20:05

## 2020-01-01 RX ADMIN — CALCIUM CARBONATE 1500 MG: 500 TABLET, CHEWABLE ORAL at 22:19

## 2020-01-01 RX ADMIN — FOLIC ACID 1 MG: 1 TABLET ORAL at 08:11

## 2020-01-01 RX ADMIN — PREGABALIN 50 MG: 50 CAPSULE ORAL at 14:31

## 2020-01-01 RX ADMIN — FOLIC ACID 1 MG: 1 TABLET ORAL at 08:40

## 2020-01-01 RX ADMIN — HEPARIN SODIUM 5000 UNITS: 10000 INJECTION INTRAVENOUS; SUBCUTANEOUS at 13:04

## 2020-01-01 RX ADMIN — HEPARIN SODIUM 5000 UNITS: 10000 INJECTION INTRAVENOUS; SUBCUTANEOUS at 07:53

## 2020-01-01 RX ADMIN — ADENOSINE 6 MG: 3 INJECTION INTRAVENOUS at 10:21

## 2020-01-01 RX ADMIN — MIDODRINE HYDROCHLORIDE 20 MG: 5 TABLET ORAL at 08:11

## 2020-01-01 RX ADMIN — PREGABALIN 50 MG: 50 CAPSULE ORAL at 14:03

## 2020-01-01 RX ADMIN — CYANOCOBALAMIN TAB 1000 MCG 1000 MCG: 1000 TAB at 11:24

## 2020-01-01 RX ADMIN — PREGABALIN 50 MG: 50 CAPSULE ORAL at 20:57

## 2020-01-01 RX ADMIN — LORAZEPAM 1 MG: 2 INJECTION INTRAMUSCULAR; INTRAVENOUS at 03:40

## 2020-01-01 RX ADMIN — ARFORMOTEROL TARTRATE 15 MCG: 15 SOLUTION RESPIRATORY (INHALATION) at 21:40

## 2020-01-01 RX ADMIN — LIOTHYRONINE SODIUM 5 MCG: 5 TABLET ORAL at 08:28

## 2020-01-01 RX ADMIN — MONTELUKAST SODIUM 10 MG: 10 TABLET, FILM COATED ORAL at 21:44

## 2020-01-01 RX ADMIN — Medication 5000 UNITS: at 20:37

## 2020-01-01 RX ADMIN — POTASSIUM CHLORIDE 20 MEQ: 29.8 INJECTION, SOLUTION INTRAVENOUS at 23:52

## 2020-01-01 RX ADMIN — POTASSIUM CHLORIDE: 149 INJECTION, SOLUTION, CONCENTRATE INTRAVENOUS at 08:34

## 2020-01-01 RX ADMIN — SODIUM BICARBONATE 650 MG: 650 TABLET ORAL at 21:45

## 2020-01-01 RX ADMIN — HYDROCORTISONE SODIUM SUCCINATE 100 MG: 100 INJECTION, POWDER, FOR SOLUTION INTRAMUSCULAR; INTRAVENOUS at 08:58

## 2020-01-01 RX ADMIN — Medication 5000 UNITS: at 08:28

## 2020-01-01 RX ADMIN — ACETAMINOPHEN 650 MG: 325 TABLET ORAL at 14:00

## 2020-01-01 RX ADMIN — IPRATROPIUM BROMIDE 0.5 MG: 0.5 SOLUTION RESPIRATORY (INHALATION) at 12:57

## 2020-01-01 RX ADMIN — VASOPRESSIN 0.02 UNITS/MIN: 20 INJECTION INTRAVENOUS at 20:14

## 2020-01-01 RX ADMIN — POTASSIUM CHLORIDE 10 MEQ: 7.46 INJECTION, SOLUTION INTRAVENOUS at 12:12

## 2020-01-01 RX ADMIN — POTASSIUM CHLORIDE 20 MEQ: 400 INJECTION, SOLUTION INTRAVENOUS at 09:15

## 2020-01-01 RX ADMIN — MIDODRINE HYDROCHLORIDE 20 MG: 5 TABLET ORAL at 12:04

## 2020-01-01 RX ADMIN — HEPARIN SODIUM 5000 UNITS: 10000 INJECTION INTRAVENOUS; SUBCUTANEOUS at 20:36

## 2020-01-01 RX ADMIN — EPOETIN ALFA-EPBX 10000 UNITS: 10000 INJECTION, SOLUTION INTRAVENOUS; SUBCUTANEOUS at 12:23

## 2020-01-01 RX ADMIN — PANTOPRAZOLE SODIUM 40 MG: 40 TABLET, DELAYED RELEASE ORAL at 06:10

## 2020-01-01 RX ADMIN — POTASSIUM CHLORIDE 10 MEQ: 7.46 INJECTION, SOLUTION INTRAVENOUS at 20:46

## 2020-01-01 RX ADMIN — CYANOCOBALAMIN TAB 1000 MCG 1000 MCG: 1000 TAB at 21:58

## 2020-01-01 RX ADMIN — PSYLLIUM HUSK 1 PACKET: 3.4 GRANULE ORAL at 09:00

## 2020-01-01 RX ADMIN — FENOFIBRATE 160 MG: 160 TABLET ORAL at 22:04

## 2020-01-01 RX ADMIN — HYDROCORTISONE SODIUM SUCCINATE 100 MG: 100 INJECTION, POWDER, FOR SOLUTION INTRAMUSCULAR; INTRAVENOUS at 01:03

## 2020-01-01 RX ADMIN — LIOTHYRONINE SODIUM 5 MCG: 5 TABLET ORAL at 06:06

## 2020-01-01 RX ADMIN — ASPIRIN 81 MG: 81 TABLET, COATED ORAL at 08:22

## 2020-01-01 RX ADMIN — PANTOPRAZOLE SODIUM 40 MG: 40 TABLET, DELAYED RELEASE ORAL at 07:53

## 2020-01-01 RX ADMIN — SODIUM BICARBONATE 1300 MG: 650 TABLET ORAL at 22:11

## 2020-01-01 RX ADMIN — LACTULOSE 30 G: 20 SOLUTION ORAL at 20:05

## 2020-01-01 RX ADMIN — IPRATROPIUM BROMIDE 0.5 MG: 0.5 SOLUTION RESPIRATORY (INHALATION) at 07:50

## 2020-01-01 RX ADMIN — FENOFIBRATE 160 MG: 160 TABLET ORAL at 08:59

## 2020-01-01 RX ADMIN — IPRATROPIUM BROMIDE 0.5 MG: 0.5 SOLUTION RESPIRATORY (INHALATION) at 16:52

## 2020-01-01 RX ADMIN — MAGNESIUM OXIDE TAB 400 MG (241.3 MG ELEMENTAL MG) 400 MG: 400 (241.3 MG) TAB at 22:11

## 2020-01-01 RX ADMIN — VASOPRESSIN 0.03 UNITS/MIN: 20 INJECTION INTRAVENOUS at 03:14

## 2020-01-01 RX ADMIN — ALBUTEROL SULFATE 0.63 MG: 0.63 SOLUTION RESPIRATORY (INHALATION) at 21:07

## 2020-01-01 RX ADMIN — PREGABALIN 50 MG: 50 CAPSULE ORAL at 13:41

## 2020-01-01 RX ADMIN — Medication 400 MG: at 21:57

## 2020-01-01 RX ADMIN — PREGABALIN 25 MG: 25 CAPSULE ORAL at 08:58

## 2020-01-01 RX ADMIN — LIOTHYRONINE SODIUM 5 MCG: 5 TABLET ORAL at 07:30

## 2020-01-01 RX ADMIN — POTASSIUM CHLORIDE 20 MEQ: 20 TABLET, EXTENDED RELEASE ORAL at 16:06

## 2020-01-01 RX ADMIN — HYDROCORTISONE SODIUM SUCCINATE 100 MG: 100 INJECTION, POWDER, FOR SOLUTION INTRAMUSCULAR; INTRAVENOUS at 19:16

## 2020-01-01 RX ADMIN — MIDODRINE HYDROCHLORIDE 10 MG: 5 TABLET ORAL at 08:23

## 2020-01-01 RX ADMIN — Medication 5000 UNITS: at 20:43

## 2020-01-01 RX ADMIN — MONTELUKAST SODIUM 10 MG: 10 TABLET, FILM COATED ORAL at 12:25

## 2020-01-01 RX ADMIN — ADENOSINE 6 MG: 3 INJECTION, SOLUTION INTRAVENOUS at 10:21

## 2020-01-01 RX ADMIN — HYDROCORTISONE SODIUM SUCCINATE 100 MG: 100 INJECTION, POWDER, FOR SOLUTION INTRAMUSCULAR; INTRAVENOUS at 17:17

## 2020-01-01 RX ADMIN — POTASSIUM CHLORIDE: 149 INJECTION, SOLUTION, CONCENTRATE INTRAVENOUS at 17:42

## 2020-01-01 RX ADMIN — FOLIC ACID 1 MG: 1 TABLET ORAL at 08:58

## 2020-01-01 RX ADMIN — SODIUM CHLORIDE, PRESERVATIVE FREE 10 ML: 5 INJECTION INTRAVENOUS at 00:35

## 2020-01-01 RX ADMIN — ASPIRIN 81 MG: 81 TABLET, COATED ORAL at 21:58

## 2020-01-01 RX ADMIN — PREGABALIN 50 MG: 50 CAPSULE ORAL at 20:37

## 2020-01-01 RX ADMIN — LIOTHYRONINE SODIUM 5 MCG: 5 TABLET ORAL at 06:10

## 2020-01-01 RX ADMIN — Medication 6 MG: at 20:37

## 2020-01-01 RX ADMIN — SODIUM BICARBONATE 1300 MG: 650 TABLET ORAL at 22:20

## 2020-01-01 RX ADMIN — EPOETIN ALFA-EPBX 3000 UNITS: 3000 INJECTION, SOLUTION INTRAVENOUS; SUBCUTANEOUS at 08:58

## 2020-01-01 RX ADMIN — HEPARIN SODIUM 5000 UNITS: 10000 INJECTION INTRAVENOUS; SUBCUTANEOUS at 13:05

## 2020-01-01 RX ADMIN — MONTELUKAST SODIUM 10 MG: 10 TABLET, FILM COATED ORAL at 22:11

## 2020-01-01 RX ADMIN — ALBUMIN (HUMAN) 25 G: 0.25 INJECTION, SOLUTION INTRAVENOUS at 17:49

## 2020-01-01 RX ADMIN — FERROUS SULFATE TAB 325 MG (65 MG ELEMENTAL FE) 325 MG: 325 (65 FE) TAB at 11:25

## 2020-01-01 RX ADMIN — MAGNESIUM OXIDE TAB 400 MG (241.3 MG ELEMENTAL MG) 400 MG: 400 (241.3 MG) TAB at 20:26

## 2020-01-01 RX ADMIN — MONTELUKAST SODIUM 10 MG: 10 TABLET, FILM COATED ORAL at 22:00

## 2020-01-01 RX ADMIN — MIDODRINE HYDROCHLORIDE 20 MG: 5 TABLET ORAL at 17:30

## 2020-01-01 RX ADMIN — POTASSIUM CHLORIDE 20 MEQ: 29.8 INJECTION, SOLUTION INTRAVENOUS at 15:15

## 2020-01-01 RX ADMIN — MAGNESIUM OXIDE TAB 400 MG (241.3 MG ELEMENTAL MG) 400 MG: 400 (241.3 MG) TAB at 20:25

## 2020-01-01 RX ADMIN — MIDODRINE HYDROCHLORIDE 10 MG: 5 TABLET ORAL at 12:23

## 2020-01-01 RX ADMIN — POTASSIUM CHLORIDE 20 MEQ: 400 INJECTION, SOLUTION INTRAVENOUS at 00:15

## 2020-01-01 RX ADMIN — SODIUM BICARBONATE 1300 MG: 650 TABLET ORAL at 08:58

## 2020-01-01 RX ADMIN — LEVOTHYROXINE SODIUM 137 MCG: 25 TABLET ORAL at 16:25

## 2020-01-01 RX ADMIN — SODIUM CHLORIDE, PRESERVATIVE FREE 10 ML: 5 INJECTION INTRAVENOUS at 20:26

## 2020-01-01 RX ADMIN — ASPIRIN 81 MG: 81 TABLET, COATED ORAL at 12:23

## 2020-01-01 RX ADMIN — SODIUM BICARBONATE 1300 MG: 650 TABLET ORAL at 08:41

## 2020-01-01 RX ADMIN — CALCIUM CARBONATE 1500 MG: 500 TABLET, CHEWABLE ORAL at 20:33

## 2020-01-01 RX ADMIN — MIDODRINE HYDROCHLORIDE 20 MG: 5 TABLET ORAL at 17:03

## 2020-01-01 RX ADMIN — PREGABALIN 50 MG: 50 CAPSULE ORAL at 22:11

## 2020-01-01 RX ADMIN — PREGABALIN 50 MG: 50 CAPSULE ORAL at 09:04

## 2020-01-01 RX ADMIN — SODIUM CHLORIDE, PRESERVATIVE FREE 10 ML: 5 INJECTION INTRAVENOUS at 20:27

## 2020-01-01 RX ADMIN — LACTULOSE 30 G: 20 SOLUTION ORAL at 12:23

## 2020-01-01 RX ADMIN — BUDESONIDE 500 MCG: 0.5 SUSPENSION RESPIRATORY (INHALATION) at 07:50

## 2020-01-01 RX ADMIN — Medication 15 MG: at 21:45

## 2020-01-01 RX ADMIN — SODIUM BICARBONATE 650 MG: 650 TABLET ORAL at 09:02

## 2020-01-01 RX ADMIN — MONTELUKAST SODIUM 10 MG: 10 TABLET, FILM COATED ORAL at 20:57

## 2020-01-01 RX ADMIN — CHOLESTYRAMINE 4 G: 4 POWDER, FOR SUSPENSION ORAL at 11:37

## 2020-01-01 RX ADMIN — SODIUM CHLORIDE 500 ML: 9 INJECTION, SOLUTION INTRAVENOUS at 08:04

## 2020-01-01 RX ADMIN — COSYNTROPIN 250 MCG: 0.25 INJECTION, POWDER, LYOPHILIZED, FOR SOLUTION INTRAVENOUS at 07:46

## 2020-01-01 RX ADMIN — MIDODRINE HYDROCHLORIDE 10 MG: 5 TABLET ORAL at 11:31

## 2020-01-01 RX ADMIN — NOREPINEPHRINE BITARTRATE 10 MCG/MIN: 1 INJECTION, SOLUTION, CONCENTRATE INTRAVENOUS at 13:14

## 2020-01-01 RX ADMIN — FOLIC ACID 1 MG: 1 TABLET ORAL at 11:25

## 2020-01-01 RX ADMIN — Medication 400 MG: at 12:23

## 2020-01-01 RX ADMIN — HEPARIN SODIUM 5000 UNITS: 10000 INJECTION INTRAVENOUS; SUBCUTANEOUS at 04:23

## 2020-01-01 RX ADMIN — FENOFIBRATE 160 MG: 160 TABLET ORAL at 21:01

## 2020-01-01 RX ADMIN — SODIUM BICARBONATE 650 MG: 650 TABLET ORAL at 08:28

## 2020-01-01 RX ADMIN — NOREPINEPHRINE BITARTRATE 2 MCG/MIN: 1 INJECTION, SOLUTION, CONCENTRATE INTRAVENOUS at 12:24

## 2020-01-01 RX ADMIN — LORAZEPAM 1 MG: 2 INJECTION INTRAMUSCULAR; INTRAVENOUS at 01:17

## 2020-01-01 RX ADMIN — MONTELUKAST SODIUM 10 MG: 10 TABLET, FILM COATED ORAL at 08:22

## 2020-01-01 RX ADMIN — SODIUM CHLORIDE, PRESERVATIVE FREE 10 ML: 5 INJECTION INTRAVENOUS at 21:00

## 2020-01-01 RX ADMIN — ATORVASTATIN CALCIUM 20 MG: 20 TABLET, FILM COATED ORAL at 21:01

## 2020-01-01 RX ADMIN — Medication 15 MG: at 22:11

## 2020-01-01 RX ADMIN — VANCOMYCIN HYDROCHLORIDE 1.5 G: 1 INJECTION, POWDER, LYOPHILIZED, FOR SOLUTION INTRAVENOUS at 13:33

## 2020-01-01 RX ADMIN — Medication 6 MG: at 20:43

## 2020-01-01 RX ADMIN — PREGABALIN 50 MG: 50 CAPSULE ORAL at 14:37

## 2020-01-01 RX ADMIN — ALBUTEROL SULFATE 0.63 MG: 0.63 SOLUTION RESPIRATORY (INHALATION) at 20:49

## 2020-01-01 RX ADMIN — POTASSIUM CHLORIDE 10 MEQ: 10 INJECTION, SOLUTION INTRAVENOUS at 18:54

## 2020-01-01 RX ADMIN — FOLIC ACID 1 MG: 1 TABLET ORAL at 21:02

## 2020-01-01 RX ADMIN — PSYLLIUM HUSK 1 PACKET: 3.4 GRANULE ORAL at 10:34

## 2020-01-01 RX ADMIN — IPRATROPIUM BROMIDE 0.5 MG: 0.5 SOLUTION RESPIRATORY (INHALATION) at 17:04

## 2020-01-01 RX ADMIN — CALCIUM ACETATE 667 MG: 667 CAPSULE ORAL at 16:12

## 2020-01-01 RX ADMIN — IPRATROPIUM BROMIDE 0.5 MG: 0.5 SOLUTION RESPIRATORY (INHALATION) at 12:33

## 2020-01-01 RX ADMIN — ASPIRIN 81 MG: 81 TABLET, COATED ORAL at 08:28

## 2020-01-01 RX ADMIN — CYANOCOBALAMIN TAB 1000 MCG 1000 MCG: 1000 TAB at 12:24

## 2020-01-01 RX ADMIN — SODIUM BICARBONATE 650 MG: 650 TABLET ORAL at 08:06

## 2020-01-01 RX ADMIN — MAGNESIUM OXIDE TAB 400 MG (241.3 MG ELEMENTAL MG) 400 MG: 400 (241.3 MG) TAB at 22:19

## 2020-01-01 RX ADMIN — MIDODRINE HYDROCHLORIDE 20 MG: 5 TABLET ORAL at 12:30

## 2020-01-01 RX ADMIN — CALCIUM ACETATE 667 MG: 667 CAPSULE ORAL at 17:24

## 2020-01-01 RX ADMIN — SODIUM CHLORIDE, PRESERVATIVE FREE 10 ML: 5 INJECTION INTRAVENOUS at 08:58

## 2020-01-01 RX ADMIN — CALCIUM CARBONATE 1500 MG: 500 TABLET, CHEWABLE ORAL at 09:00

## 2020-01-01 RX ADMIN — PREGABALIN 50 MG: 50 CAPSULE ORAL at 21:46

## 2020-01-01 RX ADMIN — LEVOTHYROXINE SODIUM 137 MCG: 25 TABLET ORAL at 06:19

## 2020-01-01 RX ADMIN — MIDODRINE HYDROCHLORIDE 20 MG: 5 TABLET ORAL at 13:08

## 2020-01-01 RX ADMIN — SODIUM BICARBONATE 650 MG: 650 TABLET ORAL at 20:43

## 2020-01-01 RX ADMIN — SODIUM BICARBONATE 1300 MG: 650 TABLET ORAL at 09:16

## 2020-01-01 RX ADMIN — CALCIUM CARBONATE 1500 MG: 500 TABLET, CHEWABLE ORAL at 20:26

## 2020-01-01 RX ADMIN — SODIUM CHLORIDE: 9 INJECTION, SOLUTION INTRAVENOUS at 09:00

## 2020-01-01 RX ADMIN — MIDODRINE HYDROCHLORIDE 20 MG: 5 TABLET ORAL at 12:07

## 2020-01-01 RX ADMIN — FENOFIBRATE 160 MG: 160 TABLET ORAL at 12:24

## 2020-01-01 RX ADMIN — ARFORMOTEROL TARTRATE 15 MCG: 15 SOLUTION RESPIRATORY (INHALATION) at 07:50

## 2020-01-01 RX ADMIN — METOPROLOL SUCCINATE 12.5 MG: 25 TABLET, EXTENDED RELEASE ORAL at 13:38

## 2020-01-01 RX ADMIN — MIDODRINE HYDROCHLORIDE 20 MG: 5 TABLET ORAL at 11:43

## 2020-01-01 RX ADMIN — CHOLESTYRAMINE 4 G: 4 POWDER, FOR SUSPENSION ORAL at 23:01

## 2020-01-01 RX ADMIN — POTASSIUM CHLORIDE 20 MEQ: 20 TABLET, EXTENDED RELEASE ORAL at 12:23

## 2020-01-01 RX ADMIN — SODIUM CHLORIDE, PRESERVATIVE FREE 10 ML: 5 INJECTION INTRAVENOUS at 00:20

## 2020-01-01 RX ADMIN — LEVOTHYROXINE SODIUM 137 MCG: 25 TABLET ORAL at 06:07

## 2020-01-01 RX ADMIN — PSYLLIUM HUSK 1 PACKET: 3.4 GRANULE ORAL at 08:27

## 2020-01-01 RX ADMIN — SODIUM BICARBONATE 650 MG: 650 TABLET ORAL at 20:37

## 2020-01-01 RX ADMIN — EPOETIN ALFA-EPBX 3000 UNITS: 3000 INJECTION, SOLUTION INTRAVENOUS; SUBCUTANEOUS at 13:54

## 2020-01-01 RX ADMIN — MIDODRINE HYDROCHLORIDE 20 MG: 5 TABLET ORAL at 08:40

## 2020-01-01 RX ADMIN — MIDODRINE HYDROCHLORIDE 20 MG: 5 TABLET ORAL at 08:57

## 2020-01-01 RX ADMIN — SODIUM CHLORIDE, PRESERVATIVE FREE 10 ML: 5 INJECTION INTRAVENOUS at 21:04

## 2020-01-01 RX ADMIN — FENOFIBRATE 160 MG: 160 TABLET ORAL at 09:00

## 2020-01-01 RX ADMIN — SODIUM CHLORIDE, PRESERVATIVE FREE 10 ML: 5 INJECTION INTRAVENOUS at 08:27

## 2020-01-01 RX ADMIN — ACETAMINOPHEN 650 MG: 325 TABLET ORAL at 11:31

## 2020-01-01 RX ADMIN — LIOTHYRONINE SODIUM 5 MCG: 5 TABLET ORAL at 08:12

## 2020-01-01 RX ADMIN — IPRATROPIUM BROMIDE 0.5 MG: 0.5 SOLUTION RESPIRATORY (INHALATION) at 21:41

## 2020-01-01 RX ADMIN — POTASSIUM CHLORIDE 40 MEQ: 20 TABLET, EXTENDED RELEASE ORAL at 14:03

## 2020-01-01 RX ADMIN — POTASSIUM CHLORIDE 20 MEQ: 29.8 INJECTION, SOLUTION INTRAVENOUS at 01:31

## 2020-01-01 RX ADMIN — SODIUM BICARBONATE 650 MG: 650 TABLET ORAL at 22:00

## 2020-01-01 RX ADMIN — SODIUM BICARBONATE 1300 MG: 650 TABLET ORAL at 15:33

## 2020-01-01 RX ADMIN — LEVOTHYROXINE SODIUM 137 MCG: 25 TABLET ORAL at 06:12

## 2020-01-01 RX ADMIN — SODIUM CHLORIDE 250 ML: 9 INJECTION, SOLUTION INTRAVENOUS at 18:02

## 2020-01-01 RX ADMIN — POTASSIUM CHLORIDE 20 MEQ: 29.8 INJECTION, SOLUTION INTRAVENOUS at 17:00

## 2020-01-01 RX ADMIN — ACETAMINOPHEN 650 MG: 325 TABLET ORAL at 12:36

## 2020-01-01 RX ADMIN — ALBUMIN (HUMAN) 25 G: 25 SOLUTION INTRAVENOUS at 19:30

## 2020-01-01 RX ADMIN — PREGABALIN 50 MG: 50 CAPSULE ORAL at 20:14

## 2020-01-01 RX ADMIN — CYANOCOBALAMIN TAB 1000 MCG 1000 MCG: 1000 TAB at 08:21

## 2020-01-01 RX ADMIN — Medication 15 MG: at 20:25

## 2020-01-01 RX ADMIN — Medication 15 MG: at 21:37

## 2020-01-01 RX ADMIN — PREGABALIN 25 MG: 25 CAPSULE ORAL at 20:05

## 2020-01-01 RX ADMIN — PREGABALIN 50 MG: 50 CAPSULE ORAL at 08:22

## 2020-01-01 RX ADMIN — SODIUM BICARBONATE 650 MG: 650 TABLET ORAL at 20:14

## 2020-01-01 RX ADMIN — PREGABALIN 50 MG: 50 CAPSULE ORAL at 21:07

## 2020-01-01 RX ADMIN — SODIUM BICARBONATE 1300 MG: 650 TABLET ORAL at 20:33

## 2020-01-01 RX ADMIN — ACETAMINOPHEN 650 MG: 325 TABLET ORAL at 22:12

## 2020-01-01 RX ADMIN — FOLIC ACID 1 MG: 1 TABLET ORAL at 08:22

## 2020-01-01 RX ADMIN — EPOETIN ALFA-EPBX 2000 UNITS: 2000 INJECTION, SOLUTION INTRAVENOUS; SUBCUTANEOUS at 12:23

## 2020-01-01 RX ADMIN — ASPIRIN 81 MG: 81 TABLET, COATED ORAL at 08:40

## 2020-01-01 RX ADMIN — MIDODRINE HYDROCHLORIDE 20 MG: 5 TABLET ORAL at 17:24

## 2020-01-01 RX ADMIN — FOLIC ACID 1 MG: 1 TABLET ORAL at 08:27

## 2020-01-01 RX ADMIN — ASPIRIN 81 MG: 81 TABLET, COATED ORAL at 21:02

## 2020-01-01 RX ADMIN — HEPARIN SODIUM 5000 UNITS: 10000 INJECTION INTRAVENOUS; SUBCUTANEOUS at 22:12

## 2020-01-01 RX ADMIN — POTASSIUM CHLORIDE 20 MEQ: 400 INJECTION, SOLUTION INTRAVENOUS at 10:30

## 2020-01-01 RX ADMIN — MONTELUKAST SODIUM 10 MG: 10 TABLET, FILM COATED ORAL at 21:59

## 2020-01-01 RX ADMIN — ASPIRIN 81 MG: 81 TABLET, COATED ORAL at 08:11

## 2020-01-01 RX ADMIN — CALCIUM CARBONATE 1500 MG: 500 TABLET, CHEWABLE ORAL at 21:44

## 2020-01-01 RX ADMIN — PANTOPRAZOLE SODIUM 40 MG: 40 TABLET, DELAYED RELEASE ORAL at 11:35

## 2020-01-01 RX ADMIN — PREGABALIN 50 MG: 50 CAPSULE ORAL at 13:30

## 2020-01-01 RX ADMIN — VASOPRESSIN 0.03 UNITS/MIN: 20 INJECTION INTRAVENOUS at 02:08

## 2020-01-01 RX ADMIN — POTASSIUM CHLORIDE 10 MEQ: 10 INJECTION, SOLUTION INTRAVENOUS at 17:42

## 2020-01-01 RX ADMIN — HEPARIN SODIUM 5000 UNITS: 10000 INJECTION INTRAVENOUS; SUBCUTANEOUS at 15:14

## 2020-01-01 RX ADMIN — MIDODRINE HYDROCHLORIDE 10 MG: 5 TABLET ORAL at 11:39

## 2020-01-01 RX ADMIN — POTASSIUM CHLORIDE 40 MEQ: 20 TABLET, EXTENDED RELEASE ORAL at 17:41

## 2020-01-01 RX ADMIN — ATORVASTATIN CALCIUM 20 MG: 20 TABLET, FILM COATED ORAL at 08:58

## 2020-01-01 RX ADMIN — BUDESONIDE 500 MCG: 0.5 SUSPENSION RESPIRATORY (INHALATION) at 21:41

## 2020-01-01 RX ADMIN — CALCIUM ACETATE 667 MG: 667 CAPSULE ORAL at 16:06

## 2020-01-01 RX ADMIN — SODIUM BICARBONATE 1300 MG: 650 TABLET ORAL at 20:25

## 2020-01-01 RX ADMIN — IPRATROPIUM BROMIDE 0.5 MG: 0.5 SOLUTION RESPIRATORY (INHALATION) at 21:00

## 2020-01-01 RX ADMIN — CALCIUM CARBONATE 1500 MG: 500 TABLET, CHEWABLE ORAL at 08:58

## 2020-01-01 RX ADMIN — ACETAMINOPHEN 650 MG: 325 TABLET ORAL at 10:33

## 2020-01-01 RX ADMIN — SODIUM CHLORIDE 250 ML: 9 INJECTION, SOLUTION INTRAVENOUS at 20:46

## 2020-01-01 RX ADMIN — CALCIUM ACETATE 667 MG: 667 CAPSULE ORAL at 20:37

## 2020-01-01 RX ADMIN — PANTOPRAZOLE SODIUM 40 MG: 40 TABLET, DELAYED RELEASE ORAL at 05:39

## 2020-01-01 RX ADMIN — CALCIUM CARBONATE 1500 MG: 500 TABLET, CHEWABLE ORAL at 20:57

## 2020-01-01 RX ADMIN — CALCIUM CARBONATE 1500 MG: 500 TABLET, CHEWABLE ORAL at 21:01

## 2020-01-01 RX ADMIN — SODIUM BICARBONATE 650 MG: 650 TABLET ORAL at 11:36

## 2020-01-01 RX ADMIN — CHOLESTYRAMINE 4 G: 4 POWDER, FOR SUSPENSION ORAL at 13:00

## 2020-01-01 RX ADMIN — MIDODRINE HYDROCHLORIDE 20 MG: 5 TABLET ORAL at 13:02

## 2020-01-01 RX ADMIN — NOREPINEPHRINE BITARTRATE 8 MCG/MIN: 1 INJECTION, SOLUTION, CONCENTRATE INTRAVENOUS at 22:23

## 2020-01-01 RX ADMIN — FENOFIBRATE 160 MG: 160 TABLET ORAL at 08:58

## 2020-01-01 RX ADMIN — SODIUM CHLORIDE, PRESERVATIVE FREE 10 ML: 5 INJECTION INTRAVENOUS at 15:09

## 2020-01-01 RX ADMIN — FENOFIBRATE 160 MG: 160 TABLET ORAL at 09:02

## 2020-01-01 RX ADMIN — POTASSIUM CHLORIDE 10 MEQ: 10 INJECTION, SOLUTION INTRAVENOUS at 20:24

## 2020-01-01 RX ADMIN — CALCIUM CARBONATE 1500 MG: 500 TABLET, CHEWABLE ORAL at 08:27

## 2020-01-01 RX ADMIN — ATORVASTATIN CALCIUM 20 MG: 20 TABLET, FILM COATED ORAL at 20:38

## 2020-01-01 RX ADMIN — PIPERACILLIN SODIUM AND TAZOBACTAM SODIUM 4.5 G: 4; .5 INJECTION, POWDER, LYOPHILIZED, FOR SOLUTION INTRAVENOUS at 12:25

## 2020-01-01 RX ADMIN — PREGABALIN 25 MG: 25 CAPSULE ORAL at 08:20

## 2020-01-01 RX ADMIN — LIOTHYRONINE SODIUM 5 MCG: 5 TABLET ORAL at 06:04

## 2020-01-01 RX ADMIN — ROPINIROLE HYDROCHLORIDE 0.5 MG: 0.5 TABLET, FILM COATED ORAL at 21:38

## 2020-01-01 RX ADMIN — Medication 5000 UNITS: at 20:14

## 2020-01-01 RX ADMIN — BUDESONIDE 500 MCG: 0.5 SUSPENSION RESPIRATORY (INHALATION) at 21:00

## 2020-01-01 RX ADMIN — Medication 400 MG: at 08:28

## 2020-01-01 RX ADMIN — SODIUM BICARBONATE 1300 MG: 650 TABLET ORAL at 13:10

## 2020-01-01 RX ADMIN — LIOTHYRONINE SODIUM 5 MCG: 5 TABLET ORAL at 05:31

## 2020-01-01 RX ADMIN — ACETAMINOPHEN 650 MG: 325 TABLET ORAL at 16:34

## 2020-01-01 RX ADMIN — PREGABALIN 50 MG: 50 CAPSULE ORAL at 08:28

## 2020-01-01 RX ADMIN — ACETAMINOPHEN 650 MG: 325 TABLET ORAL at 20:14

## 2020-01-01 RX ADMIN — POTASSIUM CHLORIDE 10 MEQ: 7.46 INJECTION, SOLUTION INTRAVENOUS at 15:20

## 2020-01-01 RX ADMIN — MAGNESIUM OXIDE TAB 400 MG (241.3 MG ELEMENTAL MG) 400 MG: 400 (241.3 MG) TAB at 20:57

## 2020-01-01 RX ADMIN — MIDODRINE HYDROCHLORIDE 20 MG: 5 TABLET ORAL at 08:26

## 2020-01-01 RX ADMIN — ALBUTEROL SULFATE 0.63 MG: 0.63 SOLUTION RESPIRATORY (INHALATION) at 08:02

## 2020-01-01 RX ADMIN — ALBUTEROL SULFATE 0.63 MG: 0.63 SOLUTION RESPIRATORY (INHALATION) at 10:09

## 2020-01-01 RX ADMIN — CHOLESTYRAMINE 4 G: 4 POWDER, FOR SUSPENSION ORAL at 22:11

## 2020-01-01 RX ADMIN — ACETAMINOPHEN 650 MG: 325 TABLET ORAL at 14:39

## 2020-01-01 RX ADMIN — LEVOTHYROXINE SODIUM 137 MCG: 25 TABLET ORAL at 07:53

## 2020-01-01 RX ADMIN — POTASSIUM CHLORIDE 40 MEQ: 20 TABLET, EXTENDED RELEASE ORAL at 20:10

## 2020-01-01 RX ADMIN — PREGABALIN 25 MG: 25 CAPSULE ORAL at 20:34

## 2020-01-01 RX ADMIN — IPRATROPIUM BROMIDE 0.5 MG: 0.5 SOLUTION RESPIRATORY (INHALATION) at 08:08

## 2020-01-01 RX ADMIN — MAGNESIUM OXIDE TAB 400 MG (241.3 MG ELEMENTAL MG) 400 MG: 400 (241.3 MG) TAB at 21:38

## 2020-01-01 RX ADMIN — Medication 6 MG: at 21:58

## 2020-01-01 RX ADMIN — ASPIRIN 81 MG: 81 TABLET, COATED ORAL at 08:57

## 2020-01-01 RX ADMIN — SODIUM BICARBONATE 1300 MG: 650 TABLET ORAL at 20:05

## 2020-01-01 RX ADMIN — MAGNESIUM OXIDE TAB 400 MG (241.3 MG ELEMENTAL MG) 400 MG: 400 (241.3 MG) TAB at 21:02

## 2020-01-01 RX ADMIN — MIDODRINE HYDROCHLORIDE 20 MG: 5 TABLET ORAL at 16:54

## 2020-01-01 RX ADMIN — SODIUM CHLORIDE, PRESERVATIVE FREE 10 ML: 5 INJECTION INTRAVENOUS at 08:59

## 2020-01-01 RX ADMIN — CALCIUM CARBONATE 1500 MG: 500 TABLET, CHEWABLE ORAL at 08:05

## 2020-01-01 RX ADMIN — MIDODRINE HYDROCHLORIDE 20 MG: 5 TABLET ORAL at 16:34

## 2020-01-01 RX ADMIN — HYDROCORTISONE SODIUM SUCCINATE 100 MG: 100 INJECTION, POWDER, FOR SOLUTION INTRAMUSCULAR; INTRAVENOUS at 13:32

## 2020-01-01 RX ADMIN — ALBUTEROL SULFATE 0.63 MG: 0.63 SOLUTION RESPIRATORY (INHALATION) at 18:18

## 2020-01-01 RX ADMIN — LIOTHYRONINE SODIUM 5 MCG: 5 TABLET ORAL at 05:39

## 2020-01-01 RX ADMIN — CALCIUM ACETATE 667 MG: 667 CAPSULE ORAL at 12:24

## 2020-01-01 RX ADMIN — LEVOTHYROXINE SODIUM 137 MCG: 25 TABLET ORAL at 05:31

## 2020-01-01 RX ADMIN — CALCIUM CARBONATE 1500 MG: 500 TABLET, CHEWABLE ORAL at 22:11

## 2020-01-01 RX ADMIN — FOLIC ACID 1 MG: 1 TABLET ORAL at 08:29

## 2020-01-01 RX ADMIN — MIDODRINE HYDROCHLORIDE 10 MG: 5 TABLET ORAL at 16:06

## 2020-01-01 RX ADMIN — HEPARIN SODIUM 5000 UNITS: 10000 INJECTION INTRAVENOUS; SUBCUTANEOUS at 12:35

## 2020-01-01 RX ADMIN — FOLIC ACID 1 MG: 1 TABLET ORAL at 09:04

## 2020-01-01 RX ADMIN — Medication 15 MG: at 22:19

## 2020-01-01 RX ADMIN — SODIUM BICARBONATE 650 MG: 650 TABLET ORAL at 11:35

## 2020-01-01 RX ADMIN — MORPHINE SULFATE 1 MG: 2 INJECTION, SOLUTION INTRAMUSCULAR; INTRAVENOUS at 11:23

## 2020-01-01 RX ADMIN — LIOTHYRONINE SODIUM 5 MCG: 5 TABLET ORAL at 06:17

## 2020-01-01 RX ADMIN — PREGABALIN 50 MG: 50 CAPSULE ORAL at 09:00

## 2020-01-01 RX ADMIN — LORAZEPAM 1 MG: 2 INJECTION INTRAMUSCULAR; INTRAVENOUS at 23:01

## 2020-01-01 RX ADMIN — MONTELUKAST SODIUM 10 MG: 10 TABLET, FILM COATED ORAL at 08:29

## 2020-01-01 RX ADMIN — HEPARIN SODIUM 5000 UNITS: 10000 INJECTION INTRAVENOUS; SUBCUTANEOUS at 21:45

## 2020-01-01 RX ADMIN — PSYLLIUM HUSK 1 PACKET: 3.4 GRANULE ORAL at 20:06

## 2020-01-01 RX ADMIN — SODIUM BICARBONATE 1300 MG: 650 TABLET ORAL at 13:41

## 2020-01-01 RX ADMIN — EPOETIN ALFA-EPBX 2000 UNITS: 2000 INJECTION, SOLUTION INTRAVENOUS; SUBCUTANEOUS at 10:49

## 2020-01-01 RX ADMIN — PSYLLIUM HUSK 1 PACKET: 3.4 GRANULE ORAL at 21:39

## 2020-01-01 RX ADMIN — ALBUMIN (HUMAN) 25 G: 25 SOLUTION INTRAVENOUS at 07:46

## 2020-01-01 RX ADMIN — SODIUM CHLORIDE, PRESERVATIVE FREE 10 ML: 5 INJECTION INTRAVENOUS at 21:54

## 2020-01-01 RX ADMIN — CALCIUM ACETATE 667 MG: 667 CAPSULE ORAL at 08:21

## 2020-01-01 RX ADMIN — LEVOTHYROXINE SODIUM 137 MCG: 25 TABLET ORAL at 06:06

## 2020-01-01 RX ADMIN — PREGABALIN 50 MG: 50 CAPSULE ORAL at 09:15

## 2020-01-01 RX ADMIN — HYDROCORTISONE SODIUM SUCCINATE 100 MG: 100 INJECTION, POWDER, FOR SOLUTION INTRAMUSCULAR; INTRAVENOUS at 01:45

## 2020-01-01 RX ADMIN — MIDODRINE HYDROCHLORIDE 10 MG: 5 TABLET ORAL at 08:29

## 2020-01-01 RX ADMIN — MONTELUKAST SODIUM 10 MG: 10 TABLET, FILM COATED ORAL at 11:23

## 2020-01-01 RX ADMIN — ACETAMINOPHEN 650 MG: 325 TABLET ORAL at 04:10

## 2020-01-01 RX ADMIN — Medication 15 MG: at 20:24

## 2020-01-01 RX ADMIN — Medication 15 MG: at 21:02

## 2020-01-01 RX ADMIN — MAGNESIUM SULFATE 2 G: 2 INJECTION INTRAVENOUS at 10:26

## 2020-01-01 RX ADMIN — PREGABALIN 50 MG: 50 CAPSULE ORAL at 22:04

## 2020-01-01 RX ADMIN — HEPARIN SODIUM 5000 UNITS: 10000 INJECTION INTRAVENOUS; SUBCUTANEOUS at 15:33

## 2020-01-01 RX ADMIN — SODIUM BICARBONATE 1300 MG: 650 TABLET ORAL at 08:28

## 2020-01-01 RX ADMIN — CALCIUM ACETATE 667 MG: 667 CAPSULE ORAL at 16:16

## 2020-01-01 RX ADMIN — ALBUMIN (HUMAN) 25 G: 12.5 SOLUTION INTRAVENOUS at 17:49

## 2020-01-01 RX ADMIN — MIDODRINE HYDROCHLORIDE 10 MG: 5 TABLET ORAL at 09:56

## 2020-01-01 RX ADMIN — ATORVASTATIN CALCIUM 20 MG: 20 TABLET, FILM COATED ORAL at 22:00

## 2020-01-01 RX ADMIN — CALCIUM ACETATE 667 MG: 667 CAPSULE ORAL at 20:43

## 2020-01-01 RX ADMIN — EPOETIN ALFA-EPBX 3000 UNITS: 3000 INJECTION, SOLUTION INTRAVENOUS; SUBCUTANEOUS at 12:23

## 2020-01-01 RX ADMIN — EPOETIN ALFA-EPBX 2000 UNITS: 2000 INJECTION, SOLUTION INTRAVENOUS; SUBCUTANEOUS at 16:51

## 2020-01-01 RX ADMIN — ALBUTEROL SULFATE 0.63 MG: 0.63 SOLUTION RESPIRATORY (INHALATION) at 09:03

## 2020-01-01 RX ADMIN — CALCIUM CARBONATE 1500 MG: 500 TABLET, CHEWABLE ORAL at 08:40

## 2020-01-01 RX ADMIN — Medication 400 MG: at 11:23

## 2020-01-01 RX ADMIN — MIDODRINE HYDROCHLORIDE 20 MG: 5 TABLET ORAL at 18:40

## 2020-01-01 RX ADMIN — MAGNESIUM OXIDE TAB 400 MG (241.3 MG ELEMENTAL MG) 400 MG: 400 (241.3 MG) TAB at 21:44

## 2020-01-01 RX ADMIN — ACETAMINOPHEN 650 MG: 325 TABLET ORAL at 20:30

## 2020-01-01 RX ADMIN — ASPIRIN 81 MG: 81 TABLET, COATED ORAL at 08:26

## 2020-01-01 RX ADMIN — ASPIRIN 81 MG: 81 TABLET, COATED ORAL at 08:58

## 2020-01-01 RX ADMIN — CALCIUM ACETATE 667 MG: 667 CAPSULE ORAL at 11:04

## 2020-01-01 RX ADMIN — SODIUM CHLORIDE, PRESERVATIVE FREE 10 ML: 5 INJECTION INTRAVENOUS at 22:21

## 2020-01-01 RX ADMIN — Medication 5000 UNITS: at 11:30

## 2020-01-01 RX ADMIN — ATORVASTATIN CALCIUM 20 MG: 20 TABLET, FILM COATED ORAL at 16:50

## 2020-01-01 RX ADMIN — CYANOCOBALAMIN TAB 1000 MCG 1000 MCG: 1000 TAB at 08:28

## 2020-01-01 RX ADMIN — SODIUM BICARBONATE 650 MG: 650 TABLET ORAL at 21:01

## 2020-01-01 RX ADMIN — FOLIC ACID 1 MG: 1 TABLET ORAL at 11:35

## 2020-01-01 RX ADMIN — POTASSIUM CHLORIDE 10 MEQ: 10 INJECTION, SOLUTION INTRAVENOUS at 21:00

## 2020-01-01 RX ADMIN — HYDROCORTISONE SODIUM SUCCINATE 100 MG: 100 INJECTION, POWDER, FOR SOLUTION INTRAMUSCULAR; INTRAVENOUS at 08:41

## 2020-01-01 RX ADMIN — CALCIUM ACETATE 667 MG: 667 CAPSULE ORAL at 12:07

## 2020-01-01 RX ADMIN — PANTOPRAZOLE SODIUM 40 MG: 40 TABLET, DELAYED RELEASE ORAL at 08:12

## 2020-01-01 RX ADMIN — MIDODRINE HYDROCHLORIDE 10 MG: 5 TABLET ORAL at 16:12

## 2020-01-01 RX ADMIN — MORPHINE SULFATE 2 MG: 2 INJECTION, SOLUTION INTRAMUSCULAR; INTRAVENOUS at 01:17

## 2020-01-01 RX ADMIN — SODIUM CHLORIDE, PRESERVATIVE FREE 10 ML: 5 INJECTION INTRAVENOUS at 11:35

## 2020-01-01 RX ADMIN — ALBUMIN (HUMAN) 50 G: 0.25 INJECTION, SOLUTION INTRAVENOUS at 12:57

## 2020-01-01 RX ADMIN — SODIUM CHLORIDE 20 ML: 9 INJECTION, SOLUTION INTRAVENOUS at 13:06

## 2020-01-01 RX ADMIN — ATORVASTATIN CALCIUM 20 MG: 20 TABLET, FILM COATED ORAL at 20:43

## 2020-01-01 RX ADMIN — SODIUM CHLORIDE: 9 INJECTION, SOLUTION INTRAVENOUS at 13:58

## 2020-01-01 RX ADMIN — MIDODRINE HYDROCHLORIDE 20 MG: 5 TABLET ORAL at 11:39

## 2020-01-01 RX ADMIN — ACETAMINOPHEN 650 MG: 325 TABLET ORAL at 00:56

## 2020-01-01 RX ADMIN — CALCIUM ACETATE 667 MG: 667 CAPSULE ORAL at 08:30

## 2020-01-01 RX ADMIN — CALCIUM CARBONATE 1500 MG: 500 TABLET, CHEWABLE ORAL at 11:34

## 2020-01-01 RX ADMIN — FOLIC ACID 1 MG: 1 TABLET ORAL at 08:06

## 2020-01-01 RX ADMIN — SODIUM BICARBONATE 1300 MG: 650 TABLET ORAL at 21:37

## 2020-01-01 RX ADMIN — MORPHINE SULFATE 2 MG: 2 INJECTION, SOLUTION INTRAMUSCULAR; INTRAVENOUS at 23:01

## 2020-01-01 RX ADMIN — MIDODRINE HYDROCHLORIDE 20 MG: 5 TABLET ORAL at 12:45

## 2020-01-01 RX ADMIN — EPOETIN ALFA-EPBX 2000 UNITS: 2000 INJECTION, SOLUTION INTRAVENOUS; SUBCUTANEOUS at 13:53

## 2020-01-01 RX ADMIN — CALCIUM CARBONATE 1500 MG: 500 TABLET, CHEWABLE ORAL at 08:57

## 2020-01-01 RX ADMIN — POTASSIUM CHLORIDE 40 MEQ: 20 TABLET, EXTENDED RELEASE ORAL at 08:28

## 2020-01-01 RX ADMIN — MIDODRINE HYDROCHLORIDE 20 MG: 5 TABLET ORAL at 08:05

## 2020-01-01 RX ADMIN — POTASSIUM CHLORIDE: 149 INJECTION, SOLUTION, CONCENTRATE INTRAVENOUS at 00:25

## 2020-01-01 RX ADMIN — FERROUS SULFATE TAB 325 MG (65 MG ELEMENTAL FE) 325 MG: 325 (65 FE) TAB at 20:37

## 2020-01-01 RX ADMIN — PREGABALIN 50 MG: 50 CAPSULE ORAL at 20:43

## 2020-01-01 RX ADMIN — POTASSIUM CHLORIDE 10 MEQ: 7.46 INJECTION, SOLUTION INTRAVENOUS at 13:26

## 2020-01-01 RX ADMIN — HEPARIN SODIUM 5000 UNITS: 10000 INJECTION INTRAVENOUS; SUBCUTANEOUS at 20:57

## 2020-01-01 RX ADMIN — PREGABALIN 50 MG: 50 CAPSULE ORAL at 14:00

## 2020-01-01 RX ADMIN — MIDODRINE HYDROCHLORIDE 10 MG: 5 TABLET ORAL at 16:21

## 2020-01-01 RX ADMIN — FOLIC ACID 1 MG: 1 TABLET ORAL at 10:15

## 2020-01-01 RX ADMIN — NOREPINEPHRINE BITARTRATE 4 MCG/MIN: 1 INJECTION, SOLUTION, CONCENTRATE INTRAVENOUS at 05:00

## 2020-01-01 RX ADMIN — PREGABALIN 25 MG: 25 CAPSULE ORAL at 20:24

## 2020-01-01 RX ADMIN — Medication 5000 UNITS: at 08:23

## 2020-01-01 RX ADMIN — MONTELUKAST SODIUM 10 MG: 10 TABLET, FILM COATED ORAL at 20:05

## 2020-01-01 RX ADMIN — NOREPINEPHRINE BITARTRATE 30 MCG/MIN: 1 INJECTION, SOLUTION, CONCENTRATE INTRAVENOUS at 08:26

## 2020-01-01 RX ADMIN — CALCIUM CARBONATE 1500 MG: 500 TABLET, CHEWABLE ORAL at 21:39

## 2020-01-01 RX ADMIN — SODIUM CHLORIDE, PRESERVATIVE FREE 10 ML: 5 INJECTION INTRAVENOUS at 08:12

## 2020-01-01 RX ADMIN — SODIUM CHLORIDE, PRESERVATIVE FREE 10 ML: 5 INJECTION INTRAVENOUS at 08:41

## 2020-01-01 RX ADMIN — ACETAMINOPHEN 650 MG: 325 TABLET ORAL at 22:44

## 2020-01-01 RX ADMIN — EPOETIN ALFA-EPBX 3000 UNITS: 3000 INJECTION, SOLUTION INTRAVENOUS; SUBCUTANEOUS at 17:07

## 2020-01-01 RX ADMIN — POTASSIUM CHLORIDE 20 MEQ: 400 INJECTION, SOLUTION INTRAVENOUS at 16:10

## 2020-01-01 RX ADMIN — ATORVASTATIN CALCIUM 20 MG: 20 TABLET, FILM COATED ORAL at 19:31

## 2020-01-01 RX ADMIN — MONTELUKAST SODIUM 10 MG: 10 TABLET, FILM COATED ORAL at 21:37

## 2020-01-01 RX ADMIN — POTASSIUM CHLORIDE 10 MEQ: 10 INJECTION, SOLUTION INTRAVENOUS at 20:10

## 2020-01-01 RX ADMIN — NOREPINEPHRINE BITARTRATE 16 MCG/MIN: 1 INJECTION, SOLUTION, CONCENTRATE INTRAVENOUS at 14:27

## 2020-01-01 RX ADMIN — FERROUS SULFATE TAB 325 MG (65 MG ELEMENTAL FE) 325 MG: 325 (65 FE) TAB at 12:24

## 2020-01-01 RX ADMIN — POTASSIUM CHLORIDE 20 MEQ: 20 TABLET, EXTENDED RELEASE ORAL at 16:16

## 2020-01-01 RX ADMIN — PREGABALIN 50 MG: 50 CAPSULE ORAL at 11:24

## 2020-01-01 RX ADMIN — LEVOTHYROXINE SODIUM 137 MCG: 25 TABLET ORAL at 05:53

## 2020-01-01 RX ADMIN — CHOLESTYRAMINE 4 G: 4 POWDER, FOR SUSPENSION ORAL at 23:04

## 2020-01-01 RX ADMIN — CALCIUM CARBONATE 1500 MG: 500 TABLET, CHEWABLE ORAL at 09:02

## 2020-01-01 RX ADMIN — FOLIC ACID 1 MG: 1 TABLET ORAL at 21:58

## 2020-01-01 RX ADMIN — SODIUM CHLORIDE: 9 INJECTION, SOLUTION INTRAVENOUS at 05:00

## 2020-01-01 RX ADMIN — NOREPINEPHRINE BITARTRATE 12 MCG/MIN: 1 INJECTION, SOLUTION, CONCENTRATE INTRAVENOUS at 03:11

## 2020-01-01 RX ADMIN — CEFTRIAXONE 1 G: 1 INJECTION, POWDER, FOR SOLUTION INTRAMUSCULAR; INTRAVENOUS at 15:43

## 2020-01-01 RX ADMIN — CALCIUM CARBONATE 1500 MG: 500 TABLET, CHEWABLE ORAL at 08:11

## 2020-01-01 RX ADMIN — SODIUM CHLORIDE: 9 INJECTION, SOLUTION INTRAVENOUS at 19:12

## 2020-01-01 RX ADMIN — SODIUM CHLORIDE, PRESERVATIVE FREE 10 ML: 5 INJECTION INTRAVENOUS at 10:25

## 2020-01-01 RX ADMIN — MIDODRINE HYDROCHLORIDE 20 MG: 5 TABLET ORAL at 08:14

## 2020-01-01 RX ADMIN — MONTELUKAST SODIUM 10 MG: 10 TABLET, FILM COATED ORAL at 20:35

## 2020-01-01 RX ADMIN — SODIUM CHLORIDE, PRESERVATIVE FREE 10 ML: 5 INJECTION INTRAVENOUS at 22:13

## 2020-01-01 RX ADMIN — SODIUM BICARBONATE 1300 MG: 650 TABLET ORAL at 08:11

## 2020-01-01 RX ADMIN — POTASSIUM CHLORIDE 20 MEQ: 29.8 INJECTION, SOLUTION INTRAVENOUS at 15:59

## 2020-01-01 RX ADMIN — CALCIUM ACETATE 667 MG: 667 CAPSULE ORAL at 08:29

## 2020-01-01 RX ADMIN — PREGABALIN 25 MG: 25 CAPSULE ORAL at 21:38

## 2020-01-01 RX ADMIN — ATORVASTATIN CALCIUM 20 MG: 20 TABLET, FILM COATED ORAL at 09:02

## 2020-01-01 RX ADMIN — MIDODRINE HYDROCHLORIDE 10 MG: 5 TABLET ORAL at 11:04

## 2020-01-01 RX ADMIN — CALCIUM ACETATE 667 MG: 667 CAPSULE ORAL at 11:34

## 2020-01-01 RX ADMIN — SODIUM CHLORIDE, PRESERVATIVE FREE 10 ML: 5 INJECTION INTRAVENOUS at 20:06

## 2020-01-01 RX ADMIN — POTASSIUM CHLORIDE 40 MEQ: 20 TABLET, EXTENDED RELEASE ORAL at 10:24

## 2020-01-01 RX ADMIN — SODIUM CHLORIDE 500 ML: 9 INJECTION, SOLUTION INTRAVENOUS at 14:34

## 2020-01-01 RX ADMIN — PREGABALIN 25 MG: 25 CAPSULE ORAL at 08:40

## 2020-01-01 RX ADMIN — Medication 15 MG: at 20:05

## 2020-01-01 RX ADMIN — ARFORMOTEROL TARTRATE 15 MCG: 15 SOLUTION RESPIRATORY (INHALATION) at 21:00

## 2020-01-01 RX ADMIN — POTASSIUM CHLORIDE: 149 INJECTION, SOLUTION, CONCENTRATE INTRAVENOUS at 01:30

## 2020-01-01 RX ADMIN — PREGABALIN 25 MG: 25 CAPSULE ORAL at 20:25

## 2020-01-01 RX ADMIN — ASPIRIN 81 MG: 81 TABLET, COATED ORAL at 11:33

## 2020-01-01 RX ADMIN — Medication 400 MG: at 08:23

## 2020-01-01 RX ADMIN — HEPARIN SODIUM 5000 UNITS: 10000 INJECTION INTRAVENOUS; SUBCUTANEOUS at 06:06

## 2020-01-01 RX ADMIN — IPRATROPIUM BROMIDE 0.5 MG: 0.5 SOLUTION RESPIRATORY (INHALATION) at 16:56

## 2020-01-01 RX ADMIN — ACETAMINOPHEN 650 MG: 325 TABLET ORAL at 20:47

## 2020-01-01 RX ADMIN — POTASSIUM CHLORIDE 20 MEQ: 29.8 INJECTION, SOLUTION INTRAVENOUS at 23:38

## 2020-01-01 RX ADMIN — CALCIUM GLUCONATE 1 G: 98 INJECTION, SOLUTION INTRAVENOUS at 15:53

## 2020-01-01 RX ADMIN — LIOTHYRONINE SODIUM 5 MCG: 5 TABLET ORAL at 06:07

## 2020-01-01 RX ADMIN — Medication 5000 UNITS: at 22:00

## 2020-01-01 RX ADMIN — LIOTHYRONINE SODIUM 5 MCG: 5 TABLET ORAL at 07:00

## 2020-01-01 RX ADMIN — LEVOTHYROXINE SODIUM 137 MCG: 25 TABLET ORAL at 05:39

## 2020-01-01 RX ADMIN — IOPAMIDOL 75 ML: 755 INJECTION, SOLUTION INTRAVENOUS at 15:11

## 2020-01-01 RX ADMIN — Medication 15 MG: at 20:57

## 2020-01-01 RX ADMIN — ASPIRIN 81 MG: 81 TABLET, COATED ORAL at 10:15

## 2020-01-01 RX ADMIN — POTASSIUM CHLORIDE: 149 INJECTION, SOLUTION, CONCENTRATE INTRAVENOUS at 16:49

## 2020-01-01 RX ADMIN — ACETAMINOPHEN 650 MG: 325 TABLET ORAL at 09:45

## 2020-01-01 RX ADMIN — POTASSIUM CHLORIDE 20 MEQ: 29.8 INJECTION, SOLUTION INTRAVENOUS at 09:05

## 2020-01-01 RX ADMIN — ROPINIROLE HYDROCHLORIDE 0.5 MG: 0.5 TABLET, FILM COATED ORAL at 20:37

## 2020-01-01 RX ADMIN — PSYLLIUM HUSK 1 PACKET: 3.4 GRANULE ORAL at 22:12

## 2020-01-01 RX ADMIN — FENOFIBRATE 160 MG: 160 TABLET ORAL at 08:29

## 2020-01-01 RX ADMIN — PREGABALIN 25 MG: 25 CAPSULE ORAL at 08:27

## 2020-01-01 RX ADMIN — SODIUM CHLORIDE 500 ML: 9 INJECTION, SOLUTION INTRAVENOUS at 15:42

## 2020-01-01 RX ADMIN — LIOTHYRONINE SODIUM 5 MCG: 5 TABLET ORAL at 08:06

## 2020-01-01 RX ADMIN — ASPIRIN 81 MG: 81 TABLET, COATED ORAL at 09:03

## 2020-01-01 RX ADMIN — MORPHINE SULFATE 2 MG: 2 INJECTION, SOLUTION INTRAMUSCULAR; INTRAVENOUS at 03:31

## 2020-01-01 RX ADMIN — LEVOTHYROXINE SODIUM 137 MCG: 25 TABLET ORAL at 06:05

## 2020-01-01 RX ADMIN — ROPINIROLE HYDROCHLORIDE 0.5 MG: 0.5 TABLET, FILM COATED ORAL at 20:43

## 2020-01-01 RX ADMIN — ALBUTEROL SULFATE 0.63 MG: 0.63 SOLUTION RESPIRATORY (INHALATION) at 01:07

## 2020-01-01 RX ADMIN — VASOPRESSIN 0.03 UNITS/MIN: 20 INJECTION INTRAVENOUS at 15:33

## 2020-01-01 RX ADMIN — PREGABALIN 50 MG: 50 CAPSULE ORAL at 15:14

## 2020-01-01 RX ADMIN — POTASSIUM CHLORIDE: 149 INJECTION, SOLUTION, CONCENTRATE INTRAVENOUS at 08:30

## 2020-01-01 RX ADMIN — LEVOTHYROXINE SODIUM 137 MCG: 25 TABLET ORAL at 06:10

## 2020-01-01 RX ADMIN — Medication 15 MG: at 20:33

## 2020-01-01 RX ADMIN — NOREPINEPHRINE BITARTRATE 30 MCG/MIN: 1 INJECTION, SOLUTION, CONCENTRATE INTRAVENOUS at 17:58

## 2020-01-01 RX ADMIN — MIDODRINE HYDROCHLORIDE 20 MG: 5 TABLET ORAL at 17:17

## 2020-01-01 RX ADMIN — MIDODRINE HYDROCHLORIDE 20 MG: 5 TABLET ORAL at 08:30

## 2020-01-01 RX ADMIN — HYDROCORTISONE SODIUM SUCCINATE 100 MG: 100 INJECTION, POWDER, FOR SOLUTION INTRAMUSCULAR; INTRAVENOUS at 17:45

## 2020-01-01 RX ADMIN — FERROUS SULFATE TAB 325 MG (65 MG ELEMENTAL FE) 325 MG: 325 (65 FE) TAB at 20:43

## 2020-01-01 RX ADMIN — PANTOPRAZOLE SODIUM 40 MG: 40 TABLET, DELAYED RELEASE ORAL at 05:32

## 2020-01-01 RX ADMIN — HEPARIN SODIUM 5000 UNITS: 10000 INJECTION INTRAVENOUS; SUBCUTANEOUS at 21:02

## 2020-01-01 RX ADMIN — MIDODRINE HYDROCHLORIDE 10 MG: 5 TABLET ORAL at 07:12

## 2020-01-01 RX ADMIN — ROPINIROLE HYDROCHLORIDE 0.5 MG: 0.5 TABLET, FILM COATED ORAL at 20:14

## 2020-01-01 RX ADMIN — MONTELUKAST SODIUM 10 MG: 10 TABLET, FILM COATED ORAL at 20:26

## 2020-01-01 RX ADMIN — CHOLESTYRAMINE 4 G: 4 POWDER, FOR SUSPENSION ORAL at 12:04

## 2020-01-01 RX ADMIN — MAGNESIUM OXIDE TAB 400 MG (241.3 MG ELEMENTAL MG) 400 MG: 400 (241.3 MG) TAB at 20:34

## 2020-01-01 RX ADMIN — ACETAMINOPHEN 650 MG: 325 TABLET ORAL at 10:48

## 2020-01-01 RX ADMIN — SODIUM CHLORIDE: 9 INJECTION, SOLUTION INTRAVENOUS at 03:14

## 2020-01-01 RX ADMIN — NOREPINEPHRINE BITARTRATE 30 MCG/MIN: 1 INJECTION, SOLUTION, CONCENTRATE INTRAVENOUS at 00:00

## 2020-01-01 RX ADMIN — PREGABALIN 25 MG: 25 CAPSULE ORAL at 22:18

## 2020-01-01 RX ADMIN — CALCIUM CARBONATE 1500 MG: 500 TABLET, CHEWABLE ORAL at 20:06

## 2020-01-01 RX ADMIN — MONTELUKAST SODIUM 10 MG: 10 TABLET, FILM COATED ORAL at 20:25

## 2020-01-01 ASSESSMENT — PAIN SCALES - GENERAL
PAINLEVEL_OUTOF10: 0
PAINLEVEL_OUTOF10: 9
PAINLEVEL_OUTOF10: 3
PAINLEVEL_OUTOF10: 0
PAINLEVEL_OUTOF10: 4
PAINLEVEL_OUTOF10: 0
PAINLEVEL_OUTOF10: 4
PAINLEVEL_OUTOF10: 0
PAINLEVEL_OUTOF10: 0
PAINLEVEL_OUTOF10: 4
PAINLEVEL_OUTOF10: 0
PAINLEVEL_OUTOF10: 4
PAINLEVEL_OUTOF10: 0
PAINLEVEL_OUTOF10: 3
PAINLEVEL_OUTOF10: 0
PAINLEVEL_OUTOF10: 3
PAINLEVEL_OUTOF10: 0
PAINLEVEL_OUTOF10: 4
PAINLEVEL_OUTOF10: 2
PAINLEVEL_OUTOF10: 0
PAINLEVEL_OUTOF10: 7
PAINLEVEL_OUTOF10: 0
PAINLEVEL_OUTOF10: 0
PAINLEVEL_OUTOF10: 4
PAINLEVEL_OUTOF10: 0
PAINLEVEL_OUTOF10: 2
PAINLEVEL_OUTOF10: 0
PAINLEVEL_OUTOF10: 3
PAINLEVEL_OUTOF10: 0
PAINLEVEL_OUTOF10: 2
PAINLEVEL_OUTOF10: 0
PAINLEVEL_OUTOF10: 4
PAINLEVEL_OUTOF10: 0
PAINLEVEL_OUTOF10: 0
PAINLEVEL_OUTOF10: 10
PAINLEVEL_OUTOF10: 0
PAINLEVEL_OUTOF10: 1
PAINLEVEL_OUTOF10: 0
PAINLEVEL_OUTOF10: 2
PAINLEVEL_OUTOF10: 0
PAINLEVEL_OUTOF10: 0
PAINLEVEL_OUTOF10: 3
PAINLEVEL_OUTOF10: 0
PAINLEVEL_OUTOF10: 3
PAINLEVEL_OUTOF10: 0
PAINLEVEL_OUTOF10: 0
PAINLEVEL_OUTOF10: 9
PAINLEVEL_OUTOF10: 0
PAINLEVEL_OUTOF10: 9
PAINLEVEL_OUTOF10: 3
PAINLEVEL_OUTOF10: 3
PAINLEVEL_OUTOF10: 0
PAINLEVEL_OUTOF10: 8
PAINLEVEL_OUTOF10: 4
PAINLEVEL_OUTOF10: 0
PAINLEVEL_OUTOF10: 3
PAINLEVEL_OUTOF10: 6
PAINLEVEL_OUTOF10: 0
PAINLEVEL_OUTOF10: 3

## 2020-01-01 ASSESSMENT — ENCOUNTER SYMPTOMS
APNEA: 0
COUGH: 0
DIARRHEA: 0
ABDOMINAL PAIN: 0
VOMITING: 0
EYE REDNESS: 0
SHORTNESS OF BREATH: 0
WHEEZING: 0
WHEEZING: 0
DIARRHEA: 0
ABDOMINAL PAIN: 0
WHEEZING: 0
VOICE CHANGE: 0
VOMITING: 0
RHINORRHEA: 0
SORE THROAT: 0
SINUS PRESSURE: 0
NAUSEA: 0
NAUSEA: 1
CHEST TIGHTNESS: 0
EYE PAIN: 0
CHEST TIGHTNESS: 0
CHOKING: 0
COUGH: 0
EYE ITCHING: 0
CONSTIPATION: 0
VOMITING: 0
ABDOMINAL DISTENTION: 0
SHORTNESS OF BREATH: 0
COLOR CHANGE: 0
STRIDOR: 0
ABDOMINAL DISTENTION: 0
SHORTNESS OF BREATH: 1
BACK PAIN: 0
EYE REDNESS: 0
EYE DISCHARGE: 0
NAUSEA: 0
SORE THROAT: 0
COUGH: 0
BACK PAIN: 0

## 2020-01-01 ASSESSMENT — PAIN DESCRIPTION - DESCRIPTORS
DESCRIPTORS: ACHING
DESCRIPTORS: DISCOMFORT;DULL
DESCRIPTORS: CONSTANT;DISCOMFORT;ACHING
DESCRIPTORS: ACHING
DESCRIPTORS: ACHING;DISCOMFORT;THROBBING

## 2020-01-01 ASSESSMENT — PAIN DESCRIPTION - PAIN TYPE
TYPE: ACUTE PAIN

## 2020-01-01 ASSESSMENT — PAIN DESCRIPTION - PROGRESSION
CLINICAL_PROGRESSION: NOT CHANGED
CLINICAL_PROGRESSION: GRADUALLY WORSENING
CLINICAL_PROGRESSION: NOT CHANGED
CLINICAL_PROGRESSION: NOT CHANGED
CLINICAL_PROGRESSION: GRADUALLY WORSENING
CLINICAL_PROGRESSION: GRADUALLY WORSENING
CLINICAL_PROGRESSION: NOT CHANGED
CLINICAL_PROGRESSION: GRADUALLY WORSENING
CLINICAL_PROGRESSION: NOT CHANGED
CLINICAL_PROGRESSION: GRADUALLY WORSENING
CLINICAL_PROGRESSION: NOT CHANGED
CLINICAL_PROGRESSION: GRADUALLY WORSENING
CLINICAL_PROGRESSION: NOT CHANGED
CLINICAL_PROGRESSION: GRADUALLY WORSENING
CLINICAL_PROGRESSION: NOT CHANGED
CLINICAL_PROGRESSION: GRADUALLY WORSENING
CLINICAL_PROGRESSION: NOT CHANGED

## 2020-01-01 ASSESSMENT — PAIN - FUNCTIONAL ASSESSMENT
PAIN_FUNCTIONAL_ASSESSMENT: ACTIVITIES ARE NOT PREVENTED

## 2020-01-01 ASSESSMENT — PAIN DESCRIPTION - LOCATION
LOCATION: BREAST
LOCATION: LEG
LOCATION: HEAD
LOCATION: HEAD
LOCATION: NECK
LOCATION: HEAD
LOCATION: GENERALIZED

## 2020-01-01 ASSESSMENT — PAIN DESCRIPTION - ORIENTATION
ORIENTATION: LEFT
ORIENTATION: LEFT
ORIENTATION: RIGHT;MID

## 2020-01-01 ASSESSMENT — PAIN SCALES - WONG BAKER
WONGBAKER_NUMERICALRESPONSE: 2
WONGBAKER_NUMERICALRESPONSE: 2

## 2020-01-01 ASSESSMENT — PAIN DESCRIPTION - FREQUENCY
FREQUENCY: CONTINUOUS
FREQUENCY: INTERMITTENT

## 2020-01-01 ASSESSMENT — PAIN DESCRIPTION - ONSET
ONSET: GRADUAL
ONSET: ON-GOING
ONSET: GRADUAL

## 2020-02-12 NOTE — TELEPHONE ENCOUNTER
Patient returned call and was given monitor results and recommendations per Dr. Lillie Oliveros. She acknowledged understanding and indicates that her palpitations are not problematic and she does not wish to change medications.

## 2020-05-14 NOTE — Clinical Note
Patient said you ordered her a TENS unit but she has not received it. Told her I would reach out. I actually see where you ordered it twice but she has not heard anything.    Thanks

## 2020-05-14 NOTE — PROGRESS NOTES
Sammie Orta is a 75 y.o. right handed woman    Patient was evaluated by neurology in 2017 after suffering 2 GTC seizures. CT Head at the time was unremarkable for an acute stroke, but an embolic event was suspected. She was admitted for valve replacement surgery and an embolic stroke was suspected. She was maintained on Keppra for almost a year, but because of her lack of recurrent events and need to trim medications, this was discontinued and she has done well. She continues on Lyrica TID for her fibromyalgia and radicular pains      She noticed left leg weakness   She had therapy with marked improvements -- included with TENS unit    She followed with Dr Virgil Rogers who attempted to obtain more PT and a TENS unit but she has not heard anything     I will forward my notes at this time      We set her up with EMG for suspected neuropathy and LS radiculopathy    Neuropathy was demonstrated as well as LS radiculopathy and meralgia paresthetica    MRI demonstrated mild canal stenosis but she continues to report severe pains    She requested NS evaluation and they did not feel her pains were explained by her stenosis  No right leg weakness or pains  No arm weakness  No trouble chewing or swallowing    She did switch from Baclofen to Dantrium due to tolerability and she noted marked improvements in her discomfort   Unfortunately, she has been without for 3 weeks -- will reorder at this time     She is now receiving HD three times a week     No chest pain or palpitations  No SOB  No vertigo, lightheadedness or loss of consciousness  No incontinence of bowels or bladder  No itching or bruising appreciated  No numbness, tingling or focal arm/leg weakness    ROS otherwise negative     Prior to Visit Medications    Medication Sig Taking?  Authorizing Provider   dantrolene (DANTRIUM) 50 MG capsule Take 1 capsule by mouth 2 times daily Yes Clifford Pollard, APRN - CNS   rOPINIRole (REQUIP) 0.5 MG tablet Take 1 tablet by mouth nightly Yes Priyank Ray MD   montelukast (SINGULAIR) 10 MG tablet Take 1 tablet by mouth daily Yes Priyank Ray MD   Umeclidinium Bromide 62.5 MCG/INH AEPB Inhale 1 puff into the lungs daily Yes Priyank Ray MD   albuterol sulfate HFA (VENTOLIN HFA) 108 (90 Base) MCG/ACT inhaler Inhale 2 puffs into the lungs every 6 hours as needed for Wheezing Yes Priyank Ray MD   mometasone-formoterol (DULERA) 200-5 MCG/ACT inhaler Inhale 2 puffs into the lungs every 12 hours Use am of surgery Yes Priyank Ray MD   carvedilol (COREG) 25 MG tablet TAKE 1 TABLET BY MOUTH THREE TIMES A WEEK Yes Historical Provider, MD   FERROUS BISGLYCINATE CHELATE PO Take by mouth Yes Historical Provider, MD   torsemide (DEMADEX) 20 MG tablet Take 20 mg by mouth daily  Yes Historical Provider, MD   albuterol (ACCUNEB) 0.63 MG/3ML nebulizer solution Take 3 mLs by nebulization every 4 hours as needed for Wheezing or Shortness of Breath DX: Moderate Persistent Asthma J45.4 Yes Pryiank Ray MD   Acetaminophen (TYLENOL ARTHRITIS PAIN PO) Take 3 tablets by mouth 2 times daily  Yes Historical Provider, MD   Biotin w/ Vitamins C & E (HAIR/SKIN/NAILS PO) Take by mouth Ld 8/28/2019 Yes Historical Provider, MD   fenofibrate (TRICOR) 145 MG tablet Take 145 mg by mouth three times a week Patient taking Mon, Wed, Fri Yes Historical Provider, MD   calcium carbonate (TUMS) 500 MG chewable tablet Take 2 tablets by mouth 2 times daily Yes Historical Provider, MD   melatonin 5 MG TABS tablet Take 5 mg by mouth nightly as needed Yes Historical Provider, MD   sodium bicarbonate 650 MG tablet Take 650 mg by mouth 2 times daily  Yes Historical Provider, MD   Aspirin-Acetaminophen-Caffeine (EXCEDRIN EXTRA STRENGTH PO) Take by mouth as needed Ld 3-16-18 Yes Historical Provider, MD   Omega-3 Fatty Acids (FISH OIL) 1200 MG CAPS Take by mouth daily LD 8/28/2019 Yes Historical Provider, MD   iron dextran complex (INFED) 50 MG/ML

## 2020-09-29 NOTE — PROGRESS NOTES
tablet TAKE ONE TABLET BY MOUTH nightlY Yes Adalberto Primrose, MD   montelukast (SINGULAIR) 10 MG tablet TAKE ONE TABLET BY MOUTH DAILY Yes Adalberto Primrose, MD   albuterol (ACCUNEB) 0.63 MG/3ML nebulizer solution INHALE 1 VIAL VIA NEBULIZER EVERY 4 HOURS AS NEEDED FOR WHEEZING OR SHORTNESS OF BREATH  Yes Adalberto Primrose, MD   dantrolene (DANTRIUM) 50 MG capsule Take 1 capsule by mouth 2 times daily Yes Jorje Franks, APRN - CNS   Umeclidinium Bromide 62.5 MCG/INH AEPB Inhale 1 puff into the lungs daily Yes Adalberto Primrose, MD   albuterol sulfate HFA (VENTOLIN HFA) 108 (90 Base) MCG/ACT inhaler Inhale 2 puffs into the lungs every 6 hours as needed for Wheezing Yes Adalberto Primrose, MD   mometasone-formoterol (DULERA) 200-5 MCG/ACT inhaler Inhale 2 puffs into the lungs every 12 hours Use am of surgery Yes Adalberto Primrose, MD   carvedilol (COREG) 25 MG tablet TAKE 1 TABLET BY MOUTH THREE TIMES A WEEK Yes Historical Provider, MD   Tens Unit MIS by Does not apply route Indications: Patient with 2-years of back myofascial pain and spasm with good relief from TENS at PT in past.  She has end-stage kidney disease on dialysis so medications are limited.  Yes Eligio Carlson DO   FERROUS BISGLYCINATE CHELATE PO Take by mouth Yes Historical Provider, MD   torsemide (DEMADEX) 20 MG tablet Take 20 mg by mouth daily  Yes Historical Provider, MD   Methoxy PEG-Epoetin Beta (MIRCERA) 75 MCG/0.3ML SOSY Infuse 75 mcg intravenously every 14 days Yes Historical Provider, MD   Acetaminophen (TYLENOL ARTHRITIS PAIN PO) Take 3 tablets by mouth 2 times daily  Yes Historical Provider, MD   Biotin w/ Vitamins C & E (HAIR/SKIN/NAILS PO) Take by mouth Ld 8/28/2019 Yes Historical Provider, MD   fenofibrate (TRICOR) 145 MG tablet Take 145 mg by mouth three times a week Patient taking Mon, Wed, Fri Yes Historical Provider, MD   calcium carbonate (TUMS) 500 MG chewable tablet Take 2 tablets by mouth 3 times daily  Yes Historical Provider, MD   melatonin 5 MG TABS tablet Take 5 mg by mouth nightly as needed Yes Historical Provider, MD   sodium bicarbonate 650 MG tablet Take 650 mg by mouth 2 times daily  Yes Historical Provider, MD   Aspirin-Acetaminophen-Caffeine (EXCEDRIN EXTRA STRENGTH PO) Take by mouth as needed Ld 3-16-18 Yes Historical Provider, MD   Omega-3 Fatty Acids (FISH OIL) 1200 MG CAPS Take by mouth daily LD 8/28/2019 Yes Historical Provider, MD   iron dextran complex (INFED) 50 MG/ML injection Inject 100 mg into the muscle once Twice per month Yes Historical Provider, MD   magnesium oxide (MAG-OX) 400 MG tablet Take 400 mg by mouth daily  Yes Historical Provider, MD   levothyroxine (SYNTHROID) 137 MCG tablet Take 137 mcg by mouth Daily Yes Historical Provider, MD   calcium acetate (PHOSLO) 667 MG capsule Take by mouth 4 times daily Yes Historical Provider, MD   furosemide (LASIX) 40 MG tablet Take 1 tablet by mouth daily  Patient taking differently: Take 40 mg by mouth nightly  Yes Solis Snowden MD   liothyronine (CYTOMEL) 5 MCG tablet Take 5 mcg by mouth daily Yes Historical Provider, MD   aspirin 81 MG EC tablet Take 1 tablet by mouth daily Yes GABRIELLA Messer - CNP   atorvastatin (LIPITOR) 20 MG tablet Take 20 mg by mouth nightly  Yes Historical Provider, MD   Coenzyme Q10 (COQ10 PO) Take by mouth nightly  Yes Historical Provider, MD   vitamin B-12 (CYANOCOBALAMIN) 1000 MCG tablet Take 1,000 mcg by mouth daily Ld 8/28/2019 Yes Historical Provider, MD   darbepoetin izabella-polysorbate (ARANESP, ALBUMIN FREE,) 60 MCG/ML injection Inject into the skin every 14 days Twice/per month  Dose given 3-16-18 at dialysis Yes Historical Provider, MD   CPAP Machine MISC Please replace patients CPAP at 8 cm water pressure with heated humidification and C-Flex of 3. Patient states she is due for a new machine and hers is not working.  Also provide masks, tubing, filters, head gear, and water chambers as needed. Diagnosis-YUNIOR  Faxed to [facility]  Length of need 99 months Yes Parvez Gunter MD   vitamin D-3 (CHOLECALCIFEROL) 5000 UNITS TABS Take 5,000 Units by mouth 2 times daily LD 8/28/2019 Yes Historical Provider, MD   folic acid (FOLVITE) 1 MG tablet Take 1 mg by mouth daily Ld 8/28/2019 Yes Historical Provider, MD   pregabalin (LYRICA) 50 MG capsule Take 50 mg by mouth 3 times daily Instructed to take am of procedure.  Yes Historical Provider, MD     Allergies as of 09/29/2020 - Review Complete 09/29/2020   Allergen Reaction Noted    Coumadin [warfarin sodium] Hives 08/24/2014    Quinine derivatives Hives 08/24/2014    Other  01/31/2017       Objective:   BP (!) 96/55 (Site: Right Upper Arm, Position: Sitting, Cuff Size: Medium Adult)   Pulse 80   Temp 97.7 °F (36.5 °C)   Resp 18   Ht 5' 3\" (1.6 m)   Wt 187 lb (84.8 kg)   SpO2 99%   BMI 33.13 kg/m²      General appearance: alert, appears stated age and cooperative  Head: Normocephalic, without obvious abnormality, atraumatic  Neck: limited ROM   Extremities: no cyanosis but marked left arm swelling -- thrill noted in left fistula    ecchymosis to both knees   Pulses: 2+ and symmetric  Skin: no soft tissues mass palpated today -- no rashes or lesions     Mental Status: Alert, oriented, thought content appropriate    Speech: clear  Language: appropriate     Cranial Nerves:  I: smell    II: visual acuity     II: visual fields Full    II: pupils KELLY   III,VII: ptosis None   III,IV,VI: extraocular muscles  EOMI without nystagmus    V: mastication Normal   V: facial light touch sensation  Normal   V,VII: corneal reflex  Present   VII: facial muscle function - upper     VII: facial muscle function - lower Normal   VIII: hearing Normal   IX: soft palate elevation  Normal   IX,X: gag reflex Present   XI: trapezius strength  5/5   XI: sternocleidomastoid strength 5/5   XI: neck extension strength  5/5   XII: tongue strength  Normal     Motor:  Right   5/5 Left   5/5               Right Bicep  5/5           Left Bicep  5/5              Right Triceps   5/5       Left Triceps  5/5          Right Deltoid  5/5     Left Deltoid  5/5        Right IPS  4/5            Left IPS  1/5               Right Quadriceps  5/5          Left Quadriceps    5/5           Right Gastrocnemius    5/5    Left Gastrocnemius   5/5  Right Ant Tibialis   5/5  Left Ant Tibialis   5/5     Normal bulk and tone     Sensory:  LT normal  Vibration minimally decreased in ankle right   Moderately decreased left     Coordination:   FN, FFM and LELIA normal  HS normal    Gait:  Gait appears markedly antalgic     DTR:   Right Brachioradialis reflex 0  Left Brachioradialis reflex 0  Right Biceps reflex 0  Left Biceps reflex 0  Right Quadriceps reflex 1+  Left Quadriceps reflex 1+  Right Achilles reflex 0  Left Achilles reflex 0    No Song's     Laboratory/Radiology:     CBC with Differential:    Lab Results   Component Value Date    WBC 4.3 2019    RBC 3.26 2019    HGB 11.0 2019    HCT 38.5 2019     2019    .1 2019    MCH 33.7 2019    MCHC 28.6 2019    RDW 16.4 2019    LYMPHOPCT 31.0 2019    MONOPCT 8.5 2019    BASOPCT 0.7 2019    MONOSABS 0.36 2019    LYMPHSABS 1.32 2019    EOSABS 0.05 2019    BASOSABS 0.03 2019     CMP:    Lab Results   Component Value Date     2020    K 3.5 2020    K 4.0 2018    CL 96 2020    CO2 29 2020    BUN 28 2020    CREATININE 2.6 2020    GFRAA 22 2020    LABGLOM 18 2020    GLUCOSE 76 2020    PROT 7.7 2020    LABALBU 3.6 2020    CALCIUM 8.9 2020    BILITOT 0.4 2020    ALKPHOS 186 2020    AST 23 2020    ALT 14 2020     EM.  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   2. Left-sided lumbosacral motor radiculopathies---as noted by the abnormal needle testing of the paraspinals. However, these radiculopathies were not further defined with the normal needle examination of the proximal musculature of that leg. 3.  Suspected bilateral lateral femoral cutaneous sensory neuropathies---that is, meralgia parestheticas. Such neuropathies are usually related to compression of the sensory nerve at the lateral inguinal ligament. MRI L-spine:  Mild canal stenosis noted     I independently reviewed the lab studies at today's appointment. Assessment:     Left leg weakness may be as a result of her embolic event during her hospitalization in 2017   EMG was consistent with a meralgia paresthetica, radiculopathy as well as peripheral neuropathy    MRI demonstrated mild stenosis despite her severe pains     Now with falls and worsening left hip and knee pains    Needs evaluated     Radicular pains improved with TENS and therapy   Returned after its discontinuation    Two post-infarct seizures during her hospitalization in March 2017   Thankfully without recurrence off Keppra   I suspect she is seeing some benefit from her TID Lyrica dose as well -- originally prescribed for fibromyalgia     Plan:     Continue Dantrium 50mg BID     Go to ED for xrays     Follow with PM&R    Call with continued issues     Baltazar Austin  11:32 AM  9/29/2020    I spent 25 minutes with the patient, with 50% or more counseling them on their diagnosis, diagnostic workup and treatment options.

## 2020-09-29 NOTE — ED PROVIDER NOTES
[facility]     Department of Emergency Medicine   ED  Provider Note  Admit Date/RoomTime: 9/29/2020 12:11 PM  ED Room: 32/29    Chief Complaint   Fall (trip and fall sunday, denies head injury, LOC or thinners ) and Leg Pain (left hip and knee pain )    History of Present Illness   Source of history provided by:  patient. History/Exam Limitations: none. Ann Castrejon is a 77 y.o. old female who has a past medical history of:   Past Medical History:   Diagnosis Date    (HFpEF) heart failure with preserved ejection fraction ([facility] [place] [address] 01/25/2017 4/11/17- echo- LVEF 55-60%, stage II DD, severely dilated LA, severe MR, mild TR, LVDD: 5.6 cm    Anemia     hx  / takes aranesp injections every 2 weeks    Asthma     well controlled with inhalers     CAD (coronary artery disease)     Chronic kidney disease     stage 4    Complication of AV dialysis fistula, initial encounter 6/2/2018    COPD (chronic obstructive pulmonary disease) (HCC)     Fibromyalgia     GERD (gastroesophageal reflux disease)     Hemodialysis patient ([facility]     chato Tarango    History of blood transfusion spring 2017    Hx of blood clots     h/o DVT in leg at age 25yrs    Hyperlipidemia     Hypertension     well controlled with medications     Kidney failure     Kidney stone     multiple issues    YUNIOR on CPAP     Polymyalgia rheumatica (HCC)     Restless leg syndrome     Short gut syndrome     Thyroid disease     Traumatic hematoma of left upper arm 6/2/2018    Wears dentures     upper partial    presents to the emergency department by private vehicle, for a fall which occured 3 day(s) prior to arrival. She was reportedly walking in [place] when she tripped over a bottle and fell onto her left hip and knees     The patients tetanus status is up to date. Since onset the symptoms have been mild in degree. Her pain is aggraveated by movement, use and palpation and relieved by rest and Tylenol.   She denies any head injury, loss of consciousness, neck pain, chest pain, abdominal pain, numbness or weakness. .  ROS    Pertinent positives and negatives are stated within HPI, all other systems reviewed and are negative. Past Surgical History:   Procedure Laterality Date    APPENDECTOMY      AV FISTULA REPAIR Left 03/22/2018    Superficialization, Delatore    AV FISTULA REPAIR  11/21/2018    Dr Garrett Aguero 10x38 iCast Subclavian    BREAST BIOPSY Right 2000    BRONCHOSCOPY  2010    CARDIAC CATHETERIZATION  02/17/2017    Dr Dasilva [place] REPLACEMENT  03/21/2017    MVR, TVR    COLONOSCOPY      COLONOSCOPY  4/21/15    COLONOSCOPY N/A 9/3/2019    COLORECTAL CANCER SCREENING, NOT HIGH RISK performed by Josiah Rodriguez MD at [address] Left 20/38/3388    radiocephalic, Delatore    DIALYSIS FISTULA CREATION Left 01/25/2018    brachioecephalic, ligation radiocephalic, Delatore    ENDOSCOPY, COLON, DIAGNOSTIC      EYE SURGERY Right     CATARACT    INTESTINAL BYPASS  1973    for weight loss    KIDNEY STONE SURGERY      x 3    OTHER SURGICAL HISTORY Left 11/16/2017    AV fistula creation left arm    OTHER SURGICAL HISTORY  01/17/2018    Left arm fistulogram by Dr Selene Bolivar.     OTHER SURGICAL HISTORY  10/17/2018    Dr Tracey-Left arm fistuloplasty    LA CREAT AV FISTULA,AUTOGENOUS GRAFT Left 3/22/2018    AV FISTULA  REVISION LEFT ARM performed by Марина Uriarte MD at [facility] [address] TUNNELED CV CATH Right 8/9/2018    REMOVAL OF TESIO CATHETER, REMOVAL OF MEDIPORT performed by Марина Uriarte MD at [address] Right 2000    RIGHT BREAST MOLE REMOVED    TRANSESOPHAGEAL ECHOCARDIOGRAM  02/03/2017    TUNNELED VENOUS CATHETER PLACEMENT Right     TUNNELED VENOUS PORT PLACEMENT Right     Powerport / x 4 / at right chest; since short gut syndrome received magnesium & sodium bicarb in past    VASC UPPER EXTREMITY VENOUS  UNI Left 11/21/2018 deficit: none. Pulse deficit: none. Capillary refill: normal.  Extremity Skin:  no erythema, rash or swelling noted. Calf Tenderness:  No Bilateral.          Edema:  none Both lower extremity(s). Pelvis:  Bilateral.        Tenderness: none. Stability:  stable to palpation. Gait:  normal.  · Integument:  Normal turgor. Warm, dry, without visible rash, unless noted elsewhere. · Lymphatic: no lymphadenopathy noted  · Neurological:  Oriented x3, GCS 15. Motor functions intact. Lab / Imaging Results   (All laboratory and radiology results have been personally reviewed by myself)  Labs:  No results found for this visit on 09/29/20. Imaging: All Radiology results interpreted by Radiologist unless otherwise noted. XR KNEE LEFT (MIN 4 VIEWS)   Final Result   No acute process               XR KNEE RIGHT (MIN 4 VIEWS)   Final Result   No acute process               XR HIP BILATERAL W AP PELVIS (2 VIEWS)   Final Result       No acute fracture or dislocation      Osteoarthropathy of both hips. XR LUMBAR SPINE (2-3 VIEWS)   Final Result   No acute lumbar process is noted                 ED Course / Medical Decision Making     Medications   acetaminophen (TYLENOL) tablet 650 mg (650 mg Oral Given 9/29/20 1236)        Re-examination:  9/29/20     Time: 1400  Patients symptoms are improving. She is easily ambulatory without any difficulties. Consult(s):   None    Procedure(s):   none    MDM:   At this time the patient is without objective evidence of an acute process requiring hospitalization or inpatient management. They have remained hemodynamically stable throughout their entire ED visit and are stable for discharge with outpatient follow-up. The plan has been discussed in detail and they are aware of the specific conditions for emergent return, as well as the importance of follow-up. Nontoxic in appearance and in no distress.   Patient easily ambulatory without any gait disturbance. Patient was educated on x-ray findings. She was educated to follow-up with her primary care physician. Patient at this time is nontoxic in appearance and in no distress. Patient stable for outpatient follow-up. Counseling: The emergency provider has spoken with the patient and discussed todays results, in addition to providing specific details for the plan of care and counseling regarding the diagnosis and prognosis. Questions are answered at this time and they are agreeable with the plan. Assessment      1. Bilateral hip pain    2. Acute pain of both knees    3. Fall, initial encounter    4. [address]      Plan   Discharge to home  Patient condition is good    New Medications     Discharge Medication List as of 9/29/2020  2:07 PM        Electronically signed by GABRIELLA Grande CNP   DD: 9/29/20  **This report was transcribed using voice recognition software. Every effort was made to ensure accuracy; however, inadvertent computerized transcription errors may be present.   END OF ED PROVIDER NOTE      GABRIELLA Grande CNP  09/29/20 [id number]

## 2020-10-21 PROBLEM — I95.9 HYPOTENSION: Status: ACTIVE | Noted: 2020-01-01

## 2020-10-21 NOTE — H&P
History and Physical      CHIEF COMPLAINT:   Feeling weak  History of Present Illness: This is a 24-year-old female with a complicated history. She has a history of renal failure requiring hemodialysis through her AV fistula. She also has a history of gastric bypass surgery years ago. She also has a history of hypothyroidism and history of DVT history of polymyalgia rheumatica and history of sleep apnea. Patient also has a history of anemia. She also is a history of asthma. Patient has had many issues over the years and most recently about 3 to 4 weeks ago she has been complaining of low blood pressure readings. At first her diuretics were decreased significantly. The diuretics were used with third spaced edema in her legs. She has been losing weight and despite holding her diuretics she was still having low blood pressure readings. However her leg swelling necessitated her to be using her diuretics so she did go back on them. She had called the office last week and we had decreased her Coreg dose from 6.25 twice a day to once a day. On a prior occasion about 3 months ago we had lowered her dose from 25 mg to 6.25 twice a day due to some feeling of being not well with her blood pressure. This morning she had dialysis and she was given 20 mg of midodrine prior to dialysis. She ended up not having fluid removal and was told to call the ER or get to the ER if she felt weak. Not even 48 hours ago she was at [facility] for a few hours because of low blood pressure. She states when her pressure gets low she gets weak and her limbs feel really heavy and she has trouble with not feeling her normal self. She is physically exhausted. She is now here with her labs indicating anemia about the same range as prior though it is lower than it has been in the past in the range of 9 or 10. She does not have any overt bleeding no signs of nausea or vomiting but just intense weakness.   She states her legs feel heavy as well as her arms when her blood pressure is low.   Her blood pressure coming into the ER with a systolic closer to 70  She is mentating well and she is able to give an entire history but we are going to require a multidisciplinary team to help us with this profound hypotension        Past Medical History:   Diagnosis Date    (HFpEF) heart failure with preserved ejection fraction ([facility] [address] 01/25/2017 4/11/17- echo- LVEF 55-60%, stage II DD, severely dilated LA, severe MR, mild TR, LVDD: 5.6 cm    Anemia     hx  / takes aranesp injections every 2 weeks    Asthma     well controlled with inhalers     CAD (coronary artery disease)     Chronic kidney disease     stage 4    Complication of AV dialysis fistula, initial encounter 6/2/2018    COPD (chronic obstructive pulmonary disease) ([facility] [address]     Fibromyalgia     GERD (gastroesophageal reflux disease)     Hemodialysis patient ([facility] [address]     chato ashley judy Panola Emelina    History of blood transfusion spring 2017    Hx of blood clots     h/o DVT in leg at age 25yrs    Hyperlipidemia     Hypertension     well controlled with medications     Kidney failure     Kidney stone     multiple issues    YUNIOR on CPAP     Polymyalgia rheumatica (HCC)     Restless leg syndrome     Short gut syndrome     Thyroid disease     Traumatic hematoma of left upper arm 6/2/2018    Wears dentures     upper partial         Past Surgical History:   Procedure Laterality Date    APPENDECTOMY      AV FISTULA REPAIR Left 03/22/2018    Superficialization, Delatore    AV FISTULA REPAIR  11/21/2018    Dr Hernesto Ramirez 10x38 iCast Subclavian    BREAST BIOPSY Right 2000    BRONCHOSCOPY  2010    CARDIAC CATHETERIZATION  02/17/2017    Dr Abhijit Brantley  03/21/2017    MVR, TVR    COLONOSCOPY      COLONOSCOPY  4/21/15    COLONOSCOPY N/A 9/3/2019    COLORECTAL CANCER SCREENING, NOT HIGH RISK performed by Fidel Rhodes MD at [address] [facility] [address] FISTULA CREATION Left 43/42/8701    radiocephalic, Delatore    DIALYSIS FISTULA CREATION Left 01/25/2018    brachioecephalic, ligation radiocephalic, Delatore    ENDOSCOPY, COLON, DIAGNOSTIC      EYE SURGERY Right     CATARACT    INTESTINAL BYPASS  1973    for weight loss    KIDNEY STONE SURGERY      x 3    OTHER SURGICAL HISTORY Left 11/16/2017    AV fistula creation left arm    OTHER SURGICAL HISTORY  01/17/2018    Left arm fistulogram by Dr Jose Lynn.  OTHER SURGICAL HISTORY  10/17/2018    Dr Tracey-Left arm fistuloplasty    [place] CREAT AV FISTULA,AUTOGENOUS GRAFT Left 3/22/2018    AV FISTULA  REVISION LEFT ARM performed by Ray Jett MD at [facility] [address] TUNNELED CV CATH Right 8/9/2018    REMOVAL OF TESIO CATHETER, REMOVAL OF MEDIPORT performed by Ray Jett MD at [address] Right 2000    RIGHT BREAST MOLE REMOVED    TRANSESOPHAGEAL ECHOCARDIOGRAM  02/03/2017    TUNNELED VENOUS CATHETER PLACEMENT Right     TUNNELED VENOUS PORT PLACEMENT Right     Powerport / x 4 / at right chest; since short gut syndrome received magnesium & sodium bicarb in past    VASC UPPER EXTREMITY VENOUS  UNI Left 58/54/2188    Brachiocephalic covered stent, iCast, 10 mm x 38 mm       Medications Prior to Admission:    Not in a hospital admission. Allergies:    Coumadin [warfarin sodium]; Quinine derivatives; and Other    Social History:    reports that she quit smoking about 23 years ago. Her smoking use included cigarettes. She started smoking about 31 years ago. She has a 16.00 pack-year smoking history. She has never used smokeless tobacco. She reports that she does not drink alcohol or use drugs. Family History:   family history includes Cirrhosis in her mother; Depression in her sister; Diabetes in her brother and father; Heart Failure in her mother; High Blood Pressure in her brother, mother, and sister; Obesity in her mother.     REVIEW OF SYSTEMS:  Gen: Negative for nausea, vomiting, diarrhea, fever, chills, night sweats  HEENT: Negative for double vision, blurred vision, sore throat   Heart: Negative for palpitation  Lungs:  positive for history of asthma  GI: Negative for nausea, vomiting  : Negative for dysuria, hematuria  Endo: Severely underactive thyroid  Heme: She has had a history of DVT in the past remote  Psych: Anxiety  Ortho: feels dizzy when she has low blood pressure    PHYSICAL EXAM:    Vitals:  BP (!) 97/55   Pulse 108   Temp 98.4 °F (36.9 °C)   Resp 18   Ht 5' 3\" (1.6 m)   Wt 179 lb (81.2 kg)   SpO2 98%   BMI 31.71 kg/m²     General:  Awake, alert, oriented X 3. Well developed, well nourished, well groomed. No apparent distress. HEENT:  Normocephalic, atraumatic. Pupils equal, round, reactive to light. No scleral icterus. No conjunctival injection. Normal lips, teeth, and gums. No nasal discharge. Neck:  Supple  Heart:  RRR, no murmurs, gallops, or rubs  Lungs:  CTA bilaterally, bilat symmetrical expansion, no wheeze, rales, or rhonchi  Abdomen: Bowel sounds present, soft, nontender, no masses, no organomegaly, no peritoneal signs  Extremities:  No clubbing, cyanosis,  significant for edema lower extremities 2+  Skin:  Warm and dry, no open lesions or rash  Neuro:  Cranial nerves 2-12 intact, no focal deficits  Breast: deferred  Rectal: deferred  Genitalia:  deferred      She is appropriate and neurologically intact but feels weak. Feels her arms and legs are heavy.   LABS:  CBC:   Lab Results   Component Value Date    WBC 6.9 10/21/2020    RBC 2.47 10/21/2020    HGB 8.7 10/21/2020    HCT 28.9 10/21/2020    .0 10/21/2020    MCH 35.2 10/21/2020    MCHC 30.1 10/21/2020    RDW 15.3 10/21/2020     10/21/2020    MPV 10.8 10/21/2020     CMP:    Lab Results   Component Value Date     10/21/2020    K 3.7 10/21/2020     10/21/2020    CO2 32 10/21/2020    BUN 11 10/21/2020    CREATININE 2.1 10/21/2020    GFRAA 28 10/21/2020    LABGLOM

## 2020-10-21 NOTE — ED NOTES
Bed: 24  Expected date:   Expected time:   Means of arrival:   Comments:  bety Green Estimable, RN  10/21/20 [id number]

## 2020-10-21 NOTE — ED NOTES
RN faxed SBAR to floor. Confirmation received and spoke with Kaylin Worthy. Pt ready for transport to room.  Floor made aware of BP       Lashawn Bernal RN  10/21/20 MALU Booth  10/21/20 [id number]

## 2020-10-21 NOTE — ED PROVIDER NOTES
Chief Complaint   Patient presents with    Hypotension     had dialysis today, states sent after dialysis for low blood pressure    Dizziness       Patient is a 78-year-old female presents today for hypotension and dizziness after receiving a full dialysis treatment. Patient has a history of end-stage renal disease on dialysis, attend Monday, Wednesday Friday. She follows with nephrology, Dr. Roldan Contreras. Patient states after she had her for treatment today, she had an issue with her blood pressure dropping into the 66R systolic. Patient was sent to the ER for evaluation. She states that she has been having issues with her blood pressure dropping low during her dialysis treatments, and they usually have to give her fluid back. Patient states she was recently taken off of her hypertension medications last week. She states she feels lightheaded and dizzy right now, symptoms not made better or worse by anything in specific. She denies chest pain, shortness of breath, fevers or chills. She denies numbness, tingling, weakness in extremities. She has endorsed generalized fatigue. The history is provided by the patient. No  was used. Review of Systems   Constitutional: Positive for fatigue. Negative for chills and fever. HENT: Negative for ear pain, sinus pressure and sore throat. Eyes: Negative for pain, discharge and redness. Respiratory: Negative for cough, shortness of breath and wheezing. Cardiovascular: Negative for chest pain. Gastrointestinal: Negative for abdominal distention, diarrhea, nausea and vomiting. Genitourinary: Negative for dysuria and frequency. Musculoskeletal: Negative for arthralgias and back pain. Skin: Negative for rash and wound. Neurological: Positive for dizziness and light-headedness. Negative for weakness and headaches. Hematological: Negative for adenopathy. All other systems reviewed and are negative.        Physical Exam  Vitals signs and nursing note reviewed. Constitutional:       General: She is not in acute distress. Appearance: Normal appearance. She is well-developed. She is not ill-appearing. HENT:      Head: Normocephalic and atraumatic. Eyes:      Pupils: Pupils are equal, round, and reactive to light. Neck:      Musculoskeletal: Normal range of motion and neck supple. Cardiovascular:      Rate and Rhythm: Normal rate and regular rhythm. Pulses: Normal pulses. Heart sounds: Normal heart sounds. No murmur. Pulmonary:      Effort: Pulmonary effort is normal. No respiratory distress. Breath sounds: Normal breath sounds. No wheezing or rales. Abdominal:      General: Bowel sounds are normal.      Palpations: Abdomen is soft. Tenderness: There is no abdominal tenderness. There is no guarding or rebound. Musculoskeletal:      Right lower leg: Edema present. Left lower leg: Edema present. Comments: Fistula left upper extremity   Skin:     General: Skin is warm and dry. Capillary Refill: Capillary refill takes less than 2 seconds. Neurological:      General: No focal deficit present. Mental Status: She is alert and oriented to person, place, and time. Cranial Nerves: No cranial nerve deficit.       Coordination: Coordination normal.   Psychiatric:         Mood and Affect: Mood normal.         Behavior: Behavior normal.          Procedures     Labs Reviewed   CBC WITH AUTO DIFFERENTIAL - Abnormal; Notable for the following components:       Result Value    RBC 2.47 (*)     Hemoglobin 8.7 (*)     Hematocrit 28.9 (*)     .0 (*)     MCH 35.2 (*)     MCHC 30.1 (*)     RDW 15.3 (*)     Eosinophils Absolute 0.04 (*)     All other components within normal limits   COMPREHENSIVE METABOLIC PANEL W/ REFLEX TO MG FOR LOW K - Abnormal; Notable for the following components:    CO2 32 (*)     Anion Gap 3 (*)     Glucose 101 (*)     CREATININE 2.1 (*)     Calcium 7.9 (*)     Alb 1.8 (*)     Alkaline Phosphatase 165 (*)     AST 54 (*)     All other components within normal limits   BRAIN NATRIURETIC PEPTIDE - Abnormal; Notable for the following components:    Pro-BNP 3,794 (*)     All other components within normal limits   URINALYSIS - Abnormal; Notable for the following components:    Ketones, Urine TRACE (*)     Blood, Urine MODERATE (*)     Protein, UA 30 (*)     Leukocyte Esterase, Urine LARGE (*)     All other components within normal limits   PROTIME-INR - Abnormal; Notable for the following components:    Protime 15.1 (*)     All other components within normal limits   MICROSCOPIC URINALYSIS - Abnormal; Notable for the following components:    WBC, UA >20 (*)     RBC, UA 5-10 (*)     Bacteria, UA MANY (*)     All other components within normal limits   TROPONIN   TYPE AND SCREEN     XR CHEST PORTABLE   Preliminary Result   Small bilateral pleural effusions with lower lobe atelectasis or infiltrate,   left greater than right. EKG #1:  I personally interpreted this EKG  Time:  1257    Rate: 90  Rhythm: Sinus. Interpretation: Sinus rhythm with premature atrial complexes, normal axis, no ST elevation. MDM  Number of Diagnoses or Management Options  Acute cystitis without hematuria:   Dizziness:   ESRD on dialysis [facility]):   Hypotension, unspecified hypotension type:   Diagnosis management comments: Patient is a 69-year-old female with a history of end-stage renal disease on dialysis who presents today for hypotension and dizziness after completing her dialysis treatment today. On presentation patient is alert and oriented x3, in no acute distress. No neurologic deficits are noted. Lab work shows hemoglobin of 8.7, WBC of 6.9. Electrolytes are within normal limits. Troponin is less than 0.01.  UA does show evidence of UTI with large leukocytes as as well as greater than 20 WBCs. EKG shows sinus rhythm with premature atrial complexes.   Patient was given multiple fluid boluses in the department for her hypertension. Chest x-ray shows small bilateral pleural effusions with left lower lobe atelectasis versus infiltrate. With patient having no cough, being afebrile, not having elevated white count, this is unlikely pneumonia in origin, this is likely atelectasis. Patient will be admitted to the hospital for further evaluation and treatment. PCP was consulted who agrees treatment. Patient is in agreement this plan. Amount and/or Complexity of Data Reviewed  Clinical lab tests: reviewed  Tests in the radiology section of CPT®: reviewed  Tests in the medicine section of CPT®: reviewed           ED Course as of Oct 21 1534   Wed Oct 21, 2020   1449 Spoke with PCP who will admit    []      ED Course User Index  [] Dustin Lara,        --------------------------------------------- PAST HISTORY ---------------------------------------------  Past Medical History:  has a past medical history of (HFpEF) heart failure with preserved ejection fraction ([address] Anemia, Asthma, CAD (coronary artery disease), Chronic kidney disease, Complication of AV dialysis fistula, initial encounter, COPD (chronic obstructive pulmonary disease) ([address] Fibromyalgia, GERD (gastroesophageal reflux disease), Hemodialysis patient ([facility] [address] History of blood transfusion, Hx of blood clots, Hyperlipidemia, Hypertension, Kidney failure, Kidney stone, YUNIOR on CPAP, Polymyalgia rheumatica ([address] Restless leg syndrome, Short gut syndrome, Thyroid disease, Traumatic hematoma of left upper arm, and Wears dentures. Past Surgical History:  has a past surgical history that includes Appendectomy; Intestinal Bypass (1973); Kidney stone surgery; Colonoscopy; Endoscopy, colon, diagnostic; bronchoscopy (2010); Colonoscopy (4/21/15); Tunneled venous port placement (Right); transesophageal echocardiogram (02/03/2017); Cardiac catheterization (02/17/2017); eye surgery (Right); skin biopsy (Right, 2000);  Breast biopsy (Right, 2000); Cardiac valve replacement (03/21/2017); Tunneled venous catheter placement (Right); other surgical history (Left, 11/16/2017); Dialysis fistula creation (Left, 11/16/2017); other surgical history (01/17/2018); Dialysis fistula creation (Left, 01/25/2018); AV fistula repair (Left, 03/22/2018); pr creat av fistula,autogenous graft (Left, 3/22/2018); pr removal tunneled cv cath (Right, 8/9/2018); other surgical history (10/17/2018); AV fistula repair (11/21/2018); vasc upper extremity venous  uni (Left, 11/21/2018); and Colonoscopy (N/A, 9/3/2019). Social History:  reports that she quit smoking about 23 years ago. Her smoking use included cigarettes. She started smoking about 31 years ago. She has a 16.00 pack-year smoking history. She has never used smokeless tobacco. She reports that she does not drink alcohol or use drugs. Family History: family history includes Cirrhosis in her mother; Depression in her sister; Diabetes in her brother and father; Heart Failure in her mother; High Blood Pressure in her brother, mother, and sister; Obesity in her mother. The patients home medications have been reviewed. Allergies: Coumadin [warfarin sodium];  Quinine derivatives; and Other    -------------------------------------------------- RESULTS -------------------------------------------------    LABS:  Results for orders placed or performed during the hospital encounter of 10/21/20   CBC Auto Differential   Result Value Ref Range    WBC 6.9 4.5 - 11.5 E9/L    RBC 2.47 (L) 3.50 - 5.50 E12/L    Hemoglobin 8.7 (L) 11.5 - 15.5 g/dL    Hematocrit 28.9 (L) 34.0 - 48.0 %    .0 (H) 80.0 - 99.9 fL    MCH 35.2 (H) 26.0 - 35.0 pg    MCHC 30.1 (L) 32.0 - 34.5 %    RDW 15.3 (H) 11.5 - 15.0 fL    Platelets 295 803 - 294 E9/L    MPV 10.8 7.0 - 12.0 fL    Neutrophils % 64.8 43.0 - 80.0 %    Immature Granulocytes % 0.3 0.0 - 5.0 %    Lymphocytes % 22.9 20.0 - 42.0 %    Monocytes % 10.7 2.0 - 12.0 % Eosinophils % 0.6 0.0 - 6.0 %    Basophils % 0.7 0.0 - 2.0 %    Neutrophils Absolute 4.47 1.80 - 7.30 E9/L    Immature Granulocytes # 0.02 E9/L    Lymphocytes Absolute 1.58 1.50 - 4.00 E9/L    Monocytes Absolute 0.74 0.10 - 0.95 E9/L    Eosinophils Absolute 0.04 (L) 0.05 - 0.50 E9/L    Basophils Absolute 0.05 0.00 - 0.20 E9/L    Polychromasia 1+    Comprehensive Metabolic Panel w/ Reflex to MG   Result Value Ref Range    Sodium 138 132 - 146 mmol/L    Potassium reflex Magnesium 3.7 3.5 - 5.0 mmol/L    Chloride 103 98 - 107 mmol/L    CO2 32 (H) 22 - 29 mmol/L    Anion Gap 3 (L) 7 - 16 mmol/L    Glucose 101 (H) 74 - 99 mg/dL    BUN 11 8 - 23 mg/dL    CREATININE 2.1 (H) 0.5 - 1.0 mg/dL    GFR Non-African American 23 >=60 mL/min/1.73    GFR African American 28     Calcium 7.9 (L) 8.6 - 10.2 mg/dL    Total Protein 6.4 6.4 - 8.3 g/dL    Alb 1.8 (L) 3.5 - 5.2 g/dL    Total Bilirubin 0.3 0.0 - 1.2 mg/dL    Alkaline Phosphatase 165 (H) 35 - 104 U/L    ALT 21 0 - 32 U/L    AST 54 (H) 0 - 31 U/L   Troponin   Result Value Ref Range    Troponin 0.01 0.00 - 0.03 ng/mL   Brain Natriuretic Peptide   Result Value Ref Range    Pro-BNP 3,794 (H) 0 - 125 pg/mL   Urinalysis, reflex to microscopic   Result Value Ref Range    Color, UA Yellow Straw/Yellow    Clarity, UA SL CLOUDY Clear    Glucose, Ur Negative Negative mg/dL    Bilirubin Urine Negative Negative    Ketones, Urine TRACE (A) Negative mg/dL    Specific Gravity, UA 1.015 1.005 - 1.030    Blood, Urine MODERATE (A) Negative    pH, UA 5.5 5.0 - 9.0    Protein, UA 30 (A) Negative mg/dL    Urobilinogen, Urine 0.2 <2.0 E.U./dL    Nitrite, Urine Negative Negative    Leukocyte Esterase, Urine LARGE (A) Negative   Protime-INR   Result Value Ref Range    Protime 15.1 (H) 9.3 - 12.4 sec    INR 1.3    Microscopic Urinalysis   Result Value Ref Range    WBC, UA >20 (A) 0 - 5 /HPF    RBC, UA 5-10 (A) 0 - 2 /HPF    Epithelial Cells, UA FEW /HPF    Bacteria, UA MANY (A) None Seen /HPF   EKG 12 Lead   Result Value Ref Range    Ventricular Rate 90 BPM    Atrial Rate 81 BPM    P-R Interval 142 ms    QRS Duration 122 ms    Q-T Interval 374 ms    QTc Calculation (Bazett) 457 ms    P Axis 66 degrees    R Axis 40 degrees    T Axis 67 degrees   TYPE AND SCREEN   Result Value Ref Range    ABO/Rh O POS     Antibody Screen NEG        RADIOLOGY:  XR CHEST PORTABLE   Preliminary Result   Small bilateral pleural effusions with lower lobe atelectasis or infiltrate,   left greater than right.               ------------------------- NURSING NOTES AND VITALS REVIEWED ---------------------------  Date / Time Roomed:  10/21/2020  1:00 PM  ED Bed Assignment:  24/24    The nursing notes within the ED encounter and vital signs as below have been reviewed. Patient Vitals for the past 24 hrs:   BP Temp Temp src Pulse Resp SpO2 Height Weight   10/21/20 1530 (!) 66/48 98 °F (36.7 °C) Oral 110 18 99 % -- --   10/21/20 1436 (!) 97/55 -- -- 108 18 98 % -- --   10/21/20 1404 (!) 74/46 -- -- 120 18 96 % -- --   10/21/20 1248 (!) 75/45 98.4 °F (36.9 °C) -- 95 16 -- 5' 3\" (1.6 m) 179 lb (81.2 kg)       Oxygen Saturation Interpretation: Normal    ------------------------------------------ PROGRESS NOTES ------------------------------------------  Re-evaluation(s):  Time: 1430  Patients symptoms show no change  Repeat physical examination is not changed    Counseling:  I have spoken with the patient and discussed todays results, in addition to providing specific details for the plan of care and counseling regarding the diagnosis and prognosis. Their questions are answered at this time and they are agreeable with the plan of admission.    --------------------------------- ADDITIONAL PROVIDER NOTES ---------------------------------  Consultations:  Time: 1950. Spoke with Dr. Yamilet Rocha. Discussed case. They will admit the patient.   This patient's ED course included: a personal history and physicial examination, re-evaluation prior to disposition, multiple bedside re-evaluations, IV medications, cardiac monitoring, continuous pulse oximetry and complex medical decision making and emergency management    This patient has remained hemodynamically stable during their ED course. Diagnosis:  1. Dizziness    2. Hypotension, unspecified hypotension type    3. ESRD on dialysis ([facility] [address]    4. Acute cystitis without hematuria        Disposition:  Patient's disposition: Admit to telemetry  Patient's condition is fair.          Elle Quinn DO  Resident  10/21/20 0250

## 2020-10-21 NOTE — ED NOTES
Pt reports she makes urine very seldom, but knows we need urine if she has to go.       Damian Claire RN  10/21/20 [id number]

## 2020-10-22 NOTE — PROGRESS NOTES
Telemetry showed pt has an irregular heart rate. EKG done to confirm and showed pt is in afib. Pt has history of afib and is asymptomatic.

## 2020-10-22 NOTE — PROGRESS NOTES
Department of Internal Medicine  Nephrology Attending Progress Note        SUBJECTIVE:  Mrs Lane Cruz is a 75-year-old woman on chronic dialysis dialysis Monday Wednesday Friday at [facility]. Saturday patient presented to the hospital because of low blood pressure was given some IV fluids. Yesterday she had been prescribed some midodrine to take but still had issues with her blood pressure, prompting a visit to the emergency room. EKG showed normal sinus rhythm with mild multiple PACs, patient was told some A. fib was seen on the monitor. Patient normal dry weight 78 kg, patient presented to dialysis 83.5 kg, they attempted 3 kg removal, patient was given some IV fluids in the emergency room with improvement in her blood pressure.   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  History of renal lithiasis  History of chronic obstructive airway disease related to tobacco quit smoking 1998  Hypertension  Bronchial asthma/obstructive sleep apnea on CPAP  Hypothyroid disease  Dyslipidemia  Short gut syndrome with chronic diarrhea  Polymyalgia rheumatica  History of DVT  Left AV fistula  Bilateral cataract extraction  History of mitral valve repair, tricuspid repair, left atrial appendage exclusion  Heart failure with preserved ejection fraction    Allergies include Coumadin,, quinine    History of smoking quit 1997 smoked for almost 31-year    Physical Exam:    Vitals:    10/22/20 1257   BP:    Pulse:    Resp:    Temp:    SpO2: 100%       I/O last 24 hours:  Intake/Output just admitted    Weight: 181    General Appearance:  awake, alert, oriented, in no acute distress  Skin: No rashes  Neck:  neck- supple, no mass, non-tender and no bruits  Lungs: Distant breath sounds diminished at bases  Heart: Irregular rhythm     Abdominal: Abdomen soft, non-tender.  BS normal. No masses,  No organomegaly, old surgical scar  Extremities: +2 pitting edema both lower extremities  Peripheral Pulses:  +2    DATA:    CBC with Differential:    Lab Results   Component Value Date    WBC 6.3 10/22/2020    RBC 2.40 10/22/2020    HGB 8.4 10/22/2020    HCT 28.5 10/22/2020     10/22/2020    .8 10/22/2020    MCH 35.0 10/22/2020    MCHC 29.5 10/22/2020    RDW 15.4 10/22/2020    LYMPHOPCT 22.9 10/21/2020    MONOPCT 10.7 10/21/2020    BASOPCT 0.7 10/21/2020    MONOSABS 0.74 10/21/2020    LYMPHSABS 1.58 10/21/2020    EOSABS 0.04 10/21/2020    BASOSABS 0.05 10/21/2020     CMP:    Lab Results   Component Value Date     10/22/2020    K 3.1 10/22/2020    K 3.7 10/21/2020     10/22/2020    CO2 27 10/22/2020    BUN 18 10/22/2020    CREATININE 2.8 10/22/2020    GFRAA 20 10/22/2020    LABGLOM 17 10/22/2020    GLUCOSE 81 10/22/2020    PROT 5.8 10/22/2020    LABALBU 1.7 10/22/2020    CALCIUM 7.8 10/22/2020    BILITOT 0.3 10/22/2020    ALKPHOS 159 10/22/2020    AST 27 10/22/2020    ALT 17 10/22/2020     Chest x-ray showing small bilateral pleural effusions  UA showing multiple WBCs positive leukocyte esterase     aspirin  81 mg Oral Daily    atorvastatin  20 mg Oral Nightly    Calcium Acetate (Phos Binder)  667 mg Oral 4x Daily    darbepoetin izabella-polysorbate  60 mcg Subcutaneous Q14 Days    fenofibrate  160 mg Oral Once per day on Mon Wed Fri    ferrous sulfate  325 mg Oral BID    folic acid  1 mg Oral Daily    levothyroxine  137 mcg Oral Daily    liothyronine  5 mcg Oral Daily    magnesium oxide  400 mg Oral Daily    montelukast  10 mg Oral Daily    pregabalin  50 mg Oral TID    rOPINIRole  0.5 mg Oral Nightly    sodium bicarbonate  650 mg Oral BID    ipratropium  0.5 mg Nebulization 4x daily    vitamin B-12  1,000 mcg Oral Daily    vitamin D  5,000 Units Oral BID    midodrine  10 mg Oral TID     budesonide  0.5 mg Nebulization BID    Arformoterol Tartrate  15 mcg Nebulization BID       albuterol, melatonin, perflutren lipid microspheres, acetaminophen    IMPRESSION/RECOMMENDATIONS:      End-stage renal disease on dialysis Monday Wednesday Friday  Intradialytic hypotension despite being above target weight suspect her hypoalbuminemia is making it difficult to mobilize third spaced edema. Plan on giving some albumin with dialysis tomorrow  Anemia continue JEWELL therapy, iron studies were okay on September his labs.   Able to hold oral iron as patient is having ongoing diarrhea not clear how much she is absorbing  Urinary tract infection patient given some Rocephin empirically  Hypokalemia will give some potassium may be contributing to her arrhythmia, check magnesium level    Isidro Henriquez MD  10/22/2020 1:40 PM

## 2020-10-22 NOTE — PROGRESS NOTES
Consults called to Dr. Joycelyn Garnett for anemia and Dr. La Aly for hemodialysis. Consult called to Fahad Screen PA covering for Dr. Fuad Gavin for hypotension.

## 2020-10-22 NOTE — PROGRESS NOTES
Internal Medicine Progress Note      Synopsis: Patient admitted on 10/21/2020    Subjective    Clinically improving. Feeling better. Stable overnight. No other overnight issues reported. she has had lower BP and did not have s/s with this       Exam:  BP 88/62 Comment: manual  Pulse 118   Temp 98.3 °F (36.8 °C) (Axillary)   Resp 18   Ht 5' 3\" (1.6 m)   Wt 181 lb 1 oz (82.1 kg)   SpO2 98%   BMI 32.07 kg/m²   General appearance: No apparent distress, appears stated age and cooperative. HEENT: Pupils equal, round, and reactive to light. Conjunctivae/corneas clear. Neck: Supple. No jugular venous distention. Trachea midline. Respiratory:  Normal respiratory effort. Clear to auscultation, bilaterally without Rales/Wheezes/Rhonchi. Cardiovascular: Regular rate and rhythm with normal S1/S2 without murmurs, rubs or gallops. Abdomen: Soft, non-tender, non-distended with normal bowel sounds. Musculoskeletal: No clubbing, cyanosis   2 plus edema b/l le s).    Skin:  No rashes    Neurologic: awake, alert and following commands     Medications:  Reviewed    Infusion Medications   Scheduled Medications    [START ON 10/23/2020] albumin human  25 g Intravenous Once    potassium chloride  20 mEq Oral BID WC    [START ON 10/23/2020] cosyntropin  250 mcg Intravenous Once    [START ON 10/23/2020] epoetin izabella-epbx  10,000 Units Subcutaneous Once per day on Mon Wed Fri    And    [START ON 10/23/2020] epoetin izabella-epbx  5,000 Units Subcutaneous Once per day on Mon Wed Fri    aspirin  81 mg Oral Daily    atorvastatin  20 mg Oral Nightly    Calcium Acetate (Phos Binder)  667 mg Oral 4x Daily    fenofibrate  160 mg Oral Once per day on Mon Wed Fri    ferrous sulfate  325 mg Oral BID    folic acid  1 mg Oral Daily    levothyroxine  137 mcg Oral Daily    liothyronine  5 mcg Oral Daily    magnesium oxide  400 mg Oral Daily    montelukast  10 mg Oral Daily    pregabalin  50 mg Oral TID    rOPINIRole  0.5 mg Oral Nightly    sodium bicarbonate  650 mg Oral BID    ipratropium  0.5 mg Nebulization 4x daily    vitamin B-12  1,000 mcg Oral Daily    vitamin D  5,000 Units Oral BID    midodrine  10 mg Oral TID WC    budesonide  0.5 mg Nebulization BID    Arformoterol Tartrate  15 mcg Nebulization BID     PRN Meds: albuterol, melatonin, perflutren lipid microspheres, acetaminophen    I/O    Intake/Output Summary (Last 24 hours) at 10/22/2020 1456  Last data filed at 10/22/2020 0819  Gross per 24 hour   Intake 240 ml   Output 200 ml   Net 40 ml       Labs:   Recent Labs     10/21/20  1319 10/22/20  0438   WBC 6.9 6.3   HGB 8.7* 8.4*   HCT 28.9* 28.5*    168       Recent Labs     10/21/20  1319 10/22/20  0438    140   K 3.7 3.1*    103   CO2 32* 27   BUN 11 18   CREATININE 2.1* 2.8*   CALCIUM 7.9* 7.8*       Recent Labs     10/21/20  1319 10/22/20  0438   PROT 6.4 5.8*   ALKPHOS 165* 159*   ALT 21 17   AST 54* 27   BILITOT 0.3 0.3       Recent Labs     10/21/20  1319   INR 1.3       Recent Labs     10/21/20  1319   TROPONINI 0.01       Chronic labs:  Lab Results   Component Value Date    CHOL 96 2020    TRIG 131 2020    HDL 36 2020    LDLCALC 34 2020    TSH 1.150 10/21/2020    INR 1.3 10/21/2020    LABA1C <4.0 2019       Radiology:  Xr Lumbar Spine (2-3 Views)    Result Date: 2020  Patient MRN:  [id number] : 1955 Age: 77 years Gender: Female Order Date:  2020 12:41 PM EXAM: XR LUMBAR SPINE (2-3 VIEWS) INDICATION:  low back pain, fall low back pain, fall COMPARISON: None FINDINGS:  Vertebral body height and alignment are normal. There is some anterior spurs at L1-2 and L2-3. There is mild disc space narrowing and endplate sclerosis at G5-0 and L5-S1. Innumerable calculi in the right mid abdomen could reflect a fragmented renal calculus.      No acute lumbar process is noted     Xr Hip Bilateral W Ap Pelvis (2 Views)    Result Date: 2020  Patient MRN: [id number] : 1955 Age:  77 years Gender: Female Order Date: 2020 12:41 PM Exam: XR HIP BILATERAL W AP PELVIS (2 VIEWS) Number of Images: 5 views Indication:   fall,pain fall,pain Comparison: None. Findings: Examination of the pelvis in AP view shows no radiographic evidence of fracture or bony abnormality. No osteolytic or blastic lesions are identified. The soft tissue outlines are normal. There are surgical clips seen in the mid abdomen and right upper pelvic region. The bilateral femoral head is well conjugated within the acetabulum. There is no evidence of dislocation. Osteoarthropathy of both hip joints with narrowing and osteophytes seen inferiorly. The soft tissue appears to be normal..      No acute fracture or dislocation Osteoarthropathy of both hips. Xr Knee Left (min 4 Views)    Result Date: 2020  Patient MRN:  [id number] : 1955 Age: 77 years Gender: Female Order Date:  2020 12:41 PM EXAM: XR KNEE RIGHT (MIN 4 VIEWS), XR KNEE LEFT (MIN 4 VIEWS) INDICATION:  fall,pain fall,pain COMPARISON: None FINDINGS:  No compartmental narrowing, fracture or obvious joint effusion is noted about either knee. No acute process     Xr Knee Right (min 4 Views)    Result Date: 2020  Patient MRN:  [id number] : 1955 Age: 77 years Gender: Female Order Date:  2020 12:41 PM EXAM: XR KNEE RIGHT (MIN 4 VIEWS), XR KNEE LEFT (MIN 4 VIEWS) INDICATION:  fall,pain fall,pain COMPARISON: None FINDINGS:  No compartmental narrowing, fracture or obvious joint effusion is noted about either knee. No acute process     Xr Chest Portable    Result Date: 10/21/2020  EXAMINATION: ONE XRAY VIEW OF THE CHEST 10/21/2020 1:31 pm COMPARISON: 2018. HISTORY: ORDERING SYSTEM PROVIDED HISTORY: fatigue TECHNOLOGIST PROVIDED HISTORY: Reason for exam:->fatigue Initial FINDINGS: Small effusions bilaterally with lower lobe atelectasis or infiltrate, left greater than right.   No overt pulmonary edema.  There are stable coarsened interstitial markings. Stable cardiomediastinal silhouette. Small bilateral pleural effusions with lower lobe atelectasis or infiltrate, left greater than right. ASSESSMENT:    Principal Problem:    Hypotension  Active Problems:    Hypomagnesemia    Hypothyroid    Acute on chronic diastolic congestive heart failure (HCC)    MANUEL (acute kidney injury) (HCC)    Chronic renal failure, stage 5 (HCC)    ESRD (end stage renal disease) ([facility] [address]    Hematoma  Resolved Problems:    * No resolved hospital problems. *       PLAN:    1. Off of all anti  htn meds   2. Still on hemodialysis   3. No further cardiac work up planned   4. Will  Order cosyntropin test for her   5.no fib at this time   6. Still 3 rd spaced fluid on legs   Diet: DIET RENAL;  Diet Tube Feed Modular: Protein Modular (pt requesting boost with 20 gm of protein )  Code Status: Prior  PT/OT Eval Status:   Can order  Recommended disposition at discharge:    home    +++++++++++++++++++++++++++++++++++++++++++++++++  Marii Rosas MD   [facility].  +++++++++++++++++++++++++++++++++++++++++++++++++  NOTE: This report was transcribed using voice recognition software. Every effort was made to ensure accuracy; however, inadvertent computerized transcription errors may be present.

## 2020-10-22 NOTE — CONSULTS
Inpatient Cardiology Consultation      Reason for Consult:  Hypotension     Consulting Physician: Dr Myron Molina     Requesting Physician:  Dr Yamilet Rocha     Date of Consultation: 10/22/2020    HISTORY OF PRESENT ILLNESS:   Ms Alexandro Lopez is a 78 yo female who previously followed with Dr Mckay Cochran, last seen in 2017. She has a PMH that includes VHD s/p MV repair, TR repair and VIRGEN exclusion on 03/21/2017, obesity, HTN, HLD, ESRD on HD, polymyalgia rheumatica, hypothyroidism. Presented to [facility] 10/21/2020 15:52 for hypotension at dialysis   VS: 98.4-95-16-96%-75/45  Labs: WBC 6.9, H&H 8.7/28.9, Plt 196. K+ 3.7, BUN/Scr 11/2.1, glucose 101   Troponin negative PROBNP 3794  UA positive leukocyte esterase  CXR small bilateral pleural effusions with possible left lower lobe infiltrate     He was admitted to a telemetry monitoring floor. Cardiology was asked to see the patient for hypotension. SBP remains , with HR . She was given IV fluids. An echocardiogram was ordered by the primary service. She states that for the last two week she has been having hypotension prior to dialysis so they have been giving her Midodrine prior to dialysis. Yesterday her BP was in the 70s prior to dialysis so they sent her into the hospital. She states that she has been symptomatic for the last two weeks with fatigue, weakness, lightheadedness and dizziness. She did have one fall on Tuesday at [facility] that she attributes to a mechanical fall. Otherwise, ROS is negative. She states that since her admission she is feeling better and was able to walk to the rest room without getting LH/dizzy. Please note: past medical records were reviewed per electronic medical record (EMR) - see detailed reports under Past Medical/ Surgical History. Past Medical and Surgical History:    1. Morbid obesity. BMI: 41. S/p intestinal bypass in the 1970s  2. Allergies to coumadin and quinine (hives). 3. Former smoker, 16 pack year smoking history. Quit 2/1/1997.  4. Hypertension. 5. Bronchial asthma and COPD.  6. Obstructive sleep apnea on CPAP. 7. ESRD on HD M, W, F   8. Anemia of chronic disease, receiving IV iron and retacrit at dialysis   9. Hypothyroidism. 10. Short cut syndrome. 11. Dyslipidemia. 12. No prior history of CAD, PAD, CVA, or TIA. 13. Echocardiogram, 9/27/2011. Normal EF. Mild left atrial enlargement. Normal RVSP.   14. History of DVT attributed to oral contraceptives. 15. Polymyalgia rheumatica. 16. Echocardiogram, 1/5/2014. LVH. Normal EF. Normal diastolic function, moderate left atrial enlargement. Normal RVSP.  17. CHERI 02/03/2017 (Dr. Carrie Padron): Normal left ventricular systolic function. EF 60%. Borderline dilated RV with normal right ventricular function. Severely dilated LA. Positive bubble study with mild degree of right to left interatrial shunting (small         PFO). Severe MR (central jet). MR regurgitant volume >60 mL and MR ERO 0.4 cm2. Moderate TR. RV-RA gradient is estimated at 37 mmHg. Mild PHTN. 18. Cardiac catheterization (Dr. Emilee Pinon 02/17/2017): Normal epicardial coronaries. 19. S/p Sternotomy. Mitral valve repair with 28 mm Future complete ring. Tricuspid valve repair with 28 mm Giron MC3 band. Left atrial appendage exclusion with 45 mm AtriClip. Rigid internal fixation of the sternum using KLS plates         E9>>[id number]/34/[id number] (Dr. Cyndi Chen)  20. TTE 4/13/2017: Left ventricular size is grossly normal.  Normal systolic function with LVEF estimated at 65%. Indeterminate diastolic function   Normal left atrium by volume index(28ml/m2)   S/p Mitral valve repair. Mean gradient=9mmHg\  Mild tricuspid regurgitation. Normal estimated PA pressures. 21. S/p Appendectomy, right breast biopsy, bilateral cataract removal, kidney stone removal  22. GTC Seizure postoperatively 03/22/2017 (evaluated by Neurology)  23.  AV fistula creation, left radio cephalic [id number]/58/1671     Medications Prior to admit:  Prior to Admission medications    Medication Sig Start Date End Date Taking?  Authorizing Provider   dantrolene (DANTRIUM) 50 MG capsule TAKE ONE CAPSULE BY MOUTH TWO TIMES A DAY 10/14/20  Yes GABRIELLA Meyer - CNS   rOPINIRole (REQUIP) 0.5 MG tablet TAKE ONE TABLET BY MOUTH nightlY 9/29/20  Yes Oswaldo Cm MD   montelukast (SINGULAIR) 10 MG tablet TAKE ONE TABLET BY MOUTH DAILY 9/29/20  Yes Oswaldo Cm MD   torsemide (DEMADEX) 20 MG tablet Take 20 mg by mouth daily  9/20/18  Yes Historical Provider, MD   Acetaminophen (TYLENOL ARTHRITIS PAIN PO) Take 3 tablets by mouth 2 times daily    Yes Historical Provider, MD   Biotin w/ Vitamins C & E (HAIR/SKIN/NAILS PO) Take by mouth Ld 8/28/2019   Yes Historical Provider, MD   fenofibrate (TRICOR) 145 MG tablet Take 145 mg by mouth every morning Patient taking Mon, Wed, Fri 6/19/18  Yes Historical Provider, MD   calcium carbonate (TUMS) 500 MG chewable tablet Take 2 tablets by mouth 3 times daily    Yes Historical Provider, MD   sodium bicarbonate 650 MG tablet Take 650 mg by mouth 2 times daily    Yes Historical Provider, MD   magnesium oxide (MAG-OX) 400 MG tablet Take 400 mg by mouth daily    Yes Historical Provider, MD   levothyroxine (SYNTHROID) 137 MCG tablet Take 137 mcg by mouth Daily   Yes Historical Provider, MD   calcium acetate (PHOSLO) 667 MG capsule Take by mouth 4 times daily   Yes Historical Provider, MD   furosemide (LASIX) 40 MG tablet Take 1 tablet by mouth daily  Patient taking differently: Take 20 mg by mouth nightly  4/14/17  Yes Henrique Sutherland MD   liothyronine (CYTOMEL) 5 MCG tablet Take 5 mcg by mouth daily   Yes Historical Provider, MD   aspirin 81 MG EC tablet Take 1 tablet by mouth daily 3/30/17  Yes Marcia Nelson APRN - CNP   atorvastatin (LIPITOR) 20 MG tablet Take 20 mg by mouth three times a week    Yes Historical Provider, MD   Coenzyme Q10 (COQ10 PO) Take by mouth nightly    Yes Historical Provider, MD   vitamin B-12 (CYANOCOBALAMIN) 1000 MCG tablet Take 1,000 mcg by mouth daily Ld 8/28/2019   Yes Historical Provider, MD   darbepoetin izabella-polysorbate (ARANESP, ALBUMIN FREE,) 60 MCG/ML injection Inject into the skin every 14 days Twice/per month  Dose given 3-16-18 at dialysis   Yes Historical Provider, MD   CPAP Machine MISC Please replace patients CPAP at 8 cm water pressure with heated humidification and C-Flex of 3. Patient states she is due for a new machine and hers is not working. Also provide masks, tubing, filters, head gear, and water chambers as needed. Diagnosis-YUNIOR  Faxed to [facility]  Length of need 99 months 9/23/16  Yes Jo Neal MD   vitamin D-3 (CHOLECALCIFEROL) 5000 UNITS TABS Take 5,000 Units by mouth 2 times daily LD 8/28/2019   Yes Historical Provider, MD   folic acid (FOLVITE) 1 MG tablet Take 1 mg by mouth daily Ld 8/28/2019   Yes Historical Provider, MD   pregabalin (LYRICA) 50 MG capsule Take 50 mg by mouth 3 times daily Instructed to take am of procedure. Yes Historical Provider, MD   albuterol (ACCUNEB) 0.63 MG/3ML nebulizer solution INHALE 1 VIAL VIA NEBULIZER EVERY 4 HOURS AS NEEDED FOR WHEEZING OR SHORTNESS OF BREATH  7/6/20   Jo Neal MD   Umeclidinium Bromide 62.5 MCG/INH AEPB Inhale 1 puff into the lungs daily 1/21/20   Jo Neal MD   albuterol sulfate HFA (VENTOLIN HFA) 108 (90 Base) MCG/ACT inhaler Inhale 2 puffs into the lungs every 6 hours as needed for Wheezing 12/19/19   Jo Neal MD   Tens Unit [facility] [place] Does not apply route Indications: Patient with 2-years of back myofascial pain and spasm with good relief from TENS at PT in past.  She has end-stage kidney disease on dialysis so medications are limited.  9/5/19   Eligio Carlson DO   FERROUS BISGLYCINATE CHELATE PO Take by mouth    Historical Provider, MD   Methoxy PEG-Epoetin Beta (MIRCERA) 75 MCG/0.3ML SOSY Infuse 75 mcg intravenously every 14 days    Historical Provider, MD   melatonin 5 MG TABS tablet Take 10 mg by mouth nightly as needed     Historical Provider, MD       Current Medications:    Current Facility-Administered Medications: albuterol (ACCUNEB) nebulizer solution 0.63 mg, 0.63 mg, Nebulization, 4x Daily PRN  aspirin EC tablet 81 mg, 81 mg, Oral, Daily  atorvastatin (LIPITOR) tablet 20 mg, 20 mg, Oral, Nightly  Calcium Acetate (Phos Binder) CAPS 667 mg, 667 mg, Oral, 4x Daily  darbepoetin izabella-polysorbate (ARANESP) injection 60 mcg, 60 mcg, Subcutaneous, Q14 Days  fenofibrate (TRIGLIDE) tablet 160 mg, 160 mg, Oral, Once per day on Mon Wed Fri  ferrous sulfate (IRON 325) tablet 325 mg, 325 mg, Oral, BID  folic acid (FOLVITE) tablet 1 mg, 1 mg, Oral, Daily  levothyroxine (SYNTHROID) tablet 137 mcg, 137 mcg, Oral, Daily  liothyronine (CYTOMEL) tablet 5 mcg, 5 mcg, Oral, Daily  magnesium oxide (MAG-OX) tablet 400 mg, 400 mg, Oral, Daily  melatonin tablet 6 mg, 6 mg, Oral, Nightly PRN  montelukast (SINGULAIR) tablet 10 mg, 10 mg, Oral, Daily  pregabalin (LYRICA) capsule 50 mg, 50 mg, Oral, TID  rOPINIRole (REQUIP) tablet 0.5 mg, 0.5 mg, Oral, Nightly  sodium bicarbonate tablet 650 mg, 650 mg, Oral, BID  ipratropium (ATROVENT) 0.02 % nebulizer solution 0.5 mg, 0.5 mg, Nebulization, 4x daily  vitamin B-12 (CYANOCOBALAMIN) tablet 1,000 mcg, 1,000 mcg, Oral, Daily  vitamin D (CHOLECALCIFEROL) tablet 5,000 Units, 5,000 Units, Oral, BID  midodrine (PROAMATINE) tablet 10 mg, 10 mg, Oral, TID WC  perflutren lipid microspheres (DEFINITY) injection 1.65 mg, 1.5 mL, Intravenous, ONCE PRN  budesonide (PULMICORT) nebulizer suspension 500 mcg, 0.5 mg, Nebulization, BID  Arformoterol Tartrate (BROVANA) nebulizer solution 15 mcg, 15 mcg, Nebulization, BID  acetaminophen (TYLENOL) tablet 650 mg, 650 mg, Oral, Q6H PRN    Allergies:  Coumadin [warfarin sodium]; Quinine derivatives; and Other    Social History:    Lives in a one story home with her  and children. Does not wear home O2.  Sh does have a cane that she uses intermittently. Denies tobacco, alcohol, illicit drug use. Full code 9/3/2019     Family History: This information was not obtained at this time as it is found noncontributory secondary to the patients advanced age. REVIEW OF SYSTEMS:     · Constitutional: Denies fevers, chills, night sweats, and+ fatigue   · HEENT: Denies headaches, nose bleeds, and blurred vision,oral pain, abscess or lesion. · Musculoskeletal: + weakness,  falls, Deniespain to BLE with ambulation and edema to BLE. · Neurological: +dizziness and lightheadedness, numbness and tingling  · Cardiovascular: Denies chest pain, palpitations, and feelings of heart racing. · Respiratory: Denies orthopnea and PND, cough, ERVIN  · Gastrointestinal: Denies heartburn, nausea/vomiting, diarrhea and constipation, black/bloody, and tarry stools. · Genitourinary: Denies dysuria and hematuria  · Hematologic: Denies excessive bruising or bleeding  · Lymphatic: Denies lumps and bumps to neck, axilla, breast, and groin      PHYSICAL EXAM:   /61   Pulse 114   Temp 98.1 °F (36.7 °C) (Oral)   Resp 18   Ht 5' 3\" (1.6 m)   Wt 181 lb 1 oz (82.1 kg)   SpO2 92%   BMI 32.07 kg/m²   CONST:  Well developed, obese  female who appears her stated age. Awake, alert, cooperative, no apparent distress  HEENT:   Head- Normocephalic, atraumatic   Eyes- Conjunctivae pink, anicteric  Throat- Oral mucosa pink and moist  Neck-  No stridor, trachea midline, no jugular venous distention. CHEST: Chest symmetrical and non-tender to palpation. RESPIRATORY: Lung sounds clear throughout fields bilaterally. No wheeze or rhonchi noted. CARDIOVASCULAR:     No carotid bruit noted bilaterally   Heart Ausculation- Irregular, tachycardic,  PV: No lower extremity edema. No varicosities. ABDOMEN: firm, distended, non-tender to light palpation. Bowel sounds present. MS: Good muscle strength and tone. No atrophy or abnormal movements.    : Deferred   SKIN: Warm and dry no statis dermatitis or ulcers   NEURO / PSYCH: Oriented to person, place and time. Speech clear and appropriate. Follows all commands. Pleasant affect     DATA:    ECG: per Dr Renee Gaffney strips: ST with PACs HR 100s   Diagnostic:      Intake/Output Summary (Last 24 hours) at 10/22/2020 0903  Last data filed at 10/22/2020 0819  Gross per 24 hour   Intake 240 ml   Output 200 ml   Net 40 ml       Labs:   CBC:   Recent Labs     10/21/20  1319 10/22/20  0438   WBC 6.9 6.3   HGB 8.7* 8.4*   HCT 28.9* 28.5*    168     BMP:   Recent Labs     10/21/20  1319 10/22/20  0438    140   K 3.7 3.1*   CO2 32* 27   BUN 11 18   CREATININE 2.1* 2.8*   LABGLOM 23 17   CALCIUM 7.9* 7.8*     TSH:   Recent Labs     10/21/20  2221   TSH 1.150     HgA1c:   Lab Results   Component Value Date    LABA1C <4.0 12/30/2019     PT/INR:   Recent Labs     10/21/20  1319   PROTIME 15.1*   INR 1.3     CARDIAC ENZYMES:  Recent Labs     10/21/20  1319   TROPONINI 0.01     FASTING LIPID PANEL:  Lab Results   Component Value Date    CHOL 96 02/27/2020    HDL 36 02/27/2020    LDLCALC 34 02/27/2020    TRIG 131 02/27/2020     LIVER PROFILE:  Recent Labs     10/21/20  1319 10/22/20  0438   AST 54* 27   ALT 21 17   LABALBU 1.8* 1.7*       Assessment and Plan per Dr Naheed Darden   Electronically signed by Rachele Pollard, [id number] on 10/22/2020 at 9:03 AM     ______________________________________________________________________  I independently interviewed and examined the patient. I have reviewed the above documentation completed by the CRISTAL. Please see my additional contributions to the HPI, physical exam, and assessment / medical decision making.     HPI, ROS, PMH, PSH, [address], [place], and medications independently reviewed (agree; see above documentation)    Review of Systems:  Cardiac: As per HPI  General: No fever, chills  Pulmonary: As per HPI  GI: No nausea, vomiting  Musculoskeletal: [place] x 4, no focal motor deficits  Skin: Intact, no rashes  Neuro/Psych: No headache or seizures    Physical Exam:  /62 Comment: manual  Pulse 86   Temp 97.9 °F (36.6 °C) (Oral)   Resp 18   Ht 5' 3\" (1.6 m)   Wt 181 lb 1 oz (82.1 kg)   SpO2 96%   BMI 32.07 kg/m²   Appearance: Awake, alert, no acute respiratory distress  Skin: Intact, no rash  Head: Normocephalic, atraumatic  Neck: Supple, no carotid bruits  Lungs: Clear to auscultation bilaterally. No wheezes, rales, or rhonchi. Cardiac: Regular rate and rhythm, +S1S2, no murmurs apparent  Abdomen: Soft, +bowel sounds  Extremities: Moves all extremities x 4, no lower extremity edema  Neurologic: No focal motor deficits apparent, normal mood and affect    Assessment/Plan:  49-year-old female with past medical history of mitral and tricuspid valve repair in 2017, hypertension, end-stage renal disease on hemodialysis, atrial clip for left atrial appendage in 2017, right bundle branch block comes here for hypertension during dialysis and lightheadedness. Patient otherwise does not have any other cardiac symptoms. Recommendations  Echocardiogram shows no abnormalities in either mitral or tricuspid valve with no wall motion abnormalities in the left ventricle except for paradoxical septal wall motion related to her previous cardiac surgery. Physical examination shows significant fluid overload with ascites, lower limbs edema and bilateral pleural effusion. I agree that the patient will need removal of fluid through dialysis. I agree with midodrine to preserve blood pressure during dialysis. No need for further cardiac work-up.     Riki Rodriguez MD  [facility]) Cardiology

## 2020-10-22 NOTE — CONSULTS
[facility]  Hematology/Oncology  Consult      Patient Name: Christine De Leon  YOB: 1955  PCP: Keke Sosa MD   Referring Provider:      Reason for Consultation:   Chief Complaint   Patient presents with    Hypotension     had dialysis today, states sent after dialysis for low blood pressure    Dizziness        History of Present Illness: This is a 78 yo female patient of Dr. Barbra Montanez who is being seen for anemia due to iron deficiency and chronic kidney disease. She is status post gastrectomy and stopped responding to IV iron by itself. She now has end-stage renal disease and is on hemodialysis and receiving IV iron and retacrit at hemodialysis. She is also being observed for cytopenias due to BUCKLEY and splenomegaly. She also has a significant past medical history of heart failure with preserved EF, asthma, CAD, COPD, GERD, HLD HTN, YUNIOR on CPAP, short gut syndrome, and history of blood clots. Patient presented to the emergency room with hypotension and dizziness after receiving her hemodialysis treatment which she receives Monday, Wednesday, and Friday. She follows nephrology Dr. Memo Garcia. She stated that after dialysis her systolic blood pressure dropped into the 80s and was sent to the emergency room for evaluation. She reported that she has been having issues with her blood pressure dropping during dialysis treatments and they have been giving her fluids. She was previously on hypertensive medication but was taken off last week. Upon arrival she reported being lightheaded and dizzy with symptoms not being made worse or better by anything specific. She also reported generalized fatigue. She denied chest pain, shortness of breath, fever, or chills. A chest xray showed small bilateral pleural effusions with lower lobe atelectasis or infiltrate, left greater than right. Blood work showed anemia with Hgb 8.7.      Diagnostic Data:     Past Medical History:   Diagnosis Date    (HFpEF) heart failure with preserved ejection fraction ([address] 01/25/2017 4/11/17- echo- LVEF 55-60%, stage II DD, severely dilated LA, severe MR, mild TR, LVDD: 5.6 cm    Anemia     hx  / takes aranesp injections every 2 weeks    Asthma     well controlled with inhalers     CAD (coronary artery disease)     Chronic kidney disease     stage 4    Complication of AV dialysis fistula, initial encounter 6/2/2018    COPD (chronic obstructive pulmonary disease) (HCC)     Fibromyalgia     GERD (gastroesophageal reflux disease)     Hemodialysis patient ([address]     chato kim [place]    History of blood transfusion spring 2017    Hx of blood clots     h/o DVT in leg at age 25yrs    Hyperlipidemia     Hypertension     well controlled with medications     Kidney failure     Kidney stone     multiple issues    YUNIOR on CPAP     Polymyalgia rheumatica (HCC)     Restless leg syndrome     Short gut syndrome     Thyroid disease     Traumatic hematoma of left upper arm 6/2/2018    Wears dentures     upper partial       Patient Active Problem List    Diagnosis Date Noted    Hypotension 10/21/2020    Thrombocytopenia ([address] 06/02/2018    Hematoma 06/02/2018    Traumatic hematoma of left upper arm 51/85/8143    Complication of AV dialysis fistula, initial encounter 06/02/2018    ESRD (end stage renal disease) ([address] 03/07/2018    Problem with dialysis access ([address] 01/17/2018    Hypervolemia     Acute on chronic diastolic congestive heart failure (HCC)     S/P MVR (mitral valve repair)     S/P TVR (tricuspid valve repair)     MANUEL (acute kidney injury) ([address]     Chronic renal failure, stage 5 ([address]     Respiratory arrest ([address]     Non-rheumatic mitral regurgitation 02/09/2017    (HFpEF) heart failure with preserved ejection fraction ([address] 01/25/2017    Cellulitis of right lower extremity 01/31/2016    Exacerbation of asthma 01/06/2016    Renal failure (ARF), acute on chronic ([address] 01/06/2016    HTN Family History  Family History   Problem Relation Age of Onset    Obesity Mother     Cirrhosis Mother     Heart Failure Mother     High Blood Pressure Mother     Diabetes Father     High Blood Pressure Sister     Depression Sister     Diabetes Brother     High Blood Pressure Brother        Social History    TOBACCO:   reports that she quit smoking about 23 years ago. Her smoking use included cigarettes. She started smoking about 31 years ago. She has a 16.00 pack-year smoking history. She has never used smokeless tobacco.  ETOH:   reports no history of alcohol use. Home Medications  Prior to Admission medications    Medication Sig Start Date End Date Taking?  Authorizing Provider   dantrolene (DANTRIUM) 50 MG capsule TAKE ONE CAPSULE BY MOUTH TWO TIMES A DAY 10/14/20  Yes GABRIELLA Shah   rOPINIRole (REQUIP) 0.5 MG tablet TAKE ONE TABLET BY MOUTH nightlY 9/29/20  Yes Osmani Hartley MD   montelukast (SINGULAIR) 10 MG tablet TAKE ONE TABLET BY MOUTH DAILY 9/29/20  Yes Osmani Hartley MD   torsemide (DEMADEX) 20 MG tablet Take 20 mg by mouth daily  9/20/18  Yes Historical Provider, MD   Acetaminophen (TYLENOL ARTHRITIS PAIN PO) Take 3 tablets by mouth 2 times daily    Yes Historical Provider, MD   Biotin w/ Vitamins C & E (HAIR/SKIN/NAILS PO) Take by mouth Ld 8/28/2019   Yes Historical Provider, MD   fenofibrate (TRICOR) 145 MG tablet Take 145 mg by mouth every morning Patient taking Mon, Wed, Fri 6/19/18  Yes Historical Provider, MD   calcium carbonate (TUMS) 500 MG chewable tablet Take 2 tablets by mouth 3 times daily    Yes Historical Provider, MD   sodium bicarbonate 650 MG tablet Take 650 mg by mouth 2 times daily    Yes Historical Provider, MD   magnesium oxide (MAG-OX) 400 MG tablet Take 400 mg by mouth daily    Yes Historical Provider, MD   levothyroxine (SYNTHROID) 137 MCG tablet Take 137 mcg by mouth Daily   Yes Historical Provider, MD   calcium acetate (PHOSLO) 667 MG capsule Take by mouth 4 times daily   Yes Historical Provider, MD   furosemide (LASIX) 40 MG tablet Take 1 tablet by mouth daily  Patient taking differently: Take 20 mg by mouth nightly  4/14/17  Yes Cristiane Baires MD   liothyronine (CYTOMEL) 5 MCG tablet Take 5 mcg by mouth daily   Yes Historical Provider, MD   aspirin 81 MG EC tablet Take 1 tablet by mouth daily 3/30/17  Yes GABRIELLA Vu - CNP   atorvastatin (LIPITOR) 20 MG tablet Take 20 mg by mouth three times a week    Yes Historical Provider, MD   Coenzyme Q10 (COQ10 PO) Take by mouth nightly    Yes Historical Provider, MD   vitamin B-12 (CYANOCOBALAMIN) 1000 MCG tablet Take 1,000 mcg by mouth daily Ld 8/28/2019   Yes Historical Provider, MD   darbepoetin izabella-polysorbate (ARANESP, ALBUMIN FREE,) 60 MCG/ML injection Inject into the skin every 14 days Twice/per month  Dose given 3-16-18 at dialysis   Yes Historical Provider, MD   CPAP Machine MISC Please replace patients CPAP at 8 cm water pressure with heated humidification and C-Flex of 3. Patient states she is due for a new machine and hers is not working. Also provide masks, tubing, filters, head gear, and water chambers as needed. Diagnosis-YUNIOR  Faxed to [facility]  Length of need 99 months 9/23/16  Yes Osmani Hartley MD   vitamin D-3 (CHOLECALCIFEROL) 5000 UNITS TABS Take 5,000 Units by mouth 2 times daily LD 8/28/2019   Yes Historical Provider, MD   folic acid (FOLVITE) 1 MG tablet Take 1 mg by mouth daily Ld 8/28/2019   Yes Historical Provider, MD   pregabalin (LYRICA) 50 MG capsule Take 50 mg by mouth 3 times daily Instructed to take am of procedure.    Yes Historical Provider, MD   albuterol (ACCUNEB) 0.63 MG/3ML nebulizer solution INHALE 1 VIAL VIA NEBULIZER EVERY 4 HOURS AS NEEDED FOR WHEEZING OR SHORTNESS OF BREATH  7/6/20   Osmani Hartley MD   Umeclidinium Bromide 62.5 MCG/INH AEPB Inhale 1 puff into the lungs daily 1/21/20   Osmani Hartley MD   albuterol sulfate HFA (VENTOLIN HFA) 108 (90 Base) MCG/ACT inhaler Inhale 2 puffs into the lungs every 6 hours as needed for Wheezing 12/19/19   Gregory Rodrigues MD   Tens Unit [facility] by Does not apply route Indications: Patient with 2-years of back myofascial pain and spasm with good relief from TENS at PT in past.  She has end-stage kidney disease on dialysis so medications are limited. 9/5/19   Eligio Carlson,    FERROUS BISGLYCINATE CHELATE PO Take by mouth    Historical Provider, MD   Methoxy PEG-Epoetin Beta (MIRCERA) 75 MCG/0.3ML SOSY Infuse 75 mcg intravenously every 14 days    Historical Provider, MD   melatonin 5 MG TABS tablet Take 10 mg by mouth nightly as needed     Historical Provider, MD       Allergies  Allergies   Allergen Reactions    Coumadin [Warfarin Sodium] Hives    Quinine Derivatives Hives    Other      Patient states cannot take oral antibiotic dt she has short gut syndrome. ... Puts her into electrolyte inbalance       Review of Systems: +Fatigue, weakness, Afib     Constitutional:  No fever chills or rigors.  Eyes: No changes in vision, discharge, or pain   ENT: No Headaches, hearing loss or vertigo. No mouth sores or sore throat. No change in taste or smell.  Cardiovascular: No chest discomfort, dyspnea on exertion, palpitations or loss of consciousness. or phlebitis.  Respiratory: Has no cough or wheezing, Has no sputum production. Has no hemoptysis, Has no pleuritic pain, .  Gastrointestinal: No abdominal pain, appetite loss, blood in stools. No change in bowel habits. No hematemesis    Genitourinary: Patient acknowledges no dysuria, trouble voiding, or hematuria. No nocturia or increased frequency.  Musculoskeletal: No gait disturbance, weakness or joint complaints.  Integumentary: No rash or pruritis.  Neurological: No headache, diplopia, change in muscle strength, numbness or tingling. No change in gait, balance, coordination, mood, affect, memory, mentation, behavior.     Psychiatric: No anxiety, or depression.  Endocrine: No temperature intolerance. No excessive thirst, fluid intake, or urination. No tremor.  Hematologic/Lymphatic: No abnormal bruising or bleeding, blood clots or swollen lymph nodes.  Allergic/Immunologic: No nasal congestion or hives. Objective  /61   Pulse 114   Temp 98.1 °F (36.7 °C) (Oral)   Resp 18   Ht 5' 3\" (1.6 m)   Wt 181 lb 1 oz (82.1 kg)   SpO2 92%   BMI 32.07 kg/m²     Physical Exam:     General: AAO to person, place, time, in no acute distress,   Head and neck : PERRLA, EOMI . Sclera non icteric. Oropharynx : Clear  Neck: no JVD,  no adenopathy  Heart:AFib  Lungs: Clear to auscultation   Extremities: +2 bilateral lower extremity edema no cyanosis, no clubbing. Left arm dialysis fistula   Abdomen: Soft, non-tender;no masses, no organomegaly  Skin:  No rash. Neurologic:Cranial nerves grossly intact. No focal motor or sensory deficits .     Recent Laboratory Data-   Lab Results   Component Value Date    WBC 6.3 10/22/2020    HGB 8.4 (L) 10/22/2020    HCT 28.5 (L) 10/22/2020    .8 (H) 10/22/2020     10/22/2020    LYMPHOPCT 22.9 10/21/2020    RBC 2.40 (L) 10/22/2020    MCH 35.0 10/22/2020    MCHC 29.5 (L) 10/22/2020    RDW 15.4 (H) 10/22/2020    NEUTOPHILPCT 64.8 10/21/2020    MONOPCT 10.7 10/21/2020    BASOPCT 0.7 10/21/2020    NEUTROABS 4.47 10/21/2020    LYMPHSABS 1.58 10/21/2020    MONOSABS 0.74 10/21/2020    EOSABS 0.04 (L) 10/21/2020    BASOSABS 0.05 10/21/2020       Lab Results   Component Value Date     10/22/2020    K 3.1 (L) 10/22/2020     10/22/2020    CO2 27 10/22/2020    BUN 18 10/22/2020    CREATININE 2.8 (H) 10/22/2020    GLUCOSE 81 10/22/2020    CALCIUM 7.8 (L) 10/22/2020    PROT 5.8 (L) 10/22/2020    LABALBU 1.7 (L) 10/22/2020    BILITOT 0.3 10/22/2020    ALKPHOS 159 (H) 10/22/2020    AST 27 10/22/2020    ALT 17 10/22/2020    LABGLOM 17 10/22/2020    GFRAA 20 10/22/2020       Lab Results   Component Value Date    IRON 70 2020    TIBC 153 (L) 2020    FERRITIN 1,084 2020           Radiology-    Xr Lumbar Spine (2-3 Views)    Result Date: 2020  Patient MRN:  [id number] : 1955 Age: 77 years Gender: Female Order Date:  2020 12:41 PM EXAM: XR LUMBAR SPINE (2-3 VIEWS) INDICATION:  low back pain, fall low back pain, fall COMPARISON: None FINDINGS:  Vertebral body height and alignment are normal. There is some anterior spurs at L1-2 and L2-3. There is mild disc space narrowing and endplate sclerosis at P9-6 and L5-S1. Innumerable calculi in the right mid abdomen could reflect a fragmented renal calculus. No acute lumbar process is noted     Xr Hip Bilateral W Ap Pelvis (2 Views)    Result Date: 2020  Patient MRN: [id number] : 1955 Age:  77 years Gender: Female Order Date: 2020 12:41 PM Exam: XR HIP BILATERAL W AP PELVIS (2 VIEWS) Number of Images: 5 views Indication:   fall,pain fall,pain Comparison: None. Findings: Examination of the pelvis in AP view shows no radiographic evidence of fracture or bony abnormality. No osteolytic or blastic lesions are identified. The soft tissue outlines are normal. There are surgical clips seen in the mid abdomen and right upper pelvic region. The bilateral femoral head is well conjugated within the acetabulum. There is no evidence of dislocation. Osteoarthropathy of both hip joints with narrowing and osteophytes seen inferiorly. The soft tissue appears to be normal..      No acute fracture or dislocation Osteoarthropathy of both hips. Xr Knee Left (min 4 Views)    Result Date: 2020  Patient MRN:  [id number] : 1955 Age: 77 years Gender: Female Order Date:  2020 12:41 PM EXAM: XR KNEE RIGHT (MIN 4 VIEWS), XR KNEE LEFT (MIN 4 VIEWS) INDICATION:  fall,pain fall,pain COMPARISON: None FINDINGS:  No compartmental narrowing, fracture or obvious joint effusion is noted about either knee.      No acute process     Xr Knee Right (min 4 Views)    Result Date: 2020  Patient MRN:  [id number] : 1955 Age: 77 years Gender: Female Order Date:  2020 12:41 PM EXAM: XR KNEE RIGHT (MIN 4 VIEWS), XR KNEE LEFT (MIN 4 VIEWS) INDICATION:  fall,pain fall,pain COMPARISON: None FINDINGS:  No compartmental narrowing, fracture or obvious joint effusion is noted about either knee. No acute process     Xr Chest Portable    Result Date: 10/21/2020  EXAMINATION: ONE XRAY VIEW OF THE CHEST 10/21/2020 1:31 pm COMPARISON: 2018. HISTORY: ORDERING SYSTEM PROVIDED HISTORY: fatigue TECHNOLOGIST PROVIDED HISTORY: Reason for exam:->fatigue Initial FINDINGS: Small effusions bilaterally with lower lobe atelectasis or infiltrate, left greater than right. No overt pulmonary edema. There are stable coarsened interstitial markings. Stable cardiomediastinal silhouette. Small bilateral pleural effusions with lower lobe atelectasis or infiltrate, left greater than right. ASSESSMENT/PLAN :    78 yo female     Heart failure with preserved EF  Asthma  CAD COPD, GERD, HLD HTN  YUNIOR on CPAP  Short gut syndrome  History of blood Clots   CKD  ESRD on HD    Anemia multifactorial related to end-stage renal disease and also contributed to by poor iron absorption following gastric resection. Her elevated MCV is likely related to hepatocellular disease and BUCKLEY. Low-grade MDS cannot be entirely excluded. To check LDH, reticulocyte, iron studies, B12 and folate  Continue EPO supplements on hemodialysis. Patient seen and examined.   Note edited and updated to reflect above  MD Jeferson Morales APRN - CNP  Electronically signed 10/22/2020 at 8:11 AM

## 2020-10-23 NOTE — PLAN OF CARE
Problem: Falls - Risk of:  Goal: Will remain free from falls  Description: Will remain free from falls  Outcome: Met This Shift  Goal: Absence of physical injury  Description: Absence of physical injury  Outcome: Met This Shift     Problem: Fluid Volume:  Goal: Risk for excess fluid volume will decrease  Description: Risk for excess fluid volume will decrease  Outcome: Met This Shift  Goal: Maintenance of adequate hydration will improve  Description: Maintenance of adequate hydration will improve  Outcome: Met This Shift     Problem: Cardiac:  Goal: Ability to maintain an adequate cardiac output will improve  Description: Ability to maintain an adequate cardiac output will improve  Outcome: Met This Shift     Problem: Coping:  Goal: Verbalizations of decreased anxiety will decrease  Description: Verbalizations of decreased anxiety will decrease  Outcome: Met This Shift     Problem: Nutritional:  Goal: Maintenance of adequate nutrition will improve  Description: Maintenance of adequate nutrition will improve  Outcome: Met This Shift     Problem: Respiratory:  Goal: Respiratory status will improve  Description: Respiratory status will improve  Outcome: Met This Shift

## 2020-10-23 NOTE — CARE COORDINATION
Adalberto Rhodes is currently not eligible for the BPCI-A Medical Bundle due to having a diagnosis of ESRD and Medicare Form 2728 confirmed on file at [facility].  10/23/2020, Lashanda Wallace

## 2020-10-23 NOTE — FLOWSHEET NOTE
Pt ran 4 hours; 4231 bath; 4 L removed; stable/tolerated well; denies c/o. Needles removed & patent hemostasis achieved < 10 min, DSD applied positive T/B post Tx. Good Access flow no issues achieving prescribed BFR. Next Tx scheduled tomorrow 10/24. Positive refill via [facility] in last 15 min of Tx with 0.8 Hct change. Cortisol test completed. 10/23/20 1145   Vital Signs   BP (!) 100/42   Temp 98.3 °F (36.8 °C)   Pulse 75   Resp 16   Weight 179 lb 7.3 oz (81.4 kg)   Weight Method Bed scale   Percent Weight Change -4.68   Pain Assessment   Pain Assessment 0-10   Pain Level 0   Post-Hemodialysis Assessment   Post-Treatment Procedures Blood returned; Access bleeding time < 10 minutes   Machine Disinfection Process Exterior Machine Disinfection   Rinseback Volume (ml) 300 ml   Total Liters Processed (l/min) 90.2 l/min   Dialyzer Clearance Lightly streaked   Duration of Treatment (minutes) 240 minutes   Hemodialysis Intake (ml) 100 ml   Hemodialysis Output (ml) 4400 ml   NET Removed (ml) 4000 ml   Tolerated Treatment Good   Patient Response to Treatment + refill   Bilateral Breath Sounds Diminished   RLE Edema +1   LLE Edema +1

## 2020-10-23 NOTE — PROGRESS NOTES
Internal Medicine Progress Note      Synopsis: Patient admitted on 10/21/2020    Subjective  She has had more fluid removal today in dialysis. She feels better however now she is tachycardic. Looks like A. fib on the monitor. She has had a good appetite. She did have a cosyntropin test and it appears to be slightly blunted  She recalls she has been on steroids for a long time maybe till about 6 or 7 years ago. She was being treated for polymyalgia rheumatica      Exam:  /62   Pulse 116   Temp 98.2 °F (36.8 °C) (Oral)   Resp 16   Ht 5' 3\" (1.6 m)   Wt 179 lb 7.3 oz (81.4 kg)   SpO2 97%   BMI 31.79 kg/m²   General appearance: No apparent distress, appears stated age and cooperative. HEENT: Pupils equal, round, and reactive to light. Conjunctivae/corneas clear. Neck: Supple. No jugular venous distention. Trachea midline. Respiratory:  Normal respiratory effort. Clear to auscultation, bilaterally without Rales/Wheezes/Rhonchi. Cardiovascular: irreg. Abdomen: Soft, non-tender, non-distended with normal bowel sounds. Musculoskeletal: No clubbing, cyanosis   2 plus edema b/l le s).    Skin:  No rashes    Neurologic: awake, alert and following commands     Medications:  Reviewed    Infusion Medications   Scheduled Medications    [START ON 10/24/2020] albumin human  25 g Intravenous Once    epoetin izabella-epbx  10,000 Units Subcutaneous Once per day on Mon Wed Fri    And    epoetin izabella-epbx  2,000 Units Subcutaneous Once per day on Mon Wed Fri    And    epoetin izabella-epbx  3,000 Units Subcutaneous Once per day on Mon Wed Fri    aspirin  81 mg Oral Daily    atorvastatin  20 mg Oral Nightly    Calcium Acetate (Phos Binder)  667 mg Oral 4x Daily    fenofibrate  160 mg Oral Once per day on Mon Wed Fri    ferrous sulfate  325 mg Oral BID    folic acid  1 mg Oral Daily    levothyroxine  137 mcg Oral Daily    liothyronine  5 mcg Oral Daily    magnesium oxide  400 mg Oral Daily    montelukast 10 mg Oral Daily    pregabalin  50 mg Oral TID    rOPINIRole  0.5 mg Oral Nightly    sodium bicarbonate  650 mg Oral BID    ipratropium  0.5 mg Nebulization 4x daily    vitamin B-12  1,000 mcg Oral Daily    vitamin D  5,000 Units Oral BID    midodrine  10 mg Oral TID WC    budesonide  0.5 mg Nebulization BID    Arformoterol Tartrate  15 mcg Nebulization BID     PRN Meds: albuterol, melatonin, perflutren lipid microspheres, acetaminophen    I/O    Intake/Output Summary (Last 24 hours) at 10/23/2020 1739  Last data filed at 10/23/2020 1724  Gross per 24 hour   Intake 700 ml   Output 4600 ml   Net -3900 ml       Labs:   Recent Labs     10/21/20  1319 10/22/20  0438 10/23/20  0735   WBC 6.9 6.3  --    HGB 8.7* 8.4*  --    HCT 28.9* 28.5* 27.7*    168  --        Recent Labs     10/21/20  1319 10/22/20  0438 10/23/20  0911    140 139   K 3.7 3.1* 2.8*    103 101   CO2 32* 27 33*   BUN 11 18 10   CREATININE 2.1* 2.8* 1.8*   CALCIUM 7.9* 7.8* 8.5*       Recent Labs     10/21/20  1319 10/22/20  0438 10/23/20  0911   PROT 6.4 5.8* 6.2*   ALKPHOS 165* 159* 159*   ALT 21 17 16   AST 54* 27 23   BILITOT 0.3 0.3 0.3       Recent Labs     10/21/20  1319   INR 1.3       Recent Labs     10/21/20  1319   TROPONINI 0.01       Chronic labs:  Lab Results   Component Value Date    CHOL 96 2020    TRIG 131 2020    HDL 36 2020    LDLCALC 34 2020    TSH 1.150 10/21/2020    INR 1.3 10/21/2020    LABA1C <4.0 2019     Results for Maya George (MRN [id number]) as of 10/23/2020 17:41   Ref.  Range 10/23/2020 08:11 10/23/2020 09:11   Cortisol Latest Ref Range: 2.68 - 18.40 mcg/dL 12.76 17.31     Radiology:  Xr Lumbar Spine (2-3 Views)    Result Date: 2020  Patient MRN:  [id number] : 1955 Age: 77 years Gender: Female Order Date:  2020 12:41 PM EXAM: XR LUMBAR SPINE (2-3 VIEWS) INDICATION:  low back pain, fall low back pain, fall COMPARISON: None FINDINGS:  Vertebral body Portable    Result Date: 10/21/2020  EXAMINATION: ONE XRAY VIEW OF THE CHEST 10/21/2020 1:31 pm COMPARISON: December 14, 2018. HISTORY: ORDERING SYSTEM PROVIDED HISTORY: fatigue TECHNOLOGIST PROVIDED HISTORY: Reason for exam:->fatigue Initial FINDINGS: Small effusions bilaterally with lower lobe atelectasis or infiltrate, left greater than right. No overt pulmonary edema. There are stable coarsened interstitial markings. Stable cardiomediastinal silhouette. Small bilateral pleural effusions with lower lobe atelectasis or infiltrate, left greater than right. ASSESSMENT:    Principal Problem:    Hypotension  Active Problems:    Hypomagnesemia    Hypothyroid    Acute on chronic diastolic congestive heart failure (HCC)    MANUEL (acute kidney injury) (HCC)    Chronic renal failure, stage 5 (HCC)    ESRD (end stage renal disease) ([facility] [address]    Hematoma  Resolved Problems:    * No resolved hospital problems. *       PLAN:    1. Cosyntropin test would require an endocrinologist's opinion  2. A. fib so I would request cardiology to see her again and recommend? Amiodarone  3. Remains low with blood pressure now that could be related to more than volume and may be related to tachycardia  4. Compliant with her medications and food not complaining of pain  5. Replace hypokalemia per renal services  6. Appreciate input of hematology as well  7.should she get iv fe ? Diet: DIET RENAL;  Dietary Nutrition Supplements: Low Calorie High Protein Supplement  Dietary Nutrition Supplements: Protein Modular  Code Status: Full Code  PT/OT Eval Status:   Can order  Recommended disposition at discharge:    home    +++++++++++++++++++++++++++++++++++++++++++++++++  Patrick Rosas MD   [facility].  +++++++++++++++++++++++++++++++++++++++++++++++++  NOTE: This report was transcribed using voice recognition software.  Every effort was made to ensure accuracy; however, inadvertent computerized transcription errors may be present.

## 2020-10-23 NOTE — CONSULTS
ENDOCRINOLOGY INITIAL CONSULTATION NOTE      Date of admission: 10/21/2020  Date of service: 10/24/2020  Admitting physician: Tony Fraire MD   Primary Care Physician: Tony Fraire MD  Consultant physician: Walt Laguerre MD     Reason for the consultation:  Evaluation for possible adrenal insufficiency     History of Present Illness: The history is provided by the patient. Accuracy of the patient data is excellent  Minus Wendy is a very pleasant 77 y.o. old female with PMH ESRD on HD, hypothyroidism, obesity s/p gastric bypass surgery, DVT and other listed below admitted to [address] [place] [address] on 10/21/2020 because of 3-4 weeks of dizziness/lightheadedness an low BP following hemodialysis, endocrine team was consulted for evaluation of adrenal insufficiency  About 3 months ago Coreg dose was decreased from 25 mg to 6.25 twice a day but BP remained low even with small dose and medication was dc. She is currently on Midodrine 10 mg TID. due to some feeling of being not well with her blood pressure.  She denied chest pain, shortness of breath, fever, or chills  As a part of work up for hypotention AM cortisol was checked to r/o AI   10/22/2020 - Random cortisol 7.57 (done at 10PM)     Cosyntropin stim test   10/23/2020 08:11 10/23/2020 09:11   Cortisol 12.76 (30min) 17.31 (60 min)      Past medical history:   Past Medical History:   Diagnosis Date    [facility]pEF[facility] 01/25/2017 4/11/17- echo- LVEF 55-60%, stage II DD, severely dilated LA, severe MR, mild TR, LVDD: 5.6 cm    Anemia     hx  / takes aranesp injections every 2 weeks    Asthma     well controlled with inhalers     CAD (coronary artery disease)     Chronic kidney disease     stage 4    Complication of AV dialysis fistula, initial encounter 6/2/2018    COPD (chronic obstructive pulmonary disease) (HCC)     Fibromyalgia     GERD (gastroesophageal reflux disease)     [facility]     [facility]    History of blood transfusion spring 2017    Hx of blood clots     h/o DVT in leg at age 25yrs    Hyperlipidemia     Hypertension     well controlled with medications     Kidney failure     Kidney stone     multiple issues    YUNIOR on CPAP     Polymyalgia rheumatica (HCC)     Restless leg syndrome     Short gut syndrome     Thyroid disease     Traumatic hematoma of left upper arm 6/2/2018    Wears dentures     upper partial       Past surgical history:  Past Surgical History:   Procedure Laterality Date    APPENDECTOMY      AV FISTULA REPAIR Left 03/22/2018    Superficialization, Delatore    AV FISTULA REPAIR  11/21/2018    Dr Elizabeth Ambrocio 10x38 iCast Subclavian    BREAST BIOPSY Right 2000    BRONCHOSCOPY  2010    CARDIAC CATHETERIZATION  02/17/2017    Dr Harper Moreira REPLACEMENT  03/21/2017    MVR, TVR    COLONOSCOPY      COLONOSCOPY  4/21/15    COLONOSCOPY N/A 9/3/2019    COLORECTAL CANCER SCREENING, NOT HIGH RISK performed by Claudia Dawson MD at [address]  Left 90/54/0327    radiocephalic, Delatore    DIALYSIS FISTULA CREATION Left 01/25/2018    brachioecephalic, ligation radiocephalic, Delatore    ENDOSCOPY, COLON, DIAGNOSTIC      EYE SURGERY Right     CATARACT    INTESTINAL BYPASS  1973    for weight loss    KIDNEY STONE SURGERY      x 3    OTHER SURGICAL HISTORY Left 11/16/2017    AV fistula creation left arm    OTHER SURGICAL HISTORY  01/17/2018    Left arm fistulogram by Dr Cuauhtemoc Helm.     OTHER SURGICAL HISTORY  10/17/2018    Dr Tracey-Left arm fistuloplasty    MD CREAT AV FISTULA,AUTOGENOUS GRAFT Left 3/22/2018    AV FISTULA  REVISION LEFT ARM performed by Amada Garner MD at [facility] TUNNELED CV CATH Right 8/9/2018    REMOVAL OF TESIO CATHETER, REMOVAL OF MEDIPORT performed by Amada Garner MD at [address] Right 2000    RIGHT BREAST MOLE REMOVED    TRANSESOPHAGEAL ECHOCARDIOGRAM  02/03/2017    TUNNELED VENOUS CATHETER PLACEMENT Right     TUNNELED VENOUS PORT PLACEMENT Right     Powerport / x 4 / at right chest; since short gut syndrome received magnesium & sodium bicarb in past    VASC UPPER EXTREMITY VENOUS  UNI Left 36/84/9480    Brachiocephalic covered stent, iCast, 10 mm x 38 mm       Social history:   Tobacco:   reports that she quit smoking about 23 years ago. Her smoking use included cigarettes. She started smoking about 31 years ago. She has a 16.00 pack-year smoking history. She has never used smokeless tobacco.  Alcohol:   reports no history of alcohol use. Drugs:   reports no history of drug use. Family history:    Family History   Problem Relation Age of Onset    Obesity Mother     Cirrhosis Mother     Heart Failure Mother     High Blood Pressure Mother     Diabetes Father     High Blood Pressure Sister     Depression Sister     Diabetes Brother     High Blood Pressure Brother        Allergy and drug reactions: Allergies   Allergen Reactions    Coumadin [Warfarin Sodium] Hives    Quinine Derivatives Hives    Other      Patient states cannot take oral antibiotic dt she has short gut syndrome. ...  Puts her into electrolyte inbalance       Scheduled Meds:   epoetin izabella-epbx  10,000 Units Subcutaneous Once per day on Mon Wed Fri    And    epoetin izabella-epbx  2,000 Units Subcutaneous Once per day on Mon Wed Fri    And    epoetin izabella-epbx  3,000 Units Subcutaneous Once per day on Mon Wed Fri    aspirin  81 mg Oral Daily    atorvastatin  20 mg Oral Nightly    Calcium Acetate (Phos Binder)  667 mg Oral 4x Daily    fenofibrate  160 mg Oral Once per day on Mon Wed Fri    ferrous sulfate  325 mg Oral BID    folic acid  1 mg Oral Daily    levothyroxine  137 mcg Oral Daily    liothyronine  5 mcg Oral Daily    magnesium oxide  400 mg Oral Daily    montelukast  10 mg Oral Daily    pregabalin  50 mg Oral TID    rOPINIRole  0.5 mg Oral Nightly  sodium bicarbonate  650 mg Oral BID    ipratropium  0.5 mg Nebulization 4x daily    vitamin B-12  1,000 mcg Oral Daily    vitamin D  5,000 Units Oral BID    midodrine  10 mg Oral TID     budesonide  0.5 mg Nebulization BID    Arformoterol Tartrate  15 mcg Nebulization BID     PRN Meds:   albuterol, 0.63 mg, 4x Daily PRN  melatonin, 6 mg, Nightly PRN  perflutren lipid microspheres, 1.5 mL, ONCE PRN  acetaminophen, 650 mg, Q6H PRN      Continuous Infusions:      Review of Systems  All systems reviewed. All negative except for symptoms mentioned in HPI     OBJECTIVE    /68   Pulse 82   Temp 97.8 °F (36.6 °C)   Resp 16   Ht 5' 3\" (1.6 m)   Wt 174 lb 6.1 oz (79.1 kg)   SpO2 96%   BMI 30.89 kg/m²     Intake/Output Summary (Last 24 hours) at 10/24/2020 1121  Last data filed at 10/24/2020 1028  Gross per 24 hour   Intake 1000 ml   Output 7400 ml   Net -6400 ml       Physical examination:  General: awake alert, oriented x3, no abnormal position or movements. HEENT: normocephalic non-traumatic, no exophthalmos   Neck: supple, no LN enlargement, no thyromegaly, no thyroid tenderness, no JVD. Pulm: Clear equal air entry no added sounds, no wheezing or rhonchi    CVS: S1 + S2, no murmur, no heave  Abd: soft lax, no tenderness, no organomegaly, audible bowel sounds. Skin: warm, no lesions, no rash. Feet: No ulcers or open wounds.  Good peripheral pulses  Neuro: CN intact, muscle power normal  Psych: normal mood, and affect    Review of Laboratory Data:  I personally reviewed the following labs:   Recent Labs     10/21/20  1319 10/22/20  0438 10/23/20  0735 10/24/20  0437   WBC 6.9 6.3  --  4.5   RBC 2.47* 2.40*  --  2.22*   HGB 8.7* 8.4*  --  7.8*   HCT 28.9* 28.5* 27.7* 25.6*   .0* 118.8*  --  115.3*   MCH 35.2* 35.0  --  35.1*   MCHC 30.1* 29.5*  --  30.5*   RDW 15.3* 15.4*  --  15.7*    168  --  157   MPV 10.8 10.8  --  10.1     Recent Labs     10/22/20  0438 10/23/20  0968 participate in the care of this patient. Please do not hesitate to contact us with any additional questions. Walt Laguerre MD  Endocrinologist, [facility]   [address] [place] [id number]   Phone: [phone]  Fax: [phone]  --------------------------------------------  An electronic signature was used to authenticate this note.  Jaquan Marie MD on 10/24/2020 at 11:21 AM

## 2020-10-23 NOTE — PROGRESS NOTES
Department of Internal Medicine  Nephrology Attending Progress Note        SUBJECTIVE: Mrs. Disha Evans seen on dialysis is feeling better today. Continues to have issues with intermittent A. fib potassium still low this morning despite supplementation patient given some albumin crit line is showing fluid we are attempting 3 L on dialysis today, which she seems to be tolerating. Her repeat echo revealed ejection fraction of 70 - 75% no evidence of restenosis or regurg of either her mitral or tricuspid valve, significant pleural effusion and ascites noted    PMH  End-stage renal disease dialyzes Monday Wednesday Friday  Anemia of chronic kidney disease iron 96 in 9/2020 and Olga Lidia Fonseca at dialysis   History of elevated BMI status post jejunal bypass 7142 with complications of renal disease and cirrhosis later oxalate disease  History of renal lithiasis  History of chronic obstructive airway disease related to tobacco quit smoking 1998  Hypertension  Bronchial asthma/obstructive sleep apnea on CPAP  Hypothyroid disease  Dyslipidemia  Short gut syndrome with chronic diarrhea  Polymyalgia rheumatica  History of DVT  Left AV fistula  Bilateral cataract extraction  History of mitral valve repair, tricuspid repair, left atrial appendage exclusion  Heart failure with preserved ejection fraction     Physical Exam:    Vitals:    10/23/20 1100   BP: (!) 98/51   Pulse: 69   Resp:    Temp:    SpO2:        I/O last 24 hours:  Intake/Output inaccurate    Weight: 85.4 kg    General Appearance:  awake, alert, oriented, in no acute distress  Skin: No rashes  Neck:  neck- supple, no mass, non-tender and no bruits  Lungs: Distant breath sounds diminished at bases  Heart: Irregular rhythm     Abdominal: Abdomen soft, non-tender.  BS normal. No masses,  No organomegaly, old surgical scar  Extremities: +2 pitting edema both lower extremities  Peripheral Pulses:  +2    DATA:    CBC with Differential:    Lab Results   Component Value Date    WBC 6.3

## 2020-10-23 NOTE — PROGRESS NOTES
[facility]  Hematology/Oncology  Consult      Patient Name: Adalberto Rhodes  YOB: 1955  PCP: Bridger Chavira MD   Referring Provider:      Reason for Consultation:   Chief Complaint   Patient presents with    Hypotension     had dialysis today, states sent after dialysis for low blood pressure    Dizziness    Subjective:  Feels well overall today. States her Afib feels much better under control today. History of Present Illness: This is a 78 yo female patient of Dr. Liza Starr who is being seen for anemia due to iron deficiency and chronic kidney disease. She is status post gastrectomy and stopped responding to IV iron by itself. She now has end-stage renal disease and is on hemodialysis and receiving IV iron and retacrit at hemodialysis. She is also being observed for cytopenias due to BUCKLEY and splenomegaly. She also has a significant past medical history of heart failure with preserved EF, asthma, CAD, COPD, GERD, HLD HTN, YUNIOR on CPAP, short gut syndrome, and history of blood clots. Patient presented to the emergency room with hypotension and dizziness after receiving her hemodialysis treatment which she receives Monday, Wednesday, and Friday. She follows nephrology Dr. Jesse Rehman. She stated that after dialysis her systolic blood pressure dropped into the 80s and was sent to the emergency room for evaluation. She reported that she has been having issues with her blood pressure dropping during dialysis treatments and they have been giving her fluids. She was previously on hypertensive medication but was taken off last week. Upon arrival she reported being lightheaded and dizzy with symptoms not being made worse or better by anything specific. She also reported generalized fatigue. She denied chest pain, shortness of breath, fever, or chills. A chest xray showed small bilateral pleural effusions with lower lobe atelectasis or infiltrate, left greater than right.  Blood work showed Exacerbation of asthma 01/06/2016    Renal failure (ARF), acute on chronic (HCC) 01/06/2016    HTN (hypertension) 01/06/2016    YUNIOR on CPAP 01/06/2016    Hypomagnesemia 01/06/2016    Hypocalcemia 01/06/2016    Hypothyroid 01/06/2016        Past Surgical History:   Procedure Laterality Date    APPENDECTOMY      AV FISTULA REPAIR Left 03/22/2018    Superficialization, Delatore    AV FISTULA REPAIR  11/21/2018    Dr Elizabeth Ambrocio 10x38 iCast Subclavian    BREAST BIOPSY Right 2000    BRONCHOSCOPY  2010    CARDIAC CATHETERIZATION  02/17/2017    Dr Harper Moreira REPLACEMENT  03/21/2017    MVR, TVR    COLONOSCOPY      COLONOSCOPY  4/21/15    COLONOSCOPY N/A 9/3/2019    COLORECTAL CANCER SCREENING, NOT HIGH RISK performed by Claudia Dawson MD at [address] [place] [address] Dr Pearl 72/14/7682    radiocephalic, Delatore    DIALYSIS FISTULA CREATION Left 01/25/2018    brachioecephalic, ligation radiocephalic, Delatore    ENDOSCOPY, COLON, DIAGNOSTIC      EYE SURGERY Right     CATARACT    INTESTINAL BYPASS  1973    for weight loss    KIDNEY STONE SURGERY      x 3    OTHER SURGICAL HISTORY Left 11/16/2017    AV fistula creation left arm    OTHER SURGICAL HISTORY  01/17/2018    Left arm fistulogram by Dr Cuauhtemoc Helm.     OTHER SURGICAL HISTORY  10/17/2018    Dr Tracey-Left arm fistuloplasty    WI CREAT AV FISTULA,AUTOGENOUS GRAFT Left 3/22/2018    AV FISTULA  REVISION LEFT ARM performed by Amada Garner MD at [facility] 172 TUNNELED CV CATH Right 8/9/2018    REMOVAL OF TESIO CATHETER, REMOVAL OF MEDIPORT performed by Amada Garner MD at [facility] Right 2000    RIGHT BREAST MOLE REMOVED    TRANSESOPHAGEAL ECHOCARDIOGRAM  02/03/2017    TUNNELED VENOUS CATHETER PLACEMENT Right     TUNNELED VENOUS PORT PLACEMENT Right     Powerport / x 4 / at right chest; since short gut syndrome received magnesium & sodium bicarb in past    VASC UPPER EXTREMITY VENOUS  UNI Left 26/86/0220    Brachiocephalic covered stent, iCast, 10 mm x 38 mm       Family History  Family History   Problem Relation Age of Onset    Obesity Mother     Cirrhosis Mother     Heart Failure Mother     High Blood Pressure Mother     Diabetes Father     High Blood Pressure Sister     Depression Sister     Diabetes Brother     High Blood Pressure Brother        Social History    TOBACCO:   reports that she quit smoking about 23 years ago. Her smoking use included cigarettes. She started smoking about 31 years ago. She has a 16.00 pack-year smoking history. She has never used smokeless tobacco.  ETOH:   reports no history of alcohol use. Home Medications  Prior to Admission medications    Medication Sig Start Date End Date Taking?  Authorizing Provider   dantrolene (DANTRIUM) 50 MG capsule TAKE ONE CAPSULE BY MOUTH TWO TIMES A DAY 10/14/20  Yes GABRIELLA Hoyt - CNS   rOPINIRole (REQUIP) 0.5 MG tablet TAKE ONE TABLET BY MOUTH nightlY 9/29/20  Yes Carey Salmon MD   montelukast (SINGULAIR) 10 MG tablet TAKE ONE TABLET BY MOUTH DAILY 9/29/20  Yes Carey Salmon MD   torsemide (DEMADEX) 20 MG tablet Take 20 mg by mouth daily  9/20/18  Yes Historical Provider, MD   Acetaminophen (TYLENOL ARTHRITIS PAIN PO) Take 3 tablets by mouth 2 times daily    Yes Historical Provider, MD   Biotin w/ Vitamins C & E (HAIR/SKIN/NAILS PO) Take by mouth Ld 8/28/2019   Yes Historical Provider, MD   fenofibrate (TRICOR) 145 MG tablet Take 145 mg by mouth every morning Patient taking Mon, Wed, Fri 6/19/18  Yes Historical Provider, MD   calcium carbonate (TUMS) 500 MG chewable tablet Take 2 tablets by mouth 3 times daily    Yes Historical Provider, MD   sodium bicarbonate 650 MG tablet Take 650 mg by mouth 2 times daily    Yes Historical Provider, MD   magnesium oxide (MAG-OX) 400 MG tablet Take 400 mg by mouth daily    Yes Historical Provider, MD   levothyroxine (SYNTHROID) 137 MCG tablet Take 137 mcg by mouth Daily   Yes Historical Provider, MD   calcium acetate (PHOSLO) 667 MG capsule Take by mouth 4 times daily   Yes Historical Provider, MD   furosemide (LASIX) 40 MG tablet Take 1 tablet by mouth daily  Patient taking differently: Take 20 mg by mouth nightly  4/14/17  Yes Janessa Nicole MD   liothyronine (CYTOMEL) 5 MCG tablet Take 5 mcg by mouth daily   Yes Historical Provider, MD   aspirin 81 MG EC tablet Take 1 tablet by mouth daily 3/30/17  Yes Ramon Rodriguez, APRN - CNP   atorvastatin (LIPITOR) 20 MG tablet Take 20 mg by mouth three times a week    Yes Historical Provider, MD   Coenzyme Q10 (COQ10 PO) Take by mouth nightly    Yes Historical Provider, MD   vitamin B-12 (CYANOCOBALAMIN) 1000 MCG tablet Take 1,000 mcg by mouth daily Ld 8/28/2019   Yes Historical Provider, MD   darbepoetin izabella-polysorbate (ARANESP, ALBUMIN FREE,) 60 MCG/ML injection Inject into the skin every 14 days Twice/per month  Dose given 3-16-18 at dialysis   Yes Historical Provider, MD   CPAP Machine MISC Please replace patients CPAP at 8 cm water pressure with heated humidification and C-Flex of 3. Patient states she is due for a new machine and hers is not working. Also provide masks, tubing, filters, head gear, and water chambers as needed. Diagnosis-YUNIOR  Faxed to [facility]  Length of need 99 months 9/23/16  Yes Hazel Trejo MD   vitamin D-3 (CHOLECALCIFEROL) 5000 UNITS TABS Take 5,000 Units by mouth 2 times daily LD 8/28/2019   Yes Historical Provider, MD   folic acid (FOLVITE) 1 MG tablet Take 1 mg by mouth daily Ld 8/28/2019   Yes Historical Provider, MD   pregabalin (LYRICA) 50 MG capsule Take 50 mg by mouth 3 times daily Instructed to take am of procedure.    Yes Historical Provider, MD   albuterol (ACCUNEB) 0.63 MG/3ML nebulizer solution INHALE 1 VIAL VIA NEBULIZER EVERY 4 HOURS AS NEEDED FOR WHEEZING OR SHORTNESS OF BREATH  7/6/20   Hazel Trejo MD   Umeclidinium Bromide 62.5 MCG/INH AEPB Inhale 1 puff into the lungs daily 1/21/20   Jose Luis Davison MD   albuterol sulfate HFA (VENTOLIN HFA) 108 (90 Base) MCG/ACT inhaler Inhale 2 puffs into the lungs every 6 hours as needed for Wheezing 12/19/19   Jose Luis Davison MD   [place] [facility] [place] Does not apply route Indications: Patient with 2-years of back myofascial pain and spasm with good relief from TENS at PT in past.  She has end-stage kidney disease on dialysis so medications are limited. 9/5/19   Eligio Carlson DO   FERROUS BISGLYCINATE CHELATE PO Take by mouth    Historical Provider, MD   Methoxy PEG-Epoetin Beta (MIRCERA) 75 MCG/0.3ML SOSY Infuse 75 mcg intravenously every 14 days    Historical Provider, MD   melatonin 5 MG TABS tablet Take 10 mg by mouth nightly as needed     Historical Provider, MD       Allergies  Allergies   Allergen Reactions    Coumadin [Warfarin Sodium] Hives    Quinine Derivatives Hives    Other      Patient states cannot take oral antibiotic dt she has short gut syndrome. ... Puts her into electrolyte inbalance       Review of Systems: +Fatigue, weakness, Afib     Constitutional:  No fever chills or rigors.  Eyes: No changes in vision, discharge, or pain   ENT: No Headaches, hearing loss or vertigo. No mouth sores or sore throat. No change in taste or smell.  Cardiovascular: No chest discomfort, dyspnea on exertion, palpitations or loss of consciousness. or phlebitis.  Respiratory: Has no cough or wheezing, Has no sputum production. Has no hemoptysis, Has no pleuritic pain, .  Gastrointestinal: No abdominal pain, appetite loss, blood in stools. No change in bowel habits. No hematemesis    Genitourinary: Patient acknowledges no dysuria, trouble voiding, or hematuria. No nocturia or increased frequency.  Musculoskeletal: No gait disturbance, weakness or joint complaints.  Integumentary: No rash or pruritis.  Neurological: No headache, diplopia, change in muscle strength, numbness or tingling.  No change in gait, balance, coordination, mood, affect, memory, mentation, behavior.  Psychiatric: No anxiety, or depression.  Endocrine: No temperature intolerance. No excessive thirst, fluid intake, or urination. No tremor.  Hematologic/Lymphatic: No abnormal bruising or bleeding, blood clots or swollen lymph nodes.  Allergic/Immunologic: No nasal congestion or hives. Objective  /86   Pulse 124   Temp 98.1 °F (36.7 °C)   Resp 18   Ht 5' 3\" (1.6 m)   Wt 188 lb 4.4 oz (85.4 kg)   SpO2 97%   BMI 33.35 kg/m²     Physical Exam:     General: AAO to person, place, time, in no acute distress,   Head and neck : PERRLA, EOMI . Sclera non icteric. Oropharynx : Clear  Neck: no JVD,  no adenopathy  Heart:AFib  Lungs: Clear to auscultation   Extremities: +2 bilateral lower extremity edema no cyanosis, no clubbing. Left arm dialysis fistula   Abdomen: Soft, non-tender;no masses, no organomegaly  Skin:  No rash. Neurologic:Cranial nerves grossly intact. No focal motor or sensory deficits .     Recent Laboratory Data-   Lab Results   Component Value Date    WBC 6.3 10/22/2020    HGB 8.4 (L) 10/22/2020    HCT 27.7 (L) 10/23/2020    .8 (H) 10/22/2020     10/22/2020    LYMPHOPCT 22.9 10/21/2020    RBC 2.40 (L) 10/22/2020    MCH 35.0 10/22/2020    MCHC 29.5 (L) 10/22/2020    RDW 15.4 (H) 10/22/2020    NEUTOPHILPCT 64.8 10/21/2020    MONOPCT 10.7 10/21/2020    BASOPCT 0.7 10/21/2020    NEUTROABS 4.47 10/21/2020    LYMPHSABS 1.58 10/21/2020    MONOSABS 0.74 10/21/2020    EOSABS 0.04 (L) 10/21/2020    BASOSABS 0.05 10/21/2020       Lab Results   Component Value Date     10/22/2020    K 3.1 (L) 10/22/2020     10/22/2020    CO2 27 10/22/2020    BUN 18 10/22/2020    CREATININE 2.8 (H) 10/22/2020    GLUCOSE 81 10/22/2020    CALCIUM 7.8 (L) 10/22/2020    PROT 5.8 (L) 10/22/2020    LABALBU 1.7 (L) 10/22/2020    BILITOT 0.3 10/22/2020    ALKPHOS 159 (H) 10/22/2020    AST 27 10/22/2020    ALT 17 10/22/2020    LABGLOM 17 10/22/2020    GFRAA 20 10/22/2020       Lab Results   Component Value Date    IRON 70 2020    TIBC 153 (L) 2020    FERRITIN 1,084 2020           Radiology-    Xr Lumbar Spine (2-3 Views)    Result Date: 2020  Patient MRN:  [id number] : 1955 Age: 77 years Gender: Female Order Date:  2020 12:41 PM EXAM: XR LUMBAR SPINE (2-3 VIEWS) INDICATION:  low back pain, fall low back pain, fall COMPARISON: None FINDINGS:  Vertebral body height and alignment are normal. There is some anterior spurs at L1-2 and L2-3. There is mild disc space narrowing and endplate sclerosis at R6-6 and L5-S1. Innumerable calculi in the right mid abdomen could reflect a fragmented renal calculus. No acute lumbar process is noted     Xr Hip Bilateral W Ap Pelvis (2 Views)    Result Date: 2020  Patient MRN: [id number] : 1955 Age:  77 years Gender: Female Order Date: 2020 12:41 PM Exam: XR HIP BILATERAL W AP PELVIS (2 VIEWS) Number of Images: 5 views Indication:   fall,pain fall,pain Comparison: None. Findings: Examination of the pelvis in AP view shows no radiographic evidence of fracture or bony abnormality. No osteolytic or blastic lesions are identified. The soft tissue outlines are normal. There are surgical clips seen in the mid abdomen and right upper pelvic region. The bilateral femoral head is well conjugated within the acetabulum. There is no evidence of dislocation. Osteoarthropathy of both hip joints with narrowing and osteophytes seen inferiorly. The soft tissue appears to be normal..      No acute fracture or dislocation Osteoarthropathy of both hips.     Xr Knee Left (min 4 Views)    Result Date: 2020  Patient MRN:  [id number] : 1955 Age: 77 years Gender: Female Order Date:  2020 12:41 PM EXAM: XR KNEE RIGHT (MIN 4 VIEWS), XR KNEE LEFT (MIN 4 VIEWS) INDICATION:  fall,pain fall,pain COMPARISON: None FINDINGS:  No compartmental narrowing, fracture or obvious joint effusion is noted about either knee. No acute process     Xr Knee Right (min 4 Views)    Result Date: 2020  Patient MRN:  [id number] : 1955 Age: 77 years Gender: Female Order Date:  2020 12:41 PM EXAM: XR KNEE RIGHT (MIN 4 VIEWS), XR KNEE LEFT (MIN 4 VIEWS) INDICATION:  fall,pain fall,pain COMPARISON: None FINDINGS:  No compartmental narrowing, fracture or obvious joint effusion is noted about either knee. No acute process     Xr Chest Portable    Result Date: 10/21/2020  EXAMINATION: ONE XRAY VIEW OF THE CHEST 10/21/2020 1:31 pm COMPARISON: 2018. HISTORY: ORDERING SYSTEM PROVIDED HISTORY: fatigue TECHNOLOGIST PROVIDED HISTORY: Reason for exam:->fatigue Initial FINDINGS: Small effusions bilaterally with lower lobe atelectasis or infiltrate, left greater than right. No overt pulmonary edema. There are stable coarsened interstitial markings. Stable cardiomediastinal silhouette. Small bilateral pleural effusions with lower lobe atelectasis or infiltrate, left greater than right. ASSESSMENT/PLAN :    76 yo female     Heart failure with preserved EF  Asthma  CAD COPD, GERD, HLD HTN  YUNIOR on CPAP  Short gut syndrome  History of blood Clots   CKD  ESRD on HD    Anemia multifactorial related to end-stage renal disease and also contributed to by poor iron absorption following gastric resection. Her elevated MCV is likely related to hepatocellular disease and BUCKLEY. Low-grade MDS cannot be entirely excluded. To check LDH, reticulocyte, iron studies, B12 and folate  Continue EPO supplements on hemodialysis. 10/23/20  -   - Retic ct pct 3.2 imm retic fra 25.6 hema 25.6 rest of retic panel WNL  -Iron folate and B12 pending   - No CBC drawn today  - Dialysis today with plans for dialysis tomorrow  Continue Retacrit as per Nephrology    Patient seen and examined.   Note edited and updated to reflect above  MD Jared Ventura APRN - CNP  Electronically signed 10/23/2020 at 8:53 AM

## 2020-10-24 PROBLEM — Z99.2 ESRD ON DIALYSIS (HCC): Status: ACTIVE | Noted: 2018-03-07

## 2020-10-24 NOTE — PROGRESS NOTES
Inpatient Cardiology Progress note     PATIENT IS BEING FOLLOWED FOR: tachycardia, possible Afib    Jake Callejas is a 77 y.o. female known to Dr. Henrique Braun. She has a known medical history of VHD (MVr, TVr, VIRGEN exclusion in 3/17), hx palpitations, HTN, hyperlipidemia, hypothyroidism, HFpEF, ESRD on HD, polymyalgia rheumatica, YUNIOR (compliant with CPAP), RBBB, hx RLE DVT (while on oral contraceptives in past), Coumadin allergy (reported hives/pruritus), and prior bariatric surgery. She was admitted 10/21/2020 due to hypotension while at HD session. She admitted to lightheadedness, fatigue, and weakness. She was initiated on Midodrine. She was evaluated by Cardiology. Echocardiogram 10/22/2020 noted EF 70-75%, with no significant valvular disease. Cardiology was re-consulted yesterday evening due to telemetry noting tachycardia with questionable Afib vs ST with PACs. She admits to intermittent palpitations but denies any current chest pain or dizziness. She is currently undergoing HD session and is resting comfortably, in no acute distress. Repeat 12 lead EKG is pending. SUBJECTIVE: Patient resting comfortably in bed. Admits to intermittent palpitations. Denies any chest pain, SOB, or dizziness. Admits to mild fatigue. OBJECTIVE: No apparent distress     ROS:  Consist: Denies fevers, chills or night sweats  Heart: Denies chest pain, palpitations, lightheadedness, dizziness or syncope  Lungs: Denies SOB, cough, wheezing, orthopnea or PND  GI: Denies abdominal pain, vomiting or diarrhea    PHYSICAL EXAM:   BP (!) 92/55   Pulse 86   Temp 98 °F (36.7 °C)   Resp (!) 169   Ht 5' 3\" (1.6 m)   Wt 180 lb 5.4 oz (81.8 kg)   SpO2 96%   BMI 31.95 kg/m²    B/P Range last 24 hours: Systolic (53DXP), MIRNA:341 , Min:82 , BEB:208    Diastolic (16WDX), EDX:26, Min:42, Max:77    CONST: Well developed, well nourished who appears stated age. Awake, alert and cooperative.  No apparent distress  HEENT:   Head- montelukast  10 mg Oral Daily    pregabalin  50 mg Oral TID    rOPINIRole  0.5 mg Oral Nightly    sodium bicarbonate  650 mg Oral BID    ipratropium  0.5 mg Nebulization 4x daily    vitamin B-12  1,000 mcg Oral Daily    vitamin D  5,000 Units Oral BID    midodrine  10 mg Oral TID WC    budesonide  0.5 mg Nebulization BID    Arformoterol Tartrate  15 mcg Nebulization BID       IV Infusions (if any):      DIAGNOSTIC/ LABORATORY DATA:  Labs:   CBC:   Recent Labs     10/22/20  0438 10/23/20  0735 10/24/20  0437   WBC 6.3  --  4.5   HGB 8.4*  --  7.8*   HCT 28.5* 27.7* 25.6*     --  157     BMP:   Recent Labs     10/23/20  0911 10/24/20  0437    142   K 2.8* 3.6   CO2 33* 29   BUN 10 11   CREATININE 1.8* 2.9*   LABGLOM 28 16   CALCIUM 8.5* 8.1*     Mag:   Recent Labs     10/22/20  0438   MG 1.7     Phos: No results for input(s): PHOS in the last 72 hours. TSH:   Recent Labs     10/21/20  2221   TSH 1.150     HgA1c:   Lab Results   Component Value Date    LABA1C <4.0 12/30/2019     No results found for: EAG    BNP: No results for input(s): BNP in the last 72 hours. PT/INR:   Recent Labs     10/21/20  1319   PROTIME 15.1*   INR 1.3     APTT:No results for input(s): APTT in the last 72 hours.   CARDIAC ENZYMES:  Recent Labs     10/21/20  1319   TROPONINI 0.01     FASTING LIPID PANEL:  Lab Results   Component Value Date    CHOL 96 02/27/2020    HDL 36 02/27/2020    LDLCALC 34 02/27/2020    TRIG 131 02/27/2020     LIVER PROFILE:  Recent Labs     10/23/20  0911 10/24/20  0437   AST 23 22   ALT 16 13   LABALBU 2.2* 2.2*       CXR: 10/21/2020  FINDINGS:   Small effusions bilaterally with lower lobe atelectasis or infiltrate, left   greater than right.  No overt pulmonary edema.  There are stable coarsened   interstitial markings.  Stable cardiomediastinal silhouette.        Impression:         Small bilateral pleural effusions with lower lobe atelectasis or infiltrate,   left greater than right.      Telemetry: presently HRs 90-110s (irregular rhythm noted). Repeat 12 lead EKG is pending. 12 lead EKG: (Reviewed with Dr. Shayna Mcintosh)  10/21/2020 12:57: NSR rate 90, PACs, nonspecific st/t wave changes  10/21/2020 22:59: possible Afib vs ST with frequent PACs, rate 101, nonspecific st/t wave changes  Repeat 12 lead EKG from today has been ordered and is pending    Echo: 10/22/2020 (Dr. Patricia Lopes)   Summary   Technically difficult examination. Enhancing contrast was used to define endocardial borders. Normal left ventricular chamber size. Hyperdynamic left ventricular systolic function. Visually estimated LVEF is 70-75 %. Paradoxical septal wall motion consistent with history of cardiac valvular   surgery. Normal right ventricle structure with mild dysfunction. Findings consistent with pervious mitral valve repair and annular 28 mm   Future ring. No evidence of significant stenosis or regurgitation. The   mean transvalvular gradient is 9 mmHg. Findings consistent with pervious tricuspid valve repair and annular ring   using 28 mm MC3 band. No evidence of significant stenosis of   regurgitation. Small pericardial effusion. Large left pleural effusion. Evidence of ascites. A & P are to follow as per Dr. Shayna Mcintosh following results of 12 lead EKG  Gustavo Juanito. Mami Flaherty [address], [address] Cardiology    Electronically signed by Luis Antonio Alves on 10/24/2020 at 10:02 AM  ______________________________________________________________________  Cardiology attending attestation:  I have independently interviewed and examined the patient. I personally reviewed pertinent laboratory values and diagnostic testing (including, if applicable, chest xray, electrocardiogram, telemetry, echocardiogram, stress testing, and coronary angiography). I have reviewed the above documentation completed by Ruthann Hoffman PA-C and agree with the findings, assessment and plan except where noted.   Plan formulated under my direct supervision. I participated in all aspects of the medical decision making. Please see my additional contributions to the HPI, physical exam, and assessment / medical decision making:  _______________________________________________________________________    Patient feels well. Denies chest pain or shortness of breath. Occasional palpitations. Detailed review of all EKGs and telemetry this admission, no definitive evidence of atrial fibrillation. She has an irregular rhythm, manifested by sinus with frequent PACs and runs of PAT. Physical Exam:  /68   Pulse 93   Temp 98.3 °F (36.8 °C) (Oral)   Resp 18   Ht 5' 3\" (1.6 m)   Wt 174 lb 6.1 oz (79.1 kg)   SpO2 97%   BMI 30.89 kg/m²   General appearance: Awake, alert, no acute respiratory distress  Skin: Intact, no rash  ENMT: Moist mucous membranes  Neck: Supple, no carotid bruits. Normal jugular venous pressure  Lungs: Clear to auscultation bilaterally. No wheezes, rales, or rhonchi  Cardiac: Irregular rhythm with a normal rate, normal S1&S2, no murmurs apparent  Abdomen: Soft, positive bowel sounds, nontender  Extremities: Moves all extremities x 4, no lower extremity edema  Neurologic: No focal motor deficits apparent, normal mood and affect    Telemetry: Sinus with frequent PACs and PAT, giving the appearance of an irregularly irregular rhythm. EKG: As above    Impression:   1. Palpitations. Frequent PACs and PAT. No evidence of atrial fibrillation, though remains at high risk for developing given history of valvular surgery  2. Valvular heart disease  3. Status post mitral valve repair 28 mm future complete ring, tricuspid valve repair 28 mm Giron MC 3 band (3/2017 Dr. Avalos Smoker)  4. Chronic HFpEF, compensated  5. Left atrial appendage exclusion with 45 mm atriclip device  6. End-stage renal disease on hemodialysis  7. History of hypertension, currently blood pressures running low.   Now on midodrine and carvedilol discontinued  8. Bronchial asthma and COPD  9. Obstructive sleep apnea, on CPAP  10. Chronic pain  11. Hypothyroidism  12. Hyperlipidemia  13. Chronic anemia  14. Polymyalgia rheumatica  15. Remote history of DVT in the setting of oral contraceptive use  16. Elevated BMI (34 kg/m2)  17. History of bariatric surgery  18. Short gut syndrome    Recommendations: All EKGs and telemetry reviewed in detail. I do not see atrial fibrillation. I asked EP for their opinion, and they concur.  Start metoprolol succinate 12.5 mg daily   No anticoagulation   Consider outpatient ambulatory monitoring, as she is certainly at risk for developing A. fib, but has shown no definitive evidence yet   Follow-up with me in the office  Josephine Gr for discharge    Thank you for the consultation. Please do not hesitate to call with questions.     Bel Duque MD, [facility] Cardiology

## 2020-10-24 NOTE — FLOWSHEET NOTE
10/24/20 1028   Vital Signs   /61   Temp 97.8 °F (36.6 °C)   Pulse 82   Resp 16   Weight 174 lb 6.1 oz (79.1 kg)   Weight Method Bed scale   Percent Weight Change -3.3   Pain Assessment   Pain Assessment 0-10   Pain Level 0   Patient's Stated Pain Goal No pain   Post-Hemodialysis Assessment   Post-Treatment Procedures Blood returned; Access bleeding time < 10 minutes   Machine Disinfection Process Exterior Machine Disinfection;Machine Absence of Bleach Machine   Rinseback Volume (ml) 300 ml   Total Liters Processed (l/min) 0 l/min   Dialyzer Clearance Lightly streaked   Duration of Treatment (minutes) 180 minutes   Hemodialysis Intake (ml) 300 ml   Hemodialysis Output (ml) 3000 ml   NET Removed (ml) 2700 ml   Tolerated Treatment Good   Patient Response to Treatment dialysis completed, pt blood returned, both needles removed, pressure applied to both sites until stasis was achieved, 2x2 and bandaids applied, ++thrill/bruit noted to avf   Bilateral Breath Sounds Diminished   RLE Edema +2;Pitting   LLE Edema +2;Pitting   Physician Notified?  No

## 2020-10-24 NOTE — DISCHARGE SUMMARY
Internal Medicine Discharge Summary    Patient ID: Susan All      Patient's PCP: Santos Monk MD    Admit Date: 10/21/2020     Discharge Date:   10/24/2020  Admitting Physician: Santos Monk MD     Discharge Physician: Santos Monk MD     Discharge Diagnoses: Active Hospital Problems    Diagnosis Date Noted    Hypotension [I95.9] 10/21/2020    Hematoma [T14. 8XXA] 06/02/2018    ESRD (end stage renal disease) ([facility] [address] [N18.6] 03/07/2018    Acute on chronic diastolic congestive heart failure (HCC) [I50.33]     MANUEL (acute kidney injury) ([facility] [address] [N17.9]     Chronic renal failure, stage 5 ([facility] [address] [N18.5]     Hypomagnesemia [E83.42] 01/06/2016    Hypothyroid [E03.9] 01/06/2016       The patient was seen and examined on day of discharge and this discharge summary is in conjunction with any daily progress note from day of discharge. Hospital Course: This is a 53-year-old female with a complicated history. She has a history of renal failure requiring hemodialysis through her AV fistula. She also has a history of jejunoileal bypass surgery years ago. She also has a history of hypothyroidism and history of DVT history of polymyalgia rheumatica and history of sleep apnea. Patient also has a history of anemia. She also is a history of asthma. Patient has had many issues over the years and most recently about 3 to 4 weeks ago she has been complaining of low blood pressure readings. At first her diuretics were decreased significantly. The diuretics were used with third spaced edema in her legs. She has been losing weight and despite holding her diuretics she was still having low blood pressure readings. However her leg swelling necessitated her to be using her diuretics so she did go back on them. She had called the office last week and we had decreased her Coreg dose from 6.25 twice a day to once a day.   On a prior occasion about 3 months ago we had lowered her dose from 25 mg to 6.25 twice a day Physical examination:  General: awake alert, oriented x3, no abnormal position or movements. HEENT: normocephalic non-traumatic, no exophthalmos   Neck: supple, no LN enlargement, no thyromegaly, no thyroid tenderness, no JVD. Pulm: Clear equal air entry no added sounds, no wheezing or rhonchi    CVS:  Irregular  Abd: soft lax, no tenderness, no organomegaly, audible bowel sounds. Skin: warm, no lesions, no rash. Feet: No ulcers or open wounds. Good peripheral pulses  Neuro: CN intact, muscle power normal  Psych: normal mood, and affect  Edema less so    Consults:     IP CONSULT TO NEPHROLOGY  IP CONSULT TO CARDIOLOGY  IP CONSULT TO ONCOLOGY  IP CONSULT TO ENDOCRINOLOGY  IP CONSULT TO CARDIOLOGY    Significant Diagnostic Studies:  Xr Lumbar Spine (2-3 Views)    Result Date: 2020  Patient MRN:  [id number] : 1955 Age: 77 years Gender: Female Order Date:  2020 12:41 PM EXAM: XR LUMBAR SPINE (2-3 VIEWS) INDICATION:  low back pain, fall low back pain, fall COMPARISON: None FINDINGS:  Vertebral body height and alignment are normal. There is some anterior spurs at L1-2 and L2-3. There is mild disc space narrowing and endplate sclerosis at H2-3 and L5-S1. Innumerable calculi in the right mid abdomen could reflect a fragmented renal calculus. No acute lumbar process is noted     Xr Hip Bilateral W Ap Pelvis (2 Views)    Result Date: 2020  Patient MRN: [id number] : 1955 Age:  77 years Gender: Female Order Date: 2020 12:41 PM Exam: XR HIP BILATERAL W AP PELVIS (2 VIEWS) Number of Images: 5 views Indication:   fall,pain fall,pain Comparison: None. Findings: Examination of the pelvis in AP view shows no radiographic evidence of fracture or bony abnormality. No osteolytic or blastic lesions are identified. The soft tissue outlines are normal. There are surgical clips seen in the mid abdomen and right upper pelvic region.  The bilateral femoral head is well conjugated within the acetabulum. There is no evidence of dislocation. Osteoarthropathy of both hip joints with narrowing and osteophytes seen inferiorly. The soft tissue appears to be normal..      No acute fracture or dislocation Osteoarthropathy of both hips. Xr Knee Left (min 4 Views)    Result Date: 2020  Patient MRN:  [id number] : 1955 Age: 77 years Gender: Female Order Date:  2020 12:41 PM EXAM: XR KNEE RIGHT (MIN 4 VIEWS), XR KNEE LEFT (MIN 4 VIEWS) INDICATION:  fall,pain fall,pain COMPARISON: None FINDINGS:  No compartmental narrowing, fracture or obvious joint effusion is noted about either knee. No acute process     Xr Knee Right (min 4 Views)    Result Date: 2020  Patient MRN:  [id number] : 1955 Age: 77 years Gender: Female Order Date:  2020 12:41 PM EXAM: XR KNEE RIGHT (MIN 4 VIEWS), XR KNEE LEFT (MIN 4 VIEWS) INDICATION:  fall,pain fall,pain COMPARISON: None FINDINGS:  No compartmental narrowing, fracture or obvious joint effusion is noted about either knee. No acute process     Xr Chest Portable    Result Date: 10/21/2020  EXAMINATION: ONE XRAY VIEW OF THE CHEST 10/21/2020 1:31 pm COMPARISON: 2018. HISTORY: ORDERING SYSTEM PROVIDED HISTORY: fatigue TECHNOLOGIST PROVIDED HISTORY: Reason for exam:->fatigue Initial FINDINGS: Small effusions bilaterally with lower lobe atelectasis or infiltrate, left greater than right. No overt pulmonary edema. There are stable coarsened interstitial markings. Stable cardiomediastinal silhouette. Small bilateral pleural effusions with lower lobe atelectasis or infiltrate, left greater than right. Disposition: Home in stable    Discharge Instructions/Follow-up:  Given    Code Status:  Full Code    Activity: activity as tolerated    Diet: renal diet and salt restriction    Labs:  For convenience and continuity at follow-up the following most recent labs are provided:      CBC:    Lab Results   Component Value Date    WBC 4.5 10/24/2020    HGB 7.8 10/24/2020    HCT 25.6 10/24/2020     10/24/2020       Renal:    Lab Results   Component Value Date     10/24/2020    K 3.6 10/24/2020    K 3.7 10/21/2020     10/24/2020    CO2 29 10/24/2020    BUN 11 10/24/2020    CREATININE 2.9 10/24/2020    CALCIUM 8.1 10/24/2020    PHOS 3.9 01/08/2016       Discharge Medications:     Current Discharge Medication List           Details   midodrine (PROAMATINE) 10 MG tablet Take 1 tablet by mouth 3 times daily (with meals)  Qty: 90 tablet, Refills: 3              Details   rOPINIRole (REQUIP) 0.5 MG tablet TAKE ONE TABLET BY MOUTH nightlY  Qty: 30 tablet, Refills: 0    Associated Diagnoses: RLS (restless legs syndrome)      montelukast (SINGULAIR) 10 MG tablet TAKE ONE TABLET BY MOUTH DAILY  Qty: 30 tablet, Refills: 0    Associated Diagnoses:  Moderate persistent asthma with exacerbation      Acetaminophen (TYLENOL ARTHRITIS PAIN PO) Take 3 tablets by mouth 2 times daily       Biotin w/ Vitamins C & E (HAIR/SKIN/NAILS PO) Take by mouth Ld 8/28/2019      fenofibrate (TRICOR) 145 MG tablet Take 145 mg by mouth every morning Patient taking Mon, Wed, Fri      calcium carbonate (TUMS) 500 MG chewable tablet Take 2 tablets by mouth 3 times daily       sodium bicarbonate 650 MG tablet Take 650 mg by mouth 2 times daily       magnesium oxide (MAG-OX) 400 MG tablet Take 400 mg by mouth daily       levothyroxine (SYNTHROID) 137 MCG tablet Take 137 mcg by mouth Daily      calcium acetate (PHOSLO) 667 MG capsule Take by mouth 4 times daily      liothyronine (CYTOMEL) 5 MCG tablet Take 5 mcg by mouth daily      aspirin 81 MG EC tablet Take 1 tablet by mouth daily  Qty: 30 tablet, Refills: 3      atorvastatin (LIPITOR) 20 MG tablet Take 20 mg by mouth three times a week       Coenzyme Q10 (COQ10 PO) Take by mouth nightly       vitamin B-12 (CYANOCOBALAMIN) 1000 MCG tablet Take 1,000 mcg by mouth daily Ld 8/28/2019      darbepoetin izabella-polysorbate PEG-Epoetin Beta (MIRCERA) 75 MCG/0.3ML SOSY Infuse 75 mcg intravenously every 14 days      melatonin 5 MG TABS tablet Take 10 mg by mouth nightly as needed            Patient is likely to be on Eliquis as well as metoprolol. These drugs have yet to be prescribed by cardiology     time Spent on discharge is more than 45 minutes in the examination, evaluation, counseling and review of medications and discharge plan.       Signed:    Aubrie Woo MD   10/24/2020

## 2020-10-26 NOTE — TELEPHONE ENCOUNTER
Pt got home from dialysis just about an hour ago. Pt was under dry weight when she went in at 77.7 and when she went home she was 77.6. She ate some breakfast and starting getting dizzy. BP is 86/50. Please call pt with recommendations.

## 2020-10-26 NOTE — CARE COORDINATION
[facility] PCP     [facility] Transitions Initial Follow Up Call    Call within 2 business days of discharge: Yes    Patient: Yulia Henson Patient : 1955   MRN: [id number]  Reason for Admission: DIZZINESS   Discharge Date: 10/24/20 RARS: Readmission Risk Score: 23      Last Discharge [facility]       Complaint Diagnosis Description Type Department Provider    10/21/20 Hypotension; Dizziness Dizziness . .. ED to Hosp-Admission (Discharged) (ADMITTED) ALTAGRACIA Pastor MD; Faheem Middleton. .. Spoke with: [address] [place] [address]      Non-face-to-face services provided:  Obtained and reviewed discharge summary and/or continuity of care documents    Care Transitions 24 Hour Call    Do you have any ongoing symptoms?:  Yes  Patient-reported symptoms:  Other (Comment: low BP )  Do you have a copy of your discharge instructions?:  Yes  Do you have all of your prescriptions and are they filled?:  Yes  Have you been contacted by a [facility] Pharmacist?:  No  Have you scheduled your follow up appointment?:  No  Were you discharged with any Home Care or Post Acute Services:  No  Do you feel like you have everything you need to keep you well at home?:  Yes  Care Transitions Interventions  No Identified Needs     Spoke with Van Coffey for [facility] PCP care transition call post hospital discharge. She stated that her BP kept dropping at HD this morning, she stated that despite her hypotension at HD they were able to complete her treatment. She reports taking Midodrine 10mg prior to going, the nurse gave her another 10mg of midodrine at HD, and then she just took another dose of 10mg around 80 when her latest reading was 75/56. She reports dizziness. She is on a 40oz fluid restriction. Her weight was 177.7lbs prior to HD and was 177.6lbs afterward. She stated she left a message for Dr. Nasir Banda regarding her BP and dizziness.    She plans to recheck her BP in about 30 minutes and if it is still low she will return to [facility] ED for evaluation. Med review completed. Keo Pierce denies any needs, questions, or concerns at this time for CTN and kindly declines the need for further follow up, will sign off as she follows with a [facility] PCP.      Follow Up  Future Appointments   Date Time Provider Giovany Mirza   11/17/2020 10:30 AM Fermín Villasenor MD [id number] Maximiliano Bradford RN

## 2020-10-26 NOTE — TELEPHONE ENCOUNTER
Needs to call PCP and/or nephrologist.  She is not on any blood pressure lowering medications and was started on midodrine by someone, that may need to be increased.

## 2020-10-26 NOTE — TELEPHONE ENCOUNTER
Left message with  for patient to contact office. Patient tentatively placed on schedule with Dr. Da Jarvis on 11/17/2020 at 10:30 am.        ----- Message from BETH Mendez sent at 10/24/2020 11:52 AM EDT -----  Regarding: Da Jarvis F/U  Wallace Rossi! Dr. Da Jarvis would like to see patient in office within next few weeks. She is currently still inpatient at [facility]. (patient has palpitations and CHF history). Thank you!

## 2020-11-17 NOTE — PROGRESS NOTES
OUTPATIENT CARDIOLOGY FOLLOW-UP    Name: Tari Dejesus    Age: 77 y.o. Primary Care Physician: Brianna Ghotra MD    Date of Service: 11/17/2020    Chief Complaint:   Chief Complaint   Patient presents with    Palpitations     2 week HFU,         Interim History:   49-year-old lady, former patient of Dr. Nelly Sawyer, with a history of end-stage renal disease, chronic HFpEF as well as valvular heart disease, status post mitral and tricuspid valve repairs in March 2017 by Dr. Aziza Alves. She was recently admitted to the hospital with volume overload, required aggressive volume removal with hemodialysis. Cardiology was consulted for possible atrial fibrillation, though no definitive evidence of A. fib was seen and this was reviewed with EP. She is having frequent PACs and PAT causing an irregular appearing rhythm. She presents today for hospital follow-up. She states overall she is feeling well since her discharge. She has been maintaining her dry weight. She has been having some problems with low blood pressure and dizziness on dialysis days, and is taking midodrine. She is on low-dose metoprolol succinate 12.5 mg daily because of her atrial arrhythmias. She states that since the extra fluid has come off, her palpitations have significantly improved and denies any palpitations currently. She has some chronic lower extremity edema which is unchanged. She denies chest pain or shortness of breath. She is doing water aerobics couple times a week.     Review of Systems:   Negative except as described above    Past Medical History:  Past Medical History:   Diagnosis Date    (HFpEF) heart failure with preserved ejection fraction ([facility] [address] 01/25/2017 4/11/17- echo- LVEF 55-60%, stage II DD, severely dilated LA, severe MR, mild TR, LVDD: 5.6 cm    Anemia     hx  / takes aranesp injections every 2 weeks    Asthma     well controlled with inhalers     CAD (coronary artery disease)     Chronic kidney disease stage 4    Complication of AV dialysis fistula, initial encounter 6/2/2018    COPD (chronic obstructive pulmonary disease) (HCC)     Fibromyalgia     GERD (gastroesophageal reflux disease)     Hemodialysis patient ([address]     chato Tarango    History of blood transfusion spring 2017    Hx of blood clots     h/o DVT in leg at age 25yrs    Hyperlipidemia     Hypertension     well controlled with medications     Kidney failure     Kidney stone     multiple issues    YUNIOR on CPAP     Polymyalgia rheumatica (HCC)     Restless leg syndrome     Short gut syndrome     Thyroid disease     Traumatic hematoma of left upper arm 6/2/2018    Wears dentures     upper partial       Past Surgical History:  Past Surgical History:   Procedure Laterality Date    APPENDECTOMY      AV FISTULA REPAIR Left 03/22/2018    Superficialization, [place]    AV FISTULA REPAIR  11/21/2018    Dr Howard Grew 10x38 iCast Subclavian    BREAST BIOPSY Right 2000    BRONCHOSCOPY  2010    CARDIAC CATHETERIZATION  02/17/2017    Dr Jenkins Blunt REPLACEMENT  03/21/2017    MVR, TVR    COLONOSCOPY      COLONOSCOPY  4/21/15    COLONOSCOPY N/A 9/3/2019    COLORECTAL CANCER SCREENING, NOT HIGH RISK performed by Randal Sullivan MD at [address] [place] 99/58/2061    radiocephalic, Del[place]    DIALYSIS FISTULA CREATION Left 01/25/2018    brachioecephalic, ligation radiocephalic, [place]    ENDOSCOPY, COLON, DIAGNOSTIC      EYE SURGERY Right     CATARACT    INTESTINAL BYPASS  1973    for weight loss    KIDNEY STONE SURGERY      x 3    OTHER SURGICAL HISTORY Left 11/16/2017    AV fistula creation left arm    OTHER SURGICAL HISTORY  01/17/2018    Left arm fistulogram by Dr Geovani Thomas.     OTHER SURGICAL HISTORY  10/17/2018    Dr Tracey-Left arm fistuloplasty    LA CREAT AV FISTULA,AUTOGENOUS GRAFT Left 3/22/2018    AV FISTULA  REVISION LEFT ARM performed by Blayne Waters Rubi Jara MD at [facility] TUNNELED CV CATH Right 2018    REMOVAL OF TESIO CATHETER, REMOVAL OF MEDIPORT performed by Sara Hidalgo MD at [facility]     RIGHT BREAST MOLE REMOVED    TRANSESOPHAGEAL ECHOCARDIOGRAM  2017    TUNNELED VENOUS CATHETER PLACEMENT Right     TUNNELED VENOUS PORT PLACEMENT Right     Powerport / x 4 / at right chest; since short gut syndrome received magnesium & sodium bicarb in past    VASC UPPER EXTREMITY VENOUS  UNI Left     Brachiocephalic covered stent, iCast, 10 mm x 38 mm       Family History:  Family History   Problem Relation Age of Onset    Obesity Mother     Cirrhosis Mother     Heart Failure Mother     High Blood Pressure Mother     Diabetes Father     High Blood Pressure Sister     Depression Sister     Diabetes Brother     High Blood Pressure Brother        Social History:  Social History     Tobacco Use    Smoking status: Former Smoker     Packs/day: 2.00     Years: 8.00     Pack years: 16.00     Types: Cigarettes     Start date: 1989     Last attempt to quit: 1997     Years since quittin.8    Smokeless tobacco: Never Used   Substance Use Topics    Alcohol use: No    Drug use: No        Allergies: Allergies   Allergen Reactions    Coumadin [Warfarin Sodium] Hives    Quinine Derivatives Hives    Turmeric Shortness Of Breath    Other      Patient states cannot take oral antibiotic dt she has short gut syndrome. ...  Puts her into electrolyte inbalance       Current Medications:    Current Outpatient Medications:     furosemide (LASIX) 40 MG tablet, Take 40 mg by mouth 2 times daily, Disp: , Rfl:     rOPINIRole (REQUIP) 0.5 MG tablet, TAKE ONE TABLET BY MOUTH AT NIGHT TIME, Disp: 30 tablet, Rfl: 0    montelukast (SINGULAIR) 10 MG tablet, TAKE ONE TABLET BY MOUTH EVERY DAY, Disp: 30 tablet, Rfl: 0    midodrine (PROAMATINE) 10 MG tablet, Take 1 tablet by mouth 3 times daily (with meals) (Patient taking differently: Take 10 mg by mouth 3 times daily (with meals) 10 mg on Sunday, Tuesday, Thursday, and Saturday three times on each day.  20 mg Monday, Wednesday, and Friday three times on each day), Disp: 90 tablet, Rfl: 3    metoprolol succinate (TOPROL XL) 25 MG extended release tablet, Take 0.5 tablets by mouth daily, Disp: 30 tablet, Rfl: 3    albuterol (ACCUNEB) 0.63 MG/3ML nebulizer solution, INHALE 1 VIAL VIA NEBULIZER EVERY 4 HOURS AS NEEDED FOR WHEEZING OR SHORTNESS OF BREATH , Disp: 360 mL, Rfl: 3    albuterol sulfate HFA (VENTOLIN HFA) 108 (90 Base) MCG/ACT inhaler, Inhale 2 puffs into the lungs every 6 hours as needed for Wheezing, Disp: 1 Inhaler, Rfl: 6    Tens Unit MISC, by Does not apply route Indications: Patient with 2-years of back myofascial pain and spasm with good relief from TENS at PT in past.  She has end-stage kidney disease on dialysis so medications are limited., Disp: 1 each, Rfl: 0    FERROUS BISGLYCINATE CHELATE PO, Take by mouth, Disp: , Rfl:     Methoxy PEG-Epoetin Beta (MIRCERA) 75 MCG/0.3ML SOSY, Infuse 75 mcg intravenously every 14 days, Disp: , Rfl:     Acetaminophen (TYLENOL ARTHRITIS PAIN PO), Take 3 tablets by mouth 2 times daily , Disp: , Rfl:     Biotin w/ Vitamins C & E (HAIR/SKIN/NAILS PO), Take by mouth Ld 8/28/2019, Disp: , Rfl:     fenofibrate (TRICOR) 145 MG tablet, Take 145 mg by mouth every morning Patient taking Mon, Wed, Fri, Disp: , Rfl:     calcium carbonate (TUMS) 500 MG chewable tablet, Take 2 tablets by mouth 3 times daily , Disp: , Rfl:     melatonin 5 MG TABS tablet, Take 10 mg by mouth nightly as needed , Disp: , Rfl:     sodium bicarbonate 650 MG tablet, Take 650 mg by mouth 2 times daily , Disp: , Rfl:     magnesium oxide (MAG-OX) 400 MG tablet, Take 400 mg by mouth daily , Disp: , Rfl:     levothyroxine (SYNTHROID) 137 MCG tablet, Take 137 mcg by mouth Daily, Disp: , Rfl:     calcium acetate (PHOSLO) 667 MG capsule, Take by mouth 4 times daily, Disp: , Rfl:     liothyronine (CYTOMEL) 5 MCG tablet, Take 5 mcg by mouth daily, Disp: , Rfl:     aspirin 81 MG EC tablet, Take 1 tablet by mouth daily, Disp: 30 tablet, Rfl: 3    atorvastatin (LIPITOR) 20 MG tablet, Take 20 mg by mouth three times a week , Disp: , Rfl:     Coenzyme Q10 (COQ10 PO), Take by mouth nightly , Disp: , Rfl:     vitamin B-12 (CYANOCOBALAMIN) 1000 MCG tablet, Take 1,000 mcg by mouth daily Ld 8/28/2019, Disp: , Rfl:     darbepoetin izabella-polysorbate (ARANESP, ALBUMIN FREE,) 60 MCG/ML injection, Inject into the skin every 14 days Twice/per month  Dose given 3-16-18 at dialysis, Disp: , Rfl:     CPAP Machine [facility] patients CPAP at 8 cm water pressure with heated humidification and C-Flex of 3. Patient states she is due for a new machine and hers is not working. Also provide masks, tubing, filters, head gear, and water chambers as needed.  Diagnosis-YUNIOR Faxed to [facility] Length of need 99 months, Disp: 1 each, Rfl: 0    vitamin D-3 (CHOLECALCIFEROL) 5000 UNITS TABS, Take 5,000 Units by mouth 2 times daily LD 8/28/2019, Disp: , Rfl:     folic acid (FOLVITE) 1 MG tablet, Take 1 mg by mouth daily Ld 8/28/2019, Disp: , Rfl:     pregabalin (LYRICA) 50 MG capsule, Take 50 mg by mouth 3 times daily Instructed to take am of procedure., Disp: , Rfl:     Umeclidinium Bromide 62.5 MCG/INH AEPB, Inhale 1 puff into the lungs daily (Patient not taking: Reported on 11/17/2020), Disp: 30 each, Rfl: 5    Physical Exam:  BP 90/60 (Site: Right Upper Arm, Position: Sitting, Cuff Size: Medium Adult)   Pulse 73   Resp 16   Ht 5' 3\" (1.6 m)   Wt 167 lb 9.6 oz (76 kg)   SpO2 98%   BMI 29.69 kg/m²   Wt Readings from Last 3 Encounters:   11/17/20 167 lb 9.6 oz (76 kg)   10/24/20 174 lb 6.1 oz (79.1 kg)   09/29/20 172 lb (78 kg)     Appearance: Awake, alert and oriented x 3, no acute respiratory distress  Skin: Intact, no rash  Head: Normocephalic, atraumatic  Eyes: EOMI, no conjunctival erythema  ENMT: No pharyngeal erythema, MMM, no rhinorrhea  Neck: Supple, no elevated JVP, no carotid bruits  Lungs: Clear to auscultation bilaterally. No wheezes, rales, or rhonchi. Cardiac: Regular rate and rhythm, +S1S2, 2 out of 6 systolic murmur at the left sternal border. Well-healed sternotomy scar.     Abdomen: Soft, nontender, +bowel sounds  Extremities: Moves all extremities x 4, 2+ bilateral pitting lower extremity edema  Neurologic: No focal motor deficits apparent, normal mood and affect, alert and oriented x 3  Peripheral Pulses: Intact posterior tibial pulses bilaterally    Laboratory Tests:  Lab Results   Component Value Date    CREATININE 2.9 (H) 10/24/2020    BUN 11 10/24/2020     10/24/2020    K 3.6 10/24/2020     10/24/2020    CO2 29 10/24/2020     Lab Results   Component Value Date    MG 1.7 10/24/2020     Lab Results   Component Value Date    WBC 4.5 10/24/2020    HGB 7.8 (L) 10/24/2020    HCT 25.6 (L) 10/24/2020    .3 (H) 10/24/2020     10/24/2020     Lab Results   Component Value Date    ALT 13 10/24/2020    AST 22 10/24/2020    ALKPHOS 143 (H) 10/24/2020    BILITOT 0.3 10/24/2020     Lab Results   Component Value Date    CKTOTAL 47 09/18/2018    CKMB 1.4 08/24/2014    TROPONINI 0.01 10/21/2020    TROPONINI 0.20 (H) 04/10/2017    TROPONINI <0.01 01/06/2016     Lab Results   Component Value Date    INR 1.3 10/21/2020    INR 1.2 02/27/2020    INR 1.6 06/02/2018    PROTIME 15.1 (H) 10/21/2020    PROTIME 14.8 (H) 02/27/2020    PROTIME 18.3 (H) 06/02/2018     Lab Results   Component Value Date    TSH 1.150 10/21/2020     Lab Results   Component Value Date    LABA1C <4.0 12/30/2019     No results found for: EAG  Lab Results   Component Value Date    CHOL 96 02/27/2020    CHOL 90 12/30/2019     Lab Results   Component Value Date    TRIG 131 02/27/2020    TRIG 86 12/30/2019     Lab Results   Component Value Date    HDL 36 02/27/2020    HDL 41 12/30/2019    HDL 41 09/18/2018     Lab Results   Component Value Date    LDLCALC 34 02/27/2020    LDLCALC 32 12/30/2019    LDLCALC 44 09/18/2018     Lab Results   Component Value Date    LABVLDL 26 02/27/2020    LABVLDL 17 12/30/2019    LABVLDL 24 09/18/2018     Cardiac Tests:  ECG: Sinus rhythm 73 bpm.  Normal axis. Right bundle branch block QRS duration 134 ms. Echocardiogram: 4/2017   Summary   Left ventricular size is grossly normal.   Normal systolic function with LVEF estimated at 65%.   Indeterminate diastolic function   Normal left atrium by volume index(28ml/m2)   S/p Mitral valve repair. Mean gradient=9mmHg\   Mild tricuspid regurgitation.   Normal estimated PA pressures. CHERI performed 2/2017  Normal LVEF  Severely dilated left atrium  Evidence of small PFO with right-to-left shunting  Severe central MR  Moderate TR    Cardiac catheterization: 2/2017  Findings:  Left main: 0% stenosis  LAD: 0 % stenosis. Tapers off prior to the apex. Circumflex: Large OM1: 0 % stenosis  RCA: Dominant. Large. 0% stenosis in RCA, RPLCA or RPDA  LV angio: not performed to conserve contrast.      Hemodynamics:  LV: 156/2(12) mmHg. No gradient across AV. Ao: 143/91(114)mmHg. Orders Placed This Encounter   Procedures    EKG 12 lead        Requested Prescriptions      No prescriptions requested or ordered in this encounter        ASSESSMENT / PLAN:  1. Palpitations. Frequent PACs and PAT during recent hospitalization. No evidence of atrial fibrillation though remains at high risk for such  2. Valvular heart disease: Severe MR and moderate TR  3. Status post mitral valve repair 28 mm future complete ring, tricuspid valve repair 28 mm [facility] 3 band (3/2017 Dr. Mason Germain)  4. Chronic HFpEF, compensated today. Recent admission for decompensation/volume overload 10/2020  5. Left atrial appendage exclusion with 45 mm atriclip device  6. Right bundle branch block  7. End-stage renal disease on hemodialysis MWF  8.  Prior history of hypertension, currently hypotensive  9. Bronchial asthma and COPD  10. Obstructive sleep apnea, on CPAP  11. Chronic pain  12. Hypothyroidism  13. Hyperlipidemia  14. Chronic anemia  15. Polymyalgia rheumatica  16. Remote history of DVT in the setting of oral contraceptive use  17. Elevated BMI (30 kg/m2)  18. History of bariatric surgery  19. Short gut syndrome    Recommendations:  Fairly stable from a cardiac standpoint with intermittent symptomatic hypotension usually with dialysis. · Continue current low-dose metoprolol succinate, but recommended taking it in the evening instead of during the day before dialysis  · If continues to have symptomatic hypotension, could skip metoprolol doses the evening before dialysis days  · Patient to let us know if has recurrent palpitations, and ambulatory cardiac monitoring can be arranged given her high risk for atrial arrhythmias  · Continue midodrine  · Continue low-dose aspirin  · Continue to monitor daily weights  · Aggressive risk factor modification  · Encouraged continued exercise program and compliance with CPAP  · Follow-up in 3 months or sooner if need arises    Follow-up in 3 months. The patient's current medication list, allergies, problem list and results of all previously ordered testing were reviewed at today's visit.     Kimo Suresh MD   [facility]) Cardiology

## 2020-11-28 NOTE — ED NOTES
Anna Castle  Ph# [phone].  At home      Dang Malin, [address] [place] [address]  11/28/20 [id number]

## 2020-11-29 NOTE — H&P
History and Physical      CHIEF COMPLAINT:     low blood pressure  History of Present Illness: This is a 42-year-old female that is well-known to me. She has a history of renal failure that has required regular hemodialysis through left arm AV fistula. She has a history of premorbid polymyalgia rheumatica and hypothyroidism and sleep apnea and gastric bypass. Patient was in the hospital just over a month ago and had had symptomatic hypotension caused by fluid removal during dialysis. Once she was admitted she had been seen by cardiology as well as endocrinology. Adrenal insufficiency was ruled out. Midodrine was increased. Her beta-blocker which was Coreg which had been gradually decreased in the outpatient setting was eventually removed completely. She did have issues with paroxysmal atrial tachycardia which was not atrial fibrillation and patient was started on metoprolol 25 mg a day. She did follow-up in the outpatient setting with the cardiology services and they had recommended lowering the dose at 1st-12 0.5 mg a day and then taking it at night and then skipping it on the days of dialysis. She has not responded to any of these measures and she received dialysis yesterday and became fairly lightheaded and dizzy and was in the ER with a systolic blood pressure of 60.   Again she is mentating well and she is able to give me the entire history and also her recent encounter with cardiology in the outpatient setting  No fevers no chills no cough just dizziness and lightheadedness when her blood pressure drops      Past Medical History:   Diagnosis Date    (HFpEF) heart failure with preserved ejection fraction ([facility] [address] 01/25/2017 4/11/17- echo- LVEF 55-60%, stage II DD, severely dilated LA, severe MR, mild TR, LVDD: 5.6 cm    Anemia     hx  / takes aranesp injections every 2 weeks    Asthma     well controlled with inhalers     CAD (coronary artery disease)     Chronic kidney disease     stage 4    Complication of AV dialysis fistula, initial encounter 6/2/2018    COPD (chronic obstructive pulmonary disease) (HCC)     Fibromyalgia     GERD (gastroesophageal reflux disease)     Hemodialysis patient ([facility] [address]     m ashley Tarango    History of blood transfusion spring 2017    Hx of blood clots     h/o DVT in leg at age 25yrs    Hyperlipidemia     Hypertension     well controlled with medications     Kidney failure     Kidney stone     multiple issues    YUNIOR on CPAP     Polymyalgia rheumatica (HCC)     Restless leg syndrome     Short gut syndrome     Thyroid disease     Traumatic hematoma of left upper arm 6/2/2018    Wears dentures     upper partial         Past Surgical History:   Procedure Laterality Date    APPENDECTOMY      AV FISTULA REPAIR Left 03/22/2018    Superficialization, Delatore    AV FISTULA REPAIR  11/21/2018    Dr Eduardo Story 10x38 iCast Subclavian    BREAST BIOPSY Right 2000    BRONCHOSCOPY  2010    CARDIAC CATHETERIZATION  02/17/2017    Dr Aletha Mejia REPLACEMENT  03/21/2017    MVR, TVR    COLONOSCOPY      COLONOSCOPY  4/21/15    COLONOSCOPY N/A 9/3/2019    COLORECTAL CANCER SCREENING, NOT HIGH RISK performed by Elida Bolivar MD at [address] [facility] Left 85/17/3003    radiocephalic, Delatore    DIALYSIS FISTULA CREATION Left 01/25/2018    brachioecephalic, ligation radiocephalic, Delatore    ENDOSCOPY, COLON, DIAGNOSTIC      EYE SURGERY Right     CATARACT    INTESTINAL BYPASS  1973    for weight loss    KIDNEY STONE SURGERY      x 3    OTHER SURGICAL HISTORY Left 11/16/2017    AV fistula creation left arm    OTHER SURGICAL HISTORY  01/17/2018    Left arm fistulogram by Dr Leonel Reyes.     OTHER SURGICAL HISTORY  10/17/2018    Dr Tracey-Left arm fistuloplasty    WI CREAT AV FISTULA,AUTOGENOUS GRAFT Left 3/22/2018    AV FISTULA  REVISION LEFT ARM performed by Raya Diaz MD at [place] [address] REMOVAL TUNNELED CV CATH Right 8/9/2018    REMOVAL OF TESIO CATHETER, REMOVAL OF MEDIPORT performed by Napoleon Akesr MD at [address] [place] [address] Street Right 2000    RIGHT BREAST MOLE REMOVED    TRANSESOPHAGEAL ECHOCARDIOGRAM  02/03/2017    TUNNELED VENOUS CATHETER PLACEMENT Right     TUNNELED VENOUS PORT PLACEMENT Right     Powerport / x 4 / at right chest; since short gut syndrome received magnesium & sodium bicarb in past    VASC UPPER EXTREMITY VENOUS  UNI Left 39/99/6301    Brachiocephalic covered stent, iCast, 10 mm x 38 mm       Medications Prior to Admission:    Medications Prior to Admission: lidocaine-prilocaine (EMLA) 2.5-2.5 % cream, Apply topically as needed for Pain Apply to AV fistula prior to HD.  furosemide (LASIX) 40 MG tablet, Take 40 mg by mouth 2 times daily  rOPINIRole (REQUIP) 0.5 MG tablet, TAKE ONE TABLET BY MOUTH AT NIGHT TIME  montelukast (SINGULAIR) 10 MG tablet, TAKE ONE TABLET BY MOUTH EVERY DAY  midodrine (PROAMATINE) 10 MG tablet, Take 1 tablet by mouth 3 times daily (with meals) (Patient taking differently: Take 10 mg by mouth 3 times daily (with meals) 10 mg on Sunday, Tuesday, Thursday, and Saturday three times on each day.  20 mg Monday, Wednesday, and Friday three times on each day)  albuterol (ACCUNEB) 0.63 MG/3ML nebulizer solution, INHALE 1 VIAL VIA NEBULIZER EVERY 4 HOURS AS NEEDED FOR WHEEZING OR SHORTNESS OF BREATH   albuterol sulfate HFA (VENTOLIN HFA) 108 (90 Base) MCG/ACT inhaler, Inhale 2 puffs into the lungs every 6 hours as needed for Wheezing  Acetaminophen (TYLENOL ARTHRITIS PAIN PO), Take 3 tablets by mouth 2 times daily   Biotin w/ Vitamins C & E (HAIR/SKIN/NAILS PO), Take by mouth Ld 8/28/2019  fenofibrate (TRICOR) 145 MG tablet, Take 145 mg by mouth every morning Patient taking Mon, Wed, Fri  calcium carbonate (TUMS) 500 MG chewable tablet, Take 3 tablets by mouth 3 times daily   melatonin 5 MG TABS tablet, Take 10 mg by mouth nightly as needed   sodium bicarbonate 650 MG tablet, Take 650 mg by mouth 2 times daily   magnesium oxide (MAG-OX) 400 MG tablet, Take 400 mg by mouth daily   levothyroxine (SYNTHROID) 137 MCG tablet, Take 137 mcg by mouth Daily  calcium acetate (PHOSLO) 667 MG capsule, Take by mouth 4 times daily  liothyronine (CYTOMEL) 5 MCG tablet, Take 5 mcg by mouth daily  aspirin 81 MG EC tablet, Take 1 tablet by mouth daily  atorvastatin (LIPITOR) 20 MG tablet, Take 20 mg by mouth three times a week   Coenzyme Q10 (COQ10 PO), Take by mouth nightly   vitamin B-12 (CYANOCOBALAMIN) 1000 MCG tablet, Take 1,000 mcg by mouth daily Ld 8/28/2019  darbepoetin izabella-polysorbate (ARANESP, ALBUMIN FREE,) 60 MCG/ML injection, Inject into the skin every 14 days Twice/per month  Dose given 3-16-18 at dialysis  vitamin D-3 (CHOLECALCIFEROL) 5000 UNITS TABS, Take 5,000 Units by mouth 2 times daily LD 3/09/0196  folic acid (FOLVITE) 1 MG tablet, Take 1 mg by mouth daily Ld 8/28/2019  pregabalin (LYRICA) 50 MG capsule, Take 50 mg by mouth 3 times daily Instructed to take am of procedure. metoprolol succinate (TOPROL XL) 25 MG extended release tablet, Take 0.5 tablets by mouth daily (Patient taking differently: Take 12.5 mg by mouth four times a week Take at night, not on nights prior to HD.)  [DISCONTINUED] Umeclidinium Bromide 62.5 MCG/INH AEPB, Inhale 1 puff into the lungs daily (Patient not taking: Reported on 11/17/2020)  Tens Unit MIS, by Does not apply route Indications: Patient with 2-years of back myofascial pain and spasm with good relief from TENS at PT in past.  She has end-stage kidney disease on dialysis so medications are limited. FERROUS BISGLYCINATE CHELATE PO, Take by mouth  Methoxy PEG-Epoetin Beta (MIRCERA) 75 MCG/0.3ML SOSY, Infuse 75 mcg intravenously every 14 days  CPAP Machine MIS, Please replace patients CPAP at 8 cm water pressure with heated humidification and C-Flex of 3. Patient states she is due for a new machine and hers is not working. Also provide masks, tubing, filters, head gear, and water chambers as needed. Diagnosis-YUNIOR Faxed to [facility] Length of need 99 months    Allergies:    Coumadin [warfarin sodium]; Quinine derivatives; Turmeric; and Other    Social History:    reports that she quit smoking about 23 years ago. Her smoking use included cigarettes. She started smoking about 31 years ago. She has a 16.00 pack-year smoking history. She has never used smokeless tobacco. She reports that she does not drink alcohol or use drugs. Family History:   family history includes Cirrhosis in her mother; Depression in her sister; Diabetes in her brother and father; Heart Failure in her mother; High Blood Pressure in her brother, mother, and sister; Obesity in her mother. REVIEW OF SYSTEMS:  Gen: Negative for nausea, vomiting, diarrhea, fever, chills, night sweats  HEENT: Negative for double vision, blurred vision, sore throat   Heart: Negative for HTN, palpitations, chest pain  Lungs: Negative for wheezes, asthma or SOB  GI: Negative for nausea, vomiting  : Negative for dysuria, hematuria  Endo: Positive for hypothyroidism  Heme: Positive for easy bruising  Psych: Negative for Depression or anxiety  Ortho: Some gait dysfunction not necessarily related to obvious arthritis but some aches and pains    PHYSICAL EXAM:    Vitals:  BP (!) 91/53   Pulse 100   Temp 98.3 °F (36.8 °C) (Oral)   Resp 16   Ht 5' 3\" (1.6 m)   Wt 175 lb (79.4 kg)   SpO2 96%   BMI 31.00 kg/m²     General:  Awake, alert, oriented X 3. Well developed, well nourished, well groomed. No apparent distress. HEENT:  Normocephalic, atraumatic. Pupils equal, round, reactive to light. No scleral icterus. No conjunctival injection. Normal lips, teeth, and gums. No nasal discharge. Neck:  Supple  Heart:  RRR, no murmurs, gallops, or rubs  Lungs: Decreased bibasilar   Abdomen:   Bowel sounds present, soft, nontender, no masses, no organomegaly, no peritoneal signs  Extremities: No clubbing, cyanosis, edema 1 plus and right lower extremity with some bruising  Skin:  Warm and dry, no open lesions or rash  Neuro:  Cranial nerves 2-12 intact, no focal deficits  Breast: deferred  Rectal: deferred  Genitalia:  deferred    LABS:  CBC:   Lab Results   Component Value Date    WBC 12.6 11/29/2020    RBC 3.06 11/29/2020    HGB 10.3 11/29/2020    HCT 34.5 11/29/2020    .7 11/29/2020    MCH 33.7 11/29/2020    MCHC 29.9 11/29/2020    RDW 16.5 11/29/2020     11/29/2020    MPV 11.8 11/29/2020     CMP:    Lab Results   Component Value Date     11/29/2020    K 2.7 11/29/2020    K 3.7 10/21/2020    CL 99 11/29/2020    CO2 24 11/29/2020    BUN 22 11/29/2020    CREATININE 4.7 11/29/2020    GFRAA 11 11/29/2020    LABGLOM 9 11/29/2020    GLUCOSE 74 11/29/2020    PROT 6.1 11/29/2020    LABALBU 1.8 11/29/2020    CALCIUM 8.2 11/29/2020    BILITOT 0.4 11/29/2020    ALKPHOS 141 11/29/2020    AST 55 11/29/2020    ALT 24 11/29/2020     Albumin:    Lab Results   Component Value Date    LABALBU 1.8 11/29/2020     Calcium:    Lab Results   Component Value Date    CALCIUM 8.2 11/29/2020     Ionized Calcium:  No results found for: IONCA  Magnesium:    Lab Results   Component Value Date    MG 1.8 11/28/2020     Phosphorus:    Lab Results   Component Value Date    PHOS 3.9 01/08/2016     LDH:    Lab Results   Component Value Date     10/23/2020     Uric Acid:  No results found for: LABURIC, URICACID  PT/INR:    Lab Results   Component Value Date    PROTIME 15.1 10/21/2020    INR 1.3 10/21/2020       Xr Chest Portable    Result Date: 11/28/2020  EXAMINATION: ONE XRAY VIEW OF THE CHEST 11/28/2020 6:07 pm COMPARISON: Chest series from October 21, 2020. HISTORY: ORDERING SYSTEM PROVIDED HISTORY: sob TECHNOLOGIST PROVIDED HISTORY: Reason for exam:->sob FINDINGS: Postsurgical changes overlie the mediastinum. Lung hyperinflation and coarse interstitial markings.   Elevation of the left hemidiaphragm versus loculated left pleural effusion is unchanged. Small right pleural effusion or scarring. No new airspace disease. No pneumothorax. Atherosclerotic disease in the aortic arch. Cardiac silhouette is enlarged. Normal pulmonary vascularity. Osseous and thoracic soft tissue structures demonstrate no acute findings. 1.  Stable lung hyperinflation and coarse interstitial markings. Stable left greater than right pleural effusions or potentially scarring. No new pulmonary pathology. 2.  Atherosclerotic disease and cardiomegaly. Postsurgical changes. Normal pulmonary vascularity on this exam  ASSESSMENT:      Principal Problem:    Hypotension  Active Problems:    Hypothyroid    (HFpEF) heart failure with preserved ejection fraction (HCC)    PAT (paroxysmal atrial tachycardia) (HCC)  Resolved Problems:    * No resolved hospital problems. *    Dizziness  Lightheadedness  Hypokalemia  Peripheral neuropathy  Restless legs  PLAN:    1.  I would like cardiology to be see her again  2. We need to equate her fluid removal in the outpatient setting of hemodialysis since the outpatient setting seems to be creating these recurrent episodes  3. She refuses to wear compression stockings which would help because it is painful for her. 4.   quite compliant with her BiPAP  5 I can request cardiology to resee regarding. Metoprolol  6. Replace hypokalemia  7. Continue premorbid medications for restless legs and peripheral neuropathy  8.   Anemia with no changes currently but continue to erythropoietin analogs        Please note that over 50 minutes was spent in evaluating the patient, review of records and results, discussion with staff/family, etc.      Electronically signed by Joshua Pastor MD on 11/29/2020 at 11:20 AM

## 2020-11-29 NOTE — PROGRESS NOTES
Dr. Ishmael Desir gave replacement orders and if repeat BMP is better she can be discharged tonight. Alberto Fernández what Dr. Ishmael Desir said and awaiting message back.

## 2020-11-29 NOTE — PROGRESS NOTES
Superficialization, Delatore    AV FISTULA REPAIR  11/21/2018    Dr Elle Quinn 10x38 iCast Subclavian    BREAST BIOPSY Right 2000    BRONCHOSCOPY  2010    CARDIAC CATHETERIZATION  02/17/2017    Dr Mark Barker REPLACEMENT  03/21/2017    MVR, TVR    COLONOSCOPY      COLONOSCOPY  4/21/15    COLONOSCOPY N/A 9/3/2019    COLORECTAL CANCER SCREENING, NOT HIGH RISK performed by Tara Mejia MD at [address] Left 48/26/4800    radiocephalic, Delatore    DIALYSIS FISTULA CREATION Left 01/25/2018    brachioecephalic, ligation radiocephalic, Delatore    ENDOSCOPY, COLON, DIAGNOSTIC      EYE SURGERY Right     CATARACT    INTESTINAL BYPASS  1973    for weight loss    KIDNEY STONE SURGERY      x 3    OTHER SURGICAL HISTORY Left 11/16/2017    AV fistula creation left arm    OTHER SURGICAL HISTORY  01/17/2018    Left arm fistulogram by Dr Stephani Garcia.     OTHER SURGICAL HISTORY  10/17/2018    Dr Tracey-Left arm fistuloplasty    IN CREAT AV FISTULA,AUTOGENOUS GRAFT Left 3/22/2018    AV FISTULA  REVISION LEFT ARM performed by Drea Winn MD at [facility] TUNNELED CV CATH Right 8/9/2018    REMOVAL OF TESIO CATHETER, REMOVAL OF MEDIPORT performed by Drea Winn MD at [address] Right 2000    RIGHT BREAST MOLE REMOVED    TRANSESOPHAGEAL ECHOCARDIOGRAM  02/03/2017    TUNNELED VENOUS CATHETER PLACEMENT Right     TUNNELED VENOUS PORT PLACEMENT Right     Powerport / x 4 / at right chest; since short gut syndrome received magnesium & sodium bicarb in past    VASC UPPER EXTREMITY VENOUS  UNI Left 25/80/7285    Brachiocephalic covered stent, iCast, 10 mm x 38 mm         Social History     Socioeconomic History    Marital status:      Spouse name: Not on file    Number of children: Not on file    Years of education: Not on file    Highest education level: Not on file   Occupational History    Occupation: retired- VENDING WORK   Social Needs    Financial resource strain: Not on file    Food insecurity     Worry: Not on file     Inability: Not on file   [employer] needs     Medical: Not on file     Non-medical: Not on file   Tobacco Use    Smoking status: Former Smoker     Packs/day: 2.00     Years: 8.00     Pack years: 16.00     Types: Cigarettes     Start date: 1989     Last attempt to quit: 1997     Years since quittin.8    Smokeless tobacco: Never Used   Substance and Sexual Activity    Alcohol use: No    Drug use: No    Sexual activity: Not Currently   Lifestyle    Physical activity     Days per week: Not on file     Minutes per session: Not on file    Stress: Not on file   Relationships    Social connections     Talks on phone: Not on file     Gets together: Not on file     Attends Sabianism service: Not on file     Active member of club or organization: Not on file     Attends meetings of clubs or organizations: Not on file     Relationship status: Not on file    Intimate partner violence     Fear of current or ex partner: Not on file     Emotionally abused: Not on file     Physically abused: Not on file     Forced sexual activity: Not on file   Other Topics Concern    Not on file   Social History Narrative    1-2 cups coffee daily       Family History  Family History   Problem Relation Age of Onset    Obesity Mother     Cirrhosis Mother     Heart Failure Mother     High Blood Pressure Mother     Diabetes Father     High Blood Pressure Sister     Depression Sister     Diabetes Brother     High Blood Pressure Brother        Scheduled Meds:  Sangita Evangelista ON 2020] epoetin izabella-epbx  4,000 Units Subcutaneous Once per day on     aspirin  81 mg Oral Daily    [START ON 2020] atorvastatin  20 mg Oral Once per day on     Calcium Acetate (Phos Binder)  667 mg Oral 4x Daily WC    folic acid  1 mg Oral Daily    levothyroxine  137 mcg Oral Daily    liothyronine  5 mcg Oral Daily    magnesium oxide  400 mg Oral Daily    midodrine  10 mg Oral TID WC    montelukast  10 mg Oral Daily    pregabalin  50 mg Oral TID    rOPINIRole  0.5 mg Oral Nightly    sodium bicarbonate  650 mg Oral BID    vitamin B-12  1,000 mcg Oral Daily    Vitamin D  5,000 Units Oral BID    fenofibrate  160 mg Oral Daily    ipratropium  0.5 mg Nebulization 4x daily       Continuous Infusions:  [unfilled]    PRN meds  acetaminophen, albuterol, albuterol, melatonin    Allergies:  Coumadin [warfarin sodium]; Quinine derivatives; Turmeric; and Other    Review of Systems     Pertinent positives and negatives as in HPI. Other systems reviewed and were negative.      LAST 3 CMP  Recent Labs     11/28/20 1819 11/29/20 0615 11/29/20  1620    138 135   K 2.7* 2.7*  --    CL 98 99 98   CO2 26 24 22   BUN 20 22 25*   CREATININE 4.2* 4.7* 5.3*   GLUCOSE 83 74 82   CALCIUM 8.1* 8.2* 8.2*   MG 1.8  --   --    PROT 6.4 6.1*  --    LABALBU 2.0* 1.8*  --    BILITOT 0.6 0.4  --    ALKPHOS 155* 141*  --    AST 43* 55*  --        LAST 3 CBC:  Recent Labs     11/28/20 1819 11/29/20 0615   WBC 12.5* 12.6*   RBC 3.14* 3.06*   HGB 10.6* 10.3*   HCT 35.5 34.5    155         Intake/Output Summary (Last 24 hours) at 11/29/2020 1655  Last data filed at 11/29/2020 1219  Gross per 24 hour   Intake 360 ml   Output 250 ml   Net 110 ml     Patient Vitals for the past 24 hrs:   BP Temp Temp src Pulse Resp SpO2 Height Weight   11/29/20 1400 (!) 95/53 98.6 °F (37 °C) Oral 93 16 98 % -- --   11/29/20 1233 -- -- -- -- -- 97 % -- --   11/29/20 1033 (!) 91/53 -- -- -- -- -- -- --   11/29/20 0815 (!) 76/44 98.3 °F (36.8 °C) Oral 100 16 96 % -- --   11/29/20 0811 -- -- -- -- 16 -- -- --   11/29/20 0600 (!) 81/47 -- -- -- -- -- -- --   11/29/20 0046 (!) 86/53 98 °F (36.7 °C) Oral 103 16 98 % -- --   11/28/20 2325 (!) 107/56 98.3 °F (36.8 °C) Oral 114 20 98 % 5' 3\" (1.6 m) 175 lb (79.4 kg)   11/28/20 2220 (!) 76/40 98.4 °F (36.9 °C) Oral 96 16 -- -- --   11/28/20 2115 (!) 78/44 -- -- 100 -- 98 % -- --   11/28/20 2053 (!) 76/42 -- -- 102 16 97 % -- --   11/28/20 2000 (!) 87/49 -- -- 110 -- 97 % -- --   11/28/20 1945 (!) 77/58 -- -- -- -- -- -- --   11/28/20 1930 (!) 76/46 -- -- 96 -- 96 % -- --   11/28/20 1915 (!) 75/43 -- -- 97 -- 95 % -- --   11/28/20 1900 (!) 70/42 -- -- 102 -- 95 % -- --   11/28/20 1845 (!) 77/45 -- -- 98 -- 96 % -- --   11/28/20 1802 -- 98 °F (36.7 °C) -- 97 18 98 % 5' 3\" (1.6 m) 177 lb 0.5 oz (80.3 kg)   11/28/20 1800 (!) 106/51 -- -- -- -- -- -- --       General appearance:  Appears stated age  Skin: color, texture, turgor normal. No rashes or lesions. Neck: supple, no masses, no JVD, No carotid bruits, No thyromegaly  Lungs: respirations unlabored. Clear to auscultation. No rales, wheezes, no rhonchi. Equal chest excursion with respirations. Heart RRR. pmi not laterally displaced. No S3 or S4, no rub  Abdomen:  Soft, ND, NT. Bowel sounds present. No HSM. No epigastric bruit, no increased Ao pulsation,  Extremities: warm to touch. No LE edema or cyanosis. No fem bruit. 2+ upstrokes and equal bilaterally. PT/Pedal 2+ equal bilat  Neuro: Cr N 2-12 grossly intact. No focal motor neuro deficit. No alteration in recent remote memory.     Assessment/Plan                                   ESRD HD every MWF lnext HD planned in AM                                    Hypotensive episode , responded to midodrine and some fluid bolus at HD unit  ---Will increase her dry wt at out patient facility                                      Await blood c/s results                                       Hypokalemia persists -- aggressive replacement , IV                                        Hypocalcemia corrected for albumin                                        Anemia of ckd HB controlled                                      If K better , may discharge her later today   Will d/w dialysis unit re her in AM   Follow up blood c/s       Thank you for allowing us to participate in the care of [address] [place] [address]  11/29/2020  4:55 PM

## 2020-11-29 NOTE — PROGRESS NOTES
Received SBAR from ED, patient's SBP 76. No physician or nursing notes in at this time. ED RN to touch base with ED physician prior to transport.

## 2020-11-29 NOTE — ED PROVIDER NOTES
[facility] [address] [place] [address] [address]      Pt Name: Christine De Leon  MRN: [id number]  [place] 1955  Date of evaluation: 11/28/2020      CHIEF COMPLAINT       Chief Complaint   Patient presents with    Hypotension     was at dialysis and they had to stop due to low bp, states she was unable to complete diaylsis 3 times in a row d/t low bp. HPI  Christine De Leon is a 77 y.o. female with past medical history of ESRD on dialysis Monday Wednesday Fridays presents from dialysis today with symptomatic hypotension. Patient was recently seen yesterday and received a full session of dialysis. She was still fluid overloaded patient was seen at dialysis today to remove excess fluid so she was complaining of CHF symptoms prior. Today patient states that while she was at dialysis her blood pressure became low. 80s over 62s and she became nauseous, had a headache, and felt like she was going to faint. No alleviating or exacerbating factors. Patient is taking midodrine 50 mg as an outpatient with no relief of her symptoms. Admits to nausea. Denies any fevers, chills, chest pain, shortness of breath, abdominal pain, flank pain, diarrhea, constipation, any rashes or sores. Except as noted above the remainder of the review of systems was reviewed and negative. Review of Systems   Constitutional: Negative for activity change, appetite change, diaphoresis, fatigue, fever and unexpected weight change. HENT: Negative for congestion and voice change. Eyes: Negative for visual disturbance. Respiratory: Negative for apnea, cough, choking, chest tightness, shortness of breath, wheezing and stridor. Cardiovascular: Negative for chest pain, palpitations and leg swelling. Gastrointestinal: Positive for nausea. Negative for abdominal distention, abdominal pain, constipation, diarrhea and vomiting. Endocrine: Negative for polyphagia and polyuria.    Genitourinary: Negative for flank pain.   Musculoskeletal: Negative for arthralgias. Skin: Negative for color change, pallor, rash and wound. Neurological: Positive for light-headedness. Negative for dizziness, tremors, seizures, syncope, speech difficulty, weakness and numbness. Hematological: Negative for adenopathy. Does not bruise/bleed easily. Psychiatric/Behavioral: Negative for agitation. Physical Exam  Vitals signs and nursing note reviewed. Constitutional:       General: She is not in acute distress. Appearance: Normal appearance. She is not ill-appearing or diaphoretic. HENT:      Head: Normocephalic and atraumatic. Nose: Nose normal.      Mouth/Throat:      Mouth: Mucous membranes are dry. Eyes:      Extraocular Movements: Extraocular movements intact. Pupils: Pupils are equal, round, and reactive to light. Neck:      Musculoskeletal: Normal range of motion. Cardiovascular:      Rate and Rhythm: Normal rate and regular rhythm. Pulses: Normal pulses. Heart sounds: Normal heart sounds. No murmur. No friction rub. No gallop. Pulmonary:      Effort: Pulmonary effort is normal. No respiratory distress. Breath sounds: Normal breath sounds. No stridor. No wheezing or rhonchi. Chest:      Chest wall: No tenderness. Abdominal:      General: Bowel sounds are normal.      Palpations: Abdomen is soft. There is no mass. Tenderness: There is no abdominal tenderness. There is no right CVA tenderness, left CVA tenderness or rebound. Negative signs include Hahn's sign, Rovsing's sign and McBurney's sign. Hernia: No hernia is present. Musculoskeletal: Normal range of motion. Skin:     General: Skin is warm and dry. Capillary Refill: Capillary refill takes less than 2 seconds. Neurological:      General: No focal deficit present. Mental Status: She is alert and oriented to person, place, and time. Comments: Cranial 2 through 12 grossly intact.    Psychiatric: Mood and Affect: Mood normal.          Procedures     MDM  Number of Diagnoses or Management Options  Diagnosis management comments: Is a 7year-old female the past medical history of ESRD on dialysis Monday Wednesday Fridays presents from dialysis today with symptomatic hypotension. Patient states that her blood pressure was in the 80s over 60s she started feeling lightheaded, dizzy, and nauseous. She was sent to the emergency room for immediate evaluation. Vitals significant for hypotension. Her blood pressure is 86/53. And she is slightly tachycardic at 103. Afebrile. On physical exam patient is in no acute distress, speaking full sentences, alert and oriented x3. Cranial 2 through 12 grossly intact. Lungs clear to auscultation bilaterally no wheeze rales rhonchi. Normal S1-S2. No murmurs rubs gallops. Abdomen soft nontender, no rebound or guarding. Negative CVA tenderness bilaterally. Mild 1+ pitting edema on patient's lower extremities bilaterally. Diagnostic labs are remarkable for potassium of 2.7. EKG shows normal sinus rhythm. No elevation in troponin. Patient will be admitted to the hospital for symptomatic hypotension and hypokalemia. Patient was given 250 mils of normal saline in the department as well as oral potassium as well as 10 mEq of IV potassium. Dr. Antionette Silva will admit patient to [place] [id number] telemetry. Patient is agreeable with plan. Amount and/or Complexity of Data Reviewed  Decide to obtain previous medical records or to obtain history from someone other than the patient: yes         ED Course as of Nov 29 0230   Sat Nov 28, 2020 2029 ATTENDING PHYSICIAN ATTESTATION:     I have personally performed and/or participated in the history, exam, medical decision making, and procedures and agree with all pertinent clinical information. I have also reviewed and agree with the past medical, family and social history unless otherwise noted.     I have discussed this patient in detail with the resident, and provided the instruction and education regarding hypotension, hypokalemia. My findings/Plan: Six 7year-old female who presents for evaluation of hypotension after dialysis. She reports was only to get 1 hour was unable to complete dialysis due to hypotension. She reports he been trying to remove fluid due to her dry weight being up slightly. She reports she has been feeling lightheaded today. Hypotension likely secondary to taking too much fluid off will give her a small fluid bolus. In addition patient found to be hypokalemic apparently too much was removed at dialysis today we will give some back. Patient be admitted for further evaluation and treatment          [MF]   2058 Dr. Adan Minus admitted patient. [JV]      ED Course User Index  [JV] Leonardo Del Rosario MD  [] Sunshine Moore, DO             --------------------------------------------- PAST HISTORY ---------------------------------------------  Past Medical History:  has a past medical history of (HFpEF) heart failure with preserved ejection fraction ([address] Anemia, Asthma, CAD (coronary artery disease), Chronic kidney disease, Complication of AV dialysis fistula, initial encounter, COPD (chronic obstructive pulmonary disease) ([address] Fibromyalgia, GERD (gastroesophageal reflux disease), Hemodialysis patient ([facility] [address] History of blood transfusion, Hx of blood clots, Hyperlipidemia, Hypertension, Kidney failure, Kidney stone, YUNIOR on CPAP, Polymyalgia rheumatica ([address] Restless leg syndrome, Short gut syndrome, Thyroid disease, Traumatic hematoma of left upper arm, and Wears dentures. Past Surgical History:  has a past surgical history that includes Appendectomy; Intestinal Bypass (1973); Kidney stone surgery; Colonoscopy; Endoscopy, colon, diagnostic; bronchoscopy (2010); Colonoscopy (4/21/15); Tunneled venous port placement (Right); transesophageal echocardiogram (02/03/2017);  Cardiac catheterization (02/17/2017); eye surgery (Right); skin biopsy (Right, 2000); Breast biopsy (Right, 2000); Cardiac valve replacement (03/21/2017); Tunneled venous catheter placement (Right); other surgical history (Left, 11/16/2017); Dialysis fistula creation (Left, 11/16/2017); other surgical history (01/17/2018); Dialysis fistula creation (Left, 01/25/2018); AV fistula repair (Left, 03/22/2018); pr creat av fistula,autogenous graft (Left, 3/22/2018); pr removal tunneled cv cath (Right, 8/9/2018); other surgical history (10/17/2018); AV fistula repair (11/21/2018); vasc upper extremity venous  uni (Left, 11/21/2018); and Colonoscopy (N/A, 9/3/2019). Social History:  reports that she quit smoking about 23 years ago. Her smoking use included cigarettes. She started smoking about 31 years ago. She has a 16.00 pack-year smoking history. She has never used smokeless tobacco. She reports that she does not drink alcohol or use drugs. Family History: family history includes Cirrhosis in her mother; Depression in her sister; Diabetes in her brother and father; Heart Failure in her mother; High Blood Pressure in her brother, mother, and sister; Obesity in her mother. The patients home medications have been reviewed. Allergies: Coumadin [warfarin sodium]; Quinine derivatives;  Turmeric; and Other    -------------------------------------------------- RESULTS -------------------------------------------------    LABS:  Results for orders placed or performed during the hospital encounter of 11/28/20   CBC Auto Differential   Result Value Ref Range    WBC 12.5 (H) 4.5 - 11.5 E9/L    RBC 3.14 (L) 3.50 - 5.50 E12/L    Hemoglobin 10.6 (L) 11.5 - 15.5 g/dL    Hematocrit 35.5 34.0 - 48.0 %    .1 (H) 80.0 - 99.9 fL    MCH 33.8 26.0 - 35.0 pg    MCHC 29.9 (L) 32.0 - 34.5 %    RDW 16.5 (H) 11.5 - 15.0 fL    Platelets 452 011 - 114 E9/L    MPV 11.6 7.0 - 12.0 fL    Neutrophils % 86.0 (H) 43.0 - 80.0 %    Lymphocytes % 9.0 (L) 20.0 - 42.0 % Monocytes % 5.0 2.0 - 12.0 %    Eosinophils % 0.0 0.0 - 6.0 %    Basophils % 0.0 0.0 - 2.0 %    Neutrophils Absolute 10.75 (H) 1.80 - 7.30 E9/L    Lymphocytes Absolute 1.13 (L) 1.50 - 4.00 E9/L    Monocytes Absolute 0.62 0.10 - 0.95 E9/L    Eosinophils Absolute 0.00 (L) 0.05 - 0.50 E9/L    Basophils Absolute 0.00 0.00 - 0.20 E9/L    Smudge Cells 1+     Anisocytosis 1+     Hypochromia 1+    Comprehensive Metabolic Panel   Result Value Ref Range    Sodium 141 132 - 146 mmol/L    Potassium 2.7 (LL) 3.5 - 5.0 mmol/L    Chloride 98 98 - 107 mmol/L    CO2 26 22 - 29 mmol/L    Anion Gap 17 (H) 7 - 16 mmol/L    Glucose 83 74 - 99 mg/dL    BUN 20 8 - 23 mg/dL    CREATININE 4.2 (H) 0.5 - 1.0 mg/dL    GFR Non-African American 11 >=60 mL/min/1.73    GFR African American 13     Calcium 8.1 (L) 8.6 - 10.2 mg/dL    Total Protein 6.4 6.4 - 8.3 g/dL    Alb 2.0 (L) 3.5 - 5.2 g/dL    Total Bilirubin 0.6 0.0 - 1.2 mg/dL    Alkaline Phosphatase 155 (H) 35 - 104 U/L    ALT 24 0 - 32 U/L    AST 43 (H) 0 - 31 U/L   Magnesium   Result Value Ref Range    Magnesium 1.8 1.6 - 2.6 mg/dL   Troponin   Result Value Ref Range    Troponin 0.02 0.00 - 0.03 ng/mL   EKG 12 Lead   Result Value Ref Range    Ventricular Rate 96 BPM    Atrial Rate 96 BPM    P-R Interval 144 ms    QRS Duration 108 ms    Q-T Interval 374 ms    QTc Calculation (Bazett) 472 ms    P Axis 53 degrees    R Axis 2 degrees    T Axis 54 degrees       RADIOLOGY:  XR CHEST PORTABLE   Final Result   1. Stable lung hyperinflation and coarse interstitial markings. Stable left   greater than right pleural effusions or potentially scarring. No new   pulmonary pathology. 2.  Atherosclerotic disease and cardiomegaly. Postsurgical changes. Normal   pulmonary vascularity on this exam          EKG: This EKG is signed and interpreted by me. Heart rate 86. Normal sinus rhythm. Incomplete right bundle branch block. QTC at 472. No ST elevations or depressions.   Stable as previous EKG oximetry    This patient has remained hemodynamically stable during their ED course. Diagnosis:  1. Hypotension, unspecified hypotension type    2. Hypokalemia        Disposition:  Patient's disposition: Admit to telemetry  Patient's condition is stable.         Juan Jose Weinstein MD  Resident  11/29/20 2729

## 2020-11-29 NOTE — CONSULTS
INPATIENT CARDIOLOGY CONSULT    Name: Bobby Phillips    Age: 77 y.o. Date of Admission: 11/28/2020  5:54 PM    Date of Service: 11/29/2020    Reason for Consultation: Hypotension    Referring Physician: Kamla Campbell MD    Primary Cardiologist: Patient known to me    History of Present Illness:   Bobby Phillips is a 77 y.o. female (I recently saw in the office 11/17/2020) who presented on 11/28/2020 for further evaluation of hypotension. She has ESRD, chronic HFpEF, and history of mitral and tricuspid valve repairs in 2017 by Dr. Rylee Saldaña. Recent admission in October 2020 for volume overload, requiring aggressive fluid removal with dialysis. There was a question of atrial fibrillation but there was no definitive evidence, there were frequent PACs and PAT causing irregular rhythm. At the time of the office follow-up she is feeling well with occasional low blood pressure. Made some adjustments to her metoprolol including taking 12.5 mg in the evening, and skipping it the days before dialysis. This apparently has not helped and she is again admitted with the same. She had off cycle dialysis 3 days this week, and had an additional session on Saturday because her weight was elevated. That is where she had a lot of trouble with her blood pressure and was sent in for evaluation. Potassium is very low at 2.7. Blood pressures have been 70s/40s, 90s/50s today. Feels better today. Wants to go home. Review of Systems:   Complete review of systems negative except as described above.     Past Medical History:  Past Medical History:   Diagnosis Date    (HFpEF) heart failure with preserved ejection fraction ([facility] [address] 01/25/2017 4/11/17- echo- LVEF 55-60%, stage II DD, severely dilated LA, severe MR, mild TR, LVDD: 5.6 cm    Anemia     hx  / takes aranesp injections every 2 weeks    Asthma     well controlled with inhalers     CAD (coronary artery disease)     Chronic kidney disease     stage 4    Complication of AV dialysis fistula, initial encounter 6/2/2018    COPD (chronic obstructive pulmonary disease) (HCC)     Fibromyalgia     GERD (gastroesophageal reflux disease)     Hemodialysis patient ([facility] [address]     m ashley Tarango    History of blood transfusion spring 2017    Hx of blood clots     h/o DVT in leg at age 25yrs    Hyperlipidemia     Hypertension     well controlled with medications     Kidney failure     Kidney stone     multiple issues    YUNIOR on CPAP     Polymyalgia rheumatica (HCC)     Restless leg syndrome     Short gut syndrome     Thyroid disease     Traumatic hematoma of left upper arm 6/2/2018    Wears dentures     upper partial       Past Surgical History:  Past Surgical History:   Procedure Laterality Date    APPENDECTOMY      AV FISTULA REPAIR Left 03/22/2018    Superficialization, Delatore    AV FISTULA REPAIR  11/21/2018    Dr Morena Benoit 10x38 iCast Subclavian    BREAST BIOPSY Right 2000    BRONCHOSCOPY  2010    CARDIAC CATHETERIZATION  02/17/2017    Dr Reyes Mata REPLACEMENT  03/21/2017    MVR, TVR    COLONOSCOPY      COLONOSCOPY  4/21/15    COLONOSCOPY N/A 9/3/2019    COLORECTAL CANCER SCREENING, NOT HIGH RISK performed by Terrell Tapia MD at [facility] 63/24/6955    radiocephalic, Delatore    DIALYSIS FISTULA CREATION Left 01/25/2018    brachioecephalic, ligation radiocephalic, Delatore    ENDOSCOPY, COLON, DIAGNOSTIC      EYE SURGERY Right     CATARACT    INTESTINAL BYPASS  1973    for weight loss    KIDNEY STONE SURGERY      x 3    OTHER SURGICAL HISTORY Left 11/16/2017    AV fistula creation left arm    OTHER SURGICAL HISTORY  01/17/2018    Left arm fistulogram by Dr Robert Mcgill.     OTHER SURGICAL HISTORY  10/17/2018    Dr Tracey-Left arm fistuloplasty    [place] CREAT AV FISTULA,AUTOGENOUS GRAFT Left 3/22/2018    AV FISTULA  REVISION LEFT ARM performed by Nancy Loja MD at [facility]    MD REMOVAL TUNNELED CV CATH Right 2018    REMOVAL OF TESIO CATHETER, REMOVAL OF MEDIPORT performed by Julianne Lopez MD at [address] Right     RIGHT BREAST MOLE REMOVED    TRANSESOPHAGEAL ECHOCARDIOGRAM  2017    TUNNELED VENOUS CATHETER PLACEMENT Right     TUNNELED VENOUS PORT PLACEMENT Right     Powerport / x 4 / at right chest; since short gut syndrome received magnesium & sodium bicarb in past    VASC UPPER EXTREMITY VENOUS  UNI Left     Brachiocephalic covered stent, iCast, 10 mm x 38 mm       Family History:  Family History   Problem Relation Age of Onset    Obesity Mother     Cirrhosis Mother     Heart Failure Mother     High Blood Pressure Mother     Diabetes Father     High Blood Pressure Sister     Depression Sister     Diabetes Brother     High Blood Pressure Brother        Social History:  Social History     Tobacco Use    Smoking status: Former Smoker     Packs/day: 2.00     Years: 8.00     Pack years: 16.00     Types: Cigarettes     Start date: 1989     Last attempt to quit: 1997     Years since quittin.8    Smokeless tobacco: Never Used   Substance Use Topics    Alcohol use: No    Drug use: No       Allergies: Allergies   Allergen Reactions    Coumadin [Warfarin Sodium] Hives    Quinine Derivatives Hives    Turmeric Shortness Of Breath    Other      Patient states cannot take oral antibiotic dt she has short gut syndrome. ... Puts her into electrolyte inbalance       Home Medications:  Prior to Admission medications    Medication Sig Start Date End Date Taking? Authorizing Provider   lidocaine-prilocaine (EMLA) 2.5-2.5 % cream Apply topically as needed for Pain Apply to AV fistula prior to HD.    Yes Historical Provider, MD   furosemide (LASIX) 40 MG tablet Take 40 mg by mouth 2 times daily   Yes Historical Provider, MD   rOPINIRole (REQUIP) 0.5 MG tablet TAKE ONE TABLET BY MOUTH AT NIGHT TIME 20  Yes Sara Bran X MD Miriam   montelukast (SINGULAIR) 10 MG tablet TAKE ONE TABLET BY MOUTH EVERY DAY 11/2/20  Yes Trudi Franco MD   midodrine (PROAMATINE) 10 MG tablet Take 1 tablet by mouth 3 times daily (with meals)  Patient taking differently: Take 10 mg by mouth 3 times daily (with meals) 10 mg on Sunday, Tuesday, Thursday, and Saturday three times on each day.   20 mg Monday, Wednesday, and Friday three times on each day 10/24/20  Yes Gabriel Noriega MD   albuterol (ACCUNEB) 0.63 MG/3ML nebulizer solution INHALE 1 VIAL VIA NEBULIZER EVERY 4 HOURS AS NEEDED FOR WHEEZING OR SHORTNESS OF BREATH  7/6/20  Yes Trudi Franco MD   albuterol sulfate HFA (VENTOLIN HFA) 108 (90 Base) MCG/ACT inhaler Inhale 2 puffs into the lungs every 6 hours as needed for Wheezing 12/19/19  Yes Trudi Franco MD   Acetaminophen (TYLENOL ARTHRITIS PAIN PO) Take 3 tablets by mouth 2 times daily    Yes Historical Provider, MD   Biotin w/ Vitamins C & E (HAIR/SKIN/NAILS PO) Take by mouth Ld 8/28/2019   Yes Historical Provider, MD   fenofibrate (TRICOR) 145 MG tablet Take 145 mg by mouth every morning Patient taking Mon, Wed, Fri 6/19/18  Yes Historical Provider, MD   calcium carbonate (TUMS) 500 MG chewable tablet Take 3 tablets by mouth 3 times daily    Yes Historical Provider, MD   melatonin 5 MG TABS tablet Take 10 mg by mouth nightly as needed    Yes Historical Provider, MD   sodium bicarbonate 650 MG tablet Take 650 mg by mouth 2 times daily    Yes Historical Provider, MD   magnesium oxide (MAG-OX) 400 MG tablet Take 400 mg by mouth daily    Yes Historical Provider, MD   levothyroxine (SYNTHROID) 137 MCG tablet Take 137 mcg by mouth Daily   Yes Historical Provider, MD   calcium acetate (PHOSLO) 667 MG capsule Take by mouth 4 times daily   Yes Historical Provider, MD   liothyronine (CYTOMEL) 5 MCG tablet Take 5 mcg by mouth daily   Yes Historical Provider, MD   aspirin 81 MG EC tablet Take 1 tablet by mouth daily 3/30/17  Yes Ever CAMPBELL [facility], APRN - CNP   atorvastatin (LIPITOR) 20 MG tablet Take 20 mg by mouth three times a week    Yes Historical Provider, MD   Coenzyme Q10 (COQ10 PO) Take by mouth nightly    Yes Historical Provider, MD   vitamin B-12 (CYANOCOBALAMIN) 1000 MCG tablet Take 1,000 mcg by mouth daily Ld 8/28/2019   Yes Historical Provider, MD   darbepoetin izabella-polysorbate (ARANESP, ALBUMIN FREE,) 60 MCG/ML injection Inject into the skin every 14 days Twice/per month  Dose given 3-16-18 at dialysis   Yes Historical Provider, MD   vitamin D-3 (CHOLECALCIFEROL) 5000 UNITS TABS Take 5,000 Units by mouth 2 times daily LD 8/28/2019   Yes Historical Provider, MD   folic acid (FOLVITE) 1 MG tablet Take 1 mg by mouth daily Ld 8/28/2019   Yes Historical Provider, MD   pregabalin (LYRICA) 50 MG capsule Take 50 mg by mouth 3 times daily Instructed to take am of procedure. Yes Historical Provider, MD   metoprolol succinate (TOPROL XL) 25 MG extended release tablet Take 0.5 tablets by mouth daily  Patient taking differently: Take 12.5 mg by mouth four times a week Take at night, not on nights prior to HD. 10/24/20   Ryland Fleischer, MD   Tens Unit [facility] [place] [facility] Does not apply route Indications: Patient with 2-years of back myofascial pain and spasm with good relief from TENS at PT in past.  She has end-stage kidney disease on dialysis so medications are limited. 9/5/19   Eligio Carlson,    FERROUS BISGLYCINATE CHELATE PO Take by mouth    Historical Provider, MD   Methoxy PEG-Epoetin Beta (MIRCERA) 75 MCG/0.3ML SOSY Infuse 75 mcg intravenously every 14 days    Historical Provider, MD   CPAP Machine MISC Please replace patients CPAP at 8 cm water pressure with heated humidification and C-Flex of 3. Patient states she is due for a new machine and hers is not working. Also provide masks, tubing, filters, head gear, and water chambers as needed.   Diagnosis-YUNIOR  Faxed to [facility]  Length of need 99 months 9/23/16   Carmella Castro MD       Current Medications:    Current Facility-Administered Medications:     acetaminophen (TYLENOL) tablet 650 mg, 650 mg, Oral, Q4H PRN, Jodi Garcia MD, 650 mg at 11/29/20 1033    [START ON 11/30/2020] epoetin izabella-epbx (RETACRIT) injection 4,000 Units, 4,000 Units, Subcutaneous, Once per day on Mon Wed Fri, Jodi Garcia MD    albuterol (PROVENTIL) nebulizer solution 2.5 mg, 2.5 mg, Nebulization, Q6H PRN, MD Anne Marie Manley  [START ON 12/1/2020] metoprolol succinate (TOPROL XL) extended release tablet 12.5 mg, 12.5 mg, Oral, Once per day on Sun Tue Thu Sat, Jodi Garcia MD    albuterol (ACCUNEB) nebulizer solution 0.63 mg, 0.63 mg, Nebulization, 4x Daily PRN, Jodi Garcia MD    aspirin EC tablet 81 mg, 81 mg, Oral, Daily, Jodi Garcia MD, 81 mg at 11/29/20 0828    [START ON 11/30/2020] atorvastatin (LIPITOR) tablet 20 mg, 20 mg, Oral, Once per day on Mon Wed Fri, Jodi Garcia MD    Calcium Acetate (Phos Binder) CAPS 667 mg, 667 mg, Oral, 4x Daily WC, Jodi Garcia MD, 667 mg at 17/47/84 2701    folic acid (FOLVITE) tablet 1 mg, 1 mg, Oral, Daily, Jodi Garcia MD, 1 mg at 11/29/20 0829    levothyroxine (SYNTHROID) tablet 137 mcg, 137 mcg, Oral, Daily, Jodi Garcia MD, 137 mcg at 11/29/20 0612    liothyronine (CYTOMEL) tablet 5 mcg, 5 mcg, Oral, Daily, Jodi Garcia MD, 5 mcg at 11/29/20 0828    magnesium oxide (MAG-OX) tablet 400 mg, 400 mg, Oral, Daily, Jodi Garcia MD, 400 mg at 11/29/20 0828    melatonin tablet 10.5 mg, 10.5 mg, Oral, Nightly PRN, Jodi Garcia MD    midodrine (PROAMATINE) tablet 10 mg, 10 mg, Oral, TID WC, Jennifer Mejía MD, 10 mg at 11/29/20 1104    montelukast (SINGULAIR) tablet 10 mg, 10 mg, Oral, Daily, Jodi Garcia MD, 10 mg at 11/29/20 0829    pregabalin (LYRICA) capsule 50 mg, 50 mg, Oral, TID, Jodi Garcia MD, 50 mg at 11/29/20 0828    rOPINIRole (REQUIP) tablet 0.5 mg, 0.5 mg, Oral, Nightly, Jodi Garcia MD    sodium bicarbonate tablet 650 mg, 650 mg, Oral, BID, Jennifer Pavan Murguia MD, 650 mg at 11/29/20 0077    vitamin B-12 (CYANOCOBALAMIN) tablet 1,000 mcg, 1,000 mcg, Oral, Daily, Deepali Soto MD, 1,000 mcg at 11/29/20 4791    Vitamin D (CHOLECALCIFEROL) tablet 5,000 Units, 5,000 Units, Oral, BID, Deepali Soto MD, 5,000 Units at 11/29/20 0828    fenofibrate (TRIGLIDE) tablet 160 mg, 160 mg, Oral, Daily, Deepali Soto MD, 160 mg at 11/29/20 0829    ipratropium (ATROVENT) 0.02 % nebulizer solution 0.5 mg, 0.5 mg, Nebulization, 4x daily, Deepali Soto MD, 0.5 mg at 11/29/20 0808    Physical Exam:  BP (!) 91/53   Pulse 100   Temp 98.3 °F (36.8 °C) (Oral)   Resp 16   Ht 5' 3\" (1.6 m)   Wt 175 lb (79.4 kg)   SpO2 96%   BMI 31.00 kg/m²   Wt Readings from Last 3 Encounters:   11/28/20 175 lb (79.4 kg)   11/17/20 167 lb 9.6 oz (76 kg)   10/24/20 174 lb 6.1 oz (79.1 kg)     Appearance: Awake, alert, no acute respiratory distress  Skin: Intact, no rash  Head: Normocephalic, atraumatic  Eyes: EOMI, no conjunctival erythema  ENMT: No pharyngeal erythema, MMM, no rhinorrhea  Neck: Supple, no elevated JVP, no carotid bruits  Lungs: Clear to auscultation bilaterally. No wheezes, rales, or rhonchi. Cardiac: PMI nondisplaced, regular rhythm with a normal rate, normal S1 & S2, no murmurs  Abdomen: Soft, nontender, +bowel sounds  Extremities: Moves all extremities x 4, trace to 1+ lower extremity edema  Neurologic: No focal motor deficits apparent, normal mood and affect  Peripheral Pulses: Intact posterior tibial pulses bilaterally    Intake/Output:    Intake/Output Summary (Last 24 hours) at 11/29/2020 1204  Last data filed at 11/28/2020 2158  Gross per 24 hour   Intake --   Output 250 ml   Net -250 ml     No intake/output data recorded.     Laboratory Tests:  Recent Labs     11/28/20  1819 11/29/20  0615    138   K 2.7* 2.7*   CL 98 99   CO2 26 24   BUN 20 22   CREATININE 4.2* 4.7*   GLUCOSE 83 74   CALCIUM 8.1* 8.2*     Lab Results   Component Value Date    MG 1.8 11/28/2020 Recent Labs     11/28/20 1819 11/29/20  0615   ALKPHOS 155* 141*   ALT 24 24   AST 43* 55*   PROT 6.4 6.1*   BILITOT 0.6 0.4   LABALBU 2.0* 1.8*     Recent Labs     11/28/20 1819 11/29/20  0615   WBC 12.5* 12.6*   RBC 3.14* 3.06*   HGB 10.6* 10.3*   HCT 35.5 34.5   .1* 112.7*   MCH 33.8 33.7   MCHC 29.9* 29.9*   RDW 16.5* 16.5*    155   MPV 11.6 11.8     Lab Results   Component Value Date    CKTOTAL 47 09/18/2018    CKMB 1.4 08/24/2014    TROPONINI 0.02 11/28/2020    TROPONINI 0.01 10/21/2020    TROPONINI 0.20 (H) 04/10/2017     Lab Results   Component Value Date    INR 1.3 10/21/2020    INR 1.2 02/27/2020    INR 1.6 06/02/2018    PROTIME 15.1 (H) 10/21/2020    PROTIME 14.8 (H) 02/27/2020    PROTIME 18.3 (H) 06/02/2018     Lab Results   Component Value Date    TSH 1.150 10/21/2020     Lab Results   Component Value Date    LABA1C <4.0 12/30/2019     No results found for: EAG  Lab Results   Component Value Date    CHOL 96 02/27/2020    CHOL 90 12/30/2019     Lab Results   Component Value Date    TRIG 131 02/27/2020    TRIG 86 12/30/2019     Lab Results   Component Value Date    HDL 36 02/27/2020    HDL 41 12/30/2019    HDL 41 09/18/2018     Lab Results   Component Value Date    LDLCALC 34 02/27/2020    LDLCALC 32 12/30/2019    LDLCALC 44 09/18/2018     Lab Results   Component Value Date    LABVLDL 26 02/27/2020    LABVLDL 17 12/30/2019    LABVLDL 24 09/18/2018     No results found for: CHOLHDLRATIO  No results for input(s): PROBNP in the last 72 hours. Cardiac Tests:  ECG: Sinus rhythm 73 bpm.  Normal axis. Right bundle branch block QRS duration 134 ms. Chest x-ray 11/28/2020  Impression    1.  Stable lung hyperinflation and coarse interstitial markings.  Stable left    greater than right pleural effusions or potentially scarring.  No new    pulmonary pathology.     2.  Atherosclerotic disease and cardiomegaly.  Postsurgical changes.  Normal    pulmonary vascularity on this exam    Echocardiogram: 4/2017   Summary   Left ventricular size is grossly normal.   Normal systolic function with LVEF estimated at 65%.   Indeterminate diastolic function   Normal left atrium by volume index(28ml/m2)   S/p Mitral valve repair. Mean gradient=9mmHg\   Mild tricuspid regurgitation.   Normal estimated PA pressures.     CHERI performed 2/2017  Normal LVEF  Severely dilated left atrium  Evidence of small PFO with right-to-left shunting  Severe central MR  Moderate TR     Cardiac catheterization: 2/2017  Findings:  Left main: 0% stenosis  LAD: 0 % stenosis. Tapers off prior to the apex.   Circumflex: Large OM1: 0 % stenosis  RCA: Dominant.  Large. 0% stenosis in RCA, RPLCA or RPDA  LV angio: not performed to conserve contrast.      Hemodynamics:  LV: 156/2(12) mmHg. No gradient across AV. Ao: 143/91(114)mmHg.     -------------------------------------------------------------------------------------------------------------------------------------------------------------  IMPRESSION:  1. Frequent PACs/PAT. No evidence of atrial fibrillation though remains at high risk  2. Hypotension with dialysis, intolerant to low-dose beta-blocker  3. Severe hypokalemia  4. Valvular heart disease: Severe MR and moderate TR  5. Status post mitral valve repair 28 mm future complete ring, tricuspid valve repair 28 mm [facility] 3 band (3/2017 Dr. Mike Tom)  6. Chronic HFpEF, compensated today. Recent admission for decompensation/volume overload 10/2020  7. Left atrial appendage exclusion with 45 mm atriclip device  8. Right bundle branch block  9. End-stage renal disease on hemodialysis MWF  10. Prior history of hypertension, currently hypotensive  11. Bronchial asthma and COPD  12. Obstructive sleep apnea, on CPAP  13. Chronic pain  14. Hypothyroidism  15. Hyperlipidemia  16. Chronic anemia, Hgb 10.3  17. Polymyalgia rheumatica  18. Remote history of DVT in the setting of oral contraceptive use  19.  Elevated BMI (30 kg/m2)  20. History of bariatric surgery  21. Short gut syndrome    RECOMMENDATIONS:     Discontinue metoprolol   Aggressive electrolyte replacement   Monitor for arrhythmias   Set up outpatient 30-day monitor   Aggressive risk factor modification   Okay for discharge from cardiology standpoint after potassium replacement   I will follow-up with her in the office    Thank you for allowing me to participate in your patient's care. Please feel free to contact me if you have any questions or concerns. Lauren Rangel MD, [facility]    NOTE: This report was transcribed using voice recognition software. Every effort was made to ensure accuracy; however, inadvertent computerized transcription errors may be present.

## 2020-11-29 NOTE — DISCHARGE SUMMARY
Internal Medicine Discharge Summary    Patient ID: João Ventura      Patient's PCP: Shahid Gonzalez MD    Admit Date: 11/28/2020     Discharge Date:  1129 20    Admitting Physician: Shahid Gonzalez MD     Discharge Physician: Shahid Gonzalez MD     Discharge Diagnoses: Active Hospital Problems    Diagnosis Date Noted    PAT (paroxysmal atrial tachycardia) (HCC) [I47.1]     Hypotension [I95.9] 10/21/2020    (HFpEF) heart failure with preserved ejection fraction ([facility] [address] [I50.30] 01/25/2017    Hypothyroid [E03.9] 01/06/2016       The patient was seen and examined on day of discharge and this discharge summary is in conjunction with any daily progress note from day of discharge. Hospital Course: This is a 40-year-old female that is well-known to me. She has a history of renal failure that has required regular hemodialysis through left arm AV fistula. She has a history of premorbid polymyalgia rheumatica and hypothyroidism and sleep apnea and gastric bypass. Patient was in the hospital just over a month ago and had had symptomatic hypotension caused by fluid removal during dialysis. Once she was admitted she had been seen by cardiology as well as endocrinology. Adrenal insufficiency was ruled out. Midodrine was increased. Her beta-blocker which was Coreg which had been gradually decreased in the outpatient setting was eventually removed completely. She did have issues with paroxysmal atrial tachycardia which was not atrial fibrillation and patient was started on metoprolol 25 mg a day. She did follow-up in the outpatient setting with the cardiology services and they had recommended lowering the dose at 1st-12 0.5 mg a day and then taking it at night and then skipping it on the days of dialysis. She has not responded to any of these measures and she received dialysis yesterday and became fairly lightheaded and dizzy and was in the ER with a systolic blood pressure of 60.    Again she is mentating well and she is able to give me the entire history and also her recent encounter with cardiology in the outpatient setting   No fevers no chills no cough just dizziness and lightheadedness when her blood pressure drops   Given fluids and k replacement   No med changes and ok for dc         Exam:     BP (!) 95/53   Pulse 93   Temp 98.6 °F (37 °C) (Oral)   Resp 16   Ht 5' 3\" (1.6 m)   Wt 175 lb (79.4 kg)   SpO2 98%   BMI 31.00 kg/m²     t  General:  Awake, alert, oriented X 3. Well developed, well nourished, well groomed. No apparent distress. HEENT:  Normocephalic, atraumatic. Pupils equal, round, reactive to light. No scleral icterus. No conjunctival injection. Normal lips, teeth, and gums. No nasal discharge. Neck:  Supple  Heart:  RRR, no murmurs, gallops, or rubs  Lungs: Decreased bibasilar   Abdomen: Bowel sounds present, soft, nontender, no masses, no organomegaly, no peritoneal signs  Extremities:  No clubbing, cyanosis, edema 1 plus and right lower extremity with some bruising  Skin:  Warm and dry, no open lesions or rash  Neuro:  Cranial nerves 2-12 intact, no focal deficits  Breast: deferred  Rectal: deferred  Genitalia:  deferred       Consults:     IP CONSULT TO INTERNAL MEDICINE  IP CONSULT TO NEPHROLOGY  IP CONSULT TO CARDIOLOGY    Significant Diagnostic Studies:   Xr Chest Portable    Result Date: 11/28/2020  EXAMINATION: ONE XRAY VIEW OF THE CHEST 11/28/2020 6:07 pm COMPARISON: Chest series from October 21, 2020. HISTORY: ORDERING SYSTEM PROVIDED HISTORY: sob TECHNOLOGIST PROVIDED HISTORY: Reason for exam:->sob FINDINGS: Postsurgical changes overlie the mediastinum. Lung hyperinflation and coarse interstitial markings. Elevation of the left hemidiaphragm versus loculated left pleural effusion is unchanged. Small right pleural effusion or scarring. No new airspace disease. No pneumothorax. Atherosclerotic disease in the aortic arch. Cardiac silhouette is enlarged.   Normal pulmonary HFA) 108 (90 Base) MCG/ACT inhaler Inhale 2 puffs into the lungs every 6 hours as needed for Wheezing  Qty: 1 Inhaler, Refills: 6    Associated Diagnoses: Moderate persistent asthma without complication      Acetaminophen (TYLENOL ARTHRITIS PAIN PO) Take 3 tablets by mouth 2 times daily       Biotin w/ Vitamins C & E (HAIR/SKIN/NAILS PO) Take by mouth Ld 8/28/2019      fenofibrate (TRICOR) 145 MG tablet Take 145 mg by mouth every morning Patient taking Mon, Wed, Fri      calcium carbonate (TUMS) 500 MG chewable tablet Take 3 tablets by mouth 3 times daily       melatonin 5 MG TABS tablet Take 10 mg by mouth nightly as needed       sodium bicarbonate 650 MG tablet Take 650 mg by mouth 2 times daily       magnesium oxide (MAG-OX) 400 MG tablet Take 400 mg by mouth daily       levothyroxine (SYNTHROID) 137 MCG tablet Take 137 mcg by mouth Daily      calcium acetate (PHOSLO) 667 MG capsule Take by mouth 4 times daily      liothyronine (CYTOMEL) 5 MCG tablet Take 5 mcg by mouth daily      aspirin 81 MG EC tablet Take 1 tablet by mouth daily  Qty: 30 tablet, Refills: 3      atorvastatin (LIPITOR) 20 MG tablet Take 20 mg by mouth three times a week       Coenzyme Q10 (COQ10 PO) Take by mouth nightly       vitamin B-12 (CYANOCOBALAMIN) 1000 MCG tablet Take 1,000 mcg by mouth daily Ld 8/28/2019      darbepoetin izabella-polysorbate (ARANESP, ALBUMIN FREE,) 60 MCG/ML injection Inject into the skin every 14 days Twice/per month  Dose given 3-16-18 at dialysis      vitamin D-3 (CHOLECALCIFEROL) 5000 UNITS TABS Take 5,000 Units by mouth 2 times daily LD 5/70/1519      folic acid (FOLVITE) 1 MG tablet Take 1 mg by mouth daily Ld 8/28/2019      pregabalin (LYRICA) 50 MG capsule Take 50 mg by mouth 3 times daily Instructed to take am of procedure.       metoprolol succinate (TOPROL XL) 25 MG extended release tablet Take 0.5 tablets by mouth daily  Qty: 30 tablet, Refills: 3      Tens Unit MISC by Does not apply route Indications: Patient with 2-years of back myofascial pain and spasm with good relief from TENS at PT in past.  She has end-stage kidney disease on dialysis so medications are limited. Qty: 1 each, Refills: 0    Associated Diagnoses: Myofascial pain; Lumbar muscle pain; Chronic right-sided low back pain without sciatica      FERROUS BISGLYCINATE CHELATE PO Take by mouth      Methoxy PEG-Epoetin Beta (MIRCERA) 75 MCG/0.3ML SOSY Infuse 75 mcg intravenously every 14 days      CPAP Machine MISC Please replace patients CPAP at 8 cm water pressure with heated humidification and C-Flex of 3. Patient states she is due for a new machine and hers is not working. Also provide masks, tubing, filters, head gear, and water chambers as needed. Diagnosis-YUNIOR  Faxed to [facility]  Length of need 99 months  Qty: 1 each, Refills: 0    Associated Diagnoses: YUNIOR (obstructive sleep apnea)             Time Spent on discharge is more than 45 minutes in the examination, evaluation, counseling and review of medications and discharge plan.       Signed:    Kamla Campbell MD   11/29/2020

## 2020-11-30 NOTE — CARE COORDINATION
[facility] PCP     [address] Transitions Initial Follow Up Call    Call within 2 business days of discharge: Yes    Patient: Rylie Wiley Patient : 1955   MRN: [id number]  Reason for Admission: HYPOTENSION   Discharge Date: 20 RARS: Readmission Risk Score: 26      Last Discharge [facility]       Complaint Diagnosis Description Type Department Provider    20 Hypotension Hypotension, unspecified hypotension type . .. ED to Hosp-Admission (Discharged) (ADMITTED) ALTAGRACIA Amanda MD; Bipin Davis. .. Spoke with: [address] [place] [address] 12: [id number] [facility]     Non-face-to-face services provided:  Obtained and reviewed discharge summary and/or continuity of care documents    Care Transitions 24 Hour Call    Do you have any ongoing symptoms?:  Yes  Patient-reported symptoms:  Other (Comment: hypotension at HD )  Do you have a copy of your discharge instructions?:  Yes  Do you have all of your prescriptions and are they filled?:  Yes  Have you been contacted by a [address]?:  No  Have you scheduled your follow up appointment?:  No  Were you discharged with any Home Care or Post Acute Services:  No  Do you feel like you have everything you need to keep you well at home?:  Yes  Care Transitions Interventions  No Identified Needs     Spoke with Tomasz Dorsey for [facility] PCP care transition call post hospital discharge. She reports that she is tired today from not getting home until after midnight from the hospital and going to HD at 0500 this morning. She is just getting home from HD and is about to take a nap. She reports that HD had trouble with her BP today again and couldn't complete her entire treatment. She reports her BP was 70's/40's at HD and has come up some since returning home     Full med review not completed, however, she does confirm stopping the Lasix and Metoprolol.      Tomasz Dorsey denies any needs, questions, or concerns at this time and stated she follows closely with her doctors. Will sign off as the patient presently follows with a [facility] PCP and has no concerns at this time.      Follow Up  Future Appointments   Date Time Provider Giovany Mirza   2/4/2021 10:40 AM GABRIELLA Solis - [facility]       Marimar Martinez RN

## 2020-12-01 NOTE — CARE COORDINATION
Patient is a non [facility] PCP and was called yesterday 11/30/2020.     Etienne Winters LPN    [phone]  [address] [place] [address] / [facility] Coordinator

## 2020-12-07 PROBLEM — E87.6 HYPOKALEMIA: Status: ACTIVE | Noted: 2020-01-01

## 2020-12-07 NOTE — ED PROVIDER NOTES
Patient is a 78-year-old female with a history of end-stage renal disease on dialysis, hypotension, HFpEF, PAT presents emergency department for evaluation of hypotension, fatigue and dizziness. Patient states that starting 2 days ago she started feeling lightheaded and unsteady. Nothing seems to make it better or worse. Is been constant and moderate in severity. States she has never had this type of symptom before. She does have a history of hypotension and is on midodrine. States that she takes 20 mg daily before her dialysis appointments on Monday, Wednesday, Friday. She took midodrine this morning and again immediately before dialysis the request.  Patient states she had 40 minutes of her dialysis treatment when she started having worsening of her lightheadedness and they found her blood pressure to be 62 systolic. They gave her 50 cc of saline at that time and transferred here for further evaluation. She denies fever, chills, cough, congestion, chest pain, abdominal pain, nausea, vomiting, change in bowel habits or change in urinary habits. The history is provided by the patient. Review of Systems   Constitutional: Positive for activity change and fatigue. Negative for appetite change, chills, diaphoresis and fever. HENT: Negative for congestion, rhinorrhea and sore throat. Eyes: Negative for redness and itching. Respiratory: Positive for shortness of breath. Negative for cough, chest tightness and wheezing. Cardiovascular: Negative for chest pain and leg swelling. Gastrointestinal: Negative for abdominal pain, nausea and vomiting. Musculoskeletal: Negative for back pain and myalgias. Skin: Negative for pallor and rash. Neurological: Positive for dizziness, weakness and light-headedness. Negative for headaches. Hematological: Negative for adenopathy. Does not bruise/bleed easily. Physical Exam  Vitals signs and nursing note reviewed.    Constitutional: General: She is not in acute distress. Appearance: Normal appearance. She is not ill-appearing. HENT:      Head: Normocephalic and atraumatic. Mouth/Throat:      Mouth: Mucous membranes are moist.   Eyes:      Extraocular Movements: Extraocular movements intact. Pupils: Pupils are equal, round, and reactive to light. Cardiovascular:      Rate and Rhythm: Normal rate and regular rhythm. Pulses: Normal pulses. Pulmonary:      Effort: Pulmonary effort is normal. No respiratory distress. Breath sounds: Normal breath sounds. No wheezing or rhonchi. Abdominal:      General: There is distension. Palpations: Abdomen is soft. Tenderness: There is no abdominal tenderness. There is no guarding or rebound. Musculoskeletal:         General: No tenderness. Right lower leg: Edema present. Left lower leg: Edema present. Lymphadenopathy:      Cervical: No cervical adenopathy. Skin:     General: Skin is warm and dry. Neurological:      Mental Status: She is alert and oriented to person, place, and time. Procedures     MDM  Number of Diagnoses or Management Options  Elevated lactic acid level:   Generalized weakness:   Hypokalemia:   Hypotension, unspecified hypotension type:   Diagnosis management comments: Patient is a 75-year-old female that presents emergency department for evaluation of hypotension, fatigue and dizziness. Patient ill-appearing but in no obvious distress on exam.  Vital signs remarkable for hypotension with a blood pressure of 72 over palpation. Blood work remarkable for potassium of 1.6. Patient ordered 40 mEq oral potassium as well as several doses of IV potassium. Blood work also consistent with sepsis with a leukocytosis of 14.8, lactic acid of 4.3, however no obvious source of infection noted at this time. Patient was given a dose of vancomycin and Zosyn for broad-spectrum antibiotic coverage.   Central line was placed and patient was 15.0 fL    Platelets [phone] 818 E9/L    MPV 11.8 7.0 - 12.0 fL    Neutrophils % 86.2 (H) 43.0 - 80.0 %    Immature Granulocytes % 0.5 0.0 - 5.0 %    Lymphocytes % 9.3 (L) 20.0 - 42.0 %    Monocytes % 3.8 2.0 - 12.0 %    Eosinophils % 0.1 0.0 - 6.0 %    Basophils % 0.1 0.0 - 2.0 %    Neutrophils Absolute 12.76 (H) 1.80 - 7.30 E9/L    Immature Granulocytes # 0.07 E9/L    Lymphocytes Absolute 1.37 (L) 1.50 - 4.00 E9/L    Monocytes Absolute 0.56 0.10 - 0.95 E9/L    Eosinophils Absolute 0.02 (L) 0.05 - 0.50 E9/L    Basophils Absolute 0.02 0.00 - 0.20 E9/L   Lipase   Result Value Ref Range    Lipase 8 (L) 13 - 60 U/L   Troponin   Result Value Ref Range    Troponin 0.02 0.00 - 0.03 ng/mL   Lactate, Sepsis   Result Value Ref Range    Lactic Acid, Sepsis 4.3 (HH) 0.5 - 1.9 mmol/L   Magnesium   Result Value Ref Range    Magnesium 1.6 1.6 - 2.6 mg/dL   EKG 12 Lead   Result Value Ref Range    Ventricular Rate 77 BPM    Atrial Rate 77 BPM    P-R Interval 126 ms    QRS Duration 144 ms    Q-T Interval 498 ms    QTc Calculation (Bazett) 563 ms    P Axis 49 degrees    R Axis 16 degrees    T Axis -174 degrees       Radiology  XR CHEST PORTABLE   Final Result   Increased moderate left pleural effusion. EKG: This EKG is signed and interpreted by me. Rate: 77  Rhythm: Sinus  Interpretation: Normal sinus rhythm with right bundle branch block and prolonged QTc  Comparison: changes compared to previous EKG      ------------------------- NURSING NOTES AND VITALS REVIEWED ---------------------------  Date / Time Roomed:  12/7/2020  7:50 AM  ED Bed Assignment:  18/18    The nursing notes within the ED encounter and vital signs as below have been reviewed.    Patient Vitals for the past 24 hrs:   BP Temp Temp src Pulse Resp SpO2 Height Weight   12/07/20 1042 (!) 77/40 -- -- 82 18 92 % -- --   12/07/20 0926 (!) 69/39 -- -- 78 16 96 % -- --   12/07/20 0754 -- 97.8 °F (36.6 °C) Oral 76 18 97 % 5' 3\" (1.6 m) 175 lb (79.4 kg) Oxygen Saturation Interpretation: Normal      ------------------------------------------ PROGRESS NOTES ------------------------------------------  Re-evaluation(s):  Time: 0900. Patients symptoms show no change  Repeat physical examination is not changed    Time: 1030. Patients symptoms show no change  Repeat physical examination is not changed    I have spoken with the patient and discussed todays results, in addition to providing specific details for the plan of care and counseling regarding the diagnosis and prognosis. Their questions are answered at this time and they are agreeable with the plan.      --------------------------------- ADDITIONAL PROVIDER NOTES ---------------------------------  Consultations:  Spoke with Dr. Silviano Beauchamp,  They will admit this patient. Spoke with Dr. James Silver,  They will provide consultation. This patient's ED course included: a personal history and physicial examination, re-evaluation prior to disposition, multiple bedside re-evaluations, IV medications, cardiac monitoring and continuous pulse oximetry    This patient has remained unchanged and been closely monitored during their ED course. Please note that the withdrawal or failure to initiate urgent interventions for this patient would likely result in a life threatening deterioration or permanent disability. Accordingly this patient received 35 minutes of critical care time, excluding separately billable procedures. Clinical Impression  1. Generalized weakness    2. Hypokalemia    3. Hypotension, unspecified hypotension type    4.  Elevated lactic acid level          Disposition  Patient's disposition: Admit to CCU/ICU  Patient's condition is critical.       Larissa Medrano,   Resident  12/07/20 1230

## 2020-12-07 NOTE — PROGRESS NOTES
Database complete. Medications reconciled. Care plans and education initiated. Left arm fistula with dialysis every M-W-F. Follows a renal diet and produces small amounts of urine. Wears CPAP nightly( bringing in). Pt states she suffers from short gut syndrome and has had chronic diarrhea for past 50 yrs. Pt normally uses no assistive devices and states she had a recent fall in [place] on her tailbone. Cardiologist is Dr. Judith Nguyen. Nephrologist is Dr. Memo Garcia.

## 2020-12-07 NOTE — CONSULTS
Critical Care Admit/Consult Note         Patient - Ruben Go   MRN -  [id number]   [facility]   - 1955      Date of Admission -  2020  7:50 AM  Date of evaluation -  2020  0206/0206-A   Hospital Day - 1            ADMIT/CONSULT DETAILS     Reason for Admit/Consult   Critical care management    Consulting Lala Ley MD  Primary Care Physician - MD JENNIFER Alexis   The patient is a 77 y.o. female with significant past medical history of ESRD on dialysis, hypotension on midodrine, hypothyroidism, polymyalgia rheumatica, YUNIOR, status post gastric bypass with short gut syndrome. She was sent to the emergency department from the dialysis center due to being hypotensive. She said she has had issues with hypotension in the past, but normally runs systolic blood pressures in 90s to 110s. She states that she took her dose of midodrine this morning, and was asked to take another dose of midodrine prior to her dialysis appointment, she was found to have a systolic blood pressure in the 70s prior to dialysis, but got worsening hypotension during dialysis, so the treatment was stopped about an hour in. In the emergency department she was found to have blood pressures as low as 55 systolic, and also had a potassium of 1.8. A triple-lumen catheter was placed her right Muhammad, and she was started on Levophed. She is currently on 12 mics, her blood pressures have improved from anywhere to 70s to lower 04Z systolic. Is also found she had a lactic acidosis, and elevated white blood cell count, there is an underlying sepsis process suspected, blood cultures were drawn, and she was started on Vanco and Zosyn. Urinalysis was not able to be obtained as the patient is anuric.   She said the symptoms she was having include lightheadedness, dizziness, the lightheadedness was worse with standing, she tried to take her midodrine to resolve the lightheadedness, this did not help, the onset was today, the severity is moderate. She will be admitted to ICU for hypotension, and possible sepsis.          Past Medical History         Diagnosis Date    (HFpEF) heart failure with preserved ejection fraction ([facility] [address] 01/25/2017 4/11/17- echo- LVEF 55-60%, stage II DD, severely dilated LA, severe MR, mild TR, LVDD: 5.6 cm    Anemia     hx  / takes aranesp injections every 2 weeks    Asthma     well controlled with inhalers     CAD (coronary artery disease)     Chronic kidney disease     stage 4    Complication of AV dialysis fistula, initial encounter 6/2/2018    COPD (chronic obstructive pulmonary disease) (HCC)     Fibromyalgia     GERD (gastroesophageal reflux disease)     Hemodialysis patient ([facility] [address]     chato ashley judy Miriam Tarango    History of blood transfusion spring 2017    Hx of blood clots     h/o DVT in leg at age 25yrs    Hyperlipidemia     Hypertension     well controlled with medications     Kidney failure     Kidney stone     multiple issues    YUNIOR on CPAP     Polymyalgia rheumatica (HCC)     Restless leg syndrome     Short gut syndrome     Thyroid disease     Traumatic hematoma of left upper arm 6/2/2018    Wears dentures     upper partial        Past Surgical History           Procedure Laterality Date    APPENDECTOMY      AV FISTULA REPAIR Left 03/22/2018    Superficialization, Delatore    AV FISTULA REPAIR  11/21/2018    Dr Andreia Tristan 10x38 iCast Subclavian    BREAST BIOPSY Right 2000    BRONCHOSCOPY  2010    CARDIAC CATHETERIZATION  02/17/2017    Dr Andersen Hind REPLACEMENT  03/21/2017    MVR, TVR    COLONOSCOPY      COLONOSCOPY  4/21/15    COLONOSCOPY N/A 9/3/2019    COLORECTAL CANCER SCREENING, NOT HIGH RISK performed by Suzy Rudd MD at [address]  Left 40/18/3356    radiocephalic, Delatore    DIALYSIS FISTULA CREATION Left 01/25/2018    brachioecephalic, ligation radiocephalic, Kyung    ENDOSCOPY, COLON, DIAGNOSTIC      EYE SURGERY Right     CATARACT    INTESTINAL BYPASS  1973    for weight loss    KIDNEY STONE SURGERY      x 3    OTHER SURGICAL HISTORY Left 11/16/2017    AV fistula creation left arm    OTHER SURGICAL HISTORY  01/17/2018    Left arm fistulogram by Dr Ashlyn Thao.  OTHER SURGICAL HISTORY  10/17/2018    Dr Tracey-Left arm fistuloplasty    VA CREAT AV FISTULA,AUTOGENOUS GRAFT Left 3/22/2018    AV FISTULA  REVISION LEFT ARM performed by Marcia Louis MD at [facility] TUNNELED CV CATH Right 8/9/2018    REMOVAL OF TESIO CATHETER, REMOVAL OF MEDIPORT performed by Marcia Louis MD at [address] Right 2000    RIGHT BREAST MOLE REMOVED    TRANSESOPHAGEAL ECHOCARDIOGRAM  02/03/2017    TUNNELED VENOUS CATHETER PLACEMENT Right     TUNNELED VENOUS PORT PLACEMENT Right     Powerport / x 4 / at right chest; since short gut syndrome received magnesium & sodium bicarb in past    VASC UPPER EXTREMITY VENOUS  UNI Left 54/39/3356    Brachiocephalic covered stent, iCast, 10 mm x 38 mm       SOCIAL AND OCCUPATIONAL HEALTH:  The patient is a Past smoker of 6 years. Pack year equal __12__. There  is not history of TB or TB exposure. There is not asbestos or silica dust exposure. 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 Travel history reveals no recent travel history. There is not  history of recreational or IV drug use.          Influenza: Not up-to-date  Pneumococcal Polysaccharide:  Up-to-date; approximate date: 10/16/15                Current Medications   Current Medications    sodium chloride flush  10 mL Intravenous 2 times per day    aspirin  81 mg Oral Daily    atorvastatin  20 mg Oral Once per day on Mon Wed Fri    Calcium Acetate (Phos Binder)  667 mg Oral TID     calcium carbonate  3 tablet Oral BID    fenofibrate  160 mg Oral Once per day on Mon Wed Fri    folic acid  1 mg Oral Daily    levothyroxine  137 mcg Oral Daily    liothyronine  5 mcg Oral Daily    magnesium oxide  400 mg Oral Nightly    melatonin  15 mg Oral Nightly    midodrine  10 mg Oral TID WC    montelukast  10 mg Oral Daily    pregabalin  50 mg Oral TID    sodium bicarbonate  650 mg Oral BID    heparin (porcine)  5,000 Units Subcutaneous Q8H     sodium chloride flush, albuterol, rOPINIRole  IV Drips/Infusions   norepinephrine 30 mcg/min (12/08/20 0000)    vasopressin (Septic Shock) infusion 0.02 Units/min (12/07/20 2014)     Home Medications  Medications Prior to Admission: Biotin w/ Vitamins C & E (HAIR SKIN & NAILS GUMMIES PO), Take 5,000 mcg by mouth 2 times daily  montelukast (SINGULAIR) 10 MG tablet, TAKE ONE TABLET BY MOUTH EVERY DAY  rOPINIRole (REQUIP) 0.5 MG tablet, TAKE ONE TABLET BY MOUTH AT NIGHT TIME  midodrine (PROAMATINE) 10 MG tablet, Take 1 tablet by mouth 3 times daily (with meals) (Patient taking differently: Take 20 mg by mouth 3 times daily (with meals) 20 mg  three times on each day)  albuterol (ACCUNEB) 0.63 MG/3ML nebulizer solution, INHALE 1 VIAL VIA NEBULIZER EVERY 4 HOURS AS NEEDED FOR WHEEZING OR SHORTNESS OF BREATH   albuterol sulfate HFA (VENTOLIN HFA) 108 (90 Base) MCG/ACT inhaler, Inhale 2 puffs into the lungs every 6 hours as needed for Wheezing  Acetaminophen (TYLENOL ARTHRITIS PAIN PO), Take 3 tablets by mouth 2 times daily   calcium carbonate (TUMS) 500 MG chewable tablet, Take 3 tablets by mouth 2 times daily   melatonin 5 MG TABS tablet, Take 15 mg by mouth nightly   sodium bicarbonate 650 MG tablet, Take 650 mg by mouth 2 times daily   magnesium oxide (MAG-OX) 400 MG tablet, Take 400 mg by mouth nightly   levothyroxine (SYNTHROID) 137 MCG tablet, Take 137 mcg by mouth Daily  calcium acetate (PHOSLO) 667 MG capsule, Take 667 mg by mouth 3 times daily (with meals)   liothyronine (CYTOMEL) 5 MCG tablet, Take 5 mcg by mouth daily  aspirin 81 MG EC tablet, Take 1 tablet by mouth daily  Coenzyme Q10 (COQ10 PO), Take 200 mg by mouth nightly   vitamin B-12 (CYANOCOBALAMIN) 1000 MCG tablet, Take 3,000 mcg by mouth 2 times daily Ld 8/28/2019  darbepoetin izabella-polysorbate (ARANESP, ALBUMIN FREE,) 60 MCG/ML injection, Inject into the skin every 14 days Twice/per month  Dose given 3-16-18 at dialysis  CPAP Machine MISC, Please replace patients CPAP at 8 cm water pressure with heated humidification and C-Flex of 3. Patient states she is due for a new machine and hers is not working. Also provide masks, tubing, filters, head gear, and water chambers as needed. Diagnosis-YUNIOR Faxed to [facility] Length of need 99 months  vitamin D-3 (CHOLECALCIFEROL) 5000 UNITS TABS, Take 5,000 Units by mouth 2 times daily LD 7/54/4083  folic acid (FOLVITE) 1 MG tablet, Take 1 mg by mouth daily Ld 8/28/2019  pregabalin (LYRICA) 50 MG capsule, Take 50 mg by mouth 3 times daily Instructed to take am of procedure. lidocaine-prilocaine (EMLA) 2.5-2.5 % cream, Apply topically as needed for Pain Apply to AV fistula prior to HD. Tens Unit MISC, by Does not apply route Indications: Patient with 2-years of back myofascial pain and spasm with good relief from TENS at PT in past.  She has end-stage kidney disease on dialysis so medications are limited. Methoxy PEG-Epoetin Beta (MIRCERA) 75 MCG/0.3ML SOSY, Infuse 75 mcg intravenously every 14 days  fenofibrate (TRICOR) 145 MG tablet, Take 145 mg by mouth three times a week Patient taking Mon, Wed, Fri  atorvastatin (LIPITOR) 20 MG tablet, Take 20 mg by mouth three times a week M-W-F at [facility] [place]    Diet/Nutrition   DIET RENAL;    Allergies   Coumadin [warfarin sodium]; Quinine derivatives; Turmeric; and Other    Social History   Tobacco   reports that she quit smoking about 23 years ago. Her smoking use included cigarettes. She started smoking about 29 years ago. She has a 12.00 pack-year smoking history.  She has never used smokeless tobacco.    Alcohol     reports no history of alcohol use.    Occupational history :    Family History         Problem Relation Age of Onset    Obesity Mother     Cirrhosis Mother     Heart Failure Mother     High Blood Pressure Mother     Diabetes Father     High Blood Pressure Sister     Depression Sister     Diabetes Brother     High Blood Pressure Brother        Sleep History   history of YUNIOR    ROS     REVIEW OF SYSTEMS:  CONSTITUTIONAL:  negative for  fevers, chills and weight loss  EYES:  negative for  double vision and blurred vision  HEENT:  negative for  nasal congestion and sore throat  RESPIRATORY:  negative for  cough with sputum and dyspnea  CARDIOVASCULAR:  negative for  chest pain, palpitations  GASTROINTESTINAL:  negative for nausea, vomiting, positive for diarrhea  GENITOURINARY: Anuric, patient says she only makes occasional dribble  INTEGUMENT/BREAST:  negative for rash and skin lesion(s)  HEMATOLOGIC/LYMPHATIC:  positive for easy bruising negative for bleeding  ALLERGIC/IMMUNOLOGIC:  negative for recurrent infections and urticaria  ENDOCRINE:  negative for weight changes and diabetic symptoms including neither polyuria nor polydipsia  MUSCULOSKELETAL:  negative for  myalgias and arthralgias  NEUROLOGICAL:  negative for headaches, dizziness and numbness, positive for lightheadedness, negative for syncope  BEHAVIOR/PSYCH:  negative for poor appetite and anxiety    Lines and Devices   Right femoral triple-lumen catheter placed 12/7/2020    Mechanical Ventilation Data   VENT SETTINGS (Comprehensive)  Vent Information  SpO2: 94 %  Additional Respiratory  Assessments  Pulse: 108  Resp: 16  SpO2: 94 %    ABG  Lab Results   Component Value Date    PH 7.320 03/22/2017    PCO2 36.6 03/22/2017    PO2 143.5 03/22/2017    HCO3 18.4 03/22/2017    O2SAT 99.1 03/22/2017     Lab Results   Component Value Date    MODE SIMV 03/22/2017           Vitals    height is 5' 3\" (1.6 m) and weight is 175 lb (79.4 kg). Her oral temperature is 98.7 °F (37.1 °C).  Her blood pressure is 97/52 (abnormal) and her pulse is 108. Her respiration is 16 and oxygen saturation is 94%. Temperature Range: Temp: 98.7 °F (37.1 °C) Temp  Av °F (36.7 °C)  Min: 97.6 °F (36.4 °C)  Max: 98.7 °F (37.1 °C)  BP Range:  Systolic (92KKF), BCL:36 , Min:55 , FYO:05     Diastolic (84PFZ), SJH:94, Min:37, Max:56    Pulse Range: Pulse  Av.8  Min: 75  Max: 108  Respiration Range: Resp  Av.9  Min: 14  Max: 28  Current Pulse Ox[de-identified]  SpO2: 94 %  24HR Pulse Ox Range:  SpO2  Av.2 %  Min: 92 %  Max: 98 %  Oxygen Amount and Delivery:        I/O (24 Hours)    Patient Vitals for the past 8 hrs:   BP Temp Temp src Pulse Resp SpO2   20 0600 (!) 97/52 -- -- 108 16 94 %   20 0501 (!) 97/52 -- -- 106 15 95 %   20 0500 (!) 83/56 -- -- 106 15 95 %   20 0400 (!) 90/53 98.7 °F (37.1 °C) Oral 105 18 93 %   20 0300 (!) 95/55 -- -- 91 16 92 %   20 0230 (!) 95/55 -- -- 105 16 93 %   20 0102 (!) 77/42 -- -- 87 16 92 %   20 0000 (!) 80/47 97.6 °F (36.4 °C) Oral 93 15 94 %       Intake/Output Summary (Last 24 hours) at 2020 0743  Last data filed at 2020 1700  Gross per 24 hour   Intake 750 ml   Output --   Net 750 ml     I/O last 3 completed shifts: In: 750 [IV Piggyback:750]  Out: -      Patient Vitals for the past 96 hrs (Last 3 readings):   Weight   20 0754 175 lb (79.4 kg)         Drains/Tubes Outputs    Exam         PHYSICAL EXAM:    General appearance - alert, well appearing, and in no distress and oriented to person, place, and time.    Mental status - alert, oriented to person, place, and time, normal mood, behavior, speech, dress, motor activity, and thought processes  Eyes - pupils equal and reactive, extraocular eye movements intact, sclera anicteric  Ears - external ear normal  Nose - normal and patent, no erythema, discharge or polyps  Mouth - mucous membranes moist, pharynx normal without lesions  Neck - supple, no significant adenopathy  Chest -mild wheezing throughout, no rales or rhonchi, symmetric air entry  Heart -systolic heart murmur, normal rate, normal rhythm, no rubs  Abdomen - soft, nontender, nondistended, no masses or organomegaly  Neurological - alert, oriented, normal speech, no focal findings or movement disorder noted  Extremities - peripheral pulses normal, no clubbing or cyanosis. No edema, right femoral triple-lumen catheter, left arm AV fistula  Skin - normal coloration and turgor, bruises noted throughout the limbs    Data   Old records and images have been reviewed    Lab Results   CBC     Lab Results   Component Value Date    WBC 28.9 12/08/2020    RBC 3.29 12/08/2020    HGB 11.0 12/08/2020    HCT 33.6 12/08/2020     12/08/2020    .1 12/08/2020    MCH 33.4 12/08/2020    MCHC 32.7 12/08/2020    RDW 19.0 12/08/2020    LYMPHOPCT 2.6 12/08/2020    MONOPCT 2.1 12/08/2020    BASOPCT 0.1 12/08/2020    MONOSABS 0.60 12/08/2020    LYMPHSABS 0.76 12/08/2020    EOSABS 0.00 12/08/2020    BASOSABS 0.02 12/08/2020       BMP   Lab Results   Component Value Date     12/08/2020    K 2.7 12/08/2020    K 2.4 12/07/2020    CL 97 12/08/2020    CO2 19 12/08/2020    BUN 13 12/08/2020    CREATININE 4.9 12/08/2020    GLUCOSE 156 12/08/2020    CALCIUM 8.4 12/08/2020       LFTS  Lab Results   Component Value Date    ALKPHOS 215 12/08/2020    ALT 31 12/08/2020    AST 42 12/08/2020    PROT 6.7 12/08/2020    BILITOT 1.3 12/08/2020    LABALBU 1.5 12/08/2020       INR  No results for input(s): PROTIME, INR in the last 72 hours. APTT  No results for input(s): APTT in the last 72 hours. Lactic Acid  Lab Results   Component Value Date    LACTA 4.3 12/08/2020    LACTA 5.6 12/07/2020    LACTA 0.8 09/18/2018        BNP   No results for input(s): BNP in the last 72 hours. Cultures     No results for input(s): BC in the last 72 hours. No results for input(s): Ni Cava in the last 72 hours.     No results for input(s): replete intravascular volume, restart home midodrine    Resume home Lipitor, fenofibrate  Gastrointestinal   Short gut syndrome-monitor, may require stool thickening agent as this may be the source of hypovolemia  GI prophylaxis-Protonix    Renal   ESRD on dialysis-Monday Wednesday Friday dialysis, will need to improve blood pressure before resuming  Hypokalemia-likely related to short gut syndrome versus early termination of dialysis today, receiving 80 mEq in the ER, monitor electrolytes every 4 hours     Infectious Disease   Possible sepsis-lactic acidemia, elevated white blood cell count, blood cultures drawn, empiric antibiotics given-Vanco, Zosyn    Hematology/Oncology   DVT prophylaxis-Heparin  Chronic anemia-resume home darbepoetin, or possible substitute    Endocrine   Resume home Synthroid,    Social/Spiritual/DNR/Other   Full code    ASSESSMENT/ PLAN   1. Hypotension-on Levophed, titrate as needed, goal MAP greater than 60, hydrate, restart home midodrine  2. Sepsis-follow blood cultures, empiric Vanco and Zosyn given, order Vanco trough  3. Hypokalemia- given 120 meq total, up to 2.7, dialysis today with high potassium bath  4. Resume home medications    Malik De La Cruz MD PGY-1  12/8/2020 7:43 AM        Critical Care Attending Addendum:    Patient seen and examined with the house staff. X-rays personally reviewed through the PACS. Family is updated at the bedside as available. Additional findings listed below as necessary. Additional comments:  1. Hypotension hopefully hypovolemia related to diarrhea vs sepsis. Agree with empiric abx. 2. Hypokalemia will aggressively replete  3.  ESRD on HD

## 2020-12-07 NOTE — ED NOTES
Bed: 18  Expected date:   Expected time:   Means of arrival:   Comments:  QUAN Pedersen, RN  12/07/20 [id number]

## 2020-12-07 NOTE — ED NOTES
Report called to Richard Madera RN in ICU.  Bed not currently available, will call when ready     Jamari Triplett RN  12/07/20 [id number]

## 2020-12-08 NOTE — CONSULTS
12/8/2020  2:23 PM           Nutrition Consult    Consult re: Bart sub score 2 or less. Pt  has no wound consult or wounds documented. Also trigger by MST. No significant wt loss per EMR. Pt has hx. RYGB 1973/short-gut and ESRD on HD. Will add HP Vanilla Ensure and Protein Moduar ONS to promote adequate protein intake in the setting of losses via HD.  Follow per Policy      Electronically signed by Nevaeh Haney, JULIA, CNSC, LD on 12/8/20 at 2:23 PM EST    Contact: [phone]

## 2020-12-08 NOTE — PROGRESS NOTES
to take her midodrine to resolve the lightheadedness, this did not help, the onset was today, the severity is moderate. She will be admitted to ICU for hypotension, and possible sepsis. OVERNIGHT EVENTS:       Progressively higher vasopressor needs.     AWAKE & FOLLOWING COMMANDS:  [] No   [x] Yes    CURRENT VENTILATION STATUS:     [] Ventilator  [] BIPAP  [] Nasal Cannula [x] Room Air      IF INTUBATED, ET TUBE MARKING AT LOWER LIP:       cms    SECRETIONS Amount:  [] Small [] Moderate  [] Large  [x] None  Color:     [] White [] Colored  [] Bloody    SEDATION:  RAAS Score:  [] Propofol gtt  [] Versed gtt  [] Ativan gtt   [x] No Sedation    PARALYZED:  [x] No    [] Yes    DIARRHEA:                [] No                [x] Yes  (C. Difficile status: [] positive                                                                                                                       [] negative                                                                                                                     [x] pending)    VASOPRESSORS:  [] No    [x] Yes    If yes -   [x] Levophed       [] Dopamine     [x] Vasopressin       [] Dobutamine  [] Phenylephrine         [] Epinephrine    CENTRAL LINES:     [] No   [] Yes   (Date of Insertion:   )           If yes -     [] Right IJ     [] Left IJ [x] Right Femoral [] Left Femoral                   [] Right Subclavian [] Left Subclavian       PORTILLO'S CATHETER:   [x] No   [] Yes  (Date of Insertion:   )     URINE OUTPUT:            [] Good   [] Low              [x] Anuric      OBJECTIVE:     VITAL SIGNS:  BP (!) 72/47   Pulse 104   Temp 97.9 °F (36.6 °C)   Resp 19   Ht 5' 3\" (1.6 m)   Wt 182 lb 15.7 oz (83 kg)   SpO2 93%   BMI 32.41 kg/m²   Tmax over 24 hours:  Temp (24hrs), Av °F (36.7 °C), Min:97.6 °F (36.4 °C), Max:98.7 °F (37.1 °C)      Patient Vitals for the past 6 hrs:   BP Temp Temp src Pulse Resp SpO2 Weight   20 0819 (!) 72/47 -- -- 104 -- -- -- 12/08/20 0806 (!) 72/47 -- -- 105 -- -- --   12/08/20 0748 (!) 86/49 -- -- 96 -- -- --   12/08/20 0725 (!) 94/54 97.9 °F (36.6 °C) -- 99 19 93 % 182 lb 15.7 oz (83 kg)   12/08/20 0600 (!) 97/52 -- -- 108 16 94 % --   12/08/20 0501 (!) 97/52 -- -- 106 15 95 % --   12/08/20 0500 (!) 83/56 -- -- 106 15 95 % --   12/08/20 0400 (!) 90/53 98.7 °F (37.1 °C) Oral 105 18 93 % --   12/08/20 0300 (!) 95/55 -- -- 91 16 92 % --   12/08/20 0230 (!) 95/55 -- -- 105 16 93 % --         Intake/Output Summary (Last 24 hours) at 12/8/2020 0827  Last data filed at 12/7/2020 1700  Gross per 24 hour   Intake 750 ml   Output --   Net 750 ml     Wt Readings from Last 2 Encounters:   12/08/20 182 lb 15.7 oz (83 kg)   11/28/20 175 lb (79.4 kg)     Body mass index is 32.41 kg/m². PHYSICAL EXAMINATION:    General appearance - alert, well appearing, and in no distress and oriented to person, place, and time.    Mental status - alert, oriented to person, place, and time, normal mood, behavior, speech, dress, motor activity, and thought processes  Eyes - pupils equal and reactive, extraocular eye movements intact, sclera anicteric  Ears - external ear normal  Nose - normal and patent, no erythema, discharge or polyps  Mouth - mucous membranes moist, pharynx normal without lesions  Neck - supple, no significant adenopathy  Chest - clear to auscultation, no wheezes, rales or rhonchi, symmetric air entry  Heart -systolic heart murmur present,tachycardiac rate, normal rhythm, no rubs, gallops  Abdomen - soft, nontender, nondistended, no masses or organomegaly  Neurological - alert, oriented, normal speech, no focal findings or movement disorder noted  Extremities - peripheral pulses normal, right femoral TLC, left arm AV fistula  Skin - normal coloration, appears to have some bruises    Any additional physical findings:    MEDICATIONS:    Scheduled Meds:   sodium chloride flush  10 mL Intravenous 2 times per day    aspirin  81 mg Oral Daily  atorvastatin  20 mg Oral Once per day on Mon Wed Fri    Calcium Acetate (Phos Binder)  667 mg Oral TID     calcium carbonate  3 tablet Oral BID    fenofibrate  160 mg Oral Once per day on Mon Wed Fri    folic acid  1 mg Oral Daily    levothyroxine  137 mcg Oral Daily    liothyronine  5 mcg Oral Daily    magnesium oxide  400 mg Oral Nightly    melatonin  15 mg Oral Nightly    midodrine  10 mg Oral TID     montelukast  10 mg Oral Daily    pregabalin  50 mg Oral TID    sodium bicarbonate  650 mg Oral BID    heparin (porcine)  5,000 Units Subcutaneous Q8H     Continuous Infusions:   norepinephrine 30 mcg/min (12/08/20 0826)    vasopressin (Septic Shock) infusion 0.02 Units/min (12/07/20 2014)     PRN Meds:   sodium chloride flush, 10 mL, PRN  albuterol, 0.63 mg, 4x Daily PRN  rOPINIRole, 0.5 mg, Nightly PRN          VENT SETTINGS (Comprehensive) (if applicable):  Vent Information  SpO2: 93 %  Additional Respiratory  Assessments  Pulse: 104  Resp: 19  SpO2: 93 %        Laboratory findings:    Complete Blood Count:   Recent Labs     12/07/20  0815 12/08/20  0400   WBC 14.8* 28.9*   HGB 9.6* 11.0*   HCT 28.5* 33.6*    238        Last 3 Blood Glucose:   Recent Labs     12/07/20  1526 12/07/20 2125 12/08/20  0400   GLUCOSE 122* 162* 156*        PT/INR:    Lab Results   Component Value Date    PROTIME 15.1 10/21/2020    INR 1.3 10/21/2020     PTT:    Lab Results   Component Value Date    APTT 36.6 06/02/2018       Comprehensive Metabolic Profile:   Recent Labs     12/07/20  1526 12/07/20 2125 12/08/20  0400    134 136   K 3.4* 2.4* 2.7*   CL 98 96* 97*   CO2 26 21* 19*   BUN 11 12 13   CREATININE 4.3* 4.6* 4.9*   GLUCOSE 122* 162* 156*   CALCIUM 7.5* 7.9* 8.4*   PROT  --   --  6.7   LABALBU  --   --  1.5*   BILITOT  --   --  1.3*   ALKPHOS  --   --  215*   AST  --   --  42*   ALT  --   --  31      Magnesium:   Lab Results   Component Value Date    MG 2.3 12/08/2020     Phosphorus:   Lab Results   Component Value Date    PHOS 3.6 12/08/2020     Ionized Calcium:   Lab Results   Component Value Date    CAION 1.21 03/22/2017        Urinalysis:     Troponin:   Recent Labs     12/07/20  0815   TROPONINI 0.02       Microbiology:    Cultures during this admission:     Blood cultures:   Pending              [] None drawn      [] Negative             []  Positive (Details:  )  Urine Culture:                   [x] None drawn      [] Negative             []  Positive (Details:  )  Sputum Culture:               [x] None drawn       [] Negative             []  Positive (Details:  )   Endotracheal aspirate:     [x] None drawn       [] Negative             []  Positive (Details:  )     Other pertinent Labs:       Radiology/Imaging:   Xr Chest Portable    Result Date: 12/7/2020  EXAMINATION: ONE XRAY VIEW OF THE CHEST 12/7/2020 8:12 am COMPARISON: 11/28/2020 HISTORY: Acute shortness of breath. FINDINGS: Sternotomy changes with prosthetic cardiac valves and left atrial appendage clip. Heart remains enlarged. Increased moderate left pleural effusion with adjacent airspace disease. Mild right basilar atelectasis. No pneumothorax. Increased moderate left pleural effusion. Xr Chest Portable    Result Date: 11/28/2020  EXAMINATION: ONE XRAY VIEW OF THE CHEST 11/28/2020 6:07 pm COMPARISON: Chest series from October 21, 2020. HISTORY: ORDERING SYSTEM PROVIDED HISTORY: sob TECHNOLOGIST PROVIDED HISTORY: Reason for exam:->sob FINDINGS: Postsurgical changes overlie the mediastinum. Lung hyperinflation and coarse interstitial markings. Elevation of the left hemidiaphragm versus loculated left pleural effusion is unchanged. Small right pleural effusion or scarring. No new airspace disease. No pneumothorax. Atherosclerotic disease in the aortic arch. Cardiac silhouette is enlarged. Normal pulmonary vascularity. Osseous and thoracic soft tissue structures demonstrate no acute findings.     1.  Stable lung hyperinflation and coarse interstitial markings. Stable left greater than right pleural effusions or potentially scarring. No new pulmonary pathology. 2.  Atherosclerotic disease and cardiomegaly. Postsurgical changes. Normal pulmonary vascularity on this exam       Chest Xray (12/8/2020):    ASSESSMENT:     Patient Active Problem List    Diagnosis Date Noted    Hypokalemia 12/07/2020    Dizziness     PAT (paroxysmal atrial tachycardia) (HCC)     Hypotension 10/21/2020    Thrombocytopenia ([address] 06/02/2018    Hematoma 06/02/2018    Traumatic hematoma of left upper arm 88/28/1741    Complication of AV dialysis fistula, initial encounter 06/02/2018    ESRD on dialysis ([address] 03/07/2018    Problem with dialysis access ([address] 01/17/2018    Hypervolemia     Acute on chronic diastolic congestive heart failure (HCC)     History of mitral valve repair     History of tricuspid valve repair     MANUEL (acute kidney injury) ([address]     Chronic renal failure, stage 5 ([address]     Respiratory arrest ([address]     Non-rheumatic mitral regurgitation 02/09/2017    (HFpEF) heart failure with preserved ejection fraction ([address] 01/25/2017    Cellulitis of right lower extremity 01/31/2016    Exacerbation of asthma 01/06/2016    Renal failure (ARF), acute on chronic ([address] 01/06/2016    HTN (hypertension) 01/06/2016    YUNIOR on CPAP 01/06/2016    Hypomagnesemia 01/06/2016    Hypocalcemia 01/06/2016    Hypothyroid 01/06/2016       Additional assessment:    ·         PLAN:     WEAN PER PROTOCOL:  [] No   [] Yes  [x] N/A    DISCONTINUE ANY LABS:   [x] No   [] Yes    ICU PROPHYLAXIS:  Stress ulcer:  [x] PPI Agent  [] [id number] [] Sucralfate  [] Other:  VTE:   [] Enoxaparin  [x] Unfract.  Heparin Subcut  [] EPC Cuffs    NUTRITION:  [] NPO [] Tube Feeding (Specify: ) [] TPN  [x] PO (Diet: DIET RENAL;)    HOME MEDICATIONS RECONCILED: [] No  [x] Yes    INSULIN DRIP:   [x] No   [] Yes    CONSULTATION NEEDED:  [x] No   [] Yes    FAMILY UPDATED:    [x] No   [] Yes    TRANSFER OUT OF ICU:   [x] No   [] Yes    ADDITIONAL PLAN:    SYSTEMS ASSESSMENT     Neuro   Lightheadedness-likely related to hypotension, and hypovolemia  Resume home Lyrica, ropinirole  Respiratory   Wheeziness-patient has a history of asthma, continue home Singulair, albuterol  YUNIOR-CPAP at night  Cardiovascular   History of diastolic heart failure-monitor fluid status, continue home aspirin     Hypotension-likely related to hypovolemia versus sepsis, titrate levo as needed, continue to replete intravascular volume, restart home midodrine     Resume home Lipitor, fenofibrate  Gastrointestinal   Short gut syndrome-monitor, may require stool thickening agent as this may be the source of hypovolemia  GI prophylaxis-Protonix     Renal   ESRD on dialysis-Monday Wednesday Friday dialysis, will need to improve blood pressure before resuming  Hypokalemia-likely related to short gut syndrome versus early termination of dialysis today, receiving 80 mEq in the ER, monitor electrolytes every 4 hours      Infectious Disease   Possible sepsis-lactic acidemia, elevated white blood cell count, blood cultures drawn, empiric antibiotics given-Vanco, Zosyn     Hematology/Oncology   DVT prophylaxis-Heparin  Chronic anemia-resume home darbepoetin, or possible substitute     Endocrine   Resume home Synthroid,     Social/Spiritual/DNR/Other   Full code     ASSESSMENT/ PLAN   1. Hypotension-on Levophed, titrate as needed, goal MAP greater than 60, hydrate, restart home midodrine  2. Sepsis-follow blood cultures, empiric Vanco and Zosyn given,    3. Hypokalemia- given 120 meq total, up to 2.7, dialysis today with high potassium bath  4. Resumed home meds  5. Work up for Type D lactic acidosis, ordered c diff- if negative may use imodium     Frankie Gillis MD PGY-1  12/8/2020 7:43 AM    Critical Care Attending Addendum:    Patient seen and examined with the house staff.   X-rays personally reviewed through the PACS.  Family is updated at the bedside as available. Additional findings listed below as necessary. Additional comments:  1. Persistently with asymptomatic hypotension, will place art line and I did get consent from patient. Hydrated to increase bp for HD today. 2. Lactic acidosis, check d lactic acid level. 3. Hypokalemia replete. 4. Left pleural effusion is completely asymptomatic though bigger so will defer thoracentesis at present. 5. D/W Dr. Christine Caldera. 30 min CCT separate from procedure time.

## 2020-12-08 NOTE — PROGRESS NOTES
This note also relates to the following rows which could not be included:  Resp - Cannot attach notes to unvalidated device data       12/08/20 0054   NIV Type   Mode Other (Comment)  (\[facility]\""p in use)

## 2020-12-08 NOTE — FLOWSHEET NOTE
12/08/20 1012   Vital Signs   BP (!) 93/53   Temp 97.9 °F (36.6 °C)   Pulse 139   Resp 20   Weight 182 lb 15.7 oz (83 kg)   Weight Method Bed scale   Percent Weight Change 0   Pain Assessment   Pain Assessment 0-10   Pain Level 0   Patient's Stated Pain Goal No pain   Post-Hemodialysis Assessment   Post-Treatment Procedures Blood returned; Access bleeding time < 10 minutes   Machine Disinfection Process Exterior Machine Disinfection   Rinseback Volume (ml) 30 ml   Total Liters Processed (l/min) 40.4 l/min   Dialyzer Clearance Lightly streaked   Duration of Treatment (minutes) 150 minutes   Hemodialysis Intake (ml) 300 ml   Hemodialysis Output (ml) 40.4 ml   NET Removed (ml) 0 ml   Tolerated Treatment Good   Patient Response to Treatment dialysis stopped per Dr Fredi Agee, pt hypotensive throughout tx, pt blood returned, both needles removed, pressure applied to sites until stasis was achieved, 2x2 and tape applied, ++thrill/bruit noted to avf   Bilateral Breath Sounds Diminished; Expiratory wheezes   Edema Right lower extremity; Left lower extremity   RLE Edema +1;Pitting   LLE Edema +2;Pitting   Physician Notified?  Yes

## 2020-12-08 NOTE — H&P
History and Physical      CHIEF COMPLAINT:   Not feeling well    History of Present Illness: This is a 78-year-old female. She has a history of renal failure that has been hemodialysis dependent with a left arm AV fistula. She has a history of hypothyroidism. She also has had a history of heart failure with preserved ejection fraction and anemia and asthma and heart disease and mitral and tricuspid valve surgery at [facility]. She has had consecutive admissions for hypotension. This morning she went to dialysis and did not even have fluid removal.  She was sitting in her hot tub which she normally does and started to feel extremely unwell. She was seen in the ER with profound hypotension and severe hypokalemia and symptomatic of course and was found to be lactic acidotic. Patient had been just admitted and discharged for observation stay from the 29th of last month at the 30th. She was seen by cardiology and her renal services had adjusted her dry weight at hemodialysis center. She had also seen me in the outpatient setting and we had raised her midodrine from 10 3 times daily to 20 3 times daily to allow for blood pressure to be better. She had been gradually taken off the beta-blocker over the last 6 months. She now comes in with need for pressors. She is in the ICU with pressors. She was admitted about a month and a half ago and at that point she had been ruled out for adrenal insufficiency and pericardial effusion as well.           Past Medical History:   Diagnosis Date    (HFpEF) heart failure with preserved ejection fraction ([facility] [address] 01/25/2017 4/11/17- echo- LVEF 55-60%, stage II DD, severely dilated LA, severe MR, mild TR, LVDD: 5.6 cm    Anemia     hx  / takes aranesp injections every 2 weeks    Asthma     well controlled with inhalers     CAD (coronary artery disease)     Chronic kidney disease     stage 4    Complication of AV dialysis fistula, initial encounter 6/2/2018    COPD (chronic obstructive pulmonary disease) (HCC)     Fibromyalgia     GERD (gastroesophageal reflux disease)     Hemodialysis patient ([facility] [address]     m w judy Tarango    History of blood transfusion spring 2017    Hx of blood clots     h/o DVT in leg at age 25yrs    Hyperlipidemia     Hypertension     well controlled with medications     Kidney failure     Kidney stone     multiple issues    YUNIOR on CPAP     Polymyalgia rheumatica (HCC)     Restless leg syndrome     Short gut syndrome     Thyroid disease     Traumatic hematoma of left upper arm 6/2/2018    Wears dentures     upper partial         Past Surgical History:   Procedure Laterality Date    APPENDECTOMY      AV FISTULA REPAIR Left 03/22/2018    Superficialization, Delatore    AV FISTULA REPAIR  11/21/2018    Dr Scott Sorensen 10x38 iCast Subclavian    BREAST BIOPSY Right 2000    BRONCHOSCOPY  2010    CARDIAC CATHETERIZATION  02/17/2017    Dr Baptiste [place] REPLACEMENT  03/21/2017    MVR, TVR    COLONOSCOPY      COLONOSCOPY  4/21/15    COLONOSCOPY N/A 9/3/2019    COLORECTAL CANCER SCREENING, NOT HIGH RISK performed by Luis Lopez MD at [facility] Left 30/62/8519    radiocephalic, Delatore    DIALYSIS FISTULA CREATION Left 01/25/2018    brachioecephalic, ligation radiocephalic, Delatore    ENDOSCOPY, COLON, DIAGNOSTIC      EYE SURGERY Right     CATARACT    INTESTINAL BYPASS  1973    for weight loss    KIDNEY STONE SURGERY      x 3    OTHER SURGICAL HISTORY Left 11/16/2017    AV fistula creation left arm    OTHER SURGICAL HISTORY  01/17/2018    Left arm fistulogram by Dr Kaley Heath.     OTHER SURGICAL HISTORY  10/17/2018    Dr Tracey-Left arm fistuloplasty    ID CREAT AV FISTULA,AUTOGENOUS GRAFT Left 3/22/2018    AV FISTULA  REVISION LEFT ARM performed by Felipe Rowe MD at [facility] 172 TUNNELED CV CATH Right 8/9/2018    REMOVAL OF TESIO CATHETER, REMOVAL OF [facility] performed by Karsten Austin MD at [address] Right 2000    RIGHT BREAST MOLE REMOVED    TRANSESOPHAGEAL ECHOCARDIOGRAM  02/03/2017    TUNNELED VENOUS CATHETER PLACEMENT Right     TUNNELED VENOUS PORT PLACEMENT Right     Powerport / x 4 / at right chest; since short gut syndrome received magnesium & sodium bicarb in past    VASC UPPER EXTREMITY VENOUS  UNI Left 68/45/9794    Brachiocephalic covered stent, iCast, 10 mm x 38 mm       Medications Prior to Admission:    Medications Prior to Admission: Biotin w/ Vitamins C & E (HAIR SKIN & NAILS GUMMIES PO), Take 5,000 mcg by mouth 2 times daily  montelukast (SINGULAIR) 10 MG tablet, TAKE ONE TABLET BY MOUTH EVERY DAY  rOPINIRole (REQUIP) 0.5 MG tablet, TAKE ONE TABLET BY MOUTH AT NIGHT TIME  midodrine (PROAMATINE) 10 MG tablet, Take 1 tablet by mouth 3 times daily (with meals) (Patient taking differently: Take 20 mg by mouth 3 times daily (with meals) 20 mg  three times on each day)  albuterol (ACCUNEB) 0.63 MG/3ML nebulizer solution, INHALE 1 VIAL VIA NEBULIZER EVERY 4 HOURS AS NEEDED FOR WHEEZING OR SHORTNESS OF BREATH   albuterol sulfate HFA (VENTOLIN HFA) 108 (90 Base) MCG/ACT inhaler, Inhale 2 puffs into the lungs every 6 hours as needed for Wheezing  Acetaminophen (TYLENOL ARTHRITIS PAIN PO), Take 3 tablets by mouth 2 times daily   calcium carbonate (TUMS) 500 MG chewable tablet, Take 3 tablets by mouth 2 times daily   melatonin 5 MG TABS tablet, Take 15 mg by mouth nightly   sodium bicarbonate 650 MG tablet, Take 650 mg by mouth 2 times daily   magnesium oxide (MAG-OX) 400 MG tablet, Take 400 mg by mouth nightly   levothyroxine (SYNTHROID) 137 MCG tablet, Take 137 mcg by mouth Daily  calcium acetate (PHOSLO) 667 MG capsule, Take 667 mg by mouth 3 times daily (with meals)   liothyronine (CYTOMEL) 5 MCG tablet, Take 5 mcg by mouth daily  aspirin 81 MG EC tablet, Take 1 tablet by mouth daily  Coenzyme Q10 (COQ10 PO), Take 200 mg by mouth nightly   vitamin B-12 (CYANOCOBALAMIN) 1000 MCG tablet, Take 3,000 mcg by mouth 2 times daily Ld 8/28/2019  darbepoetin izabella-polysorbate (ARANESP, ALBUMIN FREE,) 60 MCG/ML injection, Inject into the skin every 14 days Twice/per month  Dose given 3-16-18 at dialysis  CPAP Machine MISC, Please replace patients CPAP at 8 cm water pressure with heated humidification and C-Flex of 3. Patient states she is due for a new machine and hers is not working. Also provide masks, tubing, filters, head gear, and water chambers as needed. Diagnosis-YUNIOR Faxed to [facility] Length of need 99 months  vitamin D-3 (CHOLECALCIFEROL) 5000 UNITS TABS, Take 5,000 Units by mouth 2 times daily LD 6/41/4796  folic acid (FOLVITE) 1 MG tablet, Take 1 mg by mouth daily Ld 8/28/2019  pregabalin (LYRICA) 50 MG capsule, Take 50 mg by mouth 3 times daily Instructed to take am of procedure. lidocaine-prilocaine (EMLA) 2.5-2.5 % cream, Apply topically as needed for Pain Apply to AV fistula prior to HD. Tens Unit MISC, by Does not apply route Indications: Patient with 2-years of back myofascial pain and spasm with good relief from TENS at PT in past.  She has end-stage kidney disease on dialysis so medications are limited. Methoxy PEG-Epoetin Beta (MIRCERA) 75 MCG/0.3ML SOSY, Infuse 75 mcg intravenously every 14 days  fenofibrate (TRICOR) 145 MG tablet, Take 145 mg by mouth three times a week Patient taking Mon, Wed, Fri  atorvastatin (LIPITOR) 20 MG tablet, Take 20 mg by mouth three times a week M-W-F at     Allergies:    Coumadin [warfarin sodium]; Quinine derivatives; Turmeric; and Other    Social History:    reports that she quit smoking about 23 years ago. Her smoking use included cigarettes. She started smoking about 29 years ago. She has a 12.00 pack-year smoking history. She has never used smokeless tobacco. She reports that she does not drink alcohol or use drugs.     Family History:   family history includes Cirrhosis in her mother; Depression in her sister; Diabetes in her brother and father; Heart Failure in her mother; High Blood Pressure in her brother, mother, and sister; Obesity in her mother. REVIEW OF SYSTEMS:  Gen: Negative for nausea, vomiting, diarrhea, fever, chills, night sweats  HEENT: Negative for double vision, blurred vision, sore throat   Heart: Negative for HTN, palpitations, chest pain  Lungs: Negative for wheezes, asthma or SOB  GI: Negative for nausea, vomiting  : Negative for dysuria, hematuria  Endo: Positive for hypothyroidism  Heme: Positive for easy bruising  Psych: Negative for Depression or anxiety  Ortho: Some gait dysfunction not necessarily related to obvious arthritis but some aches and pains    PHYSICAL EXAM:    Vitals:  BP (!) 80/46   Pulse 97   Temp 97.8 °F (36.6 °C) (Oral)   Resp 16   Ht 5' 3\" (1.6 m)   Wt 175 lb (79.4 kg)   SpO2 95%   BMI 31.00 kg/m²     General:  Awake, alert, oriented X 3. Well developed, well nourished, well groomed. No apparent distress. HEENT:  Normocephalic, atraumatic. Pupils equal, round, reactive to light. No scleral icterus. No conjunctival injection. Normal lips, teeth, and gums. No nasal discharge. Neck:  Supple  Heart:  RRR, no murmurs, gallops, or rubs  Lungs:  CTA bilaterally, bilat symmetrical expansion, no wheeze, rales, or rhonchi  Abdomen:   Bowel sounds present, soft, nontender, no masses, no organomegaly, no peritoneal signs  Extremities:  No clubbing, cyanosis, trace edema  Skin:  Warm and dry, no open lesions or rash  Neuro:  Cranial nerves 2-12 intact, no focal deficits  Breast: deferred  Rectal: deferred  Genitalia:  deferred    LABS:  CBC:   Lab Results   Component Value Date    WBC 14.8 12/07/2020    RBC 2.78 12/07/2020    HGB 9.6 12/07/2020    HCT 28.5 12/07/2020    .5 12/07/2020    MCH 34.5 12/07/2020    MCHC 33.7 12/07/2020    RDW 18.9 12/07/2020     12/07/2020    MPV 11.8 12/07/2020     CMP:    Lab Results   Component Value Date     12/07/2020    K 3.4 12/07/2020    CL 98 12/07/2020    CO2 26 12/07/2020    BUN 11 12/07/2020    CREATININE 4.3 12/07/2020    GFRAA 12 12/07/2020    LABGLOM 10 12/07/2020    GLUCOSE 122 12/07/2020    PROT 5.4 12/07/2020    LABALBU 1.7 12/07/2020    CALCIUM 7.5 12/07/2020    BILITOT 1.3 12/07/2020    ALKPHOS 161 12/07/2020    AST 38 12/07/2020    ALT 31 12/07/2020     Hepatic Function Panel:    Lab Results   Component Value Date    ALKPHOS 161 12/07/2020    ALT 31 12/07/2020    AST 38 12/07/2020    PROT 5.4 12/07/2020    BILITOT 1.3 12/07/2020    LABALBU 1.7 12/07/2020     Albumin:    Lab Results   Component Value Date    LABALBU 1.7 12/07/2020     Calcium:    Lab Results   Component Value Date    CALCIUM 7.5 12/07/2020     Ionized Calcium:  No results found for: IONCA  Magnesium:    Lab Results   Component Value Date    MG 2.0 12/07/2020     Phosphorus:    Lab Results   Component Value Date    PHOS 3.9 01/08/2016     LDH:    Lab Results   Component Value Date     10/23/2020     Xr Chest Portable    Result Date: 12/7/2020  EXAMINATION: ONE XRAY VIEW OF THE CHEST 12/7/2020 8:12 am COMPARISON: 11/28/2020 HISTORY: Acute shortness of breath. FINDINGS: Sternotomy changes with prosthetic cardiac valves and left atrial appendage clip. Heart remains enlarged. Increased moderate left pleural effusion with adjacent airspace disease. Mild right basilar atelectasis. No pneumothorax. Increased moderate left pleural effusion. Xr Chest Portable    Result Date: 11/28/2020  EXAMINATION: ONE XRAY VIEW OF THE CHEST 11/28/2020 6:07 pm COMPARISON: Chest series from October 21, 2020. HISTORY: ORDERING SYSTEM PROVIDED HISTORY: sob TECHNOLOGIST PROVIDED HISTORY: Reason for exam:->sob FINDINGS: Postsurgical changes overlie the mediastinum. Lung hyperinflation and coarse interstitial markings. Elevation of the left hemidiaphragm versus loculated left pleural effusion is unchanged.   Small right pleural effusion or scarring. No new airspace disease. No pneumothorax. Atherosclerotic disease in the aortic arch. Cardiac silhouette is enlarged. Normal pulmonary vascularity. Osseous and thoracic soft tissue structures demonstrate no acute findings. 1.  Stable lung hyperinflation and coarse interstitial markings. Stable left greater than right pleural effusions or potentially scarring. No new pulmonary pathology. 2.  Atherosclerotic disease and cardiomegaly. Postsurgical changes. Normal pulmonary vascularity on this exam    ASSESSMENT:      Active Problems:    Hypotension    Hypokalemia  Resolved Problems:    * No resolved hospital problems. *  Hemodialysis dependence  Lactic acidosis  Hypothyroidism    PLAN:    1 pressors. 2 reconsult renal   3.follow-up lactic acidosis and blood cultures have been drawn. 4.  Hemodialysis support  5. With critical care input.             Please note that over 50 minutes was spent in evaluating the patient, review of records and results, discussion with staff/family, etc.      Electronically signed by Akiko Arana MD on 12/7/2020 at 8:05 PM

## 2020-12-08 NOTE — PROCEDURES
Arterial Line Placement Procedure Note                     Indication: severe hypotension    Consent: Patient provided written consent    Clyde's Test: Normal    Procedure: The skin over the right radial artery was prepped with betadine and draped in a sterile fashion. Local anesthesia was obtained by infiltration using 1% Lidocaine without epinephrine. A arterial line catheter was then inserted, over a needle, into the vessel. The transducer set was then attached and securely fastened to the skin with sutures. Waveforms on the monitor were observed and found to be adequate. The patient had good distal perfusion after the procedure. The site was then dressed in a sterile fashion. The patient tolerated the procedure well. Complications: None    I directly supervised placement of arterial line.

## 2020-12-08 NOTE — PROGRESS NOTES
Patient admitted from ED to 206, with the following belongings CPAP machine and power cord, cell phone, cell phone , earrings, placed on monitor, patient oriented to room and unit visiting hours. Patient guide at bedside, reviewed patient rights and responsibilities. MRSA nasal swab obtained. Bed alarm on. Call light within reach.

## 2020-12-08 NOTE — CONSULTS
Nephrology Consult Note  Patient's Name: Christine De Leon  9:18 AM  12/8/2020    Nephrologist: Nelly Moseley    Reason for Consult:  ESRD  Requesting Physician:  Keke Sosa MD    Chief Complaint:  Hypotension    History Obtained From:  patient and past medical records    History of Present Ilness:    Christine De Leon is a 77 y.o. female with prior history of ESRD on IHD MWF at [facility]. Pt has a Hx of recurrent episodes of hypotension. She was at IHD yestereday when she developed hypotension and was sent to the ED. In the ED the SBP was as low as 55/41. She was treated with IVF and initiated on norepi. Pt also noted to have a lactic acidosis as well as an initial serum K+1.6.  She received 40meq KCL po and then a total of 60Meq IV with the K+ up to 2.8 this AM. Pt seen on IHD this AM SBP into the 70's with HR in the 140's    Past Medical History:   Diagnosis Date    (HFpEF) heart failure with preserved ejection fraction ([facility] [address] 01/25/2017 4/11/17- echo- LVEF 55-60%, stage II DD, severely dilated LA, severe MR, mild TR, LVDD: 5.6 cm    Anemia     hx  / takes aranesp injections every 2 weeks    Asthma     well controlled with inhalers     CAD (coronary artery disease)     Chronic kidney disease     stage 4    Complication of AV dialysis fistula, initial encounter 6/2/2018    COPD (chronic obstructive pulmonary disease) ([facility])     Fibromyalgia     GERD (gastroesophageal reflux disease)     [facility]     chato Tarango    History of blood transfusion spring 2017    Hx of blood clots     h/o DVT in leg at age 25yrs    Hyperlipidemia     Hypertension     well controlled with medications     Kidney failure     Kidney stone     multiple issues    YUNIOR on CPAP     Polymyalgia rheumatica (HCC)     Restless leg syndrome     Short gut syndrome     Thyroid disease     Traumatic hematoma of left upper arm 6/2/2018    Wears dentures     upper partial       Past Surgical History: Procedure Laterality Date    APPENDECTOMY      AV FISTULA REPAIR Left 03/22/2018    Superficialization, Delatore    AV FISTULA REPAIR  11/21/2018    Dr Dustin Lara 10x38 iCast Subclavian    BREAST BIOPSY Right 2000    BRONCHOSCOPY  2010    CARDIAC CATHETERIZATION  02/17/2017    Dr Silva [place] REPLACEMENT  03/21/2017    MVR, TVR    COLONOSCOPY      COLONOSCOPY  4/21/15    COLONOSCOPY N/A 9/3/2019    COLORECTAL CANCER SCREENING, NOT HIGH RISK performed by Tiffany Davis MD at [address] Left 02/96/9044    radiocephalic, Delatore    DIALYSIS FISTULA CREATION Left 01/25/2018    brachioecephalic, ligation radiocephalic, Delatore    ENDOSCOPY, COLON, DIAGNOSTIC      EYE SURGERY Right     CATARACT    INTESTINAL BYPASS  1973    for weight loss    KIDNEY STONE SURGERY      x 3    OTHER SURGICAL HISTORY Left 11/16/2017    AV fistula creation left arm    OTHER SURGICAL HISTORY  01/17/2018    Left arm fistulogram by Dr Octaviano Chaves.     OTHER SURGICAL HISTORY  10/17/2018    Dr Tracey-Left arm fistuloplasty    OR CREAT AV FISTULA,AUTOGENOUS GRAFT Left 3/22/2018    AV FISTULA  REVISION LEFT ARM performed by Cecelia Suresh MD at [facility] TUNNELED CV CATH Right 8/9/2018    REMOVAL OF TESIO CATHETER, REMOVAL OF MEDIPORT performed by Cecelia Suresh MD at [address] Right 2000    RIGHT BREAST MOLE REMOVED    TRANSESOPHAGEAL ECHOCARDIOGRAM  02/03/2017    TUNNELED VENOUS CATHETER PLACEMENT Right     TUNNELED VENOUS PORT PLACEMENT Right     Powerport / x 4 / at right chest; since short gut syndrome received magnesium & sodium bicarb in past    VASC UPPER EXTREMITY VENOUS  UNI Left 26/97/5838    Brachiocephalic covered stent, iCast, 10 mm x 38 mm       Family History   Problem Relation Age of Onset    Obesity Mother     Cirrhosis Mother     Heart Failure Mother     High Blood Pressure Mother     Diabetes Father    [facility] High Blood Pressure Sister     Depression Sister     Diabetes Brother     High Blood Pressure Brother         reports that she quit smoking about 23 years ago. Her smoking use included cigarettes. She started smoking about 29 years ago. She has a 12.00 pack-year smoking history. She has never used smokeless tobacco. She reports that she does not drink alcohol or use drugs. Allergies:  Coumadin [warfarin sodium]; Quinine derivatives; Turmeric; and Other    Current Medications:    pantoprazole (PROTONIX) tablet 40 mg, QAM AC  0.9 % sodium chloride infusion, Continuous  sodium chloride flush 0.9 % injection 10 mL, 2 times per day  sodium chloride flush 0.9 % injection 10 mL, PRN  norepinephrine (LEVOPHED) 16 mg in dextrose 5 % 250 mL infusion, Continuous  vasopressin 20 Units in dextrose 5 % 100 mL infusion, Continuous  albuterol (ACCUNEB) nebulizer solution 0.63 mg, 4x Daily PRN  aspirin EC tablet 81 mg, Daily  atorvastatin (LIPITOR) tablet 20 mg, Once per day on Mon Wed Fri  Calcium Acetate (Phos Binder) CAPS 667 mg, TID WC  calcium carbonate (TUMS) chewable tablet 1,500 mg, BID  fenofibrate (TRIGLIDE) tablet 160 mg, Once per day on Mon Wed Fri  folic acid (FOLVITE) tablet 1 mg, Daily  levothyroxine (SYNTHROID) tablet 137 mcg, Daily  liothyronine (CYTOMEL) tablet 5 mcg, Daily  magnesium oxide (MAG-OX) tablet 400 mg, Nightly  melatonin tablet 15 mg, Nightly  midodrine (PROAMATINE) tablet 10 mg, TID WC  montelukast (SINGULAIR) tablet 10 mg, Daily  pregabalin (LYRICA) capsule 50 mg, TID  rOPINIRole (REQUIP) tablet 0.5 mg, Nightly PRN  sodium bicarbonate tablet 650 mg, BID  heparin (porcine) injection 5,000 Units, Q8H        Review of Systems:   Pertinent items are noted in HPI. Remainder of a complete review is (-) other than stated in the HPI    Physical exam:   Constitutional:  Elderly female in NAD  Vitals:   VITALS:  BP (!) 80/47   Pulse 106   Temp 97.9 °F (36.6 °C)   Resp 23   Ht 5' 3\" (1.6 m)   Wt 182 lb 15.7 oz (83 kg)   SpO2 96%   BMI 32.41 kg/m²   24HR INTAKE/OUTPUT:    Intake/Output Summary (Last 24 hours) at 12/8/2020 0919  Last data filed at 12/7/2020 1700  Gross per 24 hour   Intake 750 ml   Output --   Net 750 ml     URINARY CATHETER OUTPUT (Morse):     DRAIN/TUBE OUTPUT:     VENT SETTINGS:  Vent Information  SpO2: 96 %  Additional Respiratory  Assessments  Pulse: 106  Resp: 23  SpO2: 96 %    Skin: no rash, turgor wnl  Heent:  eomi, mmm  Neck: no bruits or jvd noted  Cardiovascular: Tachy  S1, S2 without S3 or rub  Respiratory: Decreased BS at bases   Abdomen:  +bs, soft, nt, nd  Ext: (-) bilat lower extremity edema  Psychiatric: mood and affect appropriate, cr nr 2-12 grossly intact      Data:   Labs:  CBC:   Lab Results   Component Value Date    WBC 28.9 12/08/2020    RBC 3.29 12/08/2020    HGB 11.0 12/08/2020    HCT 33.6 12/08/2020    .1 12/08/2020    MCH 33.4 12/08/2020    MCHC 32.7 12/08/2020    RDW 19.0 12/08/2020     12/08/2020    MPV 11.8 12/08/2020     CBC with Differential:    Lab Results   Component Value Date    WBC 28.9 12/08/2020    RBC 3.29 12/08/2020    HGB 11.0 12/08/2020    HCT 33.6 12/08/2020     12/08/2020    .1 12/08/2020    MCH 33.4 12/08/2020    MCHC 32.7 12/08/2020    RDW 19.0 12/08/2020    LYMPHOPCT 2.6 12/08/2020    MONOPCT 2.1 12/08/2020    BASOPCT 0.1 12/08/2020    MONOSABS 0.60 12/08/2020    LYMPHSABS 0.76 12/08/2020    EOSABS 0.00 12/08/2020    BASOSABS 0.02 12/08/2020     Hemoglobin/Hematocrit:    Lab Results   Component Value Date    HGB 11.0 12/08/2020    HCT 33.6 12/08/2020     CMP:    Lab Results   Component Value Date     12/08/2020    K 2.8 12/08/2020     12/08/2020    CO2 22 12/08/2020    BUN 13 12/08/2020    CREATININE 4.5 12/08/2020    GFRAA 12 12/08/2020    LABGLOM 10 12/08/2020    GLUCOSE 163 12/08/2020    PROT 6.7 12/08/2020    LABALBU 1.5 12/08/2020    CALCIUM 8.0 12/08/2020    BILITOT 1.3 12/08/2020    ALKPHOS 215 12/08/2020    AST 42 12/08/2020    ALT 31 12/08/2020     BMP:    Lab Results   Component Value Date     12/08/2020    K 2.8 12/08/2020     12/08/2020    CO2 22 12/08/2020    BUN 13 12/08/2020    LABALBU 1.5 12/08/2020    CREATININE 4.5 12/08/2020    CALCIUM 8.0 12/08/2020    GFRAA 12 12/08/2020    LABGLOM 10 12/08/2020    GLUCOSE 163 12/08/2020     BUN/Creatinine:    Lab Results   Component Value Date    BUN 13 12/08/2020    CREATININE 4.5 12/08/2020     Hepatic Function Panel:    Lab Results   Component Value Date    ALKPHOS 215 12/08/2020    ALT 31 12/08/2020    AST 42 12/08/2020    PROT 6.7 12/08/2020    BILITOT 1.3 12/08/2020    LABALBU 1.5 12/08/2020     Albumin:    Lab Results   Component Value Date    LABALBU 1.5 12/08/2020     Calcium:    Lab Results   Component Value Date    CALCIUM 8.0 12/08/2020     Ionized Calcium:  No results found for: IONCA  Magnesium:    Lab Results   Component Value Date    MG 2.3 12/08/2020     Phosphorus:    Lab Results   Component Value Date    PHOS 3.6 12/08/2020     LDH:    Lab Results   Component Value Date     10/23/2020     Uric Acid:  No results found for: LABURIC, URICACID  PT/INR:    Lab Results   Component Value Date    PROTIME 15.1 10/21/2020    INR 1.3 10/21/2020     PTT:    Lab Results   Component Value Date    APTT 36.6 06/02/2018   [APTT}  Troponin:    Lab Results   Component Value Date    TROPONINI 0.02 12/07/2020     U/A:    Lab Results   Component Value Date    COLORU Yellow 10/21/2020    PROTEINU 30 10/21/2020    PHUR 5.5 10/21/2020    WBCUA >20 10/21/2020    RBCUA 5-10 10/21/2020    BACTERIA MANY 10/21/2020    CLARITYU SL CLOUDY 10/21/2020    SPECGRAV 1.015 10/21/2020    LEUKOCYTESUR LARGE 10/21/2020    UROBILINOGEN 0.2 10/21/2020    BILIRUBINUR Negative 10/21/2020    BLOODU MODERATE 10/21/2020    GLUCOSEU Negative 10/21/2020     ABG:    Lab Results   Component Value Date    PH 7.320 03/22/2017    PCO2 36.6 03/22/2017    PO2 143.5 03/22/2017 HCO3 18.4 03/22/2017    BE -7.0 03/22/2017    O2SAT 99.1 03/22/2017     HgBA1c:    Lab Results   Component Value Date    LABA1C <4.0 12/30/2019     Microalbumen/Creatinine ratio:  No components found for: RUCREAT  FLP:    Lab Results   Component Value Date    TRIG 131 02/27/2020    HDL 36 02/27/2020    LDLCALC 34 02/27/2020    LABVLDL 26 02/27/2020     TSH:    Lab Results   Component Value Date    TSH 4.260 12/07/2020     VITAMIN B12: No components found for: B12  FOLATE:    Lab Results   Component Value Date    FOLATE 16.2 10/23/2020     Iron Saturation:  No components found for: PERCENTFE  TIBC:    Lab Results   Component Value Date    TIBC 80 10/23/2020     FERRITIN:    Lab Results   Component Value Date    FERRITIN 2,068 10/23/2020     AMYLASE:    Lab Results   Component Value Date    AMYLASE 11 08/24/2014     LIPASE:    Lab Results   Component Value Date    LIPASE 8 12/07/2020     Fibrinogen Level:  No components found for: FIB  Urine Toxicology:  No components found for: IAMMENTA, IBARBIT, IBENZO, ICOCAINE, IMARTHC, IOPIATES, IPHENCYC  24 Hour Urine for Protein:  No components found for: RAWUPRO, UHRS3, SXSG62TU, UTV3  24 Hour Urine for Creatinine Clearance:  No components found for: CREAT4, UHRS10, UTV10     Imaging:  CXR results:  EXAMINATION:    ONE XRAY VIEW OF THE CHEST    12/7/2020 8:12 am    COMPARISON:    11/28/2020    HISTORY:    Acute shortness of breath. FINDINGS:    Sternotomy changes with prosthetic cardiac valves and left atrial appendage    clip.  Heart remains enlarged.  Increased moderate left pleural effusion with    adjacent airspace disease.  Mild right basilar atelectasis.  No pneumothorax.         Impression    Increased moderate left pleural effusion.           Assessment  1-ESRD-on an MWF schedule for IHD  PLAN:1. Pt seen on IHD this AM-no vol removal-given the tachycardia and the hypotension, pt rinsed back off dialysis-will plan to hold  treatment today and 12/9    2-Hypokalemia in the setting of the ESRD and the IHD as well as the Short Gut Syndrome  PLAN:1. Supplement K+  2. Follow serial labs    3-Lactic acidosis most probably a Type A Lactic Acidosis due to the hypotension and tissue hypoxia-concern however for an undiagnosed D-Lactic Acidosis due to the Short Gut Syndrome-with this possibly exacerbating the recurrent hypotension  PLAN:1. Check a D-Lactic    4- Anemia in CKD-Hgb above goal hgb >10  PLAN:1. Start JEWELL when the hgB <10    5- Sec HPTH of Renal Origin with hypocalcemia in the setting of the hypoalbuminemia   PLANL:1.  Check an Ionized Ca++, PO4, PTH, Vit D    Thank you Dr. Jodi Garcia MD for allowing us to participate in care of Vikas Mariano     Discussed with Pulm/CCC    Kat Leal  9:18 AM  12/8/2020

## 2020-12-08 NOTE — PATIENT CARE CONFERENCE
Intensive Care Daily Quality Rounding Checklist      ICU Team Members:     ICU Day #: 2    Intubation Date:     Ventilator Day #:     Central Line Insertion Date: 12/7        Day #: 2     Arterial Line Insertion Date:       Day #:     DVT Prophylaxis: heparin    GI Prophylaxis: protonix    Morse Catheter Insertion Date: anuric       Day #:       Continued need (if yes, reason documented and discussed with physician):     Skin Issues/ Wounds and ordered treatment discussed on rounds: no skin issues    Goals/ Plans for the Day: monitor labs and replace as needed, dialysis today wean pressors as tolerated, place an arterial line today, increase midodrine today

## 2020-12-09 NOTE — PATIENT CARE CONFERENCE
Intensive Care Daily Quality Rounding Checklist        ICU Team Members:      ICU Day #: 3     Intubation Date:      Ventilator Day #:      Central Line Insertion Date: 12/7                                                    Day #: 3      Arterial Line Insertion Date:                              Day #:      DVT Prophylaxis: heparin    GI Prophylaxis: protonix     Morse Catheter Insertion Date: anuric                                        Day #:                              Continued need (if yes, reason documented and discussed with physician):      Skin Issues/ Wounds and ordered treatment discussed on rounds: no skin issues     Goals/ Plans for the Day: monitor labs and replace as needed, wean pressors as tolerated, place an arterial line today,      Revision History

## 2020-12-09 NOTE — PROGRESS NOTES
Internal Medicine Progress Note      Synopsis: Patient admitted on 12/7/2020    Subjective    As always she does not complain much. She has a BiPAP in place. Still remains hypotensive and lactic acidotic but surprisingly with good mentation and attitude    Exam:  BP (!) 80/44   Pulse 101   Temp 97.8 °F (36.6 °C) (Oral)   Resp 21   Ht 5' 3\" (1.6 m)   Wt 182 lb 15.7 oz (83 kg)   SpO2 90%   BMI 32.41 kg/m²   General appearance: No apparent distress, appears stated age and cooperative. HEENT: Pupils equal, round, and reactive to light. Conjunctivae/corneas clear. Neck: Supple. No jugular venous distention. Trachea midline. Respiratory:  Normal respiratory effort. Clear to auscultation, bilaterally without Rales/Wheezes/Rhonchi. Cardiovascular: Regular rate and rhythm with normal S1/S2 without murmurs, rubs or gallops. Abdomen: Soft, non-tender, non-distended with normal bowel sounds. Musculoskeletal: No clubbing, cyanosis one plus edema bilaterally. Brisk capillary refill. 2+ lower extremity pulses (dorsalis pedis).    Skin:  No rashes    Neurologic: awake, alert and following commands     Medications:  Reviewed    Infusion Medications    sodium chloride 100 mL/hr at 12/08/20 1833    norepinephrine 30 mcg/min (12/08/20 1758)    vasopressin (Septic Shock) infusion 0.03 Units/min (12/08/20 1007)     Scheduled Medications    pantoprazole  40 mg Oral QAM AC    midodrine  20 mg Oral TID     lidocaine        sodium chloride flush  10 mL Intravenous 2 times per day    aspirin  81 mg Oral Daily    atorvastatin  20 mg Oral Once per day on Mon Wed Fri    Calcium Acetate (Phos Binder)  667 mg Oral TID     calcium carbonate  3 tablet Oral BID    fenofibrate  160 mg Oral Once per day on Mon Wed Fri    folic acid  1 mg Oral Daily    levothyroxine  137 mcg Oral Daily    liothyronine  5 mcg Oral Daily    magnesium oxide  400 mg Oral Nightly    melatonin  15 mg Oral Nightly    montelukast  10 mg Oral Daily    pregabalin  50 mg Oral TID    sodium bicarbonate  650 mg Oral BID    heparin (porcine)  5,000 Units Subcutaneous Q8H     PRN Meds: sodium chloride flush, albuterol, rOPINIRole    I/O    Intake/Output Summary (Last 24 hours) at 12/8/2020 1948  Last data filed at 12/8/2020 1835  Gross per 24 hour   Intake 2538 ml   Output 40.4 ml   Net 2497.6 ml       Labs:   Recent Labs     12/07/20  0815 12/08/20  0400   WBC 14.8* 28.9*   HGB 9.6* 11.0*   HCT 28.5* 33.6*    238       Recent Labs     12/08/20  0400 12/08/20  0750 12/08/20  1200    137 136   K 2.7* 2.8* 2.9*   CL 97* 103 101   CO2 19* 22 25   BUN 13 13 8   CREATININE 4.9* 4.5* 3.2*   CALCIUM 8.4* 8.0* 7.9*   PHOS 3.6  --   --        Recent Labs     12/07/20  0815 12/08/20  0400   PROT 5.4* 6.7   ALKPHOS 161* 215*   ALT 31 31   AST 38* 42*   BILITOT 1.3* 1.3*   LIPASE 8*  --        No results for input(s): INR in the last 72 hours. Recent Labs     12/07/20  0815   TROPONINI 0.02       Chronic labs:  Lab Results   Component Value Date    CHOL 96 02/27/2020    TRIG 131 02/27/2020    HDL 36 02/27/2020    LDLCALC 34 02/27/2020    TSH 4.260 (H) 12/07/2020    INR 1.3 10/21/2020    LABA1C <4.0 12/30/2019       Radiology:  Xr Chest Portable    Result Date: 12/7/2020  EXAMINATION: ONE XRAY VIEW OF THE CHEST 12/7/2020 8:12 am COMPARISON: 11/28/2020 HISTORY: Acute shortness of breath. FINDINGS: Sternotomy changes with prosthetic cardiac valves and left atrial appendage clip. Heart remains enlarged. Increased moderate left pleural effusion with adjacent airspace disease. Mild right basilar atelectasis. No pneumothorax. Increased moderate left pleural effusion. Xr Chest Portable    Result Date: 11/28/2020  EXAMINATION: ONE XRAY VIEW OF THE CHEST 11/28/2020 6:07 pm COMPARISON: Chest series from October 21, 2020.  HISTORY: ORDERING SYSTEM PROVIDED HISTORY: sob TECHNOLOGIST PROVIDED HISTORY: Reason for exam:->sob FINDINGS: Postsurgical changes overlie the mediastinum. Lung hyperinflation and coarse interstitial markings. Elevation of the left hemidiaphragm versus loculated left pleural effusion is unchanged. Small right pleural effusion or scarring. No new airspace disease. No pneumothorax. Atherosclerotic disease in the aortic arch. Cardiac silhouette is enlarged. Normal pulmonary vascularity. Osseous and thoracic soft tissue structures demonstrate no acute findings. 1.  Stable lung hyperinflation and coarse interstitial markings. Stable left greater than right pleural effusions or potentially scarring. No new pulmonary pathology. 2.  Atherosclerotic disease and cardiomegaly. Postsurgical changes. Normal pulmonary vascularity on this exam    ASSESSMENT:    Active Problems:    Hypotension    Hypokalemia  Resolved Problems:    * No resolved hospital problems. *  Lactic acidosis  Hemodialysis dependence  Chronic pain  Restless legs  Anemia  Asthma  Hypothyroidism     PLAN:    Appreciate input of renal services  Replace potassium  Remains on pressors  Still acidotic. D lactic acid being looked for. No results yet      Diet: DIET RENAL;  Dietary Nutrition Supplements: Protein Modular  Dietary Nutrition Supplements: Low Calorie High Protein Supplement  Code Status: Prior  PT/OT Eval Status:   Once off of pressors  DVT Prophylaxis:    In place   recommended disposition at discharge:   Likely home  +++++++++++++++++++++++++++++++++++++++++++++++++  Melly Clements MD   [facility].  +++++++++++++++++++++++++++++++++++++++++++++++++  NOTE: This report was transcribed using voice recognition software. Every effort was made to ensure accuracy; however, inadvertent computerized transcription errors may be present.

## 2020-12-09 NOTE — CARE COORDINATION
Met w/ patient. Explained role of  and plan of care. Requiring iv pressors. On room air, Bipap @ HS. From home w/ , son, and daughter PTA. Has [employer], cane. Has home CPAP through [facility]. Hx Visiting Nurses [facility] and Trang ZHANG. Hemodialysis patient Deleyang M-W-F BETO [facility] @ [facility] notified of admission.  provides transportation to dialysis. Per pt, plan is to return home on discharge.  Will follow Jaden Angel

## 2020-12-09 NOTE — PROGRESS NOTES
Nephrology Progress Note  Patient's Name: Jose Giles  1:38 PM  12/9/2020    Nephrologist: Lenny Pagan    Reason for Consult:  ESRD  Requesting Physician:  Elis Harris MD    Chief Complaint:  Hypotension    History Obtained From:  patient and past medical records    History of Present Ilness from the 12/8/20 note:    Jose Giles is a 77 y.o. female with prior history of ESRD on IHD MWF at [facility]. Pt has a Hx of recurrent episodes of hypotension. She was at IHD yestereday when she developed hypotension and was sent to the ED. In the ED the SBP was as low as 55/41. She was treated with IVF and initiated on norepi. Pt also noted to have a lactic acidosis as well as an initial serum K+1.6.  She received 40meq KCL po and then a total of 60Meq IV with the K+ up to 2.8 this AM. Pt seen on IHD this AM SBP into the 70's with HR in the 140's    12/9/20: Pt awake alert and appropriate still with loose stools    Past Medical History:   Diagnosis Date    (HFpEF) heart failure with preserved ejection fraction ([facility] [address] 01/25/2017 4/11/17- echo- LVEF 55-60%, stage II DD, severely dilated LA, severe MR, mild TR, LVDD: 5.6 cm    Anemia     hx  / takes aranesp injections every 2 weeks    Asthma     well controlled with inhalers     CAD (coronary artery disease)     Chronic kidney disease     stage 4    Complication of AV dialysis fistula, initial encounter 6/2/2018    COPD [facility]chronic obstructive pulmonary disease) ([facility]     Fibromyalgia     GERD (gastroesophageal reflux disease)     Hemodialysis patient ([facility] [address]     chato Tarango    History of blood transfusion spring 2017    Hx of blood clots     h/o DVT in leg at age 25yrs    Hyperlipidemia     Hypertension     well controlled with medications     Kidney failure     Kidney stone     multiple issues    YUNIOR on CPAP     Polymyalgia rheumatica (HCC)     Restless leg syndrome     Short gut syndrome     Thyroid disease     Traumatic hematoma of left upper arm 6/2/2018    Wears dentures     upper partial       Past Surgical History:   Procedure Laterality Date    APPENDECTOMY      AV FISTULA REPAIR Left 03/22/2018    Superficialization, Del[place]    AV FISTULA REPAIR  11/21/2018    Dr Keara Ulloa 10x38 iCast Subclavian    BREAST BIOPSY Right 2000    BRONCHOSCOPY  2010    CARDIAC CATHETERIZATION  02/17/2017    Dr Sree Moody REPLACEMENT  03/21/2017    MVR, TVR    COLONOSCOPY      COLONOSCOPY  4/21/15    COLONOSCOPY N/A 9/3/2019    COLORECTAL CANCER SCREENING, NOT HIGH RISK performed by Joselyn Mathews MD at [address] Left 75/57/1418    radiocephalic, Del[place]    DIALYSIS FISTULA CREATION Left 01/25/2018    brachioecephalic, ligation radiocephalic, Delatore    ENDOSCOPY, COLON, DIAGNOSTIC      EYE SURGERY Right     CATARACT    INTESTINAL BYPASS  1973    for weight loss    KIDNEY STONE SURGERY      x 3    OTHER SURGICAL HISTORY Left 11/16/2017    AV fistula creation left arm    OTHER SURGICAL HISTORY  01/17/2018    Left arm fistulogram by Dr Abhijit Patricia.     OTHER SURGICAL HISTORY  10/17/2018    Dr Tracey-Left arm fistuloplasty    MI CREAT AV FISTULA,AUTOGENOUS GRAFT Left 3/22/2018    AV FISTULA  REVISION LEFT ARM performed by Jorje Clements MD at [facility] 172 TUNNELED CV CATH Right 8/9/2018    REMOVAL OF TESIO CATHETER, REMOVAL OF MEDIPORT performed by Jorje Clements MD at [address] Right 2000    RIGHT BREAST MOLE REMOVED    TRANSESOPHAGEAL ECHOCARDIOGRAM  02/03/2017    TUNNELED VENOUS CATHETER PLACEMENT Right     TUNNELED VENOUS PORT PLACEMENT Right     Powerport / x 4 / at right chest; since short gut syndrome received magnesium & sodium bicarb in past    VASC UPPER EXTREMITY VENOUS  UNI Left 09/42/2075    Brachiocephalic covered stent, iCast, 10 mm x 38 mm       Family History   Problem Relation Age of Onset    Obesity Mother     Cirrhosis Mother  Heart Failure Mother     High Blood Pressure Mother     Diabetes Father     High Blood Pressure Sister     Depression Sister     Diabetes Brother     High Blood Pressure Brother         reports that she quit smoking about 23 years ago. Her smoking use included cigarettes. She started smoking about 29 years ago. She has a 12.00 pack-year smoking history. She has never used smokeless tobacco. She reports that she does not drink alcohol or use drugs. Allergies:  Coumadin [warfarin sodium]; Quinine derivatives; Turmeric; and Other    Current Medications:    acetaminophen (TYLENOL) tablet 650 mg, Q4H PRN  pantoprazole (PROTONIX) tablet 40 mg, QAM AC  0.9 % sodium chloride infusion, Continuous  midodrine (PROAMATINE) tablet 20 mg, TID WC  potassium chloride 20 mEq/50 mL IVPB (Central Line), Once  sodium chloride flush 0.9 % injection 10 mL, 2 times per day  sodium chloride flush 0.9 % injection 10 mL, PRN  norepinephrine (LEVOPHED) 16 mg in dextrose 5 % 250 mL infusion, Continuous  vasopressin 20 Units in dextrose 5 % 100 mL infusion, Continuous  albuterol (ACCUNEB) nebulizer solution 0.63 mg, 4x Daily PRN  aspirin EC tablet 81 mg, Daily  atorvastatin (LIPITOR) tablet 20 mg, Once per day on Mon Wed Fri  Calcium Acetate (Phos Binder) CAPS 667 mg, TID WC  calcium carbonate (TUMS) chewable tablet 1,500 mg, BID  fenofibrate (TRIGLIDE) tablet 160 mg, Once per day on Mon Wed Fri  folic acid (FOLVITE) tablet 1 mg, Daily  levothyroxine (SYNTHROID) tablet 137 mcg, Daily  liothyronine (CYTOMEL) tablet 5 mcg, Daily  magnesium oxide (MAG-OX) tablet 400 mg, Nightly  melatonin tablet 15 mg, Nightly  montelukast (SINGULAIR) tablet 10 mg, Daily  pregabalin (LYRICA) capsule 50 mg, TID  rOPINIRole (REQUIP) tablet 0.5 mg, Nightly PRN  sodium bicarbonate tablet 650 mg, BID  heparin (porcine) injection 5,000 Units, Q8H        Review of Systems:   Pertinent items are noted in HPI. Remainder of a complete review is (-) other than stated in the HPI    Physical exam:   Constitutional:  Elderly female in NAD  Vitals:   VITALS:  /62   Pulse 93   Temp 98.7 °F (37.1 °C) (Axillary)   Resp 29   Ht 5' 3\" (1.6 m)   Wt 182 lb 15.7 oz (83 kg)   SpO2 92%   BMI 32.41 kg/m²   24HR INTAKE/OUTPUT:      Intake/Output Summary (Last 24 hours) at 12/9/2020 1338  Last data filed at 12/9/2020 0600  Gross per 24 hour   Intake 3229 ml   Output 6 ml   Net 3223 ml     URINARY CATHETER OUTPUT (Morse):     DRAIN/TUBE OUTPUT:     VENT SETTINGS:  Vent Information  SpO2: 92 %  Additional Respiratory  Assessments  Pulse: 93  Resp: 29  SpO2: 92 %    Skin: no rash, turgor wnl  Heent:  eomi, mmm  Neck: no bruits or jvd noted  Cardiovascular: Tachy  S1, S2 without S3 or rub  Respiratory: Decreased BS at bases   Abdomen:  +bs, soft, nt, nd  Ext: (-) bilat lower extremity edema  Psychiatric: mood and affect appropriate, cr nr 2-12 grossly intact      Data:   Labs:  CBC:   Lab Results   Component Value Date    WBC 17.3 12/09/2020    RBC 2.70 12/09/2020    HGB 9.2 12/09/2020    HCT 27.3 12/09/2020    .1 12/09/2020    MCH 34.1 12/09/2020    MCHC 33.7 12/09/2020    RDW 19.4 12/09/2020     12/09/2020    MPV 11.3 12/09/2020     CBC with Differential:    Lab Results   Component Value Date    WBC 17.3 12/09/2020    RBC 2.70 12/09/2020    HGB 9.2 12/09/2020    HCT 27.3 12/09/2020     12/09/2020    .1 12/09/2020    MCH 34.1 12/09/2020    MCHC 33.7 12/09/2020    RDW 19.4 12/09/2020    LYMPHOPCT 9.2 12/09/2020    MONOPCT 5.0 12/09/2020    BASOPCT 0.1 12/09/2020    MONOSABS 0.86 12/09/2020    LYMPHSABS 1.60 12/09/2020    EOSABS 0.01 12/09/2020    BASOSABS 0.01 12/09/2020     Hemoglobin/Hematocrit:    Lab Results   Component Value Date    HGB 9.2 12/09/2020    HCT 27.3 12/09/2020     CMP:    Lab Results   Component Value Date     12/09/2020    K 3.7 12/09/2020     12/09/2020    CO2 24 12/09/2020    BUN 11 12/09/2020    CREATININE 3.8 12/09/2020    GFRAA 14 12/09/2020    LABGLOM 12 12/09/2020    GLUCOSE 104 12/09/2020    PROT 5.7 12/09/2020    LABALBU 1.4 12/09/2020    CALCIUM 7.9 12/09/2020    BILITOT 0.8 12/09/2020    ALKPHOS 181 12/09/2020    AST 35 12/09/2020    ALT 27 12/09/2020     BMP:    Lab Results   Component Value Date     12/09/2020    K 3.7 12/09/2020     12/09/2020    CO2 24 12/09/2020    BUN 11 12/09/2020    LABALBU 1.4 12/09/2020    CREATININE 3.8 12/09/2020    CALCIUM 7.9 12/09/2020    GFRAA 14 12/09/2020    LABGLOM 12 12/09/2020    GLUCOSE 104 12/09/2020     BUN/Creatinine:    Lab Results   Component Value Date    BUN 11 12/09/2020    CREATININE 3.8 12/09/2020     Hepatic Function Panel:    Lab Results   Component Value Date    ALKPHOS 181 12/09/2020    ALT 27 12/09/2020    AST 35 12/09/2020    PROT 5.7 12/09/2020    BILITOT 0.8 12/09/2020    LABALBU 1.4 12/09/2020     Albumin:    Lab Results   Component Value Date    LABALBU 1.4 12/09/2020     Calcium:    Lab Results   Component Value Date    CALCIUM 7.9 12/09/2020     Ionized Calcium:  No results found for: IONCA  Magnesium:    Lab Results   Component Value Date    MG 2.0 12/09/2020     Phosphorus:    Lab Results   Component Value Date    PHOS 2.7 12/09/2020     LDH:    Lab Results   Component Value Date     10/23/2020     Uric Acid:  No results found for: LABURIC, URICACID  PT/INR:    Lab Results   Component Value Date    PROTIME 15.1 10/21/2020    INR 1.3 10/21/2020     PTT:    Lab Results   Component Value Date    APTT 36.6 06/02/2018   [APTT}  Troponin:    Lab Results   Component Value Date    TROPONINI 0.02 12/07/2020     U/A:    Lab Results   Component Value Date    COLORU Yellow 10/21/2020    PROTEINU 30 10/21/2020    PHUR 5.5 10/21/2020    WBCUA >20 10/21/2020    RBCUA 5-10 10/21/2020    BACTERIA MANY 10/21/2020    CLARITYU SL CLOUDY 10/21/2020    SPECGRAV 1.015 10/21/2020    LEUKOCYTESUR LARGE 10/21/2020    UROBILINOGEN 0.2 10/21/2020    BILIRUBINUR MWF schedule for IHD  PLAN:1. Plan next IHD 12/11    2-Hypokalemia in the setting of the ESRD and the IHD as well as the Short Gut Syndrome   Most recent K+ 3.7  PLAN:1. Follow K+    3-Lactic acidosis most probably a Type A Lactic Acidosis due to the hypotension and tissue hypoxia-concern however for an undiagnosed D-Lactic Acidosis due to the Short Gut Syndrome-with this possibly exacerbating the recurrent hypotension  Lactic Acid trending down  Continues to require Norepi 3mcg and Vasopressin 0.03units  PLAN:1.  Await  D-Lactic Acid    4- Anemia in CKD-Hgb above goal hgb >10  PLAN:1. Start JEWELL as the hgB <10    5- Sec HPTH of Renal Origin with hypocalcemia in the setting of the hypoalbuminemia    Ionized Ca++ 1.20 WNL, PO4 2.7, Vit D 66, PTH 47-overly suppressed for ESRD  PLANL:1.Stop the Ca++ Acetate    Thank you Dr. Melly Clements MD for allowing us to participate in care of Judy Leal  1:38 PM  12/9/2020

## 2020-12-09 NOTE — PROGRESS NOTES
Internal Medicine Progress Note      Synopsis: Patient admitted on 12/7/2020    Subjective    Feeling slightly better today. Pressors are less and hoping to be weaned off today. Still with lots of frequent stools    Exam:  BP (!) 80/44   Pulse 93   Temp 98.4 °F (36.9 °C) (Infrared)   Resp 29   Ht 5' 3\" (1.6 m)   Wt 182 lb 15.7 oz (83 kg)   SpO2 92%   BMI 32.41 kg/m²   General appearance: No apparent distress, appears stated age and cooperative. HEENT: Pupils equal, round, and reactive to light. Conjunctivae/corneas clear. Neck: Supple. No jugular venous distention. Trachea midline. Respiratory:  Normal respiratory effort. Clear to auscultation, bilaterally without Rales/Wheezes/Rhonchi. Cardiovascular: Regular rate and rhythm with normal S1/S2 without murmurs, rubs or gallops. Abdomen: Soft, non-tender, non-distended with normal bowel sounds. Musculoskeletal: No clubbing, cyanosis 2 plusedema bilaterally. Brisk capillary refill. 2+ lower extremity pulses (dorsalis pedis).    Skin:  No rashes    Neurologic: awake, alert and following commands     Medications:  Reviewed    Infusion Medications    sodium chloride 100 mL/hr at 12/09/20 0500    norepinephrine 3 mcg/min (12/09/20 0510)    vasopressin (Septic Shock) infusion 0.03 Units/min (12/08/20 2000)     Scheduled Medications    pantoprazole  40 mg Oral QAM AC    midodrine  20 mg Oral TID WC    potassium chloride  20 mEq Intravenous Once    sodium chloride flush  10 mL Intravenous 2 times per day    aspirin  81 mg Oral Daily    atorvastatin  20 mg Oral Once per day on Mon Wed Fri    Calcium Acetate (Phos Binder)  667 mg Oral TID WC    calcium carbonate  3 tablet Oral BID    fenofibrate  160 mg Oral Once per day on Mon Wed Fri    folic acid  1 mg Oral Daily    levothyroxine  137 mcg Oral Daily    liothyronine  5 mcg Oral Daily    magnesium oxide  400 mg Oral Nightly    melatonin  15 mg Oral Nightly    montelukast  10 mg Oral Daily  pregabalin  50 mg Oral TID    sodium bicarbonate  650 mg Oral BID    heparin (porcine)  5,000 Units Subcutaneous Q8H     PRN Meds: acetaminophen, sodium chloride flush, albuterol, rOPINIRole    I/O    Intake/Output Summary (Last 24 hours) at 12/9/2020 1050  Last data filed at 12/9/2020 0600  Gross per 24 hour   Intake 3579 ml   Output 6 ml   Net 3573 ml       Labs:   Recent Labs     12/07/20  0815 12/08/20  0400 12/09/20  0500   WBC 14.8* 28.9* 17.3*   HGB 9.6* 11.0* 9.2*   HCT 28.5* 33.6* 27.3*    238 158       Recent Labs     12/08/20  0400  12/09/20  0055 12/09/20  0500 12/09/20  0840      < > 137 136 137   K 2.7*   < > 3.8 3.3* 3.7   CL 97*   < > 103 103 106   CO2 19*   < > 23 24 24   BUN 13   < > 10 11 11   CREATININE 4.9*   < > 3.7* 3.8* 3.8*   CALCIUM 8.4*   < > 7.9* 7.9* 7.9*   PHOS 3.6  --   --  2.7  --     < > = values in this interval not displayed. Recent Labs     12/07/20  0815 12/08/20  0400 12/09/20  0500   PROT 5.4* 6.7 5.7*   ALKPHOS 161* 215* 181*   ALT 31 31 27   AST 38* 42* 35*   BILITOT 1.3* 1.3* 0.8   LIPASE 8*  --   --        No results for input(s): INR in the last 72 hours. Recent Labs     12/07/20  0815   TROPONINI 0.02       Chronic labs:  Lab Results   Component Value Date    CHOL 96 02/27/2020    TRIG 131 02/27/2020    HDL 36 02/27/2020    LDLCALC 34 02/27/2020    TSH 4.260 (H) 12/07/2020    INR 1.3 10/21/2020    LABA1C <4.0 12/30/2019     Results for Monica Sinks (MRN [id number]) as of 12/9/2020 10:51   Ref. Range 12/8/2020 07:50 12/8/2020 20:00 12/9/2020 00:55 12/9/2020 05:00 12/9/2020 08:40   Lactic Acid Latest Ref Range: 0.5 - 2.2 mmol/L 4.6 (HH) 3.8 (H) 3.2 (H) 2.5 (H) 2.6 (H)     Radiology:  Xr Chest Portable    Result Date: 12/7/2020  EXAMINATION: ONE XRAY VIEW OF THE CHEST 12/7/2020 8:12 am COMPARISON: 11/28/2020 HISTORY: Acute shortness of breath. FINDINGS: Sternotomy changes with prosthetic cardiac valves and left atrial appendage clip.   Heart remains enlarged. Increased moderate left pleural effusion with adjacent airspace disease. Mild right basilar atelectasis. No pneumothorax. Increased moderate left pleural effusion. Xr Chest Portable    Result Date: 11/28/2020  EXAMINATION: ONE XRAY VIEW OF THE CHEST 11/28/2020 6:07 pm COMPARISON: Chest series from October 21, 2020. HISTORY: ORDERING SYSTEM PROVIDED HISTORY: sob TECHNOLOGIST PROVIDED HISTORY: Reason for exam:->sob FINDINGS: Postsurgical changes overlie the mediastinum. Lung hyperinflation and coarse interstitial markings. Elevation of the left hemidiaphragm versus loculated left pleural effusion is unchanged. Small right pleural effusion or scarring. No new airspace disease. No pneumothorax. Atherosclerotic disease in the aortic arch. Cardiac silhouette is enlarged. Normal pulmonary vascularity. Osseous and thoracic soft tissue structures demonstrate no acute findings. 1.  Stable lung hyperinflation and coarse interstitial markings. Stable left greater than right pleural effusions or potentially scarring. No new pulmonary pathology. 2.  Atherosclerotic disease and cardiomegaly. Postsurgical changes. Normal pulmonary vascularity on this exam    ASSESSMENT:    Active Problems:    Hypotension    Hypokalemia  Resolved Problems:    * No resolved hospital problems. *  Lactic acidosis  Hemodialysis dependence  Chronic pain  Restless legs  Anemia  Asthma  Hypothyroidism     PLAN:    Appreciate input of renal services  Replace potassium  Remains on pressors  Still acidotic. D lactic acid being looked for.   No results yet    December 9, 2020  Await decrease of pressors and her toleration of that  The lactic acid levels are pending and will need to be waited on  She is tired but otherwise mentally well  Less and lowered lactic acid level today    Diet: DIET RENAL;  Dietary Nutrition Supplements: Protein Modular  Dietary Nutrition Supplements: Low Calorie High Protein Supplement  Code Status: Prior  PT/OT Eval Status:   Once off of pressors  DVT Prophylaxis:    In place   recommended disposition at discharge:   Likely home  +++++++++++++++++++++++++++++++++++++++++++++++++  Joshua Pastor MD   [facility].  +++++++++++++++++++++++++++++++++++++++++++++++++  NOTE: This report was transcribed using voice recognition software. Every effort was made to ensure accuracy; however, inadvertent computerized transcription errors may be present.

## 2020-12-09 NOTE — PROGRESS NOTES
Critical Care Team - Daily Progress Note      Date and time: 12/9/2020 8:26 AM  Patient's name:  Werner Ware Record Number: [id number]  Patient's account/billing number: [de-identified]  Patient's YOB: 1955  Age: 77 y.o. Date of Admission: 12/7/2020  7:50 AM  Length of stay during current admission: 2      Primary Care Physician: Deshaun Nichols MD  ICU Attending Physician: Dr. Esther Sal Status: Prior    Reason for ICU admission: Critical care admission      SUBJECTIVE:   The patient is a 77 y.o. female with significant past medical history of ESRD on dialysis, hypotension on midodrine, hypothyroidism, polymyalgia rheumatica, YUNIOR, status post gastric bypass with short gut syndrome. She was sent to the emergency department from the dialysis center due to being hypotensive. She said she has had issues with hypotension in the past, but normally runs systolic blood pressures in 90s to 110s. She states that she took her dose of midodrine this morning, and was asked to take another dose of midodrine prior to her dialysis appointment, she was found to have a systolic blood pressure in the 70s prior to dialysis, but got worsening hypotension during dialysis, so the treatment was stopped about an hour in. In the emergency department she was found to have blood pressures as low as 55 systolic, and also had a potassium of 1.8. A triple-lumen catheter was placed her right Muhammad, and she was started on Levophed. She is currently on 12 mics, her blood pressures have improved from anywhere to 70s to lower 11I systolic. Is also found she had a lactic acidosis, and elevated white blood cell count, there is an underlying sepsis process suspected, blood cultures were drawn, and she was started on Vanco and Zosyn. Urinalysis was not able to be obtained as the patient is anuric.   She said the symptoms she was having include lightheadedness, dizziness, the lightheadedness was worse with standing, she tried to take her midodrine to resolve the lightheadedness, this did not help, the onset was today, the severity is moderate. She will be admitted to ICU for hypotension, and possible sepsis. OVERNIGHT EVENTS:         -Progressively higher vasopressor needs.   -Decreasing vasopressor needs, now almost off levo  AWAKE & FOLLOWING COMMANDS:  [] No   [x] Yes    CURRENT VENTILATION STATUS:     [] Ventilator  [] BIPAP  [] Nasal Cannula [x] Room Air      IF INTUBATED, ET TUBE MARKING AT LOWER LIP:       cms    SECRETIONS Amount:  [] Small [] Moderate  [] Large  [x] None  Color:     [] White [] Colored  [] Bloody    SEDATION:  RAAS Score:  [] Propofol gtt  [] Versed gtt  [] Ativan gtt   [x] No Sedation    PARALYZED:  [x] No    [] Yes    DIARRHEA:                [] No                [x] Yes  (C. Difficile status: [] positive                                                                                                                       [] negative                                                                                                                     [x] pending)    VASOPRESSORS:  [] No    [x] Yes    If yes -   [x] Levophed       [] Dopamine     [x] Vasopressin       [] Dobutamine  [] Phenylephrine         [] Epinephrine    CENTRAL LINES:     [] No   [] Yes   (Date of Insertion:   )           If yes -     [] Right IJ     [] Left IJ [x] Right Femoral [] Left Femoral                   [] Right Subclavian [] Left Subclavian       PORTILLO'S CATHETER:   [x] No   [] Yes  (Date of Insertion:   )     URINE OUTPUT:            [] Good   [] Low              [x] Anuric      OBJECTIVE:     VITAL SIGNS:  BP (!) 80/44   Pulse 80   Temp 98.4 °F (36.9 °C) (Infrared)   Resp (!) 49   Ht 5' 3\" (1.6 m)   Wt 182 lb 15.7 oz (83 kg)   SpO2 93%   BMI 32.41 kg/m²   Tmax over 24 hours:  Temp (24hrs), Av °F (36.7 °C), Min:97.8 °F (36.6 °C), Max:98.4 °F (36.9 °C)      Patient Vitals for the past 6 hrs:   Pulse Resp SpO2   12/09/20 0803 -- (!) 49 93 %   12/09/20 0645 80 15 95 %   12/09/20 0540 84 13 95 %   12/09/20 0530 105 27 93 %   12/09/20 0520 85 14 93 %   12/09/20 0510 84 16 92 %   12/09/20 0450 87 (!) 32 94 %   12/09/20 0440 88 19 95 %   12/09/20 0430 91 15 95 %   12/09/20 0420 79 17 93 %   12/09/20 0410 80 15 91 %   12/09/20 0400 79 15 91 %   12/09/20 0350 81 14 92 %   12/09/20 0340 83 15 93 %   12/09/20 0330 84 15 91 %   12/09/20 0320 82 15 92 %   12/09/20 0310 80 15 90 %         Intake/Output Summary (Last 24 hours) at 12/9/2020 0826  Last data filed at 12/9/2020 0600  Gross per 24 hour   Intake 3879 ml   Output 46.4 ml   Net 3832.6 ml     Wt Readings from Last 2 Encounters:   12/08/20 182 lb 15.7 oz (83 kg)   11/28/20 175 lb (79.4 kg)     Body mass index is 32.41 kg/m². PHYSICAL EXAMINATION:    General appearance - alert, well appearing, and in no distress and oriented to person, place, and time.    Mental status - alert, oriented to person, place, and time, normal mood, behavior, speech, dress, motor activity, and thought processes  Eyes - pupils equal and reactive, extraocular eye movements intact, sclera anicteric  Ears - external ear normal  Nose - normal and patent, no erythema, discharge or polyps  Mouth - mucous membranes moist, pharynx normal without lesions  Neck - supple, no significant adenopathy  Chest - clear to auscultation, no wheezes, rales or rhonchi, symmetric air entry  Heart -systolic heart murmur present,tachycardiac rate, normal rhythm, no rubs, gallops  Abdomen - soft, nontender, nondistended, no masses or organomegaly  Neurological - alert, oriented, normal speech, no focal findings or movement disorder noted  Extremities - peripheral pulses normal, right femoral TLC, left arm AV fistula  Skin - normal coloration, appears to have some bruises    Any additional physical findings:    MEDICATIONS:    Scheduled Meds:   potassium chloride  20 mEq Intravenous Once    pantoprazole  40 mg Oral 118*   CALCIUM 8.1* 7.9* 7.9*   PROT  --   --  5.7*   LABALBU  --   --  1.4*   BILITOT  --   --  0.8   ALKPHOS  --   --  181*   AST  --   --  35*   ALT  --   --  27      Magnesium:   Lab Results   Component Value Date    MG 2.0 12/09/2020     Phosphorus:   Lab Results   Component Value Date    PHOS 2.7 12/09/2020     Ionized Calcium:   Lab Results   Component Value Date    CAION 1.20 12/09/2020        Urinalysis:     Troponin:   Recent Labs     12/07/20  0815   TROPONINI 0.02       Microbiology:    Cultures during this admission:     Blood cultures:   Pending              [] None drawn      [] Negative             []  Positive (Details:  )  Urine Culture:                   [x] None drawn      [] Negative             []  Positive (Details:  )  Sputum Culture:               [x] None drawn       [] Negative             []  Positive (Details:  )   Endotracheal aspirate:     [x] None drawn       [] Negative             []  Positive (Details:  )     Other pertinent Labs:       Radiology/Imaging:   Xr Chest Portable    Result Date: 12/7/2020  EXAMINATION: ONE XRAY VIEW OF THE CHEST 12/7/2020 8:12 am COMPARISON: 11/28/2020 HISTORY: Acute shortness of breath. FINDINGS: Sternotomy changes with prosthetic cardiac valves and left atrial appendage clip. Heart remains enlarged. Increased moderate left pleural effusion with adjacent airspace disease. Mild right basilar atelectasis. No pneumothorax. Increased moderate left pleural effusion. Xr Chest Portable    Result Date: 11/28/2020  EXAMINATION: ONE XRAY VIEW OF THE CHEST 11/28/2020 6:07 pm COMPARISON: Chest series from October 21, 2020. HISTORY: ORDERING SYSTEM PROVIDED HISTORY: sob TECHNOLOGIST PROVIDED HISTORY: Reason for exam:->sob FINDINGS: Postsurgical changes overlie the mediastinum. Lung hyperinflation and coarse interstitial markings. Elevation of the left hemidiaphragm versus loculated left pleural effusion is unchanged.   Small right pleural effusion or Unfract. Heparin Subcut  [] EPC Cuffs    NUTRITION:  [] NPO [] Tube Feeding (Specify: ) [] TPN  [x] PO (Diet: DIET RENAL;  Dietary Nutrition Supplements: Protein Modular  Dietary Nutrition Supplements: Low Calorie High Protein Supplement)    HOME MEDICATIONS RECONCILED: [] No  [x] Yes    INSULIN DRIP:   [x] No   [] Yes    CONSULTATION NEEDED:  [x] No   [] Yes    FAMILY UPDATED:    [x] No   [] Yes    TRANSFER OUT OF ICU:   [x] No   [] Yes    ADDITIONAL PLAN:    SYSTEMS ASSESSMENT     Neuro   Lightheadedness-likely related to hypotension, and hypovolemia  Resume home Lyrica, ropinirole  Respiratory   Wheeziness-patient has a history of asthma, continue home Singulair, albuterol  YUNIOR-CPAP at night  Cardiovascular   History of diastolic heart failure-monitor fluid status, continue home aspirin     Hypotension-likely related to hypovolemia versus sepsis, titrate levo as needed, continue to replete intravascular volume, restart home midodrine     Resume home Lipitor, fenofibrate  Gastrointestinal   Short gut syndrome-monitor, may require stool thickening agent as this may be the source of hypovolemia  GI prophylaxis-Protonix     Renal   ESRD on dialysis-Monday Wednesday Friday dialysis, will need to improve blood pressure before resuming  Hypokalemia-likely related to short gut syndrome versus early termination of dialysis today, receiving 80 mEq in the ER, monitor electrolytes every 4 hours      Infectious Disease   Possible sepsis-lactic acidemia, elevated white blood cell count, blood cultures drawn, empiric antibiotics given-Vanco, Zosyn     Hematology/Oncology   DVT prophylaxis-Heparin  Chronic anemia-resume home darbepoetin, or possible substitute     Endocrine   Resume home Synthroid,     Social/Spiritual/DNR/Other   Full code     ASSESSMENT/ PLAN   1. Hypotension improving, hydration as needed  2.  Sepsis-follow blood cultures, empiric Vanco and Zosyn given-hold on antibiotics until source identified or vitals

## 2020-12-10 NOTE — CARE COORDINATION
Still requiring iv pressors. On room air, Bipap @ HS. From home w/ , son, and daughter PTA. Has [employer], cane. Has home CPAP through [facility]. Hx Visiting Nurses [facility] and Trang ZHANG. Hemodialysis patient [place] [address]  provides transportation to dialysis. Plan remains to return home on discharge-will need PT/OT evals when when off pressors to assist w/ discharge planning.  Will follow

## 2020-12-10 NOTE — PROGRESS NOTES
Nephrology Progress Note  Patient's Name: Lynette Luu  12:44 PM  12/10/2020    Nephrologist: Nicole Whitt    Reason for Consult:  ESRD  Requesting Physician:  Deshaun Nichols MD    Chief Complaint:  Hypotension    History Obtained From:  patient and past medical records    History of Present Ilness from the 12/8/20 note:    Lynette Luu is a 77 y.o. female with prior history of ESRD on IHD MWF at [facility]. Pt has a Hx of recurrent episodes of hypotension. She was at IHD yestereday when she developed hypotension and was sent to the ED. In the ED the SBP was as low as 55/41. She was treated with IVF and initiated on norepi. Pt also noted to have a lactic acidosis as well as an initial serum K+1.6.  She received 40meq KCL po and then a total of 60Meq IV with the K+ up to 2.8 this AM. Pt seen on IHD this AM SBP into the 70's with HR in the 140's    12/10/20: Pt awake alert and appropriate still with loose stools, continues to require norepi and vasopressin for hemodynamic support    Past Medical History:   Diagnosis Date    (HFpEF) heart failure with preserved ejection fraction ([address] 01/25/2017 4/11/17- echo- LVEF 55-60%, stage II DD, severely dilated LA, severe MR, mild TR, LVDD: 5.6 cm    Anemia     hx  / takes aranesp injections every 2 weeks    Asthma     well controlled with inhalers     CAD (coronary artery disease)     Chronic kidney disease     stage 4    Complication of AV dialysis fistula, initial encounter 6/2/2018    [facility] obstructive [facility]     Fibromyalgia     GERD (gastroesophageal reflux disease)     Hemodialysis patient ([address]     chato Tarango    History of blood transfusion spring 2017    Hx of blood clots     h/o DVT in leg at age 25yrs    Hyperlipidemia     Hypertension     well controlled with medications     Kidney failure     Kidney stone     multiple issues    YUNIOR on CPAP     Polymyalgia rheumatica (HCC)     Restless leg syndrome     Short gut syndrome     Thyroid disease     Traumatic hematoma of left upper arm 6/2/2018    Wears dentures     upper partial       Past Surgical History:   Procedure Laterality Date    APPENDECTOMY      AV FISTULA REPAIR Left 03/22/2018    Superficialization, Delatore    AV FISTULA REPAIR  11/21/2018    Dr Monique Avalos 10x38 iCast Subclavian    BREAST BIOPSY Right 2000    BRONCHOSCOPY  2010    CARDIAC CATHETERIZATION  02/17/2017    Dr Cohn Stalls REPLACEMENT  03/21/2017    MVR, TVR    COLONOSCOPY      COLONOSCOPY  4/21/15    COLONOSCOPY N/A 9/3/2019    COLORECTAL CANCER SCREENING, NOT HIGH RISK performed by Otto Rodriguez MD at [address] Left 75/81/6750    radiocephalic, Delatore    DIALYSIS FISTULA CREATION Left 01/25/2018    brachioecephalic, ligation radiocephalic, Delatore    ENDOSCOPY, COLON, DIAGNOSTIC      EYE SURGERY Right     CATARACT    INTESTINAL BYPASS  1973    for weight loss    KIDNEY STONE SURGERY      x 3    OTHER SURGICAL HISTORY Left 11/16/2017    AV fistula creation left arm    OTHER SURGICAL HISTORY  01/17/2018    Left arm fistulogram by Dr Quincy Palumbo.     OTHER SURGICAL HISTORY  10/17/2018    Dr Tracey-Left arm fistuloplasty    NE CREAT AV FISTULA,AUTOGENOUS GRAFT Left 3/22/2018    AV FISTULA  REVISION LEFT ARM performed by Lydia Stein MD at [facility] [address] TUNNELED CV CATH Right 8/9/2018    REMOVAL OF TESIO CATHETER, REMOVAL OF MEDIPORT performed by Lydia Stein MD at [address] Right 2000    RIGHT BREAST MOLE REMOVED    TRANSESOPHAGEAL ECHOCARDIOGRAM  02/03/2017    TUNNELED VENOUS CATHETER PLACEMENT Right     TUNNELED VENOUS PORT PLACEMENT Right     Powerport / x 4 / at right chest; since short gut syndrome received magnesium & sodium bicarb in past    VASC UPPER EXTREMITY VENOUS  UNI Left 54/43/3846    Brachiocephalic covered stent, iCast, 10 mm x 38 mm       Family History Problem Relation Age of Onset    Obesity Mother     Cirrhosis Mother     Heart Failure Mother     High Blood Pressure Mother     Diabetes Father     High Blood Pressure Sister     Depression Sister     Diabetes Brother     High Blood Pressure Brother         reports that she quit smoking about 23 years ago. Her smoking use included cigarettes. She started smoking about 29 years ago. She has a 12.00 pack-year smoking history. She has never used smokeless tobacco. She reports that she does not drink alcohol or use drugs. Allergies:  Coumadin [warfarin sodium]; Quinine derivatives;  Turmeric; and Other    Current Medications:    menthol-zinc oxide (CALMOSEPTINE) 0.44-20.6 % ointment, BID PRN  psyllium (KONSYL) 28.3 % packet 1 packet, BID  acetaminophen (TYLENOL) tablet 650 mg, Q4H PRN  pantoprazole (PROTONIX) tablet 40 mg, QAM AC  0.9 % sodium chloride infusion, Continuous  midodrine (PROAMATINE) tablet 20 mg, TID WC  sodium chloride flush 0.9 % injection 10 mL, 2 times per day  sodium chloride flush 0.9 % injection 10 mL, PRN  norepinephrine (LEVOPHED) 16 mg in dextrose 5 % 250 mL infusion, Continuous  vasopressin 20 Units in dextrose 5 % 100 mL infusion, Continuous  albuterol (ACCUNEB) nebulizer solution 0.63 mg, 4x Daily PRN  aspirin EC tablet 81 mg, Daily  atorvastatin (LIPITOR) tablet 20 mg, Once per day on Mon Wed Fri  calcium carbonate (TUMS) chewable tablet 1,500 mg, BID  fenofibrate (TRIGLIDE) tablet 160 mg, Once per day on Mon Wed Fri  folic acid (FOLVITE) tablet 1 mg, Daily  levothyroxine (SYNTHROID) tablet 137 mcg, Daily  liothyronine (CYTOMEL) tablet 5 mcg, Daily  magnesium oxide (MAG-OX) tablet 400 mg, Nightly  melatonin tablet 15 mg, Nightly  montelukast (SINGULAIR) tablet 10 mg, Daily  pregabalin (LYRICA) capsule 50 mg, TID  rOPINIRole (REQUIP) tablet 0.5 mg, Nightly PRN  sodium bicarbonate tablet 650 mg, BID  heparin (porcine) injection 5,000 Units, Q8H        Review of Systems: 12/10/2020     CMP:    Lab Results   Component Value Date     12/10/2020    K 3.1 12/10/2020    K 3.3 12/09/2020     12/10/2020    CO2 23 12/10/2020    BUN 14 12/10/2020    CREATININE 4.2 12/10/2020    GFRAA 13 12/10/2020    LABGLOM 11 12/10/2020    GLUCOSE 103 12/10/2020    PROT 5.7 12/10/2020    LABALBU 1.4 12/10/2020    CALCIUM 7.8 12/10/2020    BILITOT 0.9 12/10/2020    ALKPHOS 177 12/10/2020    AST 33 12/10/2020    ALT 26 12/10/2020     BMP:    Lab Results   Component Value Date     12/10/2020    K 3.1 12/10/2020    K 3.3 12/09/2020     12/10/2020    CO2 23 12/10/2020    BUN 14 12/10/2020    LABALBU 1.4 12/10/2020    CREATININE 4.2 12/10/2020    CALCIUM 7.8 12/10/2020    GFRAA 13 12/10/2020    LABGLOM 11 12/10/2020    GLUCOSE 103 12/10/2020     BUN/Creatinine:    Lab Results   Component Value Date    BUN 14 12/10/2020    CREATININE 4.2 12/10/2020     Hepatic Function Panel:    Lab Results   Component Value Date    ALKPHOS 177 12/10/2020    ALT 26 12/10/2020    AST 33 12/10/2020    PROT 5.7 12/10/2020    BILITOT 0.9 12/10/2020    LABALBU 1.4 12/10/2020     Albumin:    Lab Results   Component Value Date    LABALBU 1.4 12/10/2020     Calcium:    Lab Results   Component Value Date    CALCIUM 7.8 12/10/2020     Ionized Calcium:  No results found for: IONCA  Magnesium:    Lab Results   Component Value Date    MG 1.9 12/10/2020     Phosphorus:    Lab Results   Component Value Date    PHOS 3.0 12/10/2020     LDH:    Lab Results   Component Value Date     10/23/2020     Uric Acid:  No results found for: LABURIC, URICACID  PT/INR:    Lab Results   Component Value Date    PROTIME 15.1 10/21/2020    INR 1.3 10/21/2020     PTT:    Lab Results   Component Value Date    APTT 36.6 06/02/2018   [APTT}  Troponin:    Lab Results   Component Value Date    TROPONINI 0.02 12/07/2020     U/A:    Lab Results   Component Value Date    COLORU Yellow 10/21/2020    PROTEINU 30 10/21/2020    PHUR 5.5 10/21/2020    WBCUA >20 10/21/2020    RBCUA 5-10 10/21/2020    BACTERIA MANY 10/21/2020    CLARITYU SL CLOUDY 10/21/2020    SPECGRAV 1.015 10/21/2020    LEUKOCYTESUR LARGE 10/21/2020    UROBILINOGEN 0.2 10/21/2020    BILIRUBINUR Negative 10/21/2020    BLOODU MODERATE 10/21/2020    GLUCOSEU Negative 10/21/2020     ABG:    Lab Results   Component Value Date    PH 7.320 03/22/2017    PCO2 36.6 03/22/2017    PO2 143.5 03/22/2017    HCO3 18.4 03/22/2017    BE -7.0 03/22/2017    O2SAT 99.1 03/22/2017     HgBA1c:    Lab Results   Component Value Date    LABA1C <4.0 12/30/2019     Microalbumen/Creatinine ratio:  No components found for: RUCREAT  FLP:    Lab Results   Component Value Date    TRIG 131 02/27/2020    HDL 36 02/27/2020    LDLCALC 34 02/27/2020    LABVLDL 26 02/27/2020     TSH:    Lab Results   Component Value Date    TSH 4.260 12/07/2020     VITAMIN B12: No components found for: B12  FOLATE:    Lab Results   Component Value Date    FOLATE 16.2 10/23/2020     Iron Saturation:  No components found for: PERCENTFE  TIBC:    Lab Results   Component Value Date    TIBC 80 10/23/2020     FERRITIN:    Lab Results   Component Value Date    FERRITIN 2,068 10/23/2020     AMYLASE:    Lab Results   Component Value Date    AMYLASE 11 08/24/2014     LIPASE:    Lab Results   Component Value Date    LIPASE 8 12/07/2020     Fibrinogen Level:  No components found for: FIB  Urine Toxicology:  No components found for: IAMMENTA, IBARBIT, IBENZO, ICOCAINE, IMARTHC, IOPIATES, IPHENCYC  24 Hour Urine for Protein:  No components found for: RAWUPRO, UHRS3, VLOG21PC, UTV3  24 Hour Urine for Creatinine Clearance:  No components found for: CREAT4, UHRS10, UTV10     Imaging:  CXR results:  EXAMINATION:    ONE XRAY VIEW OF THE CHEST    12/7/2020 8:12 am    COMPARISON:    11/28/2020    HISTORY:    Acute shortness of breath.     FINDINGS:    Sternotomy changes with prosthetic cardiac valves and left atrial appendage    clip. Charlett Kennedy remains enlarged.  Increased moderate left pleural effusion with    adjacent airspace disease.  Mild right basilar atelectasis.  No pneumothorax.         Impression    Increased moderate left pleural effusion. Assessment  1-ESRD-on an MWF schedule for IHD  PLAN:1. Plan next IHD 12/11    2-Hypokalemia in the setting of the ESRD and the IHD as well as the Short Gut Syndrome   Most recent K+ 3.1  PLAN:1. Supplementing with the IV K+\  2. Discussed with Pulm/CCC re: trial of Gattex-spoke with Clinical pharmacist-not formulary drug which will need approval  3. Follow K+    3-Lactic acidosis most probably a Type A Lactic Acidosis due to the hypotension and tissue hypoxia-concern however for an undiagnosed D-Lactic Acidosis due to the Short Gut Syndrome-with this possibly exacerbating the recurrent hypotension  Lactic Acid down to WNL  Continues to require Norepi 4mcg and Vasopressin 0.03units  PLAN:1. Await  D-Lactic Acid    4- Anemia in CKD-Hgb above goal hgb >10  PLAN:1. Start JEWELL as the hgB <10    5- Sec HPTH of Renal Origin with hypocalcemia in the setting of the hypoalbuminemia    Ionized Ca++ 1.22 WNL, PO4 3.0, Vit D 66, PTH 47-overly suppressed for ESRD  PLANL:1. Follow Ca++ and PO4    Thank you Dr. Jodi Garcia MD for allowing us to participate in care of Thai Leal  12:44 PM  12/10/2020

## 2020-12-10 NOTE — PROGRESS NOTES
Critical Care Team - Daily Progress Note      Date and time: 12/10/2020 3:17 PM  Patient's name:  Werner Ware Record Number: [id number]  Patient's account/billing number: [de-identified]  Patient's YOB: 1955  Age: 77 y.o. Date of Admission: 12/7/2020  7:50 AM  Length of stay during current admission: 3      Primary Care Physician: Daphne Huizar MD  ICU Attending Physician: Dr. Harriet Anderson Status: Prior    Reason for ICU admission: Critical care admission      SUBJECTIVE:   The patient is a 77 y.o. female with significant past medical history of ESRD on dialysis, hypotension on midodrine, hypothyroidism, polymyalgia rheumatica, YUNIOR, status post gastric bypass with short gut syndrome. She was sent to the emergency department from the dialysis center due to being hypotensive. She said she has had issues with hypotension in the past, but normally runs systolic blood pressures in 90s to 110s. She states that she took her dose of midodrine this morning, and was asked to take another dose of midodrine prior to her dialysis appointment, she was found to have a systolic blood pressure in the 70s prior to dialysis, but got worsening hypotension during dialysis, so the treatment was stopped about an hour in. In the emergency department she was found to have blood pressures as low as 55 systolic, and also had a potassium of 1.8. A triple-lumen catheter was placed her right Muhammad, and she was started on Levophed. She is currently on 12 mics, her blood pressures have improved from anywhere to 70s to lower 73D systolic. Is also found she had a lactic acidosis, and elevated white blood cell count, there is an underlying sepsis process suspected, blood cultures were drawn, and she was started on Vanco and Zosyn. Urinalysis was not able to be obtained as the patient is anuric.   She said the symptoms she was having include lightheadedness, dizziness, the lightheadedness was worse with standing, she tried to take her midodrine to resolve the lightheadedness, this did not help, the onset was today, the severity is moderate. She will be admitted to ICU for hypotension, and possible sepsis. OVERNIGHT EVENTS:         -Progressively higher vasopressor needs.   -Decreasing vasopressor needs, now almost off levo  12/10- No acute vents overnight, pressors still being weaned  AWAKE & FOLLOWING COMMANDS:  [] No   [x] Yes    CURRENT VENTILATION STATUS:     [] Ventilator  [] BIPAP  [] Nasal Cannula [x] Room Air      IF INTUBATED, ET TUBE MARKING AT LOWER LIP:       cms    SECRETIONS Amount:  [] Small [] Moderate  [] Large  [x] None  Color:     [] White [] Colored  [] Bloody    SEDATION:  RAAS Score:  [] Propofol gtt  [] Versed gtt  [] Ativan gtt   [x] No Sedation    PARALYZED:  [x] No    [] Yes    DIARRHEA:                [] No                [x] Yes  (C. Difficile status: [] positive                                                                                                                       [x] negative                                                                                                                     [] pending)    VASOPRESSORS:  [] No    [x] Yes    If yes -   [x] Levophed       [] Dopamine     [x] Vasopressin       [] Dobutamine  [] Phenylephrine         [] Epinephrine    CENTRAL LINES:     [] No   [] Yes   (Date of Insertion:   )           If yes -     [] Right IJ     [] Left IJ [x] Right Femoral [] Left Femoral                   [] Right Subclavian [] Left Subclavian       PORTILLO'S CATHETER:   [x] No   [] Yes  (Date of Insertion:   )     URINE OUTPUT:            [] Good   [] Low              [x] Anuric      OBJECTIVE:     VITAL SIGNS:  BP (!) 96/52   Pulse 81   Temp 98 °F (36.7 °C) (Axillary)   Resp (!) 33   Ht 5' 3\" (1.6 m)   Wt 182 lb 15.7 oz (83 kg)   SpO2 94%   BMI 32.41 kg/m²   Tmax over 24 hours:  Temp (24hrs), Av.9 °F (36.6 °C), Min:97.4 °F (36.3 °C), Max:98.4 °F (36.9 °C)      Patient Vitals for the past 6 hrs:   BP Temp Temp src Pulse Resp SpO2   12/10/20 1200 (!) 96/52 98 °F (36.7 °C) Axillary 81 (!) 33 94 %   12/10/20 1100 -- -- -- 79 14 95 %   12/10/20 1009 -- -- -- -- (!) 34 --   12/10/20 1000 -- -- -- 89 22 95 %         Intake/Output Summary (Last 24 hours) at 12/10/2020 1517  Last data filed at 12/10/2020 0600  Gross per 24 hour   Intake 2182.69 ml   Output --   Net 2182.69 ml     Wt Readings from Last 2 Encounters:   12/08/20 182 lb 15.7 oz (83 kg)   11/28/20 175 lb (79.4 kg)     Body mass index is 32.41 kg/m². PHYSICAL EXAMINATION:    General appearance - alert, well appearing, and in no distress and oriented to person, place, and time.    Mental status - alert, oriented to person, place, and time, normal mood, behavior, speech, dress, motor activity, and thought processes  Eyes - pupils equal and reactive, extraocular eye movements intact, sclera anicteric  Ears - external ear normal  Nose - normal and patent, no erythema, discharge or polyps  Mouth - mucous membranes moist, pharynx normal without lesions  Neck - supple, no significant adenopathy  Chest - clear to auscultation, no wheezes, rales or rhonchi, symmetric air entry  Heart -systolic heart murmur present,tachycardiac rate, normal rhythm, no rubs, gallops  Abdomen - soft, nontender, nondistended, no masses or organomegaly  Neurological - alert, oriented, normal speech, no focal findings or movement disorder noted  Extremities - peripheral pulses normal, right femoral TLC, left arm AV fistula, right radial arterial line  Skin - normal coloration, appears to have some bruises    Any additional physical findings:    MEDICATIONS:    Scheduled Meds:   psyllium  1 packet Oral BID    cholestyramine  1 packet Oral BID    [START ON 12/11/2020] epoetin izabella-epbx  3,000 Units Subcutaneous Once per day on Mon Wed Fri    And    [START ON 12/11/2020] epoetin izabella-epbx  2,000 Units Subcutaneous Once per day on Mon Wed Fri    pantoprazole  40 mg Oral QAM AC    midodrine  20 mg Oral TID WC    sodium chloride flush  10 mL Intravenous 2 times per day    aspirin  81 mg Oral Daily    atorvastatin  20 mg Oral Once per day on Mon Wed Fri    calcium carbonate  3 tablet Oral BID    fenofibrate  160 mg Oral Once per day on Mon Wed Fri    folic acid  1 mg Oral Daily    levothyroxine  137 mcg Oral Daily    liothyronine  5 mcg Oral Daily    magnesium oxide  400 mg Oral Nightly    melatonin  15 mg Oral Nightly    montelukast  10 mg Oral Daily    pregabalin  50 mg Oral TID    sodium bicarbonate  650 mg Oral BID    heparin (porcine)  5,000 Units Subcutaneous Q8H     Continuous Infusions:   sodium chloride 100 mL/hr at 12/10/20 0314    norepinephrine 3 mcg/min (12/10/20 0600)    vasopressin (Septic Shock) infusion 0.03 Units/min (12/10/20 0314)     PRN Meds:   menthol-zinc oxide, , BID PRN  acetaminophen, 650 mg, Q4H PRN  sodium chloride flush, 10 mL, PRN  albuterol, 0.63 mg, 4x Daily PRN  rOPINIRole, 0.5 mg, Nightly PRN          VENT SETTINGS (Comprehensive) (if applicable):  Vent Information  SpO2: 94 %  Additional Respiratory  Assessments  Pulse: 81  Resp: (!) 33  SpO2: 94 %  Oral Care: Mouth swabbed, Mouth moisturizer, Mouth suctioned, Suction toothette        Laboratory findings:    Complete Blood Count:   Recent Labs     12/08/20  0400 12/09/20  0500 12/10/20  0545   WBC 28.9* 17.3* 15.2*   HGB 11.0* 9.2* 8.9*   HCT 33.6* 27.3* 27.6*    158 131        Last 3 Blood Glucose:   Recent Labs     12/09/20  2100 12/10/20  0545 12/10/20  1205   GLUCOSE 148* 103* 115*        PT/INR:    Lab Results   Component Value Date    PROTIME 15.1 10/21/2020    INR 1.3 10/21/2020     PTT:    Lab Results   Component Value Date    APTT 36.6 06/02/2018       Comprehensive Metabolic Profile:   Recent Labs     12/09/20  2100 12/10/20  0545 12/10/20  1205    137 136   K 3.3* 3.1*  3.1* 3.7    106 108*   CO2 22 23 20* BUN 13 14 15   CREATININE 4.1* 4.2* 4.3*   GLUCOSE 148* 103* 115*   CALCIUM 7.9* 7.8* 8.0*   PROT  --  5.7*  --    LABALBU  --  1.4*  --    BILITOT  --  0.9  --    ALKPHOS  --  177*  --    AST  --  33*  --    ALT  --  26  --       Magnesium:   Lab Results   Component Value Date    MG 1.9 12/10/2020     Phosphorus:   Lab Results   Component Value Date    PHOS 3.0 12/10/2020     Ionized Calcium:   Lab Results   Component Value Date    CAION 1.22 12/10/2020        Urinalysis:     Troponin:   No results for input(s): TROPONINI in the last 72 hours. Microbiology:    Cultures during this admission:     Blood cultures:   Pending              [] None drawn      [] Negative             []  Positive (Details:  )  Urine Culture:                   [x] None drawn      [] Negative             []  Positive (Details:  )  Sputum Culture:               [x] None drawn       [] Negative             []  Positive (Details:  )   Endotracheal aspirate:     [x] None drawn       [] Negative             []  Positive (Details:  )     Other pertinent Labs:       Radiology/Imaging:   Xr Chest Portable    Result Date: 12/7/2020  EXAMINATION: ONE XRAY VIEW OF THE CHEST 12/7/2020 8:12 am COMPARISON: 11/28/2020 HISTORY: Acute shortness of breath. FINDINGS: Sternotomy changes with prosthetic cardiac valves and left atrial appendage clip. Heart remains enlarged. Increased moderate left pleural effusion with adjacent airspace disease. Mild right basilar atelectasis. No pneumothorax. Increased moderate left pleural effusion. Xr Chest Portable    Result Date: 11/28/2020  EXAMINATION: ONE XRAY VIEW OF THE CHEST 11/28/2020 6:07 pm COMPARISON: Chest series from October 21, 2020. HISTORY: ORDERING SYSTEM PROVIDED HISTORY: sob TECHNOLOGIST PROVIDED HISTORY: Reason for exam:->sob FINDINGS: Postsurgical changes overlie the mediastinum. Lung hyperinflation and coarse interstitial markings.   Elevation of the left hemidiaphragm versus loculated left pleural effusion is unchanged. Small right pleural effusion or scarring. No new airspace disease. No pneumothorax. Atherosclerotic disease in the aortic arch. Cardiac silhouette is enlarged. Normal pulmonary vascularity. Osseous and thoracic soft tissue structures demonstrate no acute findings. 1.  Stable lung hyperinflation and coarse interstitial markings. Stable left greater than right pleural effusions or potentially scarring. No new pulmonary pathology. 2.  Atherosclerotic disease and cardiomegaly. Postsurgical changes.   Normal pulmonary vascularity on this exam       Chest Xray (12/10/2020):    ASSESSMENT:     Patient Active Problem List    Diagnosis Date Noted    Hypokalemia 12/07/2020    Dizziness     PAT (paroxysmal atrial tachycardia) (HCC)     Hypotension 10/21/2020    Thrombocytopenia ([address] 06/02/2018    Hematoma 06/02/2018    Traumatic hematoma of left upper arm 86/87/0736    Complication of AV dialysis fistula, initial encounter 06/02/2018    ESRD on dialysis ([address] 03/07/2018    Problem with dialysis access ([address] 01/17/2018    Hypervolemia     Acute on chronic diastolic congestive heart failure (HCC)     History of mitral valve repair     History of tricuspid valve repair     MANUEL (acute kidney injury) ([address]     Chronic renal failure, stage 5 ([address]     Respiratory arrest ([address]     Non-rheumatic mitral regurgitation 02/09/2017    (HFpEF) heart failure with preserved ejection fraction ([address] 01/25/2017    Cellulitis of right lower extremity 01/31/2016    Exacerbation of asthma 01/06/2016    Renal failure (ARF), acute on chronic ([address] 01/06/2016    HTN (hypertension) 01/06/2016    YUNIOR on CPAP 01/06/2016    Hypomagnesemia 01/06/2016    Hypocalcemia 01/06/2016    Hypothyroid 01/06/2016       Additional assessment:    ·         PLAN:     WEAN PER PROTOCOL:  [] No   [] Yes  [x] N/A    DISCONTINUE ANY LABS:   [x] No   [] Yes    ICU PROPHYLAXIS:  Stress ulcer:  [x] PPI Agent  [] [id number] [] Sucralfate  [] Other:  VTE:   [] Enoxaparin  [x] Unfract. Heparin Subcut  [] EPC Cuffs    NUTRITION:  [] NPO [] Tube Feeding (Specify: ) [] TPN  [x] PO (Diet: Dietary Nutrition Supplements: Protein Modular  Dietary Nutrition Supplements: Low Calorie High Protein Supplement  DIET GENERAL;)    HOME MEDICATIONS RECONCILED: [] No  [x] Yes    INSULIN DRIP:   [x] No   [] Yes    CONSULTATION NEEDED:  [x] No   [] Yes    FAMILY UPDATED:    [x] No   [] Yes    TRANSFER OUT OF ICU:   [x] No   [] Yes    ADDITIONAL PLAN:    SYSTEMS ASSESSMENT     Neuro   Lightheadedness-likely related to hypotension, and hypovolemia  Resume home Lyrica, ropinirole  Respiratory   Wheeziness-patient has a history of asthma, continue home Singulair, albuterol  YUNIOR-CPAP at night  Cardiovascular   History of diastolic heart failure-monitor fluid status, continue home aspirin     Hypotension-likely related to hypovolemia versus sepsis, titrate levo as needed, continue to replete intravascular volume, restart home midodrine     Resume home Lipitor, fenofibrate  Gastrointestinal   Short gut syndrome-monitor, may require stool thickening agent as this may be the source of hypovolemia, FMS placed 12/10 for frequent stooling  GI prophylaxis-Protonix     Renal   ESRD on dialysis-Monday Wednesday Friday dialysis, will need to improve blood pressure before resuming  Hypokalemia-likely related to short gut syndrome versus early termination of dialysis today, receiving 80 mEq in the ER, monitor electrolytes every 4 hours      Infectious Disease   Possible sepsis-lactic acidemia, elevated white blood cell count, blood cultures drawn, empiric antibiotics given-Hieu Thorpe     Hematology/Oncology   DVT prophylaxis-Heparin  Chronic anemia-resume home darbepoetin, or possible substitute     Endocrine   Resume home Synthroid     Social/Spiritual/DNR/Other   Full code     ASSESSMENT/ PLAN   1.  Hypotension improving, hydration as needed  2. Sepsis-follow blood cultures, empiric Vanco and Zosyn given-hold on antibiotics until source identified or vitals change  3. Lactic Acidosis- resolving, may have been related to hypovolemia, continue to monitor fluid status  4. Hypokalemia- given 160 meq total, up to 3.1 giving another 20 today  5. Down to 3 mics of levo, 0.03 of vasopressin, continue to wean and transition to midodrine  6. Resumed home meds  7. Work up for Type D lactic acidosis, C. difficile negative, will give stool bulking agent and start on Imodium tomorrow  8. FMS placed for frequent stooling     Sunshine Zhang MD PGY-1  12/10/2020 3:17 PM    Critical Care Attending Addendum:    Patient seen and examined with the house staff. X-rays personally reviewed through the PACS. Additional findings listed below as necessary. Additional comments:  1. Lactic acidosis is fluctuating likely from vol. Status  2. Hypotension likely still due to non stop stooling  3. Diarrhea place FMS, start psyllium and choleystramine. 4. Left pleural effusion asymptomatic.   5.  D/W Dr. Sonia Cross

## 2020-12-10 NOTE — PROGRESS NOTES
Internal Medicine Progress Note      Synopsis: Patient admitted on 12/7/2020    Subjective    Feeling slightly better today. Pressors are less and hoping to be weaned off today. Still with lots of frequent stools    December 10, 2020  Still on pressors though less so. Recovering better and better  Feeling short of breath and swollen and weak but does not want to consider discharge to any other placement home  Exam:  BP (!) 96/52   Pulse 81   Temp 98 °F (36.7 °C) (Axillary)   Resp (!) 33   Ht 5' 3\" (1.6 m)   Wt 182 lb 15.7 oz (83 kg)   SpO2 94%   BMI 32.41 kg/m²   General appearance: No apparent distress, appears stated age and cooperative. HEENT: Pupils equal, round, and reactive to light. Conjunctivae/corneas clear. Neck: Supple. No jugular venous distention. Trachea midline. Respiratory:  Normal respiratory effort. Clear to auscultation, bilaterally without Rales/Wheezes/Rhonchi. Cardiovascular: Regular rate and rhythm with normal S1/S2 without murmurs, rubs or gallops. Abdomen: Soft, non-tender, non-distended with normal bowel sounds. Musculoskeletal: No clubbing, cyanosis 2 plus edema . Brisk capillary refill. 2+ lower extremity pulses (dorsalis pedis).    Skin:  No rashes    Neurologic: awake, alert and following commands     Medications:  Reviewed    Infusion Medications    sodium chloride 100 mL/hr at 12/10/20 0314    norepinephrine 3 mcg/min (12/10/20 0600)    vasopressin (Septic Shock) infusion 0.03 Units/min (12/10/20 0314)     Scheduled Medications    psyllium  1 packet Oral BID    cholestyramine  1 packet Oral BID    [START ON 12/11/2020] epoetin izabella-epbx  3,000 Units Subcutaneous Once per day on Mon Wed Fri    And    [START ON 12/11/2020] epoetin iazbella-epbx  2,000 Units Subcutaneous Once per day on Mon Wed Fri    pantoprazole  40 mg Oral QAM AC    midodrine  20 mg Oral TID WC    sodium chloride flush  10 mL Intravenous 2 times per day    aspirin  81 mg Oral Daily    atorvastatin  20 mg Oral Once per day on Mon Wed Fri    calcium carbonate  3 tablet Oral BID    fenofibrate  160 mg Oral Once per day on Mon Wed Fri    folic acid  1 mg Oral Daily    levothyroxine  137 mcg Oral Daily    liothyronine  5 mcg Oral Daily    magnesium oxide  400 mg Oral Nightly    melatonin  15 mg Oral Nightly    montelukast  10 mg Oral Daily    pregabalin  50 mg Oral TID    sodium bicarbonate  650 mg Oral BID    heparin (porcine)  5,000 Units Subcutaneous Q8H     PRN Meds: menthol-zinc oxide, acetaminophen, sodium chloride flush, albuterol, rOPINIRole    I/O    Intake/Output Summary (Last 24 hours) at 12/10/2020 1849  Last data filed at 12/10/2020 0600  Gross per 24 hour   Intake 2182.69 ml   Output --   Net 2182.69 ml       Labs:   Recent Labs     12/08/20  0400 12/09/20  0500 12/10/20  0545   WBC 28.9* 17.3* 15.2*   HGB 11.0* 9.2* 8.9*   HCT 33.6* 27.3* 27.6*    158 131       Recent Labs     12/08/20  0400  12/09/20  0500  12/09/20  2100 12/10/20  0545 12/10/20  1205      < > 136   < > 135 137 136   K 2.7*   < > 3.3*   < > 3.3* 3.1*  3.1* 3.7   CL 97*   < > 103   < > 106 106 108*   CO2 19*   < > 24   < > 22 23 20*   BUN 13   < > 11   < > 13 14 15   CREATININE 4.9*   < > 3.8*   < > 4.1* 4.2* 4.3*   CALCIUM 8.4*   < > 7.9*   < > 7.9* 7.8* 8.0*   PHOS 3.6  --  2.7  --   --  3.0  --     < > = values in this interval not displayed. Recent Labs     12/08/20  0400 12/09/20  0500 12/10/20  0545   PROT 6.7 5.7* 5.7*   ALKPHOS 215* 181* 177*   ALT 31 27 26   AST 42* 35* 33*   BILITOT 1.3* 0.8 0.9       No results for input(s): INR in the last 72 hours. No results for input(s): Tiana Vivar in the last 72 hours.     Chronic labs:  Lab Results   Component Value Date    CHOL 96 02/27/2020    TRIG 131 02/27/2020    HDL 36 02/27/2020    LDLCALC 34 02/27/2020    TSH 4.260 (H) 12/07/2020    INR 1.3 10/21/2020    LABA1C <4.0 12/30/2019     Results for Ariana Mcbride (MRN [id number]) as of 12/9/2020 10:51   Ref. Range 12/8/2020 07:50 12/8/2020 20:00 12/9/2020 00:55 12/9/2020 05:00 12/9/2020 08:40   Lactic Acid Latest Ref Range: 0.5 - 2.2 mmol/L 4.6 (HH) 3.8 (H) 3.2 (H) 2.5 (H) 2.6 (H)     Radiology:  Xr Chest Portable    Result Date: 12/7/2020  EXAMINATION: ONE XRAY VIEW OF THE CHEST 12/7/2020 8:12 am COMPARISON: 11/28/2020 HISTORY: Acute shortness of breath. FINDINGS: Sternotomy changes with prosthetic cardiac valves and left atrial appendage clip. Heart remains enlarged. Increased moderate left pleural effusion with adjacent airspace disease. Mild right basilar atelectasis. No pneumothorax. Increased moderate left pleural effusion. Xr Chest Portable    Result Date: 11/28/2020  EXAMINATION: ONE XRAY VIEW OF THE CHEST 11/28/2020 6:07 pm COMPARISON: Chest series from October 21, 2020. HISTORY: ORDERING SYSTEM PROVIDED HISTORY: sob TECHNOLOGIST PROVIDED HISTORY: Reason for exam:->sob FINDINGS: Postsurgical changes overlie the mediastinum. Lung hyperinflation and coarse interstitial markings. Elevation of the left hemidiaphragm versus loculated left pleural effusion is unchanged. Small right pleural effusion or scarring. No new airspace disease. No pneumothorax. Atherosclerotic disease in the aortic arch. Cardiac silhouette is enlarged. Normal pulmonary vascularity. Osseous and thoracic soft tissue structures demonstrate no acute findings. 1.  Stable lung hyperinflation and coarse interstitial markings. Stable left greater than right pleural effusions or potentially scarring. No new pulmonary pathology. 2.  Atherosclerotic disease and cardiomegaly. Postsurgical changes. Normal pulmonary vascularity on this exam    ASSESSMENT:    Active Problems:    Hypotension    Hypokalemia  Resolved Problems:    * No resolved hospital problems.  *  Lactic acidosis  Hemodialysis dependence  Chronic pain  Restless legs  Anemia  Asthma  Hypothyroidism     PLAN:    Appreciate input of renal services  Replace potassium  Remains on pressors  Still acidotic. D lactic acid being looked for. No results yet    December 9, 2020  Await decrease of pressors and her toleration of that  The lactic acid levels are pending and will need to be waited on  She is tired but otherwise mentally well  Less and lowered lactic acid level today    December 10, 2020  Mildly acidotic still. Continue to wean pressors. Dialysis per renal services. Resumption of premorbid medications. Midodrine increase as tolerated. Fluid losses from GI tract still considered being likely the source of her profound hypotension  C. difficile negative  Diet: Dietary Nutrition Supplements: Protein Modular  Dietary Nutrition Supplements: Low Calorie High Protein Supplement  DIET GENERAL;  Code Status: Prior  PT/OT Eval Status:   Once off of pressors  DVT Prophylaxis:    In place   recommended disposition at discharge:   Likely home  +++++++++++++++++++++++++++++++++++++++++++++++++  Chris Landry MD   [facility].  +++++++++++++++++++++++++++++++++++++++++++++++++  NOTE: This report was transcribed using voice recognition software. Every effort was made to ensure accuracy; however, inadvertent computerized transcription errors may be present.

## 2020-12-11 NOTE — FLOWSHEET NOTE
12/11/20 1130   Vital Signs   BP - Standing 97/60   Pulse 88   Resp 15   Weight 200 lb 2.8 oz (90.8 kg)   Weight Method Bed scale   Percent Weight Change 9.4   Pain Assessment   Pain Assessment 0-10   Pain Level 0   Post-Hemodialysis Assessment   Machine Disinfection Process Exterior Machine Disinfection   Rinseback Volume (ml) 300 ml   Total Liters Processed (l/min) 51.2 l/min   Dialyzer Clearance Lightly streaked   Duration of Treatment (minutes) 180 minutes   Hemodialysis Intake (ml) 500 ml   Hemodialysis Output (ml) 500 ml   NET Removed (ml) 0 ml   Tolerated Treatment Good   Bilateral Breath Sounds Diminished   Edema Right lower extremity; Left lower extremity   RLE Edema +1   LLE Edema +1   Pt hypotensive first hr of tx, levophed restarted and BP improved. Pt to run in A profile per CRIT per order. Fluid returned when  min amount UF showed C profile on CRIT.  Pt wo c/o. (+)B/T. stable at d/c

## 2020-12-11 NOTE — FLOWSHEET NOTE
Intensive Care Daily Quality Rounding Checklist        ICU Team Members: Dr. Delonte Royal, residents, bedside RN.  Clinical pharmacist, TL     ICU Day #: 5     Intubation Date: N/A     Ventilator Day #: N/A     Central Line Insertion Date: 12/7                                                    Day #: 5      Arterial Line Insertion Date: 12/8/2020                             Day #: 4     DVT Prophylaxis: heparin    GI Prophylaxis: protonix     Morse Catheter Insertion Date: anuric                                        Day #:                              Continued need (if yes, reason documented and discussed with physician):      Skin Issues/ Wounds and ordered treatment discussed on rounds: no skin issues     Goals/ Plans for the Day: wean pressors, transfer when off

## 2020-12-11 NOTE — CARE COORDINATION
Still requiring iv pressors. On [address] [facility] home w/ , son, and daughter PTA. Has [employer], cane. Has home CPAP through [facility].  Visiting Nurses [facility] and Trang ZHANG. Hemodialysis patient [place] [facility] [address].  provides transportation to dialysis. Plan remains to return home on discharge-will need PT/OT evals when when off pressor to assist w/ discharge planning. Referral made to [facility]- awaiting acceptance- will need order on discharge.   Will follow Tanner Marion

## 2020-12-11 NOTE — PROGRESS NOTES
Internal Medicine Progress Note      Synopsis: Patient admitted on 12/7/2020    Subjective    Feeling slightly better today. Pressors are less and hoping to be weaned off today. Still with lots of frequent stools    December 10, 2020  Still on pressors though less so. Recovering better and better  Feeling short of breath and swollen and weak but does not want to consider discharge to any other placement home  December 11, 2020  Now with fecal management system. Pressors were weaned off and her blood pressure dropped. Almost systolic of 50. She was symptomatically lightheaded and felt weak  Exam:  BP (!) 91/50   Pulse 88   Temp 98 °F (36.7 °C) (Oral)   Resp 15   Ht 5' 3\" (1.6 m)   Wt 200 lb 2.8 oz (90.8 kg)   SpO2 94%   BMI 35.46 kg/m²   General appearance: No apparent distress, appears stated age and cooperative. HEENT: Pupils equal, round, and reactive to light. Conjunctivae/corneas clear. Neck: Supple. No jugular venous distention. Trachea midline. Respiratory:  Normal respiratory effort. Clear to auscultation, bilaterally without Rales/Wheezes/Rhonchi. Cardiovascular: Regular rate and rhythm with normal S1/S2 without murmurs, rubs or gallops. Abdomen: Soft, non-tender, non-distended with normal bowel sounds. Musculoskeletal: No clubbing, cyanosis 2 plus edema bilaterally. Brisk capillary refill. 2+ lower extremity pulses (dorsalis pedis).    Skin:  No rashes    Neurologic: awake, alert and following commands     Medications:  Reviewed    Infusion Medications    sodium chloride 150 mL/hr at 12/11/20 1641    norepinephrine Stopped (12/11/20 0600)    vasopressin (Septic Shock) infusion 0.03 Units/min (12/11/20 0947)     Scheduled Medications    pregabalin  25 mg Oral BID    psyllium  1 packet Oral BID    cholestyramine  1 packet Oral BID    epoetin izabella-epbx  3,000 Units Subcutaneous Once per day on Mon Wed Fri    And    epoetin izabella-epbx  2,000 Units Subcutaneous Once per day on Mon Wed Fri    sodium bicarbonate  1,300 mg Oral TID    pantoprazole  40 mg Oral QAM AC    midodrine  20 mg Oral TID WC    sodium chloride flush  10 mL Intravenous 2 times per day    aspirin  81 mg Oral Daily    atorvastatin  20 mg Oral Once per day on Mon Wed Fri    calcium carbonate  3 tablet Oral BID    fenofibrate  160 mg Oral Once per day on Mon Wed Fri    folic acid  1 mg Oral Daily    levothyroxine  137 mcg Oral Daily    liothyronine  5 mcg Oral Daily    magnesium oxide  400 mg Oral Nightly    melatonin  15 mg Oral Nightly    montelukast  10 mg Oral Daily    heparin (porcine)  5,000 Units Subcutaneous Q8H     PRN Meds: artificial tears, phenol, menthol-zinc oxide, acetaminophen, sodium chloride flush, albuterol, rOPINIRole    I/O    Intake/Output Summary (Last 24 hours) at 12/11/2020 1758  Last data filed at 12/11/2020 1130  Gross per 24 hour   Intake 4465 ml   Output 500 ml   Net 3965 ml       Labs:   Recent Labs     12/09/20  0500 12/10/20  0545 12/11/20  0600   WBC 17.3* 15.2* 16.5*   HGB 9.2* 8.9* 8.9*   HCT 27.3* 27.6* 28.4*    131 116*       Recent Labs     12/09/20  0500  12/10/20  0545  12/11/20  0130 12/11/20  0600 12/11/20  1205      < > 137   < > 136 137 134   K 3.3*   < > 3.1*  3.1*   < > 3.8 4.1  4.1 3.7      < > 106   < > 107 110* 103   CO2 24   < > 23   < > 20* 20* 22   BUN 11   < > 14   < > 16 17 9   CREATININE 3.8*   < > 4.2*   < > 4.5* 4.5* 2.7*   CALCIUM 7.9*   < > 7.8*   < > 7.9* 7.9* 7.4*   PHOS 2.7  --  3.0  --   --  3.2  --     < > = values in this interval not displayed. Recent Labs     12/09/20  0500 12/10/20  0545 12/11/20  0600   PROT 5.7* 5.7* 5.8*   ALKPHOS 181* 177* 182*   ALT 27 26 29   AST 35* 33* 33*   BILITOT 0.8 0.9 1.0       No results for input(s): INR in the last 72 hours. No results for input(s): Giovanni Seed in the last 72 hours.     Chronic labs:  Lab Results   Component Value Date    CHOL 96 02/27/2020    TRIG 131 02/27/2020    HDL 36 02/27/2020    LDLCALC 34 02/27/2020    TSH 4.260 (H) 12/07/2020    INR 1.3 10/21/2020    LABA1C <4.0 12/30/2019     Results for Royal Luevano (MRN [id number]) as of 12/9/2020 10:51   Ref. Range 12/8/2020 07:50 12/8/2020 20:00 12/9/2020 00:55 12/9/2020 05:00 12/9/2020 08:40   Lactic Acid Latest Ref Range: 0.5 - 2.2 mmol/L 4.6 (HH) 3.8 (H) 3.2 (H) 2.5 (H) 2.6 (H)     Radiology:  Xr Chest Portable    Result Date: 12/7/2020  EXAMINATION: ONE XRAY VIEW OF THE CHEST 12/7/2020 8:12 am COMPARISON: 11/28/2020 HISTORY: Acute shortness of breath. FINDINGS: Sternotomy changes with prosthetic cardiac valves and left atrial appendage clip. Heart remains enlarged. Increased moderate left pleural effusion with adjacent airspace disease. Mild right basilar atelectasis. No pneumothorax. Increased moderate left pleural effusion. Xr Chest Portable    Result Date: 11/28/2020  EXAMINATION: ONE XRAY VIEW OF THE CHEST 11/28/2020 6:07 pm COMPARISON: Chest series from October 21, 2020. HISTORY: ORDERING SYSTEM PROVIDED HISTORY: sob TECHNOLOGIST PROVIDED HISTORY: Reason for exam:->sob FINDINGS: Postsurgical changes overlie the mediastinum. Lung hyperinflation and coarse interstitial markings. Elevation of the left hemidiaphragm versus loculated left pleural effusion is unchanged. Small right pleural effusion or scarring. No new airspace disease. No pneumothorax. Atherosclerotic disease in the aortic arch. Cardiac silhouette is enlarged. Normal pulmonary vascularity. Osseous and thoracic soft tissue structures demonstrate no acute findings. 1.  Stable lung hyperinflation and coarse interstitial markings. Stable left greater than right pleural effusions or potentially scarring. No new pulmonary pathology. 2.  Atherosclerotic disease and cardiomegaly. Postsurgical changes.   Normal pulmonary vascularity on this exam    ASSESSMENT:    Active Problems:    Hypotension    Hypokalemia  Resolved Problems: * No resolved hospital problems. *  Lactic acidosis  Hemodialysis dependence  Chronic pain  Restless legs  Anemia  Asthma  Hypothyroidism     PLAN:    Appreciate input of renal services  Replace potassium  Remains on pressors  Still acidotic. D lactic acid being looked for. No results yet    December 9, 2020  Await decrease of pressors and her toleration of that  The lactic acid levels are pending and will need to be waited on  She is tired but otherwise mentally well  Less and lowered lactic acid level today    December 10, 2020  Mildly acidotic still. Continue to wean pressors. Dialysis per renal services. Resumption of premorbid medications. Midodrine increase as tolerated. Fluid losses from GI tract still considered being likely the source of her profound hypotension  C. difficile negative    December 11, 2020  Continue to provide pressor support. Patient still in the ICU. Await work-up for the lactic acidosis  Cholestyramine started for loose stools but doubt that it has been effective. We can request GI services to see if they can give us a new thought process  Continue to follow labs  Lower dose of Lyrica to avoid hypotension from that    Diet: Dietary Nutrition Supplements: Protein Modular  Dietary Nutrition Supplements: Low Calorie High Protein Supplement  DIET GENERAL;  Code Status: Prior  PT/OT Eval Status:   Once off of pressors  DVT Prophylaxis:    In place   recommended disposition at discharge:   Likely home  +++++++++++++++++++++++++++++++++++++++++++++++++  Michaeline Kawasaki, MD   [facility].  +++++++++++++++++++++++++++++++++++++++++++++++++  NOTE: This report was transcribed using voice recognition software. Every effort was made to ensure accuracy; however, inadvertent computerized transcription errors may be present.

## 2020-12-11 NOTE — PROGRESS NOTES
Critical Care Team - Daily Progress Note      Date and time: 12/11/2020 2:54 PM  Patient's name:  Werner Ware Record Number: [id number]  Patient's account/billing number: [de-identified]  Patient's YOB: 1955  Age: 77 y.o. Date of Admission: 12/7/2020  7:50 AM  Length of stay during current admission: 4      Primary Care Physician: Lea Taylor MD  ICU Attending Physician: Dr. Dayna Gould Status: Prior    Reason for ICU admission: Critical care admission      SUBJECTIVE:   The patient is a 77 y.o. female with significant past medical history of ESRD on dialysis, hypotension on midodrine, hypothyroidism, polymyalgia rheumatica, YUNIOR, status post gastric bypass with short gut syndrome. She was sent to the emergency department from the dialysis center due to being hypotensive. She said she has had issues with hypotension in the past, but normally runs systolic blood pressures in 90s to 110s. She states that she took her dose of midodrine this morning, and was asked to take another dose of midodrine prior to her dialysis appointment, she was found to have a systolic blood pressure in the 70s prior to dialysis, but got worsening hypotension during dialysis, so the treatment was stopped about an hour in. In the emergency department she was found to have blood pressures as low as 55 systolic, and also had a potassium of 1.8. A triple-lumen catheter was placed her right Muhammad, and she was started on Levophed. She is currently on 12 mics, her blood pressures have improved from anywhere to 70s to lower 59E systolic. Is also found she had a lactic acidosis, and elevated white blood cell count, there is an underlying sepsis process suspected, blood cultures were drawn, and she was started on Vanco and Zosyn. Urinalysis was not able to be obtained as the patient is anuric.   She said the symptoms she was having include lightheadedness, dizziness, the lightheadedness was worse with standing, she tried to take her midodrine to resolve the lightheadedness, this did not help, the onset was today, the severity is moderate. She will be admitted to ICU for hypotension, and possible sepsis. OVERNIGHT EVENTS:         -Progressively higher vasopressor needs.   -Decreasing vasopressor needs, now almost off levo  12/10- No acute vents overnight, pressors still being weaned  -off Levophed, vasopressin running at .03  AWAKE & FOLLOWING COMMANDS:  [] No   [x] Yes    CURRENT VENTILATION STATUS:     [] Ventilator  [] BIPAP  [] Nasal Cannula [x] Room Air      IF INTUBATED, ET TUBE MARKING AT LOWER LIP:       cms    SECRETIONS Amount:  [] Small [] Moderate  [] Large  [x] None  Color:     [] White [] Colored  [] Bloody    SEDATION:  RAAS Score:  [] Propofol gtt  [] Versed gtt  [] Ativan gtt   [x] No Sedation    PARALYZED:  [x] No    [] Yes    DIARRHEA:                [] No                [x] Yes  (C. Difficile status: [] positive                                                                                                                       [x] negative                                                                                                                     [] pending)    VASOPRESSORS:  [] No    [x] Yes    If yes -   [x] Levophed       [] Dopamine     [x] Vasopressin       [] Dobutamine  [] Phenylephrine         [] Epinephrine    CENTRAL LINES:     [] No   [] Yes   (Date of Insertion:   )           If yes -     [] Right IJ     [] Left IJ [x] Right Femoral [] Left Femoral                   [] Right Subclavian [] Left Subclavian       PORTILLO'S CATHETER:   [x] No   [] Yes  (Date of Insertion:   )     URINE OUTPUT:            [] Good   [] Low              [x] Anuric      OBJECTIVE:     VITAL SIGNS:  BP (!) 91/50   Pulse 88   Temp 98 °F (36.7 °C) (Oral)   Resp 15   Ht 5' 3\" (1.6 m)   Wt 200 lb 2.8 oz (90.8 kg)   SpO2 94%   BMI 35.46 kg/m²   Tmax over 24 hours:  Temp (24hrs), Av.1 °F (36.7 °C), Min:97.8 °F (36.6 °C), Max:98.4 °F (36.9 °C)      Patient Vitals for the past 6 hrs:   Pulse Resp Height Weight   12/11/20 1327 -- -- 5' 3\" (1.6 m) --   12/11/20 1130 88 15 -- 200 lb 2.8 oz (90.8 kg)   12/11/20 1120 85 13 -- --   12/11/20 1100 84 -- -- --   12/11/20 1045 82 -- -- --   12/11/20 1031 83 -- -- --   12/11/20 1015 80 -- -- --   12/11/20 1000 78 -- -- --   12/11/20 0945 80 -- -- --   12/11/20 0930 79 -- -- --   12/11/20 0915 87 -- -- --   12/11/20 0900 77 -- -- --         Intake/Output Summary (Last 24 hours) at 12/11/2020 1454  Last data filed at 12/11/2020 1130  Gross per 24 hour   Intake 4465 ml   Output 500 ml   Net 3965 ml     Wt Readings from Last 2 Encounters:   12/11/20 200 lb 2.8 oz (90.8 kg)   11/28/20 175 lb (79.4 kg)     Body mass index is 35.46 kg/m². PHYSICAL EXAMINATION:    General appearance - alert, well appearing, and in no distress and oriented to person, place, and time.    Mental status - alert, oriented to person, place, and time, normal mood, behavior, speech, dress, motor activity, and thought processes  Eyes - pupils equal and reactive, extraocular eye movements intact, sclera anicteric  Ears - external ear normal  Nose - normal and patent, no erythema, discharge or polyps  Mouth - mucous membranes moist, pharynx normal without lesions  Neck - supple, no significant adenopathy  Chest - clear to auscultation, no wheezes, rales or rhonchi, symmetric air entry  Heart -systolic heart murmur present,tachycardiac rate, normal rhythm, no rubs, gallops  Abdomen - soft, nontender, nondistended, no masses or organomegaly  Neurological - alert, oriented, normal speech, no focal findings or movement disorder noted  Extremities - peripheral pulses normal, right femoral TLC, left arm AV fistula, right radial arterial line  Skin - normal coloration, appears to have some bruises    Any additional physical findings:    MEDICATIONS:    Scheduled Meds:   psyllium  1 packet Oral BID    cholestyramine  1 packet Oral BID    epoetin izabella-epbx  3,000 Units Subcutaneous Once per day on Mon Wed Fri    And    epoetin izabella-epbx  2,000 Units Subcutaneous Once per day on Mon Wed Fri    sodium bicarbonate  1,300 mg Oral TID    pantoprazole  40 mg Oral QAM AC    midodrine  20 mg Oral TID WC    sodium chloride flush  10 mL Intravenous 2 times per day    aspirin  81 mg Oral Daily    atorvastatin  20 mg Oral Once per day on Mon Wed Fri    calcium carbonate  3 tablet Oral BID    fenofibrate  160 mg Oral Once per day on Mon Wed Fri    folic acid  1 mg Oral Daily    levothyroxine  137 mcg Oral Daily    liothyronine  5 mcg Oral Daily    magnesium oxide  400 mg Oral Nightly    melatonin  15 mg Oral Nightly    montelukast  10 mg Oral Daily    pregabalin  50 mg Oral TID    heparin (porcine)  5,000 Units Subcutaneous Q8H     Continuous Infusions:   sodium chloride 100 mL/hr at 12/11/20 1358    norepinephrine Stopped (12/11/20 0600)    vasopressin (Septic Shock) infusion 0.03 Units/min (12/11/20 0947)     PRN Meds:   artificial tears, , PRN  phenol, 1 spray, Q2H PRN  menthol-zinc oxide, , BID PRN  acetaminophen, 650 mg, Q4H PRN  sodium chloride flush, 10 mL, PRN  albuterol, 0.63 mg, 4x Daily PRN  rOPINIRole, 0.5 mg, Nightly PRN          VENT SETTINGS (Comprehensive) (if applicable):  Vent Information  Skin Assessment: Clean, dry, & intact  SpO2: 94 %  Additional Respiratory  Assessments  Pulse: 88  Resp: 15  SpO2: 94 %  Oral Care: Mouth swabbed, Mouth moisturizer, Mouth suctioned, Suction toothette        Laboratory findings:    Complete Blood Count:   Recent Labs     12/09/20  0500 12/10/20  0545 12/11/20  0600   WBC 17.3* 15.2* 16.5*   HGB 9.2* 8.9* 8.9*   HCT 27.3* 27.6* 28.4*    131 116*        Last 3 Blood Glucose:   Recent Labs     12/11/20  0130 12/11/20  0600 12/11/20  1205   GLUCOSE 109* 110* 134*        PT/INR:    Lab Results   Component Value Date    PROTIME 15.1 10/21/2020    INR HISTORY: ORDERING SYSTEM PROVIDED HISTORY: sob TECHNOLOGIST PROVIDED HISTORY: Reason for exam:->sob FINDINGS: Postsurgical changes overlie the mediastinum. Lung hyperinflation and coarse interstitial markings. Elevation of the left hemidiaphragm versus loculated left pleural effusion is unchanged. Small right pleural effusion or scarring. No new airspace disease. No pneumothorax. Atherosclerotic disease in the aortic arch. Cardiac silhouette is enlarged. Normal pulmonary vascularity. Osseous and thoracic soft tissue structures demonstrate no acute findings. 1.  Stable lung hyperinflation and coarse interstitial markings. Stable left greater than right pleural effusions or potentially scarring. No new pulmonary pathology. 2.  Atherosclerotic disease and cardiomegaly. Postsurgical changes.   Normal pulmonary vascularity on this exam       Chest Xray (12/11/2020):    ASSESSMENT:     Patient Active Problem List    Diagnosis Date Noted    Hypokalemia 12/07/2020    Dizziness     PAT (paroxysmal atrial tachycardia) (HCC)     Hypotension 10/21/2020    Thrombocytopenia ([address] 06/02/2018    Hematoma 06/02/2018    Traumatic hematoma of left upper arm 69/62/2467    Complication of AV dialysis fistula, initial encounter 06/02/2018    ESRD on dialysis ([address] 03/07/2018    Problem with dialysis access ([address] 01/17/2018    Hypervolemia     Acute on chronic diastolic congestive heart failure (HCC)     History of mitral valve repair     History of tricuspid valve repair     MANUEL (acute kidney injury) ([address]     Chronic renal failure, stage 5 ([address]     Respiratory arrest ([address]     Non-rheumatic mitral regurgitation 02/09/2017    (HFpEF) heart failure with preserved ejection fraction ([address] 01/25/2017    Cellulitis of right lower extremity 01/31/2016    Exacerbation of asthma 01/06/2016    Renal failure (ARF), acute on chronic ([address] 01/06/2016    HTN (hypertension) 01/06/2016    YUNIOR on CPAP 01/06/2016    Hypomagnesemia 01/06/2016    Hypocalcemia 01/06/2016    Hypothyroid 01/06/2016       Additional assessment:    ·         PLAN:     WEAN PER PROTOCOL:  [] No   [] Yes  [x] N/A    DISCONTINUE ANY LABS:   [x] No   [] Yes    ICU PROPHYLAXIS:  Stress ulcer:  [x] PPI Agent  [] [id number] [] Sucralfate  [] Other:  VTE:   [] Enoxaparin  [x] Unfract.  Heparin Subcut  [] EPC Cuffs    NUTRITION:  [] NPO [] Tube Feeding (Specify: ) [] TPN  [x] PO (Diet: Dietary Nutrition Supplements: Protein Modular  Dietary Nutrition Supplements: Low Calorie High Protein Supplement  DIET GENERAL;)    HOME MEDICATIONS RECONCILED: [] No  [x] Yes    INSULIN DRIP:   [x] No   [] Yes    CONSULTATION NEEDED:  [x] No   [] Yes    FAMILY UPDATED:    [x] No   [] Yes    TRANSFER OUT OF ICU:   [x] No   [] Yes    ADDITIONAL PLAN:    SYSTEMS ASSESSMENT     Neuro   Lightheadedness-likely related to hypotension, and hypovolemia  Resume home Lyrica, ropinirole  Respiratory   Wheeziness-patient has a history of asthma, continue home Singulair, albuterol  YUNIOR-CPAP at night  Cardiovascular   History of diastolic heart failure-monitor fluid status, continue home aspirin     Hypotension-likely related to hypovolemia versus sepsis, titrate levo as needed, continue to replete intravascular volume, restart home midodrine     Resume home Lipitor, fenofibrate  Gastrointestinal   Short gut syndrome-monitor, may require stool thickening agent as this may be the source of hypovolemia, FMS placed 12/10 for frequent stooling  GI prophylaxis-Protonix     Renal   ESRD on dialysis-Monday Wednesday Friday dialysis, will need to improve blood pressure before resuming  Hypokalemia-likely related to short gut syndrome versus early termination of dialysis today, receiving 80 mEq in the ER, monitor electrolytes every 4 hours      Infectious Disease   Possible sepsis-lactic acidemia, elevated white blood cell count, blood cultures drawn, empiric antibiotics given-Vanco, Zosyn     Hematology/Oncology   DVT prophylaxis-Heparin  Chronic anemia-resume home darbepoetin, or possible substitute     Endocrine   Resume home Synthroid     Social/Spiritual/DNR/Other   Full code     ASSESSMENT/ PLAN   1. Hypotension improving, hydration as needed  2. Sepsis-follow blood cultures, empiric Vanco and Zosyn given-hold on antibiotics until source identified or vitals change  3. Lactic Acidosis- resolving, may have been related to hypovolemia, continue to monitor fluid status  4. Hypokalemia- given 160 meq total, stable today  5. Continue midodrine, off Levophed, continue to wean vaso  6. Resumed home meds  7. Work up for Type D lactic acidosis, C. difficile negative, will continue stool bulking agent and start on Imodium today  8. FMS placed for frequent stooling-patient slept through the night without any bowel movements, will have to continue to monitor to see if FMS is impacting patient     Mateo Celeste MD PGY-1  12/11/2020 2:54 PM       Critical Care Attending Addendum:    Patient seen and examined with the house staff. X-rays personally reviewed through the PACS. Family is updated at the bedside as available. Additional findings listed below as necessary. Additional comments:  1. Remains hypotensive off vasopressors and again with mild lactic acidosis, given  cc to try to alleviate need for norepi. 2. ESRD received HD today. 3. Lactic acidosis as above.   4. Frequent stooling on psyllium and Imodium with FMS in.

## 2020-12-11 NOTE — PROGRESS NOTES
Nephrology Progress Note  Patient's Name: Justine Aguillon  12:34 PM  12/11/2020    Nephrologist: Amber Jim    Reason for Consult:  ESRD  Requesting Physician:  Moody Perry MD    Chief Complaint:  Hypotension    History Obtained From:  patient and past medical records    History of Present Ilness from the 12/8/20 note:    Justine Aguillon is a 77 y.o. female with prior history of ESRD on IHD MWF at [facility]. Pt has a Hx of recurrent episodes of hypotension. She was at IHD yestereday when she developed hypotension and was sent to the ED. In the ED the SBP was as low as 55/41. She was treated with IVF and initiated on norepi. Pt also noted to have a lactic acidosis as well as an initial serum K+1.6. She received 40meq KCL po and then a total of 60Meq IV with the K+ up to 2.8 this AM. Pt seen on IHD this AM SBP into the 70's with HR in the 140's    12/11/20: Pt awake alert and appropriate still with loose stools but less in number per RN, continues to require norepi and vasopressin for hemodynamic support was off the norepi prior to dialysis but at the time of my visit at the end of the treatment norepi up to 25mcg.  No volume removal    Past Medical History:   Diagnosis Date    (HFpEF) heart failure with preserved ejection fraction ([facility] [facility] 01/25/2017 4/11/17- echo- LVEF 55-60%, stage II DD, severely dilated LA, severe MR, mild TR, LVDD: 5.6 cm    Anemia     hx  / takes aranesp injections every 2 weeks    Asthma     well controlled with inhalers     CAD (coronary artery disease)     Chronic kidney disease     stage 4    Complication of AV dialysis fistula, initial encounter 6/2/2018    COPD (chronic obstructive pulmonary disease) (HCC)     Fibromyalgia     GERD (gastroesophageal reflux disease)     Hemodialysis patient ([facility] [facility]     chato Tarango    History of blood transfusion spring 2017    Hx of blood clots     h/o DVT in leg at age 25yrs    Hyperlipidemia     Hypertension     well controlled with medications     Kidney failure     Kidney stone     multiple issues    YUNIOR on CPAP     Polymyalgia rheumatica (HCC)     Restless leg syndrome     Short gut syndrome     Thyroid disease     Traumatic hematoma of left upper arm 6/2/2018    Wears dentures     upper partial       Past Surgical History:   Procedure Laterality Date    APPENDECTOMY      AV FISTULA REPAIR Left 03/22/2018    Superficialization, Delatore    AV FISTULA REPAIR  11/21/2018    Dr Ernie Mills 10x38 iCast Subclavian    BREAST BIOPSY Right 2000    BRONCHOSCOPY  2010    CARDIAC CATHETERIZATION  02/17/2017    Dr Mckay Leep REPLACEMENT  03/21/2017    MVR, TVR    COLONOSCOPY      COLONOSCOPY  4/21/15    COLONOSCOPY N/A 9/3/2019    COLORECTAL CANCER SCREENING, NOT HIGH RISK performed by Apurva Gomez MD at [address] Left 90/57/9847    radiocephalic, Delatore    DIALYSIS FISTULA CREATION Left 01/25/2018    brachioecephalic, ligation radiocephalic, Delatore    ENDOSCOPY, COLON, DIAGNOSTIC      EYE SURGERY Right     CATARACT    INTESTINAL BYPASS  1973    for weight loss    KIDNEY STONE SURGERY      x 3    OTHER SURGICAL HISTORY Left 11/16/2017    AV fistula creation left arm    OTHER SURGICAL HISTORY  01/17/2018    Left arm fistulogram by Dr Don Lang.     OTHER SURGICAL HISTORY  10/17/2018    Dr Tracey-Left arm fistuloplasty    KY CREAT AV FISTULA,AUTOGENOUS GRAFT Left 3/22/2018    AV FISTULA  REVISION LEFT ARM performed by Ratna Cancino MD at [facility] TUNNELED CV CATH Right 8/9/2018    REMOVAL OF TESIO CATHETER, REMOVAL OF MEDIPORT performed by Ratna Cancino MD at [address] Right 2000    RIGHT BREAST MOLE REMOVED    TRANSESOPHAGEAL ECHOCARDIOGRAM  02/03/2017    TUNNELED VENOUS CATHETER PLACEMENT Right     TUNNELED VENOUS PORT PLACEMENT Right     Powerport / x 4 / at right chest; since short gut syndrome received WBC 16.5 12/11/2020    RBC 2.64 12/11/2020    HGB 8.9 12/11/2020    HCT 28.4 12/11/2020     12/11/2020    .6 12/11/2020    MCH 33.7 12/11/2020    MCHC 31.3 12/11/2020    RDW 19.6 12/11/2020    LYMPHOPCT 14.7 12/11/2020    MONOPCT 4.8 12/11/2020    BASOPCT 0.1 12/11/2020    MONOSABS 0.79 12/11/2020    LYMPHSABS 2.42 12/11/2020    EOSABS 0.07 12/11/2020    BASOSABS 0.01 12/11/2020     Hemoglobin/Hematocrit:    Lab Results   Component Value Date    HGB 8.9 12/11/2020    HCT 28.4 12/11/2020     CMP:    Lab Results   Component Value Date     12/11/2020    K 4.1 12/11/2020    K 4.1 12/11/2020     12/11/2020    CO2 20 12/11/2020    BUN 17 12/11/2020    CREATININE 4.5 12/11/2020    GFRAA 12 12/11/2020    LABGLOM 10 12/11/2020    GLUCOSE 110 12/11/2020    PROT 5.8 12/11/2020    LABALBU 1.4 12/11/2020    CALCIUM 7.9 12/11/2020    BILITOT 1.0 12/11/2020    ALKPHOS 182 12/11/2020    AST 33 12/11/2020    ALT 29 12/11/2020     BMP:    Lab Results   Component Value Date     12/11/2020    K 4.1 12/11/2020    K 4.1 12/11/2020     12/11/2020    CO2 20 12/11/2020    BUN 17 12/11/2020    LABALBU 1.4 12/11/2020    CREATININE 4.5 12/11/2020    CALCIUM 7.9 12/11/2020    GFRAA 12 12/11/2020    LABGLOM 10 12/11/2020    GLUCOSE 110 12/11/2020     BUN/Creatinine:    Lab Results   Component Value Date    BUN 17 12/11/2020    CREATININE 4.5 12/11/2020     Hepatic Function Panel:    Lab Results   Component Value Date    ALKPHOS 182 12/11/2020    ALT 29 12/11/2020    AST 33 12/11/2020    PROT 5.8 12/11/2020    BILITOT 1.0 12/11/2020    LABALBU 1.4 12/11/2020     Albumin:    Lab Results   Component Value Date    LABALBU 1.4 12/11/2020     Calcium:    Lab Results   Component Value Date    CALCIUM 7.9 12/11/2020     Ionized Calcium:  No results found for: IONCA  Magnesium:    Lab Results   Component Value Date    MG 2.1 12/11/2020     Phosphorus:    Lab Results   Component Value Date    PHOS 3.2 12/11/2020

## 2020-12-11 NOTE — CARE COORDINATION
Still requiring iv pressors. On [address] [facility] home w/ , son, and daughter PTA. Has [employer], cane. Has home CPAP through [facility].  Visiting Nurses [facility] and Trang ZHANG. Hemodialysis patient [place] M-W-F [address]  provides transportation to dialysis. Plan remains to return home on discharge-will need PT/OT evals when when off pressor to assist w/ discharge planning. Referral made to [facility]- awaiting acceptance- will need order on discharge.   Will follow Mary Ann Castaneda

## 2020-12-11 NOTE — PROGRESS NOTES
12/11/2020  1:39 PM      Comprehensive Nutrition Assessment    Type and Reason for Visit:  Initial(ICU/LOS)    Nutrition Recommendations/Plan: Continue current diet and ONS, as tolerated    Nutrition Assessment:  Pt remains in ICU 2/2 pressor requirement. Hypokalemia improved and continues on HD. Nutritionally compromise also may be related to short-gut/RYGB hx. Will continue ONS to optimize nutrient intake in the setting of losses via HD    Malnutrition Assessment:  Malnutrition Status: At risk for malnutrition (Comment)    Context:  Chronic Illness     Findings of the 6 clinical characteristics of malnutrition:  Energy Intake:  Mild decrease in energy intake (Comment)(since admit inconsistent intake doc)  Weight Loss:  No significant weight loss     Body Fat Loss:  Unable to assess     Muscle Mass Loss:  Unable to assess    Fluid Accumulation:  No significant fluid accumulation(multiple factors (ESRD))     Strength:  Not Performed    Estimated Daily Nutrient Needs:  Energy (kcal):   (MSJ 1598 elvira); Weight Used for Energy Requirements:  Admission     Protein (g):  75-85 (1.4-1.6 g/kg); Weight Used for Protein Requirements:  Ideal        Fluid (ml/day):  per Critical care or Renal management; Method Used for Fluid Requirements:  Standard Renal      Nutrition Related Findings:  A&Ox4, pressor (MAP 68), +1 edema, denture partial, diarrhea persists but improving per nursing, +I/O 10 L      Wounds:  Skin Tears       Current Nutrition Therapies:    Dietary Nutrition Supplements: Protein Modular  Dietary Nutrition Supplements: Low Calorie High Protein Supplement  DIET GENERAL;     Anthropometric Measures:  · Height: 5' 3\" (160 cm)  · Current Body Weight: 200 lb (90.7 kg)(10/11 bedscale - wt up likely fluid, as no volume removed w/HD today per Dr. Lana Prasad)   · Admission Body Weight: 182 lb (82.6 kg)(12/8 bedscale)    · Usual Body Weight: 187 lb (84.8 kg)(per EMR x 1 yr - likely fluctuations over time w/fluid d/t

## 2020-12-12 NOTE — PROCEDURES
Arterial Line Placement Procedure Note                     Indication: severe hypotension    Consent: The patient was counseled regarding the procedure, its indications, risks, potential complications and alternatives, and any questions were answered. Consent was obtained to proceed. Clyde's Test: Not indicated in this particular procedure    Procedure: The skin over the left femoral artery was prepped with betadine and draped in a sterile fashion. Local anesthesia was obtained by infiltration using 1% Lidocaine without epinephrine. An arterial line catheter was then inserted, using a modified Seldinger technique, into the vessel. The transducer set was then attached and securely fastened to the skin with sutures. Waveforms on the monitor were observed and found to be adequate. The patient had good distal perfusion after the procedure. The site was then dressed in a sterile fashion. The patient tolerated the procedure well. Complications: hematoma formation from initial cannulation, wire would not pass so a second attempt had to be made    Freida Gay MD PGY-1  12/12/2020 3:19 PM    I supervised entire procedure.

## 2020-12-12 NOTE — PROGRESS NOTES
Anuric      OBJECTIVE:     VITAL SIGNS:  BP (!) 101/48   Pulse 75   Temp 98.6 °F (37 °C) (Oral)   Resp 14   Ht 5' 3\" (1.6 m)   Wt 200 lb 2.8 oz (90.8 kg)   SpO2 100%   BMI 35.46 kg/m²   Tmax over 24 hours:  Temp (24hrs), Av.4 °F (36.9 °C), Min:98.2 °F (36.8 °C), Max:98.6 °F (37 °C)      Patient Vitals for the past 6 hrs:   BP Pulse Resp SpO2   20 0630 (!) 101/48 75 14 100 %   20 0629 (!) 101/48 80 15 100 %   20 0600 (!) 85/53 80 17 100 %   20 0530 (!) 85/53 81 (!) 34 --   20 0500 (!) 83/54 73 29 100 %   20 0430 (!) 83/54 81 (!) 66 100 %   20 0424 (!) 83/54 77 15 100 %   20 0400 (!) 80/47 85 (!) 62 100 %   20 0330 (!) 80/47 76 22 95 %   20 0300 (!) 87/51 74 21 100 %         Intake/Output Summary (Last 24 hours) at 2020 0832  Last data filed at 2020 3378  Gross per 24 hour   Intake 5176.21 ml   Output 1500 ml   Net 3676.21 ml     Wt Readings from Last 2 Encounters:   20 200 lb 2.8 oz (90.8 kg)   20 175 lb (79.4 kg)     Body mass index is 35.46 kg/m². PHYSICAL EXAMINATION:    General appearance - alert, well appearing, and in no distress and oriented to person, place, and time.    Mental status - alert, oriented to person, place, and time, normal mood, behavior, speech, dress, motor activity, and thought processes  Eyes - pupils equal and reactive, extraocular eye movements intact, sclera anicteric  Ears - external ear normal  Nose - normal and patent, no erythema, discharge or polyps  Mouth - mucous membranes moist, pharynx normal without lesions  Neck - supple, no significant adenopathy  Chest - clear to auscultation, no wheezes, rales or rhonchi, symmetric air entry  Heart -systolic heart murmur present,tachycardiac rate, normal rhythm, no rubs, gallops  Abdomen - soft, nontender, nondistended, no masses or organomegaly  Neurological - alert, oriented, normal speech, no focal findings or movement disorder noted  Extremities - peripheral pulses normal, right femoral TLC, left arm AV fistula, right radial arterial line, dusky fingers bilaterally  Skin - normal coloration, appears to have some bruises    Any additional physical findings:    MEDICATIONS:    Scheduled Meds:   pregabalin  25 mg Oral BID    atorvastatin  20 mg Oral Once per day on Mon Wed Fri    psyllium  1 packet Oral BID    cholestyramine  1 packet Oral BID    epoetin izabella-epbx  3,000 Units Subcutaneous Once per day on Mon Wed Fri    And    epoetin izabella-epbx  2,000 Units Subcutaneous Once per day on Mon Wed Fri    sodium bicarbonate  1,300 mg Oral TID    pantoprazole  40 mg Oral QAM AC    midodrine  20 mg Oral TID WC    sodium chloride flush  10 mL Intravenous 2 times per day    aspirin  81 mg Oral Daily    calcium carbonate  3 tablet Oral BID    fenofibrate  160 mg Oral Once per day on Mon Wed Fri    folic acid  1 mg Oral Daily    levothyroxine  137 mcg Oral Daily    liothyronine  5 mcg Oral Daily    magnesium oxide  400 mg Oral Nightly    melatonin  15 mg Oral Nightly    montelukast  10 mg Oral Daily    heparin (porcine)  5,000 Units Subcutaneous Q8H     Continuous Infusions:   IV infusion builder 150 mL/hr at 12/12/20 0130    norepinephrine 12 mcg/min (12/12/20 0715)    vasopressin (Septic Shock) infusion 0.03 Units/min (12/12/20 0208)     PRN Meds:       artificial tears, , PRN      phenol, 1 spray, Q2H PRN      menthol-zinc oxide, , BID PRN      acetaminophen, 650 mg, Q4H PRN      sodium chloride flush, 10 mL, PRN      albuterol, 0.63 mg, 4x Daily PRN      rOPINIRole, 0.5 mg, Nightly PRN          VENT SETTINGS (Comprehensive) (if applicable):  Vent Information  Skin Assessment: Clean, dry, & intact  SpO2: 100 %  Additional Respiratory  Assessments  Pulse: 75  Resp: 14  SpO2: 100 %  Oral Care: Mouth swabbed, Mouth moisturizer        Laboratory findings:    Complete Blood Count:   Recent Labs     12/10/20  0598 12/11/20  0600 12/12/20  0538   WBC 15.2* 16.5* 19.2*   HGB 8.9* 8.9* 9.1*   HCT 27.6* 28.4* 28.7*    116* 101*        Last 3 Blood Glucose:   Recent Labs     12/11/20 1815 12/11/20 2329 12/12/20  0538   GLUCOSE 104* 104* 130*        PT/INR:    Lab Results   Component Value Date    PROTIME 15.1 10/21/2020    INR 1.3 10/21/2020     PTT:    Lab Results   Component Value Date    APTT 36.6 06/02/2018       Comprehensive Metabolic Profile:   Recent Labs     12/11/20 1815 12/11/20 2329 12/12/20  0538    135 134   K 3.8 3.8 4.0  4.0    104 103   CO2 20* 22 22   BUN 10 11 11   CREATININE 3.1* 3.5* 3.4*   GLUCOSE 104* 104* 130*   CALCIUM 7.5* 7.5* 7.5*   PROT  --   --  5.9*   LABALBU  --   --  1.5*   BILITOT  --   --  1.0   ALKPHOS  --   --  183*   AST  --   --  33*   ALT  --   --  27      Magnesium:   Lab Results   Component Value Date    MG 1.8 12/12/2020     Phosphorus:   Lab Results   Component Value Date    PHOS 2.6 12/12/2020     Ionized Calcium:   Lab Results   Component Value Date    CAION 1.18 12/12/2020        Urinalysis:     Troponin:   No results for input(s): TROPONINI in the last 72 hours. Microbiology:    Cultures during this admission:     Blood cultures:   Pending              [] None drawn      [] Negative             []  Positive (Details:  )  Urine Culture:                   [x] None drawn      [] Negative             []  Positive (Details:  )  Sputum Culture:               [x] None drawn       [] Negative             []  Positive (Details:  )   Endotracheal aspirate:     [x] None drawn       [] Negative             []  Positive (Details:  )     Other pertinent Labs:       Radiology/Imaging:   Xr Chest Portable    Result Date: 12/7/2020  EXAMINATION: ONE XRAY VIEW OF THE CHEST 12/7/2020 8:12 am COMPARISON: 11/28/2020 HISTORY: Acute shortness of breath. FINDINGS: Sternotomy changes with prosthetic cardiac valves and left atrial appendage clip. Heart remains enlarged.   Increased moderate left pleural effusion with adjacent airspace disease. Mild right basilar atelectasis. No pneumothorax. Increased moderate left pleural effusion. Xr Chest Portable    Result Date: 11/28/2020  EXAMINATION: ONE XRAY VIEW OF THE CHEST 11/28/2020 6:07 pm COMPARISON: Chest series from October 21, 2020. HISTORY: ORDERING SYSTEM PROVIDED HISTORY: sob TECHNOLOGIST PROVIDED HISTORY: Reason for exam:->sob FINDINGS: Postsurgical changes overlie the mediastinum. Lung hyperinflation and coarse interstitial markings. Elevation of the left hemidiaphragm versus loculated left pleural effusion is unchanged. Small right pleural effusion or scarring. No new airspace disease. No pneumothorax. Atherosclerotic disease in the aortic arch. Cardiac silhouette is enlarged. Normal pulmonary vascularity. Osseous and thoracic soft tissue structures demonstrate no acute findings. 1.  Stable lung hyperinflation and coarse interstitial markings. Stable left greater than right pleural effusions or potentially scarring. No new pulmonary pathology. 2.  Atherosclerotic disease and cardiomegaly. Postsurgical changes. Normal pulmonary vascularity on this exam       Chest Xray (12/12/2020):   Viewed LG mild-moderate left pleural effusion  ASSESSMENT:     Patient Active Problem List    Diagnosis Date Noted    Hypokalemia 12/07/2020    Dizziness     PAT [facility]oxysmal [facility]cardia[facility]     Hypotension 10/21/2020    Thrombocytopenia ([address] 06/02/2018    Hematoma 06/02/2018    Traumatic hematoma of left upper arm 87/99/9764    Complication of AV dialysis fistula, initial encounter 06/02/2018    ESRD on dialysis [facility]) 03/07/2018    Problem with dialysis access ([address] 01/17/2018    Hypervolemia     Acute on chronic diastolic congestive heart failure (HCC)     History of mitral valve repair     History of tricuspid valve repair     MANUEL (acute kidney injury) ([address]     Chronic renal failure, stage 5 ([address]     [facility]     Non-rheumatic mitral regurgitation 02/09/2017    (HFpEF) heart failure with preserved ejection fraction ([facility] 01/25/2017    Cellulitis of right lower extremity 01/31/2016    Exacerbation of asthma 01/06/2016    Renal failure (ARF), acute on chronic ([facility] 01/06/2016    HTN (hypertension) 01/06/2016    YUNIOR on CPAP 01/06/2016    Hypomagnesemia 01/06/2016    Hypocalcemia 01/06/2016    Hypothyroid 01/06/2016       Additional assessment:    ·         PLAN:     WEAN PER PROTOCOL:  [] No   [] Yes  [x] N/A    DISCONTINUE ANY LABS:   [x] No   [] Yes    ICU PROPHYLAXIS:  Stress ulcer:  [x] PPI Agent  [] J2Velyh [] Sucralfate  [] Other:  VTE:   [] Enoxaparin  [x] Unfract.  Heparin Subcut  [] EPC Cuffs    NUTRITION:  [] NPO [] Tube Feeding (Specify: ) [] TPN  [x] PO (Diet: Dietary Nutrition Supplements: Protein Modular  Dietary Nutrition Supplements: Low Calorie High Protein Supplement  DIET GENERAL;)    HOME MEDICATIONS RECONCILED: [] No  [x] Yes    INSULIN DRIP:   [x] No   [] Yes    CONSULTATION NEEDED:  [x] No   [] Yes    FAMILY UPDATED:    [x] No   [] Yes    TRANSFER OUT OF ICU:   [x] No   [] Yes    ADDITIONAL PLAN:    SYSTEMS ASSESSMENT     Neuro   Lightheadedness-likely related to hypotension, and hypovolemia  Resume home Lyrica, ropinirole  Respiratory   Wheeziness-patient has a history of asthma, continue home Singulair, albuterol  YUNIOR-CPAP at night  Cardiovascular   History of diastolic heart failure-monitor fluid status, continue home aspirin     Hypotension-likely related to hypovolemia versus sepsis, titrate levo as needed, continue to replete intravascular volume, restart home midodrine     Resume home Lipitor, fenofibrate  Gastrointestinal   Short gut syndrome-monitor, on stool thickening agent as this may be the source of hypovolemia, FMS placed 12/10 for frequent stooling  GI prophylaxis-Protonix     Renal   ESRD on dialysis-Monday Wednesday Friday dialysis, will need to improve blood pressure before resuming  Hypokalemia-likely related to short gut syndrome versus early termination of dialysis today, receiving 80 mEq in the ER, monitor electrolytes every 4 hours      Infectious Disease   Possible sepsis-lactic acidemia, elevated white blood cell count, blood cultures drawn, empiric antibiotics given-Vanco, Zosyn     Hematology/Oncology   DVT prophylaxis-Heparin  Chronic anemia-resume home darbepoetin, or possible substitute     Endocrine   Resume home Synthroid     Social/Spiritual/DNR/Other   Full code     ASSESSMENT/ PLAN   1. Hypotension improving, hydration as needed  2. Sepsis-follow blood cultures, empiric Vanco and Zosyn given-hold on antibiotics until source identified or vitals change  3. Lactic Acidosis- resolving, may have been related to hypovolemia, continue to monitor fluid status  4. Hypokalemia- given 160 meq total, remainsstable  5. Continue midodrine, back on Levophed 12/12 , continue to wean vaso and levo  6. Continue home meds  7. Work up for Type D lactic acidosis, C. difficile negative, will continue stool bulking agent and start on Imodium today  8. FMS placed for frequent stooling-patient slept through the night without any bowel movements, will have to continue to monitor to see if FMS is impacting patient  9. Change out arterial line from right radial to femoral line     Piper Bay MD PGY-1  12/12/2020 8:32 AM       Critical Care Attending Addendum:    Patient seen and examined with the house staff. X-rays personally reviewed through the PACS. Family is updated at the bedside as available. Additional findings listed below as necessary. Additional comments:  1. Now with persistent coughing when eating leading to shortness of breath, will check swallow eval.  2. Left effusion is mild to moderate but if dyspnea persists and she continues to need to wear her cpap, it may benefit her to drain. 3. Persistent hypotension titrating pressors.  LA still a little elevated. 4. Leukocytosis ID consulted by primary  5. Asthma not in exacerbation  6. ESRD on HD.

## 2020-12-12 NOTE — PROGRESS NOTES
Internal Medicine Progress Note      Synopsis: Patient admitted on 12/7/2020    Subjective    Feeling slightly better today. Pressors are less and hoping to be weaned off today. Still with lots of frequent stools    December 10, 2020  Still on pressors though less so. Recovering better and better  Feeling short of breath and swollen and weak but does not want to consider discharge to any other placement home  December 11, 2020  Now with fecal management system. Pressors were weaned off and her blood pressure dropped. Almost systolic of 50. She was symptomatically lightheaded and felt weak  December 12, 2020  Still  on pressors  Still with loose stools. Now with FMS since yesterday  Exam:  BP (!) 101/48   Pulse 75   Temp 98.6 °F (37 °C) (Oral)   Resp 14   Ht 5' 3\" (1.6 m)   Wt 200 lb 2.8 oz (90.8 kg)   SpO2 100%   BMI 35.46 kg/m²   General appearance: No apparent distress, appears stated age and cooperative. HEENT: Pupils equal, round, and reactive to light. Conjunctivae/corneas clear. Neck: Supple. No jugular venous distention. Trachea midline. Respiratory:  Normal respiratory effort. Clear to auscultation, bilaterally without Rales/Wheezes/Rhonchi. Cardiovascular: Regular rate and rhythm with normal S1/S2 without murmurs, rubs or gallops. Abdomen: Soft, non-tender, non-distended with normal bowel sounds.   Musculoskeletal: No clubbing, cyanosis 2+ edema bilateral lower extremities  Skin:  No rashes    Neurologic: awake, alert and following commands     Medications:  Reviewed    Infusion Medications    IV infusion builder 150 mL/hr at 12/12/20 0130    norepinephrine 12 mcg/min (12/12/20 0715)    vasopressin (Septic Shock) infusion 0.03 Units/min (12/12/20 0208)     Scheduled Medications    pregabalin  25 mg Oral BID    atorvastatin  20 mg Oral Once per day on Mon Wed Fri    psyllium  1 packet Oral BID    cholestyramine  1 packet Oral BID    epoetin izabella-epbx  3,000 Units Subcutaneous Once per day on Mon Wed Fri    And    epoetin izabella-epbx  2,000 Units Subcutaneous Once per day on Mon Wed Fri    sodium bicarbonate  1,300 mg Oral TID    pantoprazole  40 mg Oral QAM AC    midodrine  20 mg Oral TID WC    sodium chloride flush  10 mL Intravenous 2 times per day    aspirin  81 mg Oral Daily    calcium carbonate  3 tablet Oral BID    fenofibrate  160 mg Oral Once per day on Mon Wed Fri    folic acid  1 mg Oral Daily    levothyroxine  137 mcg Oral Daily    liothyronine  5 mcg Oral Daily    magnesium oxide  400 mg Oral Nightly    melatonin  15 mg Oral Nightly    montelukast  10 mg Oral Daily    heparin (porcine)  5,000 Units Subcutaneous Q8H     PRN Meds: artificial tears, phenol, menthol-zinc oxide, acetaminophen, sodium chloride flush, albuterol, rOPINIRole    I/O    Intake/Output Summary (Last 24 hours) at 12/12/2020 1227  Last data filed at 12/12/2020 3418  Gross per 24 hour   Intake 4676.21 ml   Output 1000 ml   Net 3676.21 ml       Labs:   Recent Labs     12/10/20  0545 12/11/20  0600 12/12/20  0538   WBC 15.2* 16.5* 19.2*   HGB 8.9* 8.9* 9.1*   HCT 27.6* 28.4* 28.7*    116* 101*       Recent Labs     12/10/20  0545 12/10/20  0545 12/11/20  0600 12/11/20  0600 12/11/20  1815 12/11/20  2329 12/12/20  0538      < > 137   < > 136 135 134   K 3.1*  3.1*   < > 4.1  4.1   < > 3.8 3.8 4.0  4.0      < > 110*   < > 107 104 103   CO2 23   < > 20*   < > 20* 22 22   BUN 14   < > 17   < > 10 11 11   CREATININE 4.2*   < > 4.5*   < > 3.1* 3.5* 3.4*   CALCIUM 7.8*   < > 7.9*   < > 7.5* 7.5* 7.5*   PHOS 3.0  --  3.2  --   --   --  2.6    < > = values in this interval not displayed. Recent Labs     12/10/20  0545 12/11/20  0600 12/12/20  0538   PROT 5.7* 5.8* 5.9*   ALKPHOS 177* 182* 183*   ALT 26 29 27   AST 33* 33* 33*   BILITOT 0.9 1.0 1.0       No results for input(s): INR in the last 72 hours.     No results for input(s): Lorenso Favre in the last 72 hours.    Chronic labs:  Lab Results   Component Value Date    CHOL 96 02/27/2020    TRIG 131 02/27/2020    HDL 36 02/27/2020    LDLCALC 34 02/27/2020    TSH 4.260 (H) 12/07/2020    INR 1.3 10/21/2020    LABA1C <4.0 12/30/2019     Results for Paola Crenshaw (MRN [id number]) as of 12/9/2020 10:51   Ref. Range 12/8/2020 07:50 12/8/2020 20:00 12/9/2020 00:55 12/9/2020 05:00 12/9/2020 08:40   Lactic Acid Latest Ref Range: 0.5 - 2.2 mmol/L 4.6 (HH) 3.8 (H) 3.2 (H) 2.5 (H) 2.6 (H)     Radiology:  Xr Chest Portable    Result Date: 12/7/2020  EXAMINATION: ONE XRAY VIEW OF THE CHEST 12/7/2020 8:12 am COMPARISON: 11/28/2020 HISTORY: Acute shortness of breath. FINDINGS: Sternotomy changes with prosthetic cardiac valves and left atrial appendage clip. Heart remains enlarged. Increased moderate left pleural effusion with adjacent airspace disease. Mild right basilar atelectasis. No pneumothorax. Increased moderate left pleural effusion. Xr Chest Portable    Result Date: 11/28/2020  EXAMINATION: ONE XRAY VIEW OF THE CHEST 11/28/2020 6:07 pm COMPARISON: Chest series from October 21, 2020. HISTORY: ORDERING SYSTEM PROVIDED HISTORY: sob TECHNOLOGIST PROVIDED HISTORY: Reason for exam:->sob FINDINGS: Postsurgical changes overlie the mediastinum. Lung hyperinflation and coarse interstitial markings. Elevation of the left hemidiaphragm versus loculated left pleural effusion is unchanged. Small right pleural effusion or scarring. No new airspace disease. No pneumothorax. Atherosclerotic disease in the aortic arch. Cardiac silhouette is enlarged. Normal pulmonary vascularity. Osseous and thoracic soft tissue structures demonstrate no acute findings. 1.  Stable lung hyperinflation and coarse interstitial markings. Stable left greater than right pleural effusions or potentially scarring. No new pulmonary pathology. 2.  Atherosclerotic disease and cardiomegaly. Postsurgical changes.   Normal pulmonary vascularity on transcribed using voice recognition software. Every effort was made to ensure accuracy; however, inadvertent computerized transcription errors may be present.

## 2020-12-12 NOTE — CONSULTS
[address] [address]                                  CONSULTATION    PATIENT NAME: Deven Barrera                     :        1955  MED REC NO:   [id number]                            ROOM:       0206  ACCOUNT NO:   [de-identified]                           ADMIT DATE: 2020  PROVIDER:     Maria Elena Marin MD    CONSULT DATE:  2020    REASON FOR CONSULTATION:  Chronic diarrhea. HISTORY OF PRESENT ILLNESS:  She is 77years of age. She is known to me  from a previous encounter for colonoscopy. I last saw her in 2019, when  as a patient with chronic renal failure, she was being considered for  renal transplant at the [facility] and a colonoscopy was required. The issues at that time were chronic diarrhea from an established cause. She was Hemoccult positive. It was an adequate prepared colon and there  were no abnormalities. She is admitted to the hospital on this occasion with issues of  hypotension, which apparently has been a recurrent issue and she does  take midodrine for same. She tells me she has been ruled out for a  possible renal transplant. She has a remote history of jejunoileal  bypass surgery for obesity. She did visit the bariatric Center in  [facility] apparently for a consideration of reversal, but she was deemed  too high of a risk. When she was being evaluated for transplant  [place] last year, she had an albumin at that time of 3.4 and that was  in August of last year. She acknowledges loperamide resistant diarrhea. When she was evaluated  for other issues back at the [facility] in , it was recorded  she was having 10 or more stools a day. Loperamide was advised and  nutritional deficiencies were addressed. There was no conviction at  that time that she had cirrhosis.   She had a CAT scan of the abdomen in August of last year at [place], which was interpreted as a lobulated  surface contour, mildly widened fissures, and caudate lobe enlargement,  but there was only a suspicion of chronic liver disease, but  splenomegaly as described and she also had ascites. There were  bilateral pleural effusions present then as well. She has been seen  through the office of Dr. Alatorre Duty and has been serologically negative for  viral hepatitis and her autoimmune markers have been negative. Iron  studies have never suggested iron overload. She underwent an endoscopic  evaluation through the office of Dr. Alatorre Duty in 03/2020 and there were no  findings of portal hypertension or esophageal varices. At presentation, she suggests that her diarrhea is eight or 10 times per  day. Generally watery. Generally has been loperamide resistance. She  has an albumin of 1.5. It has been noticeably low since October of this  year. It was 3.6 in February of this year and otherwise has generally  been between 2.5 and 3. It is demonstrably severely diminished now and  has been since October, when it was then 1.8 and her value currently is  1.5. She has had imaging by CT in January of this year of the chest.   Again a lobulated liver, suggesting cirrhosis as well as ascites and  bilateral pleural effusions. After being in the hospital now, she had been started on Metamucil, but  no loperamide and she suggests her stool numbers are down and  consistency is up. She is currently in the Medical Intensive Care Unit  and her hypotension unrelated to sepsis is being addressed. She is  currently on vasopressin and Levophed. She is alert and oriented. She  is not having abdominal pain. She is also on cholestyramine, which  generally would not work for post-bypass diarrhea.     PAST MEDICAL HISTORY:  Includes chronic kidney disease, currently on  dialysis; kidney stones; hypothyroidism; hyperlipidemia; COPD; polymyalgia rheumatica; congestive heart failure; and obstructive sleep  apnea. PAST SURGICAL HISTORY:  Includes remote jejunoileal bypass surgery  roughly 47 years ago, colonoscopy and EGD on several occasions,  bronchoscopy in 2010, heart catheterization, breast biopsy, mitral valve  and tricuspid valve repair, dialysis, and fistula formation. CURRENT MEDICATIONS:  Albuterol, Lipitor, Tums, Questran,  erythropoietin, fenofibrate, folic acid, Synthroid, Cytomel, magnesium  oxide, protamine, Singulair, Levophed, Protonix, Lyrica, psyllium, and  ropinirole. ALLERGIES:  Listed to WARFARIN, QUININE, and TURMERIC. OBJECTIVE:  She is alert. She is currently experiencing some cough  after trying to ingest scrambled eggs. Not a  persistent problem in the  past.  She has no pallor that is obvious to me at this time. Neck veins  not distended. Breath sounds seem symmetric. Heart tones are without a  gallop. She has a soft abdomen without tenderness. ASSESSMENT:  She has findings to suggest chronic liver disease. She was  reported to have splenomegaly, but her platelet count has been  consistently normal until recently when it is down to about 100,000. Her liver looks potentially cirrhotic on CT. Her albumin of 1.4 to 1.5  would be a major factor in her pleural effusions and probably in her  ascites as well. It may be a combination of things : the albumin  of 1.5 , chronic liver disease in addition to congestive heart failure. I am not sure how much heart failure exists  Certainly no evidence of right heart failure despite  the previous tricuspid valvular surgery. Again, I do not think cholestyramine adds much to this, since almost the  entire ileum is bypassed. This volume of diarrhea has been ongoing for  decades. She seems to think she is better with psyllium.   For now, we  will avoid the addition of loperamide until we see what psyllium alone

## 2020-12-12 NOTE — PATIENT CARE CONFERENCE
..  Intensive Care Daily Quality Rounding Checklist      ICU Team Members:  Dr. Anais Lau, resident, charge nurse, bedside nurse and respiratory therapy    ICU Day #: NUMBER: 6    Intubation Date: N/A    Ventilator Day #:  N/A    Central Line Insertion Date: December 7        Day #: NUMBER: 6     Arterial Line Insertion Date: December 8      Day #: NUMBER: 4    DVT Prophylaxis: Heparin     GI Prophylaxis: Protonix    Morse Catheter Insertion Date: N/A        Day #: N/A      Continued need (if yes, reason documented and discussed with physician):     Skin Issues/ Wounds and ordered treatment discussed on rounds:     Goals/ Plans for the Day: Monitor labs and vitals. Wean levo when able and switch art line. Doppler right arm.  Swallow study ordered

## 2020-12-12 NOTE — CONSULTS
[facility]     Consult Note                                 [address] [facility] [address], [place], [address]                   Phone [phone]     Fax [phone]        Date:   12/12/2020  Patient name:  Rylie Wiley  Date of admission:  12/7/2020  7:50 AM  MRN:   [id number]  YOB: 1955    Reason for Consult:   Leukocytosis and hypotension    CC:   Chief Complaint   Patient presents with    Hypotension     pt was at dialysis (recieved 40 min)    Fatigue    Dizziness       HISTORY OF PRESENT ILLNESS:                The patient is a 77 y.o. female not known to infectious disease service in the past admitted to the hospital on December 7, 2020. She has past medical history of end-stage renal disease on hemodialysis, hypertension on midodrine, polymyalgia rheumatica, gastric bypass with short gut syndrome. She was sent from the dialysis center after she was found to be hypotensive. A triple-lumen femoral catheter was placed on right side and she was started on Levophed. She has no fever, currently on 2 pressors, she had initial WBC count of 28.9 and has been fluctuating between 15-19 and last few days, blood cultures negative from admission, C. difficile negative, chest x-ray shows left-sided pleural effusion. Upper extremity Doppler shows no DVT, bilateral lower extremity Doppler has been ordered by the primary team.  UA obtained shows pyuria but patient is end-stage renal disease patient. She got 1 dose of vancomycin and Zosyn initially and has been off antibiotics for last 3 days primary team has consulted today for further management of leukocytosis and hypotension.   Patient also has frequent loose stools, tested negative for C. difficile      Past Medical History:   has a past medical history of (HFpEF) heart failure with preserved ejection fraction ([facility] [address] Anemia, Asthma, CAD (coronary midodrine (PROAMATINE) 10 MG tablet Take 1 tablet by mouth 3 times daily (with meals)  Patient taking differently: Take 20 mg by mouth 3 times daily (with meals) 20 mg  three times on each day 10/24/20  Yes Tony Fraire MD   albuterol (ACCUNEB) 0.63 MG/3ML nebulizer solution INHALE 1 VIAL VIA NEBULIZER EVERY 4 HOURS AS NEEDED FOR WHEEZING OR SHORTNESS OF BREATH  7/6/20  Yes Topher Rodrigues MD   albuterol sulfate HFA (VENTOLIN HFA) 108 (90 Base) MCG/ACT inhaler Inhale 2 puffs into the lungs every 6 hours as needed for Wheezing 12/19/19  Yes Topher Rodrigues MD   Acetaminophen (TYLENOL ARTHRITIS PAIN PO) Take 3 tablets by mouth 2 times daily    Yes Historical Provider, MD   calcium carbonate (TUMS) 500 MG chewable tablet Take 3 tablets by mouth 2 times daily    Yes Historical Provider, MD   melatonin 5 MG TABS tablet Take 15 mg by mouth nightly    Yes Historical Provider, MD   sodium bicarbonate 650 MG tablet Take 650 mg by mouth 2 times daily    Yes Historical Provider, MD   magnesium oxide (MAG-OX) 400 MG tablet Take 400 mg by mouth nightly    Yes Historical Provider, MD   levothyroxine (SYNTHROID) 137 MCG tablet Take 137 mcg by mouth Daily   Yes Historical Provider, MD   calcium acetate (PHOSLO) 667 MG capsule Take 667 mg by mouth 3 times daily (with meals)    Yes Historical Provider, MD   liothyronine (CYTOMEL) 5 MCG tablet Take 5 mcg by mouth daily   Yes Historical Provider, MD   aspirin 81 MG EC tablet Take 1 tablet by mouth daily 3/30/17  Yes GABRIELLA Martin - CNP   Coenzyme Q10 (COQ10 PO) Take 200 mg by mouth nightly    Yes Historical Provider, MD   vitamin B-12 (CYANOCOBALAMIN) 1000 MCG tablet Take 3,000 mcg by mouth 2 times daily Ld 8/28/2019   Yes Historical Provider, MD   darbepoetin izabella-polysorbate (ARANESP, ALBUMIN FREE,) 60 MCG/ML injection Inject into the skin every 14 days Twice/per month  Dose given 3-16-18 at dialysis   Yes Historical Provider, MD   CPAP Machine MISC Please replace patients CPAP at 8 cm water pressure with heated humidification and C-Flex of 3. Patient states she is due for a new machine and hers is not working. Also provide masks, tubing, filters, head gear, and water chambers as needed. Diagnosis-YUNIOR  Faxed to [facility]  Length of need 99 months 9/23/16  Yes Molly Aguillon MD   vitamin D-3 (CHOLECALCIFEROL) 5000 UNITS TABS Take 5,000 Units by mouth 2 times daily LD 8/28/2019   Yes Historical Provider, MD   folic acid (FOLVITE) 1 MG tablet Take 1 mg by mouth daily Ld 8/28/2019   Yes Historical Provider, MD   pregabalin (LYRICA) 50 MG capsule Take 50 mg by mouth 3 times daily Instructed to take am of procedure. Yes Historical Provider, MD   lidocaine-prilocaine (EMLA) 2.5-2.5 % cream Apply topically as needed for Pain Apply to AV fistula prior to HD. Historical Provider, MD   Tens Unit MISC by Does not apply route Indications: Patient with 2-years of back myofascial pain and spasm with good relief from TENS at PT in past.  She has end-stage kidney disease on dialysis so medications are limited. 9/5/19   Eligio Carlson,    Methoxy PEG-Epoetin Beta (MIRCERA) 75 MCG/0.3ML SOSY Infuse 75 mcg intravenously every 14 days    Historical Provider, MD   fenofibrate (TRICOR) 145 MG tablet Take 145 mg by mouth three times a week Patient taking Mon, Wed, Fri 6/19/18   Historical Provider, MD   atorvastatin (LIPITOR) 20 MG tablet Take 20 mg by mouth three times a week M[facility]W-F at [facility]    Historical Provider, MD       Allergies:  Coumadin [warfarin sodium], Quinine derivatives, Turmeric, and Other    Social History:   reports that she quit smoking about 23 years ago. Her smoking use included cigarettes. She started smoking about 29 years ago. She has a 12.00 pack-year smoking history. She has never used smokeless tobacco. She reports that she does not drink alcohol or use drugs.      Family History: family history includes Cirrhosis in her mother; Depression in her sister; Diabetes in her brother and father; Heart Failure in her mother; High Blood Pressure in her brother, mother, and sister; Obesity in her mother. REVIEW OF SYSTEMS:    12 point ROS has been done and pertinent neg and positive has been included in HPI and rest are non contributory          PHYSICAL EXAM:    BP (!) 101/48   Pulse 75   Temp 98.6 °F (37 °C) (Oral)   Resp 14   Ht 5' 3\" (1.6 m)   Wt 200 lb 2.8 oz (90.8 kg)   SpO2 100%   BMI 35.46 kg/m²    VENT SETTINGS:   Vent Information  Skin Assessment: Clean, dry, & intact  SpO2: 100 %    General appearance: Alert, Awake, Oriented times 3, no distress  Skin: Warm and dry  Eyes: Pink palpebral conjunctivae. PERRL  Ears: External ears normal.  Nose/Sinuses: Nares normal. Septum midline. Mucosa normal. No sinus tenderness. Oropharynx: Oropharynx clear with no exudates seen  Neck: Neck supple. No jugular venous distension, lymphadenopathy or thyromegaly Trachea midline  Lungs: Lungs clear to auscultation bilaterally. No rhonchi, crackles or wheezes  Heart: S1 S2  Regular rate and rhythm. No rub, murmur or gallop  Abdomen: Abdomen soft, non-tender. BS normal. No masses, organomegaly  Extremities: No edema, Peripheral pulses palpable  Musculoskeletal: Muscular strength appears intact.  No joint effusion, tenderness, swelling or warmth  Fecal management system in place and has rather more formed stools now    DATA:    Labs:     Last 3 CBC:  Recent Labs     12/10/20  0545 12/11/20  0600 12/12/20  0538   WBC 15.2* 16.5* 19.2*   RBC 2.61* 2.64* 2.66*   HGB 8.9* 8.9* 9.1*    116* 101*   MPV 11.4 11.9 12.4*       Last 3 BMP  Recent Labs     12/11/20  2329 12/12/20  0538 12/12/20  1320    134 134   K 3.8 4.0  4.0 3.9    103 108*   CO2 22 22 20*   BUN 11 11 11   CREATININE 3.5* 3.4* 3.6*   GLUCOSE 104* 130* 145*   CALCIUM 7.5* 7.5* 7.5*       LIVER PROFILE:  Recent Labs     12/11/20  0600 12/12/20  0538   AST 33* 33*   ALT 29 27   LABALBU 1.4* 1.5* Microbiology :  No results for input(s): BC in the last 72 hours. No results for input(s): Winda Sosa in the last 72 hours. No results for input(s): LABURIN in the last 72 hours. No results for input(s): CULTRESP in the last 72 hours. No results for input(s): WNDABS in the last 72 hours. Radiology :  US DUP UPPER EXTREMITY RIGHT ARTERIES   Final Result   1. No evidence of hemodynamically significant stenosis. Multiphasic   waveforms identified in all areas. 2.  Spectral broadening in the radial and ulnar arteries suggests early   underlying peripheral vascular disease. XR CHEST PORTABLE   Final Result   1. Enlarging left pleural effusion which is now moderate to large in size. 2. New patchy left perihilar infiltration which could represent pneumonia   ,edema or less likely atelectasis. 3. Trace right pleural effusion      US DUP UPPER EXTREMITY RIGHT VENOUS   Final Result   No evidence of DVT. XR CHEST PORTABLE   Final Result   Increased moderate left pleural effusion.       US DUP LOWER EXTREMITIES BILATERAL VENOUS    (Results Pending)           Assessment and Plan:      · Hypotension/leukocytosis-sepsis versus reactive (excess volume loss, diarrhea)  · End-stage renal disease on hemodialysis  · Status post gastric bypass surgery with short gut syndrome  · Obstructive sleep apnea  · Polymyalgia rheumatica  · Left-sided pleural effusion    Plan  -After carefully reviewing the hospital course I feel this is not related to sepsis  -Agree with holding any antibiotics for now in the meanwhile I will order for some biomarkers-CRP/procalcitonin  -Send stool cultures for testing, started on Imodium today, looks like more of a secretory diarrhea  -D lactic acid level pending (short gut syndrome)  -Blood cultures have been negative so far  -UA in setting of diarrhea and on top of that in the renal disease patient is not very reliable                Scott James MD

## 2020-12-12 NOTE — PROGRESS NOTES
 folic acid (FOLVITE) tablet 1 mg  1 mg Oral Daily Cintia Suarez MD   1 mg at 12/12/20 0827    levothyroxine (SYNTHROID) tablet 137 mcg  137 mcg Oral Daily Cintia Suarez MD   137 mcg at 12/12/20 0531    liothyronine (CYTOMEL) tablet 5 mcg  5 mcg Oral Daily Cintia Suarez MD   5 mcg at 12/12/20 0531    magnesium oxide (MAG-OX) tablet 400 mg  400 mg Oral Nightly Cintia Suarez MD   400 mg at 12/11/20 2026    melatonin tablet 15 mg  15 mg Oral Nightly Cintia Suarez MD   15 mg at 12/11/20 2025    montelukast (SINGULAIR) tablet 10 mg  10 mg Oral Daily Cintia Suarez MD   10 mg at 12/11/20 2026    rOPINIRole (REQUIP) tablet 0.5 mg  0.5 mg Oral Nightly PRN Cintia Suarez MD        heparin (porcine) injection 5,000 Units  5,000 Units Subcutaneous Q8H Cintia Suarez MD   5,000 Units at 12/12/20 0423       US DUP UPPER EXTREMITY RIGHT ARTERIES   Final Result   1. No evidence of hemodynamically significant stenosis. Multiphasic   waveforms identified in all areas. 2.  Spectral broadening in the radial and ulnar arteries suggests early   underlying peripheral vascular disease. XR CHEST PORTABLE   Final Result   1. Enlarging left pleural effusion which is now moderate to large in size. 2. New patchy left perihilar infiltration which could represent pneumonia   ,edema or less likely atelectasis. 3. Trace right pleural effusion      US DUP UPPER EXTREMITY RIGHT VENOUS   Final Result   No evidence of DVT. XR CHEST PORTABLE   Final Result   Increased moderate left pleural effusion.       US DUP LOWER EXTREMITIES BILATERAL VENOUS    (Results Pending)         Labs:    CBC:   Recent Labs     12/10/20  0545 12/11/20  0600 12/12/20  0538   WBC 15.2* 16.5* 19.2*   HGB 8.9* 8.9* 9.1*    116* 101*        Lab Results   Component Value Date    IRON 79 10/23/2020    TIBC 80 (L) 10/23/2020    FERRITIN 2,068 10/23/2020       Lab Results   Component Value Date    PTH 47 12/09/2020     (H) 01/07/2016    CALCIUM 7.5 (L) 12/12/2020    CALCIUM 7.5 (L) 12/12/2020    CALCIUM 7.5 (L) 12/11/2020    CAION 1.18 12/12/2020    CAION 1.22 12/10/2020    CAION 1.20 12/09/2020    PHOS 2.6 12/12/2020    PHOS 3.2 12/11/2020    PHOS 3.0 12/10/2020    MG 1.8 12/12/2020    MG 2.1 12/11/2020    MG 1.9 12/10/2020       BMP:   Recent Labs     12/11/20  2329 12/12/20  0538 12/12/20  1320    134 134   K 3.8 4.0  4.0 3.9    103 108*   CO2 22 22 20*   BUN 11 11 11   CREATININE 3.5* 3.4* 3.6*   GLUCOSE 104* 130* 145*             Assessment: / Plan:    1. Chronic kidney disease stage G5/ESRD. Guero Damian We will follow the patient for intermittent dialytic support. Patient has significant GI fluid losses and will have a low ultrafiltration on dialysis. Next hemodialysis on Monday, December 14, 2020. 2.  Hypotension/shock. Improved. Support with pressors and volume    3. Hypokalemia. Severe hypokalemia in the setting of short gut syndrome. Improved. Supplement. Will use high potassium dialysate. .      4.   Metabolic acidosis. Metabolic acidosis with a lactic acidosis and and possibility of undiagnosed D lactic acidosis due to short gut syndrome. Bicarbonate levels remain low. Will supplement. Await D- lactic acid level. 5.  Anemia with chronic kidney disease. Will use JEWELL and titrate for target hemoglobin goal of 10 g/dL. 6.   Secondary hyperparathyroidism due to renal insufficiency with renal osteodystrophy in the setting of hypocalcemia with hypoalbuminemia. Will follow calcium phosphorus levels. Shaunna Lomeli MD  Nephrology  Electronically signed by Shaunna Lomeli MD on 12/12/2020 at 3:28 PM      Note: This report was completed utilizing computer voice recognition software. Every effort has been made to ensure accuracy, however; inadvertent computerized transcription errors may be present.

## 2020-12-13 NOTE — PROGRESS NOTES
Patient sitting up in bed eating breakfast. Patient calls RN to room, stating that she is choking on eggs. No decrease in pulse ox noted. Lungs auscultated at this time, rhonchi and minimal wheezing noted to bilateral lung fields. No cyanosis or change to vital signs noted. Respirations appear to be labored at this time. Attempt to suction airway to provide comfort with breathing. Patient coughs and is able to breathe comfortably/unlabored after several minutes. Patient educated on aspiration prevention methods. Dr. Zain Nguyen notified of probable aspiration. See order for speech therapy consult to evaluate swallowing.

## 2020-12-13 NOTE — PROCEDURES
Informed consent obtained. Time out done. US localization  Sterile prep and drape  1% lidocaine sc  18 gauge angiocath used to remove 350 cc bloody pleural fluid from left chest.  Fluid sent for analysis. No immediate complications. Well tolerated. EBL under 1 cc.

## 2020-12-13 NOTE — PROGRESS NOTES
Nephrology Note  Ayala Mathis MD          Patient seen and examined. No family member is present at bedside. Chart reviewed. Patient is status post thoracentesis for 350 cc of pleural fluid drainage on the left side. Patient is less short of breath. She remains on pressor support. Appetite is slowly improving. .No nausea or dysguesia. Awake and alert . In no acute distress. Vital SignsBP (!) 91/38   Pulse 85   Temp 97.4 °F (36.3 °C)   Resp 18   Ht 5' 3\" (1.6 m)   Wt 218 lb 11.1 oz (99.2 kg)   SpO2 92%   BMI 38.74 kg/m²   24HR INTAKE/OUTPUT:    Intake/Output Summary (Last 24 hours) at 12/13/2020 1411  Last data filed at 12/13/2020 1000  Gross per 24 hour   Intake 2275 ml   Output 1100 ml   Net 1175 ml         Physical Exam       Neck: No JVD  Lungs: Breath sounds decreased at the bases. No rales or ronchi. Heart: Regular rate and rhythm. No S3 gallop. No  murmrur. Abdomen: Soft non distended, non tender and normal bowel sounds.   Extremeties: No edema.        ROS:  CARDIOVASCULAR:  negative  GASTROINTESTINAL:  negative  GENITOURINARY:  Negative       Current Facility-Administered Medications   Medication Dose Route Frequency Provider Last Rate Last Admin    0.9 % sodium chloride bolus  20 mL Intravenous Once Linda Sotelo MD        potassium chloride 10 mEq in sodium chloride 0.9 % 1,000 mL infusion   Intravenous Continuous Linda Sotelo  mL/hr at 12/13/20 0956 Rate Change at 12/13/20 0956    lubrifresh P.M. (artificial tears) ophthalmic ointment   Both Eyes PRN Linda Sotelo MD        phenol 1.4 % mouth spray 1 spray  1 spray Mouth/Throat Q2H PRN Linda Sotelo MD        pregabalin (LYRICA) capsule 25 mg  25 mg Oral BID Patrick Rosas MD   25 mg at 12/13/20 0820    atorvastatin (LIPITOR) tablet 20 mg  20 mg Oral Once per day on Mon Wed Fri Linda Sotelo MD   20 mg at 12/11/20 1931    menthol-zinc oxide (CALMOSEPTINE) 0.44-20.6 % ointment   Topical BID PRN Nash Szymanski MD        psyllium (KONSYL) 28.3 % packet 1 packet  1 packet Oral BID Mable Ochoa MD   1 packet at 12/13/20 8891    cholestyramine Moreliajuan Powers) packet 4 g  1 packet Oral BID Mable Ochoa MD   4 g at 12/12/20 1204    epoetin izabella-epbx (RETACRIT) injection 3,000 Units  3,000 Units Subcutaneous Once per day on Mon Wed Fri Marge Reeves MD   3,000 Units at 12/11/20 1354    And    epoetin izabella-epbx (RETACRIT) injection 2,000 Units  2,000 Units Subcutaneous Once per day on Mon Wed Fri Marge Reeves MD   2,000 Units at 12/11/20 1353    sodium bicarbonate tablet 1,300 mg  1,300 mg Oral TID Basil Eddy Azeem   1,300 mg at 12/13/20 5181    acetaminophen (TYLENOL) tablet 650 mg  650 mg Oral Q4H PRN Mable Ochoa MD   650 mg at 12/11/20 2047    pantoprazole (PROTONIX) tablet 40 mg  40 mg Oral QAM AC Omari Valero MD   40 mg at 12/13/20 1693    midodrine (PROAMATINE) tablet 20 mg  20 mg Oral TID WC Omari Valero MD   20 mg at 12/13/20 0318    sodium chloride flush 0.9 % injection 10 mL  10 mL Intravenous 2 times per day Syliva Cheese., DO   10 mL at 12/13/20 3501    sodium chloride flush 0.9 % injection 10 mL  10 mL Intravenous PRN Syliva Cheese., DO   10 mL at 12/13/20 0035    norepinephrine (LEVOPHED) 16 mg in dextrose 5 % 250 mL infusion  2 mcg/min Intravenous Continuous Mable Ochoa MD 6.6 mL/hr at 12/13/20 0959 7 mcg/min at 12/13/20 0959    albuterol (ACCUNEB) nebulizer solution 0.63 mg  0.63 mg Nebulization 4x Daily PRN Marii Rosas MD   0.63 mg at 12/12/20 1818    aspirin EC tablet 81 mg  81 mg Oral Daily Marii Rosas MD   81 mg at 12/13/20 8063    calcium carbonate (TUMS) chewable tablet 1,500 mg  3 tablet Oral BID Marii Rosas MD   1,500 mg at 12/13/20 1686    fenofibrate (TRIGLIDE) tablet 160 mg  160 mg Oral Once per day on Mon Wed Fri Marii Rosas MD   160 mg at 63/69/85 7267    folic acid (FOLVITE) tablet 1 mg  1 mg Oral Daily Marii Rosas MD 1 mg at 12/13/20 0811    levothyroxine (SYNTHROID) tablet 137 mcg  137 mcg Oral Daily Cintia Suarez MD   137 mcg at 12/12/20 0531    liothyronine (CYTOMEL) tablet 5 mcg  5 mcg Oral Daily Cintia Suarez MD   5 mcg at 12/13/20 9003    magnesium oxide (MAG-OX) tablet 400 mg  400 mg Oral Nightly Cintia Suarez MD   400 mg at 12/12/20 2138    melatonin tablet 15 mg  15 mg Oral Nightly Cintia Suarez MD   15 mg at 12/12/20 2137    montelukast (SINGULAIR) tablet 10 mg  10 mg Oral Daily Cintia Suarez MD   10 mg at 12/12/20 2137    rOPINIRole (REQUIP) tablet 0.5 mg  0.5 mg Oral Nightly PRN Cintia Suarez MD   0.5 mg at 12/12/20 2138    [Held by provider] heparin (porcine) injection 5,000 Units  5,000 Units Subcutaneous Emma Martel MD   Stopped at 12/12/20 2139       XR CHEST PORTABLE   Final Result   Findings suggestive of pulmonary edema with a persistent moderate left and   small right pleural effusion. No evidence of pneumothorax following   thoracentesis. US DUP LOWER EXTREMITIES BILATERAL VENOUS   Final Result   1. No evidence of DVT in either lower extremity given the limitations   detailed above. 2.  Bilateral superficial soft tissue edema. 3.  Bilateral popliteal fossa fluid collections (Baker's cyst)      US DUP UPPER EXTREMITY RIGHT ARTERIES   Final Result   1. No evidence of hemodynamically significant stenosis. Multiphasic   waveforms identified in all areas. 2.  Spectral broadening in the radial and ulnar arteries suggests early   underlying peripheral vascular disease. XR CHEST PORTABLE   Final Result   1. Enlarging left pleural effusion which is now moderate to large in size. 2. New patchy left perihilar infiltration which could represent pneumonia   ,edema or less likely atelectasis. 3. Trace right pleural effusion      US DUP UPPER EXTREMITY RIGHT VENOUS   Final Result   No evidence of DVT. XR CHEST PORTABLE   Final Result   Increased moderate left pleural effusion. US THORACENTESIS Which side should the procedure be performed? Left    (Results Pending)         Labs:    CBC:   Recent Labs     12/12/20  0538 12/13/20  0500 12/13/20  0830   WBC 19.2* 15.9* 17.4*   HGB 9.1* 7.0* 7.2*   * 67* 71*        Lab Results   Component Value Date    IRON 79 10/23/2020    TIBC 80 (L) 10/23/2020    FERRITIN 2,068 10/23/2020       Lab Results   Component Value Date    PTH 47 12/09/2020     (H) 01/07/2016    CALCIUM 7.6 (L) 12/13/2020    CALCIUM 7.5 (L) 12/12/2020    CALCIUM 7.5 (L) 12/12/2020    CAION 1.20 12/13/2020    CAION 1.18 12/12/2020    CAION 1.22 12/10/2020    PHOS 3.0 12/13/2020    PHOS 2.6 12/12/2020    PHOS 3.2 12/11/2020    MG 1.7 12/13/2020    MG 1.8 12/12/2020    MG 2.1 12/11/2020       BMP:   Recent Labs     12/12/20  0538 12/12/20  1320 12/13/20  0500    134 135   K 4.0  4.0 3.9 4.2  4.2    108* 107   CO2 22 20* 21*   BUN 11 11 12   CREATININE 3.4* 3.6* 3.7*   GLUCOSE 130* 145* 95             Assessment: / Plan:    1. Chronic kidney disease stage G5/ESRD. Radha Zhang We will follow the patient for intermittent dialytic support. Patient has significant GI fluid losses and will have a low ultrafiltration on dialysis. Next hemodialysis on Monday, December 14, 2020.     2. Hypotension/shock. Improved. Try to wean off pressor support.     3. Hypokalemia. Severe hypokalemia in the setting of short gut syndrome. Improved. Will use high potassium dialysate. .       4.   Metabolic acidosis. Metabolic acidosis with a lactic acidosis and and possibility of undiagnosed D lactic acidosis due to short gut syndrome. Bicarbonate levels improved. Will adjust dialysate. Await D- lactic acid level.     5. Anemia with chronic kidney disease. Will use JEWELL and titrate for target hemoglobin goal of 10 g/dL.     6.   Secondary hyperparathyroidism due to renal insufficiency with renal osteodystrophy in the setting of hypocalcemia with hypoalbuminemia.   Will follow calcium phosphorus levels.            Aury Wilcox MD  Nephrology  Electronically signed by Aury Wilcox MD on 12/13/2020 at 2:09 PM      Note: This report was completed utilizing computer voice recognition software. Every effort has been made to ensure accuracy, however; inadvertent computerized transcription errors may be present.

## 2020-12-13 NOTE — PROGRESS NOTES
Internal Medicine Progress Note      Synopsis: Patient admitted on 12/7/2020    Subjective    Feeling slightly better today. Pressors are less and hoping to be weaned off today. Still with lots of frequent stools    December 10, 2020  Still on pressors though less so. Recovering better and better  Feeling short of breath and swollen and weak but does not want to consider discharge to any other placement home  December 11, 2020  Now with fecal management system. Pressors were weaned off and her blood pressure dropped. Almost systolic of 50. She was symptomatically lightheaded and felt weak  December 12, 2020  Still  on pressors  Still with loose stools. Now with FMS since yesterday  Ded 13 th   Feels dizzy  Feels swollen  She  Will be getting bt   Exam:  BP (!) 87/38   Pulse 81   Temp 97.4 °F (36.3 °C) (Oral)   Resp 15   Ht 5' 3\" (1.6 m)   Wt 218 lb 11.1 oz (99.2 kg)   SpO2 92%   BMI 38.74 kg/m²   General appearance: No apparent distress, appears stated age and cooperative. HEENT: Pupils equal, round, and reactive to light. Conjunctivae/corneas clear. Neck: Supple. No jugular venous distention. Trachea midline. Respiratory:  Normal respiratory effort. Clear to auscultation, bilaterally without Rales/Wheezes/Rhonchi. Cardiovascular: Regular rate and rhythm with normal S1/S2 without murmurs, rubs or gallops. Abdomen: Soft, non-tender, non-distended with normal bowel sounds.   Musculoskeletal: No clubbing, cyanosis 2+ edema bilateral lower extremities  Skin:  No rashes    Neurologic: awake, alert and following commands     Medications:  Reviewed    Infusion Medications    IV infusion builder 100 mL/hr at 12/13/20 0956    norepinephrine 7 mcg/min (12/13/20 0959)     Scheduled Medications    sodium chloride  20 mL Intravenous Once    pregabalin  25 mg Oral BID    atorvastatin  20 mg Oral Once per day on Mon Wed Fri    psyllium  1 packet Oral BID    cholestyramine  1 packet Oral BID    epoetin izabella-epbx  3,000 Units Subcutaneous Once per day on Mon Wed Fri    And    epoetin izabella-epbx  2,000 Units Subcutaneous Once per day on Mon Wed Fri    sodium bicarbonate  1,300 mg Oral TID    pantoprazole  40 mg Oral QAM AC    midodrine  20 mg Oral TID WC    sodium chloride flush  10 mL Intravenous 2 times per day    aspirin  81 mg Oral Daily    calcium carbonate  3 tablet Oral BID    fenofibrate  160 mg Oral Once per day on Mon Wed Fri    folic acid  1 mg Oral Daily    levothyroxine  137 mcg Oral Daily    liothyronine  5 mcg Oral Daily    magnesium oxide  400 mg Oral Nightly    melatonin  15 mg Oral Nightly    montelukast  10 mg Oral Daily    [Held by provider] heparin (porcine)  5,000 Units Subcutaneous Q8H     PRN Meds: artificial tears, phenol, menthol-zinc oxide, acetaminophen, sodium chloride flush, albuterol, rOPINIRole    I/O    Intake/Output Summary (Last 24 hours) at 12/13/2020 1033  Last data filed at 12/12/2020 1800  Gross per 24 hour   Intake 2325 ml   Output 1100 ml   Net 1225 ml       Labs:   Recent Labs     12/12/20  0538 12/13/20  0500 12/13/20  0830   WBC 19.2* 15.9* 17.4*   HGB 9.1* 7.0* 7.2*   HCT 28.7* 22.2* 23.0*   * 67* 71*       Recent Labs     12/11/20  0600 12/11/20  0600 12/12/20  0538 12/12/20  1320 12/13/20  0500      < > 134 134 135   K 4.1  4.1   < > 4.0  4.0 3.9 4.2  4.2   *   < > 103 108* 107   CO2 20*   < > 22 20* 21*   BUN 17   < > 11 11 12   CREATININE 4.5*   < > 3.4* 3.6* 3.7*   CALCIUM 7.9*   < > 7.5* 7.5* 7.6*   PHOS 3.2  --  2.6  --  3.0    < > = values in this interval not displayed. Recent Labs     12/11/20  0600 12/12/20  0538 12/13/20  0500   PROT 5.8* 5.9* 5.3*   ALKPHOS 182* 183* 149*   ALT 29 27 22   AST 33* 33* 31   BILITOT 1.0 1.0 1.1       No results for input(s): INR in the last 72 hours. No results for input(s): Freya Basques in the last 72 hours.     Chronic labs:  Lab Results   Component Value Date    CHOL 96 02/27/2020    TRIG 131 02/27/2020    HDL 36 02/27/2020    LDLCALC 34 02/27/2020    TSH 4.260 (H) 12/07/2020    INR 1.3 10/21/2020    LABA1C <4.0 12/30/2019     Results for Robbi Victor (MRN [id number]) as of 12/9/2020 10:51   Ref. Range 12/8/2020 07:50 12/8/2020 20:00 12/9/2020 00:55 12/9/2020 05:00 12/9/2020 08:40   Lactic Acid Latest Ref Range: 0.5 - 2.2 mmol/L 4.6 (HH) 3.8 (H) 3.2 (H) 2.5 (H) 2.6 (H)     Radiology:  Xr Chest Portable    Result Date: 12/7/2020  EXAMINATION: ONE XRAY VIEW OF THE CHEST 12/7/2020 8:12 am COMPARISON: 11/28/2020 HISTORY: Acute shortness of breath. FINDINGS: Sternotomy changes with prosthetic cardiac valves and left atrial appendage clip. Heart remains enlarged. Increased moderate left pleural effusion with adjacent airspace disease. Mild right basilar atelectasis. No pneumothorax. Increased moderate left pleural effusion. Xr Chest Portable    Result Date: 11/28/2020  EXAMINATION: ONE XRAY VIEW OF THE CHEST 11/28/2020 6:07 pm COMPARISON: Chest series from October 21, 2020. HISTORY: ORDERING SYSTEM PROVIDED HISTORY: sob TECHNOLOGIST PROVIDED HISTORY: Reason for exam:->sob FINDINGS: Postsurgical changes overlie the mediastinum. Lung hyperinflation and coarse interstitial markings. Elevation of the left hemidiaphragm versus loculated left pleural effusion is unchanged. Small right pleural effusion or scarring. No new airspace disease. No pneumothorax. Atherosclerotic disease in the aortic arch. Cardiac silhouette is enlarged. Normal pulmonary vascularity. Osseous and thoracic soft tissue structures demonstrate no acute findings. 1.  Stable lung hyperinflation and coarse interstitial markings. Stable left greater than right pleural effusions or potentially scarring. No new pulmonary pathology. 2.  Atherosclerotic disease and cardiomegaly. Postsurgical changes.   Normal pulmonary vascularity on this exam    ASSESSMENT:    Active Problems:    Hypotension Hypokalemia  Resolved Problems:    * No resolved hospital problems. *  Lactic acidosis  Hemodialysis dependence  Chronic pain  Restless legs  Anemia  Asthma  Hypothyroidism     PLAN:    Appreciate input of renal services  Replace potassium  Remains on pressors  Still acidotic. D lactic acid being looked for. No results yet    December 9, 2020  Await decrease of pressors and her toleration of that  The lactic acid levels are pending and will need to be waited on  She is tired but otherwise mentally well  Less and lowered lactic acid level today    December 10, 2020  Mildly acidotic still. Continue to wean pressors. Dialysis per renal services. Resumption of premorbid medications. Midodrine increase as tolerated. Fluid losses from GI tract still considered being likely the source of her profound hypotension  C. difficile negative    December 11, 2020  Continue to provide pressor support. Patient still in the ICU. Await work-up for the lactic acidosis  Cholestyramine started for loose stools but doubt that it has been effective. We can request GI services to see if they can give us a new thought process  Continue to follow labs  Lower dose of Lyrica to avoid hypotension from that  December 12, 2020  Await GI services to see her. Discussed with family at length on the phone leukocytosis is getting worse  Request infectious disease to see her? Lactic acid still fluctuating and not normal yet    Dec 13 th 2020  Blood pressure still low. Pressors still in place  Note ID and Dr Alicja Jacome input   BT now ordered  No blood loss seen but s/s with her bp  D/w family.   Await hemo  Low plts still  3 rd spaced     Diet: Dietary Nutrition Supplements: Protein Modular  Dietary Nutrition Supplements: Low Calorie High Protein Supplement  DIET GENERAL;  Code Status: Prior  PT/OT Eval Status:   Once off of pressors  DVT Prophylaxis:    In place   recommended disposition at discharge:   Likely home  +++++++++++++++++++++++++++++++++++++++++++++++++  Brianna Ghotra MD   [facility].  +++++++++++++++++++++++++++++++++++++++++++++++++  NOTE: This report was transcribed using voice recognition software. Every effort was made to ensure accuracy; however, inadvertent computerized transcription errors may be present.

## 2020-12-13 NOTE — PATIENT CARE CONFERENCE
..  Intensive Care Daily Quality Rounding Checklist      ICU Team Members: Dr. Roxanna Hough, charge nurse and bedside nurse    ICU Day #: NUMBER: 6    Intubation Date: N/A    Ventilator Day #:  N/A    Central Line Insertion Date: December 7        Day #: NUMBER: 6     Arterial Line Insertion Date: December 8      Day #: NUMBER: 7    DVT Prophylaxis: Heparin drip, change to SQ    GI Prophylaxis: Protonix    Morse Catheter Insertion Date: N/A        Day #: N/A      Continued need (if yes, reason documented and discussed with physician): N/A    Skin Issues/ Wounds and ordered treatment discussed on rounds: SOS precautions    Goals/ Plans for the Day:  Wean Levophed as able for MAP > 55, check with nephro regarding possible HD today, 1 PRBC today, daily labs

## 2020-12-13 NOTE — PROGRESS NOTES
ID Progress Note                [address] [facility] [address], [place], [address] [facility]            Phone [phone]     Fax [phone]      Chief complaint   fatigue    Subjective:  Still hypotensive on pressors  Stools more formed  The patient is awake and alert. Tolerating medications. Reports no side effects. Afebrile. 10 ROS otherwise negative unless otherwise specified above. Objective:    Vitals:    12/13/20 1040   BP: (!) 91/38   Pulse: 85   Resp: 18   Temp: 97.4 °F (36.3 °C)   SpO2:      General appearance: Alert, Awake, Oriented times 3, no distress  Skin: Warm and dry  Eyes: Pink palpebral conjunctivae. PERRL  Ears: External ears normal.  Nose/Sinuses: Nares normal. Septum midline. Mucosa normal. No sinus tenderness. Oropharynx: Oropharynx clear with no exudates seen  Neck: Neck supple. No jugular venous distension, lymphadenopathy or thyromegaly Trachea midline  Lungs: Lungs clear to auscultation bilaterally. No rhonchi, crackles or wheezes  Heart: S1 S2  Regular rate and rhythm. No rub, murmur or gallop  Abdomen: Abdomen soft, non-tender. BS normal. No masses, organomegaly  Extremities: No edema, Peripheral pulses palpable  Musculoskeletal: Muscular strength appears intact.  No joint effusion, tenderness, swelling or warmth  Fecal management system in place and has rather more formed stools now       Labs:  Recent Labs     12/12/20  0538 12/13/20  0500 12/13/20  0830   WBC 19.2* 15.9* 17.4*   RBC 2.66* 2.04* 2.10*   HGB 9.1* 7.0* 7.2*   HCT 28.7* 22.2* 23.0*   .9* 108.8* 109.5*   MCH 34.2 34.3 34.3   MCHC 31.7* 31.5* 31.3*   RDW 19.9* 20.0* 20.2*   * 67* 71*   MPV 12.4* 12.2* 12.5*     CMP:    Lab Results   Component Value Date     12/13/2020    K 4.2 12/13/2020    K 4.2 12/13/2020     12/13/2020    CO2 21 12/13/2020    BUN 12 12/13/2020    CREATININE 3.7 12/13/2020    GFRAA 15 12/13/2020    LABGLOM 12 12/13/2020    GLUCOSE 95 12/13/2020    PROT 5.3 12/13/2020    LABALBU 1.7 12/13/2020    CALCIUM 7.6 12/13/2020    BILITOT 1.1 12/13/2020    ALKPHOS 149 12/13/2020    AST 31 12/13/2020    ALT 22 12/13/2020          Microbiology :  No results for input(s): BC in the last 72 hours. No results for input(s): Cletis Daft in the last 72 hours. No results for input(s): LABURIN in the last 72 hours. No results for input(s): CULTRESP in the last 72 hours. No results for input(s): WNDABS in the last 72 hours. Radiology :  US DUP LOWER EXTREMITIES BILATERAL VENOUS   Final Result   1. No evidence of DVT in either lower extremity given the limitations   detailed above. 2.  Bilateral superficial soft tissue edema. 3.  Bilateral popliteal fossa fluid collections (Baker's cyst)      US DUP UPPER EXTREMITY RIGHT ARTERIES   Final Result   1. No evidence of hemodynamically significant stenosis. Multiphasic   waveforms identified in all areas. 2.  Spectral broadening in the radial and ulnar arteries suggests early   underlying peripheral vascular disease. XR CHEST PORTABLE   Final Result   1. Enlarging left pleural effusion which is now moderate to large in size. 2. New patchy left perihilar infiltration which could represent pneumonia   ,edema or less likely atelectasis. 3. Trace right pleural effusion      US DUP UPPER EXTREMITY RIGHT VENOUS   Final Result   No evidence of DVT. XR CHEST PORTABLE   Final Result   Increased moderate left pleural effusion. US THORACENTESIS Which side should the procedure be performed?  Left    (Results Pending)   XR CHEST PORTABLE    (Results Pending)          Assessment and Plan:      · Hypotension/leukocytosis-sepsis versus reactive (excess volume loss, diarrhea)  · End-stage renal disease on hemodialysis  · Status post gastric bypass surgery with short gut syndrome  · Obstructive sleep apnea  · Polymyalgia rheumatica  · Left-sided pleural effusion     Plan  -After carefully reviewing the hospital course I feel this is not related to sepsis  -Agree with holding any antibiotics for now in the meanwhile I will order for some biomarkers-CRP/procalcitonin  -Send stool cultures for testing, started on Imodium today, looks like more of a secretory diarrhea- psyllium seems to be helping  -D lactic acid level pending (short gut syndrome)  -Blood cultures have been negative so far  -UA in setting of diarrhea and on top of that in the renal disease patient is not very reliable             Electronically signed by Rere Garcia MD on 12/13/2020 at 1:30 PM

## 2020-12-13 NOTE — PROGRESS NOTES
to take her midodrine to resolve the lightheadedness, this did not help, the onset was today, the severity is moderate. She will be admitted to ICU for hypotension, and possible sepsis. OVERNIGHT EVENTS:         12/8-Progressively higher vasopressor needs. 12/9-Decreasing vasopressor needs, now almost off levo  12/10- No acute vents overnight, pressors still being weaned  12/11-off Levophed, vasopressin running at .03  12/12- Attempted to d/c levo yesterday, SBP went to fifty and she became dizzy. Back on Levophed. Also had an episode of Raynaud's-like phenomenon where her finger became dusky, improved with elevation, will discontinue art line on that side. 12/13- Increasing dizziness and dyspnea, requesting thoracentesis be performed. Anemia slightly worse. Still on norepi at 7 mics/ min.   AWAKE & FOLLOWING COMMANDS:  [] No   [x] Yes    CURRENT VENTILATION STATUS:     [] Ventilator  [] BIPAP  [x] Nasal Cannula [] Room Air      IF INTUBATED, ET TUBE MARKING AT LOWER LIP:       cms    SECRETIONS Amount:  [] Small [] Moderate  [] Large  [x] None  Color:     [] White [] Colored  [] Bloody    SEDATION:  RAAS Score:  [] Propofol gtt  [] Versed gtt  [] Ativan gtt   [x] No Sedation    PARALYZED:  [x] No    [] Yes    DIARRHEA:                [] No                [x] Yes  (C. Difficile status: [] positive                                                                                                                       [x] negative                                                                                                                     [] pending)    VASOPRESSORS:  [] No    [x] Yes    If yes -   [x] Levophed       [] Dopamine     [] Vasopressin       [] Dobutamine  [] Phenylephrine         [] Epinephrine    CENTRAL LINES:     [] No   [] Yes   (Date of Insertion: 12/7  )           If yes -     [] Right IJ     [] Left IJ [x] Right Femoral [] Left Femoral                   [] Right Subclavian [] Left speech, no focal findings or movement disorder noted  Extremities - peripheral pulses normal, right femoral TLC, left arm AV fistula, Fingers not dusky any longer  Skin - normal coloration, appears to have some bruises    Any additional physical findings:    MEDICATIONS:    Scheduled Meds:   sodium chloride  20 mL Intravenous Once    pregabalin  25 mg Oral BID    atorvastatin  20 mg Oral Once per day on Mon Wed Fri    psyllium  1 packet Oral BID    cholestyramine  1 packet Oral BID    epoetin izabella-epbx  3,000 Units Subcutaneous Once per day on Mon Wed Fri    And    epoetin izabella-epbx  2,000 Units Subcutaneous Once per day on Mon Wed Fri    sodium bicarbonate  1,300 mg Oral TID    pantoprazole  40 mg Oral QAM AC    midodrine  20 mg Oral TID WC    sodium chloride flush  10 mL Intravenous 2 times per day    aspirin  81 mg Oral Daily    calcium carbonate  3 tablet Oral BID    fenofibrate  160 mg Oral Once per day on Mon Wed Fri    folic acid  1 mg Oral Daily    levothyroxine  137 mcg Oral Daily    liothyronine  5 mcg Oral Daily    magnesium oxide  400 mg Oral Nightly    melatonin  15 mg Oral Nightly    montelukast  10 mg Oral Daily    [Held by provider] heparin (porcine)  5,000 Units Subcutaneous Q8H     Continuous Infusions:   IV infusion builder 100 mL/hr at 12/13/20 0956    norepinephrine 7 mcg/min (12/13/20 0959)     PRN Meds:       artificial tears, , PRN      phenol, 1 spray, Q2H PRN      menthol-zinc oxide, , BID PRN      acetaminophen, 650 mg, Q4H PRN      sodium chloride flush, 10 mL, PRN      albuterol, 0.63 mg, 4x Daily PRN      rOPINIRole, 0.5 mg, Nightly PRN          VENT SETTINGS (Comprehensive) (if applicable):  Vent Information  Skin Assessment: Clean, dry, & intact  SpO2: 92 %  Additional Respiratory  Assessments  Pulse: 85  Resp: 18  SpO2: 92 %  Oral Care: Mouth swabbed, Mouth moisturizer        Laboratory findings:    Complete Blood Count:   Recent Labs     12/12/20  0579 12/13/20  0500 12/13/20  0830   WBC 19.2* 15.9* 17.4*   HGB 9.1* 7.0* 7.2*   HCT 28.7* 22.2* 23.0*   * 67* 71*        Last 3 Blood Glucose:   Recent Labs     12/12/20  0538 12/12/20  1320 12/13/20  0500   GLUCOSE 130* 145* 95        PT/INR:    Lab Results   Component Value Date    PROTIME 15.1 10/21/2020    INR 1.3 10/21/2020     PTT:    Lab Results   Component Value Date    APTT 36.6 06/02/2018       Comprehensive Metabolic Profile:   Recent Labs     12/12/20  0538 12/12/20  1320 12/13/20  0500    134 135   K 4.0  4.0 3.9 4.2  4.2    108* 107   CO2 22 20* 21*   BUN 11 11 12   CREATININE 3.4* 3.6* 3.7*   GLUCOSE 130* 145* 95   CALCIUM 7.5* 7.5* 7.6*   PROT 5.9*  --  5.3*   LABALBU 1.5*  --  1.7*   BILITOT 1.0  --  1.1   ALKPHOS 183*  --  149*   AST 33*  --  31   ALT 27  --  22      Magnesium:   Lab Results   Component Value Date    MG 1.7 12/13/2020     Phosphorus:   Lab Results   Component Value Date    PHOS 3.0 12/13/2020     Ionized Calcium:   Lab Results   Component Value Date    CAION 1.20 12/13/2020        Urinalysis:     Troponin:   No results for input(s): TROPONINI in the last 72 hours. Microbiology:    Cultures during this admission:     Blood cultures:   Pending              [] None drawn      [x] Negative             []  Positive (Details:  )  Urine Culture:                   [x] None drawn      [] Negative             []  Positive (Details:  )  Sputum Culture:               [x] None drawn       [] Negative             []  Positive (Details:  )   Endotracheal aspirate:     [x] None drawn       [] Negative             []  Positive (Details:  )     Other pertinent Labs:       Radiology/Imaging:   Xr Chest Portable    Result Date: 12/7/2020  EXAMINATION: ONE XRAY VIEW OF THE CHEST 12/7/2020 8:12 am COMPARISON: 11/28/2020 HISTORY: Acute shortness of breath. FINDINGS: Sternotomy changes with prosthetic cardiac valves and left atrial appendage clip. Heart remains enlarged.   Increased moderate left pleural effusion with adjacent airspace disease. Mild right basilar atelectasis. No pneumothorax. Increased moderate left pleural effusion. Xr Chest Portable    Result Date: 11/28/2020  EXAMINATION: ONE XRAY VIEW OF THE CHEST 11/28/2020 6:07 pm COMPARISON: Chest series from October 21, 2020. HISTORY: ORDERING SYSTEM PROVIDED HISTORY: sob TECHNOLOGIST PROVIDED HISTORY: Reason for exam:->sob FINDINGS: Postsurgical changes overlie the mediastinum. Lung hyperinflation and coarse interstitial markings. Elevation of the left hemidiaphragm versus loculated left pleural effusion is unchanged. Small right pleural effusion or scarring. No new airspace disease. No pneumothorax. Atherosclerotic disease in the aortic arch. Cardiac silhouette is enlarged. Normal pulmonary vascularity. Osseous and thoracic soft tissue structures demonstrate no acute findings. 1.  Stable lung hyperinflation and coarse interstitial markings. Stable left greater than right pleural effusions or potentially scarring. No new pulmonary pathology. 2.  Atherosclerotic disease and cardiomegaly. Postsurgical changes. Normal pulmonary vascularity on this exam       Chest Xray (12/13/2020):   Viewed LG mild-moderate left pleural effusion  ASSESSMENT:     Patient Active Problem List    Diagnosis Date Noted    Hypokalemia 12/07/2020    Dizziness     [facility]     Hypotension 10/21/2020    Thrombocytopenia ([address] 06/02/2018    Hematoma 06/02/2018    Traumatic hematoma of left upper arm 22/55/9168    Complication of AV dialysis fistula, initial encounter 06/02/2018    ESRD on dialysis [facility]) 03/07/2018    Problem with dialysis access ([address] 01/17/2018    Hypervolemia     Acute on chronic diastolic congestive heart failure (HCC)     History of mitral valve repair     History of tricuspid valve repair     MANUEL (acute kidney injury) ([address]     Chronic renal failure, stage 5 ([address]     Respiratory arrest ([facility]     Non-rheumatic mitral regurgitation 02/09/2017    (HFpEF) heart failure with preserved ejection fraction ([facility] 01/25/2017    Cellulitis of right lower extremity 01/31/2016    Exacerbation of asthma 01/06/2016    Renal failure (ARF), acute on chronic ([facility] 01/06/2016    HTN (hypertension) 01/06/2016    YUNIOR on CPAP 01/06/2016    Hypomagnesemia 01/06/2016    Hypocalcemia 01/06/2016    Hypothyroid 01/06/2016       Additional assessment:    · Left pleural effusion  · Acute on chronic anemia/ ?acute blood loss vs chronic disease        PLAN:     WEAN PER PROTOCOL:  [] No   [] Yes  [x] N/A    DISCONTINUE ANY LABS:   [x] No   [] Yes    ICU PROPHYLAXIS:  Stress ulcer:  [x] PPI Agent  [] A1Uculq [] Sucralfate  [] Other:  VTE:   [] Enoxaparin  [x] Unfract. Heparin Subcut  [] EPC Cuffs    NUTRITION:  [] NPO [] Tube Feeding (Specify: ) [] TPN  [x] PO (Diet: Dietary Nutrition Supplements: Protein Modular  Dietary Nutrition Supplements: Low Calorie High Protein Supplement  DIET GENERAL;)    HOME MEDICATIONS RECONCILED: [] No  [x] Yes    INSULIN DRIP:   [x] No   [] Yes    CONSULTATION NEEDED:  [x] No   [] Yes    FAMILY UPDATED:    [x] No   [] Yes    TRANSFER OUT OF ICU:   [x] No   [] Yes    ADDITIONAL PLAN:  Left thoracentesis today    SYSTEMS ASSESSMENT     Neuro   Lightheadedness-likely related to hypotension, and hypovolemia  Resume home Lyrica, ropinirole  Respiratory   Wheeziness-patient has a history of asthma, continue home Singulair, albuterol  YUNIOR-CPAP at night  Cardiovascular   History of diastolic heart failure-monitor fluid status, continue home aspirin     Hypotension-likely related to hypovolemia versus sepsis, titrate levo as needed, continue to replete intravascular volume, restart home midodrine.  Give 1 unit prbcs.     Resume home Lipitor, fenofibrate  Gastrointestinal   Short gut syndrome-monitor, on stool thickening agent as this may be the source of hypovolemia, FMS placed 12/10 for frequent stooling  GI prophylaxis-Protonix     Renal   ESRD on dialysis-Monday Wednesday Friday dialysis, will need to improve blood pressure before resuming  Hypokalemia-likely related to short gut syndrome versus early termination of dialysis today, receiving 80 mEq in the ER, monitor electrolytes every 4 hours      Infectious Disease   Possible sepsis-lactic acidemia, elevated white blood cell count, blood cultures drawn, empiric antibiotics given but now off     Hematology/Oncology   DVT prophylaxis-Heparin  Chronic anemia-resume home darbepoetin, or possible substitute For 1 unit prbc today. Platelets dropping and if any further drop, will stop heparin sc.     Endocrine   Resume home Synthroid     Social/Spiritual/DNR/Other   Full code     ASSESSMENT/ PLAN   1. Hypotension improving, hydration as needed  2. Sepsis-follow blood cultures, empiric Vanco and Zosyn given-hold on antibiotics until source identified or vitals change  3. Lactic Acidosis- resolved, may have been related to hypovolemia, continue to monitor fluid status  4. Hypokalemia- resolved  5. Continue midodrine, back on Levophed 12/12 , continue to wean vaso and levo  6. Continue home meds  7. Work up for Type D lactic acidosis, C. difficile negative, will continue stool bulking agent and start on Imodium today  8. FMS placed for frequent stooling-patient slept through the night without any bowel movements, will have to continue to monitor to see if FMS is impacting patient  9. Changed out arterial line from right radial to femoral line yesterday. 10. Transfuse 1 unit prbc  11. Left thoracentesis, r/b explained and she wanted to proceed.   12. D/W Dr. Nya White

## 2020-12-14 NOTE — PROGRESS NOTES
Progress Note  12/14/2020 12:33 PM  Subjective:   Admit Date: 12/7/2020  PCP: Pattie Yañez MD  Interval History: Patient examined on BIPAP     Diet: Dietary Nutrition Supplements: Protein Modular  Dietary Nutrition Supplements: Low Calorie High Protein Supplement  DIET GENERAL;    Data:   Scheduled Meds:   albumin human  50 g Intravenous Once in dialysis    sodium chloride  20 mL Intravenous Once    pregabalin  25 mg Oral BID    atorvastatin  20 mg Oral Once per day on Mon Wed Fri    psyllium  1 packet Oral BID    cholestyramine  1 packet Oral BID    epoetin izabella-epbx  3,000 Units Subcutaneous Once per day on Mon Wed Fri    And    epoetin izabella-epbx  2,000 Units Subcutaneous Once per day on Mon Wed Fri    sodium bicarbonate  1,300 mg Oral TID    pantoprazole  40 mg Oral QAM AC    midodrine  20 mg Oral TID WC    sodium chloride flush  10 mL Intravenous 2 times per day    aspirin  81 mg Oral Daily    calcium carbonate  3 tablet Oral BID    fenofibrate  160 mg Oral Once per day on Mon Wed Fri    folic acid  1 mg Oral Daily    levothyroxine  137 mcg Oral Daily    liothyronine  5 mcg Oral Daily    magnesium oxide  400 mg Oral Nightly    melatonin  15 mg Oral Nightly    montelukast  10 mg Oral Daily     Continuous Infusions:   norepinephrine 9 mcg/min (12/14/20 0652)     PRN Meds:artificial tears, phenol, menthol-zinc oxide, acetaminophen, sodium chloride flush, albuterol, rOPINIRole  I/O last 3 completed shifts: In: 3383.2 [P.O.:550; I.V.:2483.2; Blood:350]  Out: 800 [Stool:800]  No intake/output data recorded.     Intake/Output Summary (Last 24 hours) at 12/14/2020 1233  Last data filed at 12/14/2020 0525  Gross per 24 hour   Intake 3183.18 ml   Output 800 ml   Net 2383.18 ml     CBC:   Recent Labs     12/13/20  0500 12/13/20  0830 12/13/20  1650 12/14/20  0530   WBC 15.9* 17.4*  --  16.7*   HGB 7.0* 7.2* 8.6* 8.2*   PLT 67* 71*  --  59*     BMP:    Recent Labs     12/12/20  1320 12/13/20  0500 12/14/20  0530    135 138   K 3.9 4.2  4.2 4.6   * 107 109*   CO2 20* 21* 21*   BUN 11 12 14   CREATININE 3.6* 3.7* 4.0*   GLUCOSE 145* 95 112*     Hepatic:   Recent Labs     12/12/20  0538 12/13/20  0500 12/14/20  0530   AST 33* 31 38*   ALT 27 22 25   BILITOT 1.0 1.1 1.2   ALKPHOS 183* 149* 171*     Troponin: No results for input(s): TROPONINI in the last 72 hours. BNP: No results for input(s): BNP in the last 72 hours. Lipids: No results for input(s): CHOL, HDL in the last 72 hours. Invalid input(s): LDLCALCU  ABGs: No results found for: PHART, PO2ART, WXT1IKY  INR: No results for input(s): INR in the last 72 hours. -----------------------------------------------------------------  RAD: Xr Chest Portable    Result Date: 12/7/2020  EXAMINATION: ONE XRAY VIEW OF THE CHEST 12/7/2020 8:12 am COMPARISON: 11/28/2020 HISTORY: Acute shortness of breath. FINDINGS: Sternotomy changes with prosthetic cardiac valves and left atrial appendage clip. Heart remains enlarged. Increased moderate left pleural effusion with adjacent airspace disease. Mild right basilar atelectasis. No pneumothorax. Increased moderate left pleural effusion. Objective:   Vitals: BP (!) 91/38   Pulse 80   Temp 97.6 °F (36.4 °C) (Oral)   Resp 18   Ht 5' 3\" (1.6 m)   Wt 218 lb 11.1 oz (99.2 kg)   SpO2 97%   BMI 38.74 kg/m²   General appearance: appears stated age   Skin:  No rashes or lesions  HEENT: Head: Normocephalic, no lesions, without obvious abnormality.   Neck: no adenopathy, no carotid bruit, no JVD, supple, symmetrical, trachea midline and thyroid not enlarged, symmetric, no tenderness/mass/nodules  Lungs: diminished breath sounds bibasilar  Heart: regular rate and rhythm, S1, S2 normal, no murmur, click, rub or gallop  Abdomen: soft, non-tender; bowel sounds normal; no masses,  no organomegaly  Extremities: edema +++  Neurologic: Mental status: Alert, oriented, thought content appropriate    Assessment: Patient Active Problem List:     Exacerbation of asthma     Renal failure (ARF), acute on chronic (HCC)     HTN (hypertension)     YUNIOR on CPAP     Hypomagnesemia     Hypocalcemia     Hypothyroid     Cellulitis of right lower extremity     Non-rheumatic mitral regurgitation     Respiratory arrest (HCC)     Hypervolemia     Acute on chronic diastolic congestive heart failure ([address]     History of mitral valve repair     History of tricuspid valve repair     MANUEL (acute kidney injury) ([address]     Chronic renal failure, stage 5 ([address]     Problem with dialysis access ([address]     ESRD on dialysis ([address]     Thrombocytopenia ([address]     Hematoma     Traumatic hematoma of left upper arm     Complication of AV dialysis fistula, initial encounter     (HFpEF) heart failure with preserved ejection fraction (HCC)     Hypotension     Dizziness     PAT (paroxysmal atrial tachycardia) ([facility]     Hypokalemia    Plan:   Assessment: / Plan:     1.  Chronic kidney disease stage G5/ESRD. .  We will follow the patient for intermittent dialytic support.  Patient has significant GI fluid losses .  Next hemodialysis on Monday, December 14, 2020.     2.  Hypotension/shock.  Improved.    Try to wean off pressor support.     3.  Hypokalemia.  Severe hypokalemia in the setting of short gut syndrome.  Improved. Will use high potassium dialysate. .       4.   Metabolic acidosis.  Metabolic acidosis with a lactic acidosis and and possibility of undiagnosed D lactic acidosis due to short gut syndrome.  Bicarbonate levels improved. Will adjust dialysate. Await D- lactic acid level.     5.  Anemia with chronic kidney disease.  Will use JEWELL and titrate for target hemoglobin goal of 10 g/dL.    6.   Secondary hyperparathyroidism due to renal insufficiency with renal osteodystrophy in the setting of hypocalcemia with hypoalbuminemia.  Will follow calcium phosphorus levels.     Her s albumin is at 1.5 ( severe hypoalbuminemia ) , she has thrombocytopenia , will

## 2020-12-14 NOTE — CARE COORDINATION
Social Work/Discharge Planning:  Chart reviewed. Patient on 3 liters of oxygen. Therapy to evaluate patient.  made a referral to [address] [place] [address] Road on Friday 12/11. Called liaison Shazia Duvall at [address] [place] [address] and left a message in regards to status of acceptance. Will continue to follow. Electronically signed by HANANE Starkey on 12/14/2020 at 3:43 PM    Addendum:  Shazia Duvall with [address] [place] [address] states they are following patient. Will continue to follow.   Electronically signed by HANANE Starkey on 12/14/2020 at 3:47 PM

## 2020-12-14 NOTE — PROGRESS NOTES
12/13/20 2130   NIV Type   Mode Other (Comment)  (hpap in use 3l/m bleed added at this time)   Dr.  Your pt. Requests use of their Home BIPAP during their stay. This requires a home         bipap order.  Thanks,

## 2020-12-14 NOTE — PROGRESS NOTES
Occupational Therapy  Date:12/14/2020  Patient Name: Sumi Madden  Room: 0206/0206-A     Occupational Therapy (OT) order received; patient unavailable secondary to dialysis. OT evaluation to be re-attempted at later date, as able/appropriate. Arabella Lou, OTR/L  License Number: [id number]

## 2020-12-14 NOTE — PLAN OF CARE
Problem: Falls - Risk of:  Goal: Will remain free from falls  Description: Will remain free from falls  Outcome: Met This Shift     Problem: Pain:  Goal: Pain level will decrease  Description: Pain level will decrease  Outcome: Met This Shift     Problem: Fluid and Electrolyte Imbalance  Goal: Fluid and electrolyte balance are achieved/maintained  Outcome: Met This Shift     Problem: HEMODYNAMIC STATUS  Goal: Hemoglobin within specified parameters  Outcome: Met This Shift  Note: Hemoglobin improved after blood transfusion.      Problem: Cardiac Output - Decreased:  Goal: Hemodynamic stability will improve  Description: Hemodynamic stability will improve  Outcome: Met This Shift     Problem: Mobility - Impaired  Goal: Mobility/activity is maintained at optimum level for patient  Outcome: Ongoing

## 2020-12-14 NOTE — CARE COORDINATION
Updated discharge plan of care. Pt currently receiving hemodialysis. Pt on 3-5 l/nc. BP low.  PT/OT pending once improvement and will plan accordingly-O

## 2020-12-14 NOTE — PROGRESS NOTES
12/14/2020    GFRAA 13 12/14/2020    LABGLOM 11 12/14/2020    GLUCOSE 112 12/14/2020    PROT 5.4 12/14/2020    LABALBU 1.5 12/14/2020    CALCIUM 7.7 12/14/2020    BILITOT 1.2 12/14/2020    ALKPHOS 171 12/14/2020    AST 38 12/14/2020    ALT 25 12/14/2020          Microbiology :  No results for input(s): BC in the last 72 hours. No results for input(s): Gevena Summer in the last 72 hours. No results for input(s): LABURIN in the last 72 hours. No results for input(s): CULTRESP in the last 72 hours. No results for input(s): WNDABS in the last 72 hours. Radiology :  XR CHEST PORTABLE   Final Result   No significant changes since the December 13. Bilateral pleural effusions   larger on the left side. XR CHEST PORTABLE   Final Result   Findings suggestive of pulmonary edema with a persistent moderate left and   small right pleural effusion. No evidence of pneumothorax following   thoracentesis. US THORACENTESIS Which side should the procedure be performed? Left   Final Result   Left-sided pleural effusion. Please refer to separate procedure report from   Dr. Shawn Nunez for further details. US DUP LOWER EXTREMITIES BILATERAL VENOUS   Final Result   1. No evidence of DVT in either lower extremity given the limitations   detailed above. 2.  Bilateral superficial soft tissue edema. 3.  Bilateral popliteal fossa fluid collections (Baker's cyst)      US DUP UPPER EXTREMITY RIGHT ARTERIES   Final Result   1. No evidence of hemodynamically significant stenosis. Multiphasic   waveforms identified in all areas. 2.  Spectral broadening in the radial and ulnar arteries suggests early   underlying peripheral vascular disease. XR CHEST PORTABLE   Final Result   1. Enlarging left pleural effusion which is now moderate to large in size. 2. New patchy left perihilar infiltration which could represent pneumonia   ,edema or less likely atelectasis.    3. Trace right pleural effusion      US DUP UPPER EXTREMITY RIGHT VENOUS   Final Result   No evidence of DVT.       XR CHEST PORTABLE   Final Result   Increased moderate left pleural effusion.             Assessment and Plan:      · Hypotension/leukocytosis-sepsis versus reactive (excess volume loss, diarrhea)  · End-stage renal disease on hemodialysis  · Status post gastric bypass surgery with short gut syndrome  · Obstructive sleep apnea  · Polymyalgia rheumatica  · Left-sided pleural effusion     Plan  -After carefully reviewing the hospital course I feel this is not related to sepsis  -off abx , CRP- 11.9   -normal robin-  stool cultures  -D lactic acid level pending (short gut syndrome)  -Blood cultures have been negative so far  -UA in setting of diarrhea and on top of that in the renal disease patient is not very reliable   - will watch off abx           Electronically signed by Chiara Paulino MD on 12/14/2020 at 4:40 PM

## 2020-12-14 NOTE — PATIENT CARE CONFERENCE
..  Intensive Care Daily Quality Rounding Checklist      ICU Team Members: Dr. Kenji Pearce, residents, RT, TL, bedside RN    ICU Day #: NUMBER: 7    Intubation Date: N/A    Ventilator Day #: N/A    Central Line Insertion Date: December 7        Day #: NUMBER: 8     Arterial Line Insertion Date: December 13      Day #: NUMBER: 2    DVT Prophylaxis: Heparin SQ on hold d/t low platelet count    GI Prophylaxis: Protonix    Morse Catheter Insertion Date: N/A     Day #: N/A      Continued need (if yes, reason documented and discussed with physician): N/A pt anuric on HD    Skin Issues/ Wounds and ordered treatment discussed on rounds:     Goals/ Plans for the Day:  Wean levo, HIT panel, discontinue heparin, CXR, coritsol level, stop IVF, change central line

## 2020-12-14 NOTE — PROGRESS NOTES
Physical Therapy    Facility/Department: [address] ICU    NAME: Susan Moon  : 1955  MRN: [id number]    Date of Service: 2020    PT consult was received and eval was attempted. Pt is currently receiving dialysis and it is also noted her diastolic BP is in the 65'M. Will re-attempt PT eval another time. Damaris Mcardle Donaciano Melia., P.T.   License Number: [id number]

## 2020-12-14 NOTE — PROGRESS NOTES
SPEECH/LANGUAGE PATHOLOGY  CLINICAL ASSESSMENT OF SWALLOWING FUNCTION    PATIENT NAME:  Ayad Neely      :  1955      TODAY'S DATE:  2020  ROOM:  0206/0206-A    SUMMARY OF EVALUATION    Chart reviewed and discussed with RN. Reports choking on eggs X2. Pt also hesitant to sit up for oral intake due to 'trouble breathing' when sitting up. SLP observed pills presented with water. DYSPHAGIA DIAGNOSIS:  Oropharyngeal dysphagia      DIET RECOMMENDATIONS:  Dysphagia 3, Soft/advanced (Soft & Bite-sized) solids with small sips thin liquids-NO EGGS    Pt to be upright as able for all oral intake. Recommend pills to be presented in pudding/applesauce. FEEDING RECOMMENDATIONS:       Assistance level:  Stand by assistance is needed during all oral intake as needed      Compensatory strategies recommended: Small bites/sips and Alternate solids and liquids    THERAPY RECOMMENDATIONS:      Dysphagia therapy is recommended 3-5 times per week for LOS or when goals are met. Ongoing meal endurance analysis to provide diet modification and compensatory strategy implementation to minimize risk of aspiration associated with PO intake                 PROCEDURE     Consistencies Administered During the Evaluation   Liquids: thin liquid   Solids:  pureed foods and solid foods      Method of Intake:   cup, straw, spoon  Self fed, Fed by clinician      Position:   Seated, upright                  RESULTS     Oral Stage:         Extended mastication due to: weakness      Pharyngeal Stage:      No signs of aspiration were noted during this evaluation however, silent aspiration cannot be ruled out at bedside. If silent aspiration is suspected, a Videofluoroscopic Study of Swallowing (MBS) is recommended and requires a physician order. The Speech Language Pathologist (SLP) completed education with the patient regarding results of evaluation.    Explained that Speech Pathology intervention is warranted  at this time   Prognosis for improvements is fair +     This plan will be re-evaluated and revised in 1 week  if warranted. Patient stated goals: Agreed with above,   Treatment goals discussed with Patient   The Patient understand(s) the diagnosis, prognosis and plan of care         INTERVENTION/EDUCATION    Pt educated on above results and plan of care. Pt trained on compensatory strategies for safe swallow with good outcome. Pt encouraged to engage in question/answer session. All questions answered and pt verbalized understanding of above. CPT code:  75317  bedside swallow eval, 63228 dysphagia therapy 15 mins      [x]The admitting diagnosis and active problem list, as listed below have been reviewed prior to initiation of this evaluation.      ADMITTING DIAGNOSIS: Hypokalemia [E87.6]  Hypokalemia [E87.6]  Hypotension [I95.9]     ACTIVE PROBLEM LIST:   Patient Active Problem List   Diagnosis    Exacerbation of asthma    Renal failure (ARF), acute on chronic (HCC)    HTN (hypertension)    YUNIOR on CPAP    Hypomagnesemia    Hypocalcemia    Hypothyroid    Cellulitis of right lower extremity    Non-rheumatic mitral regurgitation    Respiratory arrest ([address]    Hypervolemia    Acute on chronic diastolic congestive heart failure ([address]    History of mitral valve repair    History of tricuspid valve repair    MANUEL (acute kidney injury) ([address]    Chronic renal failure, stage 5 ([address]    Problem with dialysis access ([address]    ESRD on dialysis ([address]    Thrombocytopenia ([address]    Hematoma    Traumatic hematoma of left upper arm    Complication of AV dialysis fistula, initial encounter    (HFpEF) heart failure with preserved ejection fraction (HCC)    Hypotension    Dizziness    PAT (paroxysmal atrial tachycardia) (HCC)    Hypokalemia

## 2020-12-14 NOTE — PROGRESS NOTES
Internal Medicine Progress Note      Synopsis: Patient admitted on 12/7/2020    Subjective    Feeling slightly better today. Pressors are less and hoping to be weaned off today. Still with lots of frequent stools    December 10, 2020  Still on pressors though less so. Recovering better and better  Feeling short of breath and swollen and weak but does not want to consider discharge to any other placement home  December 11, 2020  Now with fecal management system. Pressors were weaned off and her blood pressure dropped. Almost systolic of 50. She was symptomatically lightheaded and felt weak  December 12, 2020  Still  on pressors  Still with loose stools. Now with FMS since yesterday  Ded 13 th   Feels dizzy  Feels swollen  She  Will be getting bt   December 14, 2020  Still on pressors and still feeling dizzy. Exam:  BP (!) 98/45   Pulse 84   Temp 95 °F (35 °C)   Resp 20   Ht 5' 3\" (1.6 m)   Wt 231 lb 11.3 oz (105.1 kg)   SpO2 94%   BMI 41.04 kg/m²   General appearance: No apparent distress, appears stated age and cooperative. HEENT: Pupils equal, round, and reactive to light. Conjunctivae/corneas clear. Neck: Supple. No jugular venous distention. Trachea midline. Respiratory:  Normal respiratory effort. Clear to auscultation, bilaterally without Rales/Wheezes/Rhonchi. Cardiovascular: Regular rate and rhythm with normal S1/S2 without murmurs, rubs or gallops. Abdomen: Soft, non-tender, non-distended with normal bowel sounds.   Musculoskeletal: No clubbing, cyanosis 2+ edema bilateral lower extremities  Skin:  No rashes    Neurologic: awake, alert and following commands     Medications:  Reviewed    Infusion Medications    norepinephrine 16 mcg/min (12/14/20 1612)     Scheduled Medications    sodium chloride  20 mL Intravenous Once    pregabalin  25 mg Oral BID    atorvastatin  20 mg Oral Once per day on Mon Wed Fri    psyllium  1 packet Oral BID    cholestyramine  1 packet Oral BID    12/30/2019     Results for Babak Paredes (MRN [id number]) as of 12/9/2020 10:51   Ref. Range 12/8/2020 07:50 12/8/2020 20:00 12/9/2020 00:55 12/9/2020 05:00 12/9/2020 08:40   Lactic Acid Latest Ref Range: 0.5 - 2.2 mmol/L 4.6 (HH) 3.8 (H) 3.2 (H) 2.5 (H) 2.6 (H)     Radiology:  Xr Chest Portable    Result Date: 12/14/2020  EXAMINATION: ONE XRAY VIEW OF THE CHEST 12/14/2020 10:53 am COMPARISON: Comparison November 28-December 13 HISTORY: ORDERING SYSTEM PROVIDED HISTORY: Respiratory distress TECHNOLOGIST PROVIDED HISTORY: Reason for exam:->Respiratory distress FINDINGS: Presence of a large size left-sided pleural effusion causing significant compression of the left lung. There is a mild to moderate right-sided pleural effusion. Heart has mildly increased size. Patient had previous mid sternotomy. No significant changes since the December 13. Bilateral pleural effusions larger on the left side. Xr Chest Portable    Result Date: 12/13/2020  EXAMINATION: ONE XRAY VIEW OF THE CHEST 12/13/2020 1:17 pm COMPARISON: Multiple priors, most recent from December 12, 2020 at 13:25. HISTORY: ORDERING SYSTEM PROVIDED HISTORY: Thoracentesis TECHNOLOGIST PROVIDED HISTORY: Reason for exam:->Thoracentesis FINDINGS: There are postoperative changes related to sternotomy. Heart size is unable to be accurately assessed on this single portable view of the chest, but appears to be stable. There is a persistent moderate-sized left pleural effusion, not significantly changed in size compared with the most recent examination. No evidence of a pneumothorax following thoracentesis. There is a small right pleural effusion as well. Prominence of the pulmonary vasculature suggests pulmonary edema. Findings suggestive of pulmonary edema with a persistent moderate left and small right pleural effusion. No evidence of pneumothorax following thoracentesis.     Xr Chest Portable    Result Date: 12/12/2020  EXAMINATION: ONE XRAY VIEW OF THE CHEST 12/12/2020 2:20 pm COMPARISON: 12/07/2020 and 11/28/2020 HISTORY: ORDERING SYSTEM PROVIDED HISTORY: SOB TECHNOLOGIST PROVIDED HISTORY: Reason for exam:->SOB FINDINGS: The heart is enlarged. Postoperative sternotomy changes are noted. There is a trace right pleural effusion and minimal right lung base atelectasis. There is a moderate to large left pleural effusion which has increased in size when compared with the patient's prior study. Patchy left perihilar pulmonary infiltration is noted which could represent atelectasis pneumonia or edema. 1. Enlarging left pleural effusion which is now moderate to large in size. 2. New patchy left perihilar infiltration which could represent pneumonia ,edema or less likely atelectasis. 3. Trace right pleural effusion    Xr Chest Portable    Result Date: 12/7/2020  EXAMINATION: ONE XRAY VIEW OF THE CHEST 12/7/2020 8:12 am COMPARISON: 11/28/2020 HISTORY: Acute shortness of breath. FINDINGS: Sternotomy changes with prosthetic cardiac valves and left atrial appendage clip. Heart remains enlarged. Increased moderate left pleural effusion with adjacent airspace disease. Mild right basilar atelectasis. No pneumothorax. Increased moderate left pleural effusion. Xr Chest Portable    Result Date: 11/28/2020  EXAMINATION: ONE XRAY VIEW OF THE CHEST 11/28/2020 6:07 pm COMPARISON: Chest series from October 21, 2020. HISTORY: ORDERING SYSTEM PROVIDED HISTORY: sob TECHNOLOGIST PROVIDED HISTORY: Reason for exam:->sob FINDINGS: Postsurgical changes overlie the mediastinum. Lung hyperinflation and coarse interstitial markings. Elevation of the left hemidiaphragm versus loculated left pleural effusion is unchanged. Small right pleural effusion or scarring. No new airspace disease. No pneumothorax. Atherosclerotic disease in the aortic arch. Cardiac silhouette is enlarged. Normal pulmonary vascularity.   Osseous and thoracic soft tissue structures demonstrate no acute findings. 1.  Stable lung hyperinflation and coarse interstitial markings. Stable left greater than right pleural effusions or potentially scarring. No new pulmonary pathology. 2.  Atherosclerotic disease and cardiomegaly. Postsurgical changes. Normal pulmonary vascularity on this exam    Us Thoracentesis Which Side Should The Procedure Be Performed? Left    Result Date: 12/14/2020  PROCEDURE: ULTRASOUNDGUIDED RIGHT THORACENTESIS 12/13/2020 HISTORY: ORDERING SYSTEM PROVIDED HISTORY: left effusion TECHNOLOGIST PROVIDED HISTORY: Reason for exam:->left effusion Which side should the procedure be performed? ->Left What reading provider will be dictating this exam?->CRC TECHNIQUE: Limited ultrasound of the left thorax was performed combine image was saved. The site thoracentesis was then performed by Dr. Jaime Saavedra. FINDINGS: Left-sided pleural effusion. Left-sided thoracentesis was performed by Dr. Jaime Saavedra at the bedside. Left-sided pleural effusion. Please refer to separate procedure report from Dr. Jaime Saavedra for further details. Us Dup Upper Extremity Right Venous    Result Date: 12/12/2020  EXAMINATION: DUPLEX ULTRASOUND OF THE RIGHT UPPER EXTREMITY FOR DVT, 12/12/2020 2:18 pm TECHNIQUE: Duplex ultrasound and Doppler images were obtained of the deep venous structures of the upper extremity. COMPARISON: None. HISTORY: ORDERING SYSTEM PROVIDED HISTORY: right arm swelling, r/o dvt TECHNOLOGIST PROVIDED HISTORY: Reason for exam:->right arm swelling, r/o dvt What reading provider will be dictating this exam?->CRC FINDINGS: There is normal flow and compressibility of the visualized venous structures. There is no evidence of echogenic thrombus. No evidence of DVT.     Us Dup Upper Extremity Right Arteries    Result Date: 12/12/2020  EXAMINATION: DUPLEX ARTERAL ULTRASOUND OF THE RIGHT UPPER EXTREMITY 12/12/2020 2:22 pm TECHNIQUE: Arterial Doppler evaluation of the right upper extremity was performed using a combination of grayscale, color Doppler, and spectral Doppler technique. COMPARISON: None used HISTORY: ORDERING SYSTEM PROVIDED HISTORY: Right arm swelling, r/o occlusion TECHNOLOGIST PROVIDED HISTORY: Reason for exam:->Right arm swelling, r/o occlusion What reading provider will be dictating this exam?->CRC FINDINGS: Scattered areas of atherosclerotic plaque in the arterial vessels of the right upper extremity. Multiphasic waveforms identified in all areas. No soft tissue abnormality. Peak flow velocities were measured as follows: Subclavian 79 cm/sec Axillary     34 cm/sec Brachial    92 cm/sec Radial       62 cm/sec Ulnar       27 cm/sec All spectral waveforms show brisk upstroke. There is slight spectral broadening in the radial and ulnar arteries. 1.  No evidence of hemodynamically significant stenosis. Multiphasic waveforms identified in all areas. 2.  Spectral broadening in the radial and ulnar arteries suggests early underlying peripheral vascular disease. Us Dup Lower Extremities Bilateral Venous    Result Date: 12/12/2020  EXAMINATION: DUPLEX VENOUS ULTRASOUND OF THE BILATERAL LOWER EXTREMITIES, 12/12/2020 12:54 pm TECHNIQUE: Duplex ultrasound and Doppler images were obtained of the bilateral lower extremities COMPARISON: None. HISTORY: ORDERING SYSTEM PROVIDED HISTORY: re dvt TECHNOLOGIST PROVIDED HISTORY: Reason for exam:->re dvt What reading provider will be dictating this exam?->CRC FINDINGS: Limitations: Right iliac vein, greater saphenous vein, and common femoral vein not seen secondary to a peripheral IV in the right groin region. There is dialysis access in the left groin region which also limits evaluation of these vessels. Calf vessels suboptimally imaged secondary to superficial edema. There are bilateral popliteal fossa fluid collections measuring 3.7 cm on the right and 2.1 cm on the left.  The remaining visualized veins of the bilateral lower extremities are patent and free of echogenic thrombus. The veins are normally compressible and have normal phasic flow. 1.  No evidence of DVT in either lower extremity given the limitations detailed above. 2.  Bilateral superficial soft tissue edema. 3.  Bilateral popliteal fossa fluid collections (Baker's cyst)    ASSESSMENT:    Active Problems:    Hypotension    Hypokalemia  Resolved Problems:    * No resolved hospital problems. *  Lactic acidosis  Hemodialysis dependence  Chronic pain  Restless legs  Anemia  Asthma  Hypothyroidism     PLAN:    Appreciate input of renal services  Replace potassium  Remains on pressors  Still acidotic. D lactic acid being looked for. No results yet    December 9, 2020  Await decrease of pressors and her toleration of that  The lactic acid levels are pending and will need to be waited on  She is tired but otherwise mentally well  Less and lowered lactic acid level today    December 10, 2020  Mildly acidotic still. Continue to wean pressors. Dialysis per renal services. Resumption of premorbid medications. Midodrine increase as tolerated. Fluid losses from GI tract still considered being likely the source of her profound hypotension  C. difficile negative    December 11, 2020  Continue to provide pressor support. Patient still in the ICU. Await work-up for the lactic acidosis  Cholestyramine started for loose stools but doubt that it has been effective. We can request GI services to see if they can give us a new thought process  Continue to follow labs  Lower dose of Lyrica to avoid hypotension from that  December 12, 2020  Await GI services to see her. Discussed with family at length on the phone leukocytosis is getting worse  Request infectious disease to see her? Lactic acid still fluctuating and not normal yet    Dec 13 th 2020  Blood pressure still low. Pressors still in place  Note ID and Dr Dax Guillermo input   BT now ordered  No blood loss seen but s/s with her bp  D/w family.   Await hemo  Low plts still  3 rd spaced   December 14, 2020  Still on pressors and fluctuating with blood pressure. Infectious disease did see. Received blood yesterday. Pleural effusion tapped transudative  Still massively third spaced and still feeling extremely weak  Palliative care would be appropriate  The lactic acid level awaited  Psyllium husk for bulking stool. Still tons of stool in FMS  Diet: Dietary Nutrition Supplements: Protein Modular  Dietary Nutrition Supplements: Low Calorie High Protein Supplement  DIET GENERAL;  Code Status: Prior  PT/OT Eval Status:   Once off of pressors  DVT Prophylaxis:    In place   recommended disposition at discharge:   Likely home  +++++++++++++++++++++++++++++++++++++++++++++++++  Hernan Cano MD   [facility].  +++++++++++++++++++++++++++++++++++++++++++++++++  NOTE: This report was transcribed using voice recognition software. Every effort was made to ensure accuracy; however, inadvertent computerized transcription errors may be present.

## 2020-12-14 NOTE — FLOWSHEET NOTE
12/14/20 1558   Vital Signs   BP (!) 95/43   Temp 96 °F (35.6 °C)   Pulse 74   Resp 20   Weight 228 lb 13.4 oz (103.8 kg)   Percent Weight Change -1.24   Post-Hemodialysis Assessment   Post-Treatment Procedures Blood returned; Access bleeding time > 10 minutes   Machine Disinfection Process Acid/Vinegar Clean;Heat Disinfect; Exterior Machine Disinfection   Rinseback Volume (ml) 300 ml   Total Liters Processed (l/min) 50.5 l/min   Dialyzer Clearance Lightly streaked   Duration of Treatment (minutes) 180 minutes   Hemodialysis Intake (ml) 300 ml   Hemodialysis Output (ml) 1100 ml   NET Removed (ml) 800 ml   Tolerated Treatment Good   Patient Response to Treatment robbie fairly well, needles pulled, site bled for 1.5hrs, surgicell applied. low platelets noted. stasis acheived 2x2 bandaids applied   Bilateral Breath Sounds Clear;Diminished   Edema Generalized;Right upper extremity; Left upper extremity;Right lower extremity; Left lower extremity

## 2020-12-14 NOTE — PROGRESS NOTES
Critical Care Team - Daily Progress Note      Date and time: 12/14/2020 12:16 PM  Patient's name:  Werner Ware Record Number: [id number]  Patient's account/billing number: [de-identified]  Patient's YOB: 1955  Age: 77 y.o. Date of Admission: 12/7/2020  7:50 AM  Length of stay during current admission: 7      Primary Care Physician: Tony Fraire MD  ICU Attending Physician: Dr. Butler Leader    Code Status: Prior    Reason for ICU admission: Critical care admission      SUBJECTIVE:   The patient is a 77 y.o. female with significant past medical history of ESRD on dialysis, hypotension on midodrine, hypothyroidism, polymyalgia rheumatica, YUNIOR, status post gastric bypass with short gut syndrome. She was sent to the emergency department from the dialysis center due to being hypotensive. She said she has had issues with hypotension in the past, but normally runs systolic blood pressures in 90s to 110s. She states that she took her dose of midodrine this morning, and was asked to take another dose of midodrine prior to her dialysis appointment, she was found to have a systolic blood pressure in the 70s prior to dialysis, but got worsening hypotension during dialysis, so the treatment was stopped about an hour in. In the emergency department she was found to have blood pressures as low as 55 systolic, and also had a potassium of 1.8. A triple-lumen catheter was placed her right Muhammad, and she was started on Levophed. She is currently on 12 mics, her blood pressures have improved from anywhere to 70s to lower 01H systolic. Is also found she had a lactic acidosis, and elevated white blood cell count, there is an underlying sepsis process suspected, blood cultures were drawn, and she was started on Vanco and Zosyn. Urinalysis was not able to be obtained as the patient is anuric.   She said the symptoms she was having include lightheadedness, dizziness, the lightheadedness was worse with standing, she tried to take her midodrine to resolve the lightheadedness, this did not help, the onset was today, the severity is moderate. She will be admitted to ICU for hypotension, and possible sepsis. OVERNIGHT EVENTS:         12/8-Progressively higher vasopressor needs. 12/9-Decreasing vasopressor needs, now almost off levo  12/10- No acute vents overnight, pressors still being weaned  12/11-off Levophed, vasopressin running at .03  12/12- Attempted to d/c levo yesterday, SBP went to fifty and she became dizzy. Back on Levophed. Also had an episode of Raynaud's-like phenomenon where her finger became dusky, improved with elevation, will discontinue art line on that side. 12/13- Increasing dizziness and dyspnea, requesting thoracentesis be performed. Anemia slightly worse. Still on norepi at 7 mics/ min.   12/14- Increasing dizziness and dyspnea, had to increase levo from 5 to 7mics  AWAKE & FOLLOWING COMMANDS:  [] No   [x] Yes    CURRENT VENTILATION STATUS:     [] Ventilator  [] BIPAP  [x] Nasal Cannula [] Room Air      IF INTUBATED, ET TUBE MARKING AT LOWER LIP:       cms    SECRETIONS Amount:  [] Small [] Moderate  [] Large  [x] None  Color:     [] White [] Colored  [] Bloody    SEDATION:  RAAS Score:  [] Propofol gtt  [] Versed gtt  [] Ativan gtt   [x] No Sedation    PARALYZED:  [x] No    [] Yes    DIARRHEA:                [] No                [x] Yes  (C. Difficile status: [] positive                                                                                                                       [x] negative                                                                                                                     [] pending)    VASOPRESSORS:  [] No    [x] Yes    If yes -   [x] Levophed       [] Dopamine     [] Vasopressin       [] Dobutamine  [] Phenylephrine         [] Epinephrine    CENTRAL LINES:     [] No   [] Yes   (Date of Insertion: 12/7  )           If yes -     [] Right IJ     [] Left IJ [x] Right Femoral [] Left Femoral                   [] Right Subclavian [] Left Subclavian       PORTILLO'S CATHETER:   [x] No   [] Yes  (Date of Insertion:   )     URINE OUTPUT:            [] Good   [] Low              [x] Anuric      OBJECTIVE:     VITAL SIGNS:  BP (!) 91/38   Pulse 80   Temp 97.6 °F (36.4 °C) (Oral)   Resp 18   Ht 5' 3\" (1.6 m)   Wt 218 lb 11.1 oz (99.2 kg)   SpO2 97%   BMI 38.74 kg/m²   Tmax over 24 hours:  Temp (24hrs), Av.5 °F (36.4 °C), Min:97.4 °F (36.3 °C), Max:97.6 °F (36.4 °C)      No data found. Intake/Output Summary (Last 24 hours) at 2020 1216  Last data filed at 2020 0525  Gross per 24 hour   Intake 3183.18 ml   Output 800 ml   Net 2383.18 ml     Wt Readings from Last 2 Encounters:   20 218 lb 11.1 oz (99.2 kg)   20 175 lb (79.4 kg)     Body mass index is 38.74 kg/m². PHYSICAL EXAMINATION:    General appearance - alert, well appearing, and in no distress and oriented to person, place, and time.    Mental status - alert, oriented to person, place, and time, normal mood, behavior, speech, dress, motor activity, and thought processes  Eyes - pupils equal and reactive, extraocular eye movements intact, sclera anicteric  Ears - external ear normal  Nose - normal and patent, no erythema, discharge or polyps  Mouth - mucous membranes moist, pharynx normal without lesions  Neck - supple, no significant adenopathy  Chest - decreased left base, no wheezes, rales or rhonchi, symmetric air entry  Heart -systolic heart murmur present,tachycardiac rate, normal rhythm, no rubs, gallops  Abdomen - soft, nontender, nondistended, no masses or organomegaly  Neurological - alert, oriented, normal speech, no focal findings or movement disorder noted  Extremities - peripheral pulses normal, right femoral TLC, left arm AV fistula, Fingers not dusky any longer  Skin - normal coloration, appears to have some bruises    Any additional physical findings:    MEDICATIONS:    Scheduled Meds:   albumin human  50 g Intravenous Once in dialysis    sodium chloride  20 mL Intravenous Once    pregabalin  25 mg Oral BID    atorvastatin  20 mg Oral Once per day on Mon Wed Fri    psyllium  1 packet Oral BID    cholestyramine  1 packet Oral BID    epoetin izabella-epbx  3,000 Units Subcutaneous Once per day on Mon Wed Fri    And    epoetin izabella-epbx  2,000 Units Subcutaneous Once per day on Mon Wed Fri    sodium bicarbonate  1,300 mg Oral TID    pantoprazole  40 mg Oral QAM AC    midodrine  20 mg Oral TID WC    sodium chloride flush  10 mL Intravenous 2 times per day    aspirin  81 mg Oral Daily    calcium carbonate  3 tablet Oral BID    fenofibrate  160 mg Oral Once per day on Mon Wed Fri    folic acid  1 mg Oral Daily    levothyroxine  137 mcg Oral Daily    liothyronine  5 mcg Oral Daily    magnesium oxide  400 mg Oral Nightly    melatonin  15 mg Oral Nightly    montelukast  10 mg Oral Daily     Continuous Infusions:   norepinephrine 9 mcg/min (12/14/20 0652)     PRN Meds:       artificial tears, , PRN      phenol, 1 spray, Q2H PRN      menthol-zinc oxide, , BID PRN      acetaminophen, 650 mg, Q4H PRN      sodium chloride flush, 10 mL, PRN      albuterol, 0.63 mg, 4x Daily PRN      rOPINIRole, 0.5 mg, Nightly PRN          VENT SETTINGS (Comprehensive) (if applicable):  Vent Information  Skin Assessment: Clean, dry, & intact  SpO2: 97 %  Additional Respiratory  Assessments  Pulse: 80  Resp: 18  SpO2: 97 %  Oral Care: Mouth swabbed, Mouth moisturizer        Laboratory findings:    Complete Blood Count:   Recent Labs     12/13/20  0500 12/13/20  0830 12/13/20  1650 12/14/20  0530   WBC 15.9* 17.4*  --  16.7*   HGB 7.0* 7.2* 8.6* 8.2*   HCT 22.2* 23.0* 26.6* 26.4*   PLT 67* 71*  --  59*        Last 3 Blood Glucose:   Recent Labs     12/12/20  1320 12/13/20  0500 12/14/20  0530   GLUCOSE 145* 95 112*        PT/INR:    Lab Results   Component Value Date    PROTIME 15.1 10/21/2020    INR 1.3 10/21/2020     PTT:    Lab Results   Component Value Date    APTT 36.6 06/02/2018       Comprehensive Metabolic Profile:   Recent Labs     12/12/20  1320 12/13/20  0500 12/14/20  0530    135 138   K 3.9 4.2  4.2 4.6   * 107 109*   CO2 20* 21* 21*   BUN 11 12 14   CREATININE 3.6* 3.7* 4.0*   GLUCOSE 145* 95 112*   CALCIUM 7.5* 7.6* 7.7*   PROT  --  5.3* 5.4*   LABALBU  --  1.7* 1.5*   BILITOT  --  1.1 1.2   ALKPHOS  --  149* 171*   AST  --  31 38*   ALT  --  22 25      Magnesium:   Lab Results   Component Value Date    MG 1.8 12/14/2020     Phosphorus:   Lab Results   Component Value Date    PHOS 3.6 12/14/2020     Ionized Calcium:   Lab Results   Component Value Date    CAION 1.25 12/14/2020        Urinalysis:     Troponin:   No results for input(s): TROPONINI in the last 72 hours. Microbiology:    Cultures during this admission:     Blood cultures:   Pending              [] None drawn      [x] Negative             []  Positive (Details:  )  Urine Culture:                   [x] None drawn      [] Negative             []  Positive (Details:  )  Sputum Culture:               [x] None drawn       [] Negative             []  Positive (Details:  )   Endotracheal aspirate:     [x] None drawn       [] Negative             []  Positive (Details:  )     Other pertinent Labs:       Radiology/Imaging:   Xr Chest Portable    Result Date: 12/7/2020  EXAMINATION: ONE XRAY VIEW OF THE CHEST 12/7/2020 8:12 am COMPARISON: 11/28/2020 HISTORY: Acute shortness of breath. FINDINGS: Sternotomy changes with prosthetic cardiac valves and left atrial appendage clip. Heart remains enlarged. Increased moderate left pleural effusion with adjacent airspace disease. Mild right basilar atelectasis. No pneumothorax. Increased moderate left pleural effusion.     Xr Chest Portable    Result Date: 11/28/2020  EXAMINATION: ONE XRAY VIEW OF THE CHEST 11/28/2020 6:07 pm COMPARISON: Chest series from October 21, 2020. HISTORY: ORDERING SYSTEM PROVIDED HISTORY: sob TECHNOLOGIST PROVIDED HISTORY: Reason for exam:->sob FINDINGS: Postsurgical changes overlie the mediastinum. Lung hyperinflation and coarse interstitial markings. Elevation of the left hemidiaphragm versus loculated left pleural effusion is unchanged. Small right pleural effusion or scarring. No new airspace disease. No pneumothorax. Atherosclerotic disease in the aortic arch. Cardiac silhouette is enlarged. Normal pulmonary vascularity. Osseous and thoracic soft tissue structures demonstrate no acute findings. 1.  Stable lung hyperinflation and coarse interstitial markings. Stable left greater than right pleural effusions or potentially scarring. No new pulmonary pathology. 2.  Atherosclerotic disease and cardiomegaly. Postsurgical changes. Normal pulmonary vascularity on this exam       Chest Xray (12/14/2020):   Viewed LG mild-moderate left pleural effusion  ASSESSMENT:     Patient Active Problem List    Diagnosis Date Noted    Hypokalemia 12/07/2020    Dizziness     PAT (paroxysmal atrial tachycardia) ([facility])     Hypotension 10/21/2020    Thrombocytopenia ([address] 06/02/2018    Hematoma 06/02/2018    Traumatic hematoma of left upper arm 99/64/8807    Complication of AV dialysis fistula, initial encounter 06/02/2018    ESRD on dialysis [facility]) 03/07/2018    Problem with dialysis access ([address] 01/17/2018    Hypervolemia     Acute on chronic diastolic congestive heart failure (HCC)     History of mitral valve repair     History of tricuspid valve repair     MANUEL (acute kidney injury) ([address]     Chronic renal failure, stage 5 ([address]     Respiratory arrest ([address]     Non-rheumatic mitral regurgitation 02/09/2017    (HFpEF) heart failure with preserved ejection fraction ([address] 01/25/2017    Cellulitis of right lower extremity 01/31/2016    Exacerbation of asthma 01/06/2016    Renal failure (ARF), acute on chronic (HCC) 01/06/2016    HTN (hypertension) 01/06/2016    YUNIOR on CPAP 01/06/2016    Hypomagnesemia 01/06/2016    Hypocalcemia 01/06/2016    Hypothyroid 01/06/2016       Additional assessment:    · Left pleural effusion  · Acute on chronic anemia/ ?acute blood loss vs chronic disease        PLAN:     WEAN PER PROTOCOL:  [] No   [] Yes  [x] N/A    DISCONTINUE ANY LABS:   [x] No   [] Yes    ICU PROPHYLAXIS:  Stress ulcer:  [x] PPI Agent  [] [id number] [] Sucralfate  [] Other:  VTE:   [] Enoxaparin  [x] Unfract. Heparin Subcut  [] EPC Cuffs    NUTRITION:  [] NPO [] Tube Feeding (Specify: ) [] TPN  [x] PO (Diet: Dietary Nutrition Supplements: Protein Modular  Dietary Nutrition Supplements: Low Calorie High Protein Supplement  DIET GENERAL;)    HOME MEDICATIONS RECONCILED: [] No  [x] Yes    INSULIN DRIP:   [x] No   [] Yes    CONSULTATION NEEDED:  [x] No   [] Yes    FAMILY UPDATED:    [x] No   [] Yes    TRANSFER OUT OF ICU:   [x] No   [] Yes    ADDITIONAL PLAN:  Left thoracentesis today    SYSTEMS ASSESSMENT     Neuro   Lightheadedness-likely related to hypotension, and hypovolemia  Resume home Lyrica, ropinirole  Respiratory   Wheeziness-patient has a history of asthma, continue home Singulair, albuterol  YUNIOR-CPAP at night  Cardiovascular   History of diastolic heart failure-monitor fluid status, continue home aspirin     Hypotension-likely related to hypovolemia versus sepsis, titrate levo as needed, continue to replete intravascular volume, restart home midodrine.  Give 1 unit prbcs.     Resume home Lipitor, fenofibrate  Gastrointestinal   Short gut syndrome-monitor, on stool thickening agent as this may be the source of hypovolemia, FMS placed 12/10 for frequent stooling  GI prophylaxis-Protonix     Renal   ESRD on dialysis-Monday Wednesday Friday dialysis, will need to improve blood pressure before resuming  Hypokalemia-likely related to short gut syndrome versus early termination of dialysis today, receiving 80 mEq in the ER, monitor electrolytes every 4 hours      Infectious Disease   Possible sepsis-lactic acidemia, elevated white blood cell count, blood cultures drawn, empiric antibiotics given but now off     Hematology/Oncology   DVT prophylaxis-Heparin  Chronic anemia-resume home darbepoetin, or possible substitute For 1 unit prbc today. Platelets dropping and if any further drop, will stop heparin sc.     Endocrine   Resume home Synthroid     Social/Spiritual/DNR/Other   Full code     ASSESSMENT/ PLAN   1. Hypotension improving, hydration as needed  2. Sepsis-follow blood cultures, empiric Vanco and Zosyn given-hold on antibiotics until source identified or vitals change  3. Lactic Acidosis- resolved, may have been related to hypovolemia, continue to monitor fluid status  4. Hypokalemia- resolved  5. Continue midodrine, back on Levophed 12/12 , continue to wean vaso and levo  6. Continue home meds  7. Work up for Type D lactic acidosis, C. difficile negative, will continue stool bulking agent and start on Imodium today  8. FMS placed for frequent stooling-patient slept through the night without any bowel movements, will have to continue to monitor to see if FMS is impacting patient  9. Changed out arterial line from right radial to femoral line   10. TSH was high, free T3 and 4 low, may have to adjust dose of Synthroid  11. Transfused 1 unit prbc  12. S/p Left thoracentesis appears to be exudative and not hemothorax, will have to follow   13. D/W Dr. Maki Doyle recommends a palliative consult  15. Will need a central line changed today       Jose Lopes MD    I personally saw, examined and provided care for the patient. Radiographs, labs and medication list were reviewed by me independently. I spoke with bedside nursing, therapists and consultants. Critical care services and times documented are independent of procedures and multidisciplinary rounds with Residents.  Additionally comprehensive, multidisciplinary rounds were conducted with the MICU team. The case was discussed in detail and plans for care were established. Review of Residents documentation was conducted and revisions were made as appropriate. I agree with the above documented exam, problem list and plan of care with the following additions:    Remains in the ICU for hypotension on vasopressors  Wean as able  Start stress steroids  She has diffuse anasarca with worsening O2 requirements - proceed with albumin and dialysis. Discussed with Dr. Randi Purvis. Already on midodrine 20mg TID  Holding heparin, check ULI  S/p L thora which is exudative, no hemothorax based on hematocrit. At some point will get a chest CT  Change lines    36 minutes of CCT spent with the patient, reviewing the chart including imaging studies, and discussing the case with other health care professionals. This time excludes procedures.      Ry Camara MD

## 2020-12-14 NOTE — PROGRESS NOTES
This note also relates to the following rows which could not be included:  Resp - Cannot attach notes to unvalidated device data  SpO2 - Cannot attach notes to unvalidated device data       12/13/20 2200   Oxygen Therapy/Pulse Ox   O2 Device PAP (positive airway pressure)   O2 Flow Rate (L/min) 5 L/min

## 2020-12-15 NOTE — PATIENT CARE CONFERENCE
..  Intensive Care Daily Quality Rounding Checklist      ICU Team Members: JESSIE, bedside RN    ICU Day #: NUMBER: 8    Intubation Date: N/A    Ventilator Day #: N/A    Central Line Insertion Date: December 9        Day #: NUMBER: 9     Arterial Line Insertion Date: December 13      Day #: NUMBER: 3    DVT Prophylaxis: Heparin SQ on hold d/t low platelet count    GI Prophylaxis: protonix    Mores Catheter Insertion Date: N/A       Continued need (if yes, reason documented and discussed with physician): patient is anuric, on HD    Skin Issues/ Wounds and ordered treatment discussed on rounds: Y, skin is edematous and weeping from generalized locations    Goals/ Plans for the Day: wean levo, HD

## 2020-12-15 NOTE — PLAN OF CARE
Problem: Falls - Risk of:  Goal: Will remain free from falls  Description: Will remain free from falls  Outcome: Met This Shift     Problem: Pain:  Goal: Pain level will decrease  Description: Pain level will decrease  Outcome: Met This Shift     Problem: HEMODYNAMIC STATUS  Goal: Hemoglobin within specified parameters  Outcome: Met This Shift     Problem: Cardiac Output - Decreased:  Goal: Hemodynamic stability will improve  Description: Hemodynamic stability will improve  Outcome: Met This Shift     Problem: Fluid and Electrolyte Imbalance  Goal: Fluid and electrolyte balance are achieved/maintained  Outcome: Ongoing     Problem: Mobility - Impaired  Goal: Mobility/activity is maintained at optimum level for patient  Outcome: Not Met This Shift

## 2020-12-15 NOTE — PROCEDURES
Central Line Placement Procedure Note    Indication: vascular access and centrally administered medications    Consent: The patient provided verbal consent for this procedure. Procedure: The patient was positioned appropriately and the skin over the right femoral vein was prepped with betadine and draped in a sterile fashion. Local anesthesia was not used as the line was changed over a guidewire. A guidewire was passed through the sterilized brown port of the existing triple-lumen catheter. A triple lumen catheter was then inserted into the vessel over the guide wire using the Seldinger technique. All ports showed good, free flowing blood return and were flushed with saline solution. The catheter was then securely fastened to the skin with suture at 0 cm. Two sutures were placed into the proximal eyelets. An antibiotic disk was placed and the site was then covered with a sterile dressing. A post procedure X-ray was not indicated. The patient tolerated the procedure with difficulty. Complications: Initial attempt was made in the right IJ, wire was not able to be passed, despite repositioning of the needle, and attempts to insert in other parts of the vessel. Small hematoma occurred after he attempts, pressure had to be applied. Decision was made to change the line over guidewire in her right femoral with sterile technique.

## 2020-12-15 NOTE — CONSULTS
Palliative Care Department  [phone]  Palliative Care Initial Consult  Provider David Sullivan PA-C    Amiel Halsted  [id number]  Hospital Day: 9    Referring Provider: Joey Santana MD  Palliative Medicine was consulted for assistance with: goals of care and code status    CHIEF COMPLAINT: Hypotension, fatigue and dizziness    ASSESSMENT/PLAN:   Pertinent hospital diagnoses  1. ESRD on HD, MWF  -Continue renal replacement per nephrology    2. Hypotension  -Treatment per primary service and ICU  -Multifactorial with hypoalbuminemia and third spacing, chronic diarrhea    3. Respiratory failure with hypoxia and hypercapnia/obstructive sleep apnea  -Requiring BiPAP/CPAP  -Treatment per ICU    4. Diarrhea  -Chronic issue secondary to short gut syndrome  -Continue with fiber, could consider adding anticholinergic to decreased motility however would have to weigh side effects versus benefit    PALLIATIVE CARE ENCOUNTER  - Capacity: At this time, Amiel Halsted, Does have capacity for medical decision-making.   Capacity is time limited and situation/question specific  - Outcome of goals of care meeting: Continue full code and current treatment  - Code Status:   Full code  - Advanced directives: [address] and living will available under media tab  - Surrogate decision maker/Legal NOK:    Healthcare power of  spouse Issac De Leóna [phone]   Alternate [employer] [phone]  - Spiritual assessment: None identified  - Bereavement and grief: No issues identified currently    - DISPO: Treatment per renal and ICU    Referrals to: none today    HPI:   Amiel Halsted is a 77 y.o. with a past medical history of end-stage renal disease on hemodialysis (MWF), polymyalgia rheumatica, COPD, hypertension, hyperlipidemia, asthma, short gut syndrome, restless leg syndrome, GERD, history of gastric bypass, obstructive sleep apnea on CPAP, anemia, congestive heart failure, fibromyalgia, who presented to the [facility] performed by Lydia Stein MD at [address] 2000    RIGHT BREAST MOLE REMOVED    TRANSESOPHAGEAL ECHOCARDIOGRAM  02/03/2017    TUNNELED VENOUS CATHETER PLACEMENT Right     TUNNELED VENOUS PORT PLACEMENT Right     Powerport / x 4 / at right chest; since short gut syndrome received magnesium & sodium bicarb in past    VASC UPPER EXTREMITY VENOUS  UNI Left 29/30/8385    Brachiocephalic covered stent, iCast, 10 mm x 38 mm     Inpatient medications reviewed: yes  Home Medications reviewed: yes    Allergies   Allergen Reactions    Coumadin [Warfarin Sodium] Hives    Quinine Derivatives Hives    Turmeric Shortness Of Breath    Other      Patient states cannot take oral antibiotic dt she has short gut syndrome. ...  Puts her into electrolyte inbalance       Family History   Problem Relation Age of Onset    Obesity Mother     Cirrhosis Mother     Heart Failure Mother     High Blood Pressure Mother     Diabetes Father     High Blood Pressure Sister     Depression Sister     Diabetes Brother     High Blood Pressure Brother        Social history:   status: no  Marital status:   Living status: with family:  spouse, daughter and son   Work history: unknown  Tobacco use: history  Drug use: no  Alcohol use: no    OBJECTIVE:   Prognosis: Poor    Physical Exam:  BP (!) 93/59   Pulse 90   Temp 97.4 °F (36.3 °C) (Axillary)   Resp 23   Ht 5' 3\" (1.6 m)   Wt 228 lb 13.4 oz (103.8 kg)   SpO2 98%   BMI 40.54 kg/m²   Gen: Appears older than stated age and chronically ill-appearing, obese, awake, alert  HEENT:  Normocephalic, atraumatic, mucosa dry, PERRLA, sclera anicteric, supplemental oxygen  Neck: Supple  Lungs: Decreased breath sounds bilaterally, respirations unlabored  Heart[de-identified]  RRR, distant heart tones  Abd:  Soft, non tender, non distended, bowel sounds present, fecal management system  Ext: Edema of extremities  Skin: Pale /sallow in color scattered bruising and skin tear right upper extremity reported  Neuro:  Alert, oriented x 3; following commands    Objective data reviewed: labs, images, records, medication use, vitals and chart  Relevant data: Per HPI    Time/Communication  Greater than 50% of time spent, total 30 minutes in counseling and coordination of care at the bedside/over the telephone regarding goals of care, diagnosis and prognosis and see above. Thank you for allowing Palliative Medicine to participate in the care of Ann Sports. Provider Mercedez De PA-C  Palliative Medicine    Note: This report was completed using computerize voiced recognition software. Every effort has been made to ensure accuracy; however, inadvertent computerized transcription errors may be present.

## 2020-12-15 NOTE — PROGRESS NOTES
Progress Note  12/15/2020 12:58 PM  Subjective:   Admit Date: 12/7/2020  PCP: Santos Monk MD  Interval History: patient examined doing better feels ok     Diet: Dietary Nutrition Supplements: Protein Modular  Dietary Nutrition Supplements: Low Calorie High Protein Supplement  DIET GENERAL;    Data:   Scheduled Meds:   albumin human  25 g Intravenous Once    hydrocortisone sodium succinate PF  100 mg Intravenous Q8H    sodium chloride  20 mL Intravenous Once    pregabalin  25 mg Oral BID    atorvastatin  20 mg Oral Once per day on Mon Wed Fri    psyllium  1 packet Oral BID    cholestyramine  1 packet Oral BID    epoetin izabella-epbx  3,000 Units Subcutaneous Once per day on Mon Wed Fri    And    epoetin izabella-epbx  2,000 Units Subcutaneous Once per day on Mon Wed Fri    sodium bicarbonate  1,300 mg Oral TID    pantoprazole  40 mg Oral QAM AC    midodrine  20 mg Oral TID WC    sodium chloride flush  10 mL Intravenous 2 times per day    aspirin  81 mg Oral Daily    calcium carbonate  3 tablet Oral BID    fenofibrate  160 mg Oral Once per day on Mon Wed Fri    folic acid  1 mg Oral Daily    levothyroxine  137 mcg Oral Daily    liothyronine  5 mcg Oral Daily    magnesium oxide  400 mg Oral Nightly    melatonin  15 mg Oral Nightly    montelukast  10 mg Oral Daily     Continuous Infusions:   norepinephrine 6 mcg/min (12/15/20 0800)     PRN Meds:artificial tears, phenol, menthol-zinc oxide, acetaminophen, sodium chloride flush, albuterol, rOPINIRole  I/O last 3 completed shifts: In: 0692 [P.O.:425; I.V.:512]  Out: 1100   No intake/output data recorded.     Intake/Output Summary (Last 24 hours) at 12/15/2020 1258  Last data filed at 12/14/2020 1900  Gross per 24 hour   Intake 1037 ml   Output 1100 ml   Net -63 ml     CBC:   Recent Labs     12/13/20  0830 12/13/20  1650 12/14/20  0530 12/15/20  0552   WBC 17.4*  --  16.7* 12.1*   HGB 7.2* 8.6* 8.2* 7.6*   PLT 71*  --  59* 36*     BMP:    Recent Labs 12/13/20  0500 12/14/20  0530 12/15/20  0552    138 137   K 4.2  4.2 4.6 4.9    109* 106   CO2 21* 21* 26   BUN 12 14 11   CREATININE 3.7* 4.0* 3.1*   GLUCOSE 95 112* 133*     Hepatic:   Recent Labs     12/13/20  0500 12/14/20  0530 12/15/20  0552   AST 31 38* 37*   ALT 22 25 22   BILITOT 1.1 1.2 2.1*   ALKPHOS 149* 171* 166*     Troponin: No results for input(s): TROPONINI in the last 72 hours. BNP: No results for input(s): BNP in the last 72 hours. Lipids: No results for input(s): CHOL, HDL in the last 72 hours. Invalid input(s): LDLCALCU  ABGs: No results found for: PHART, PO2ART, YXK4RFD  INR: No results for input(s): INR in the last 72 hours. -----------------------------------------------------------------  RAD: Xr Chest Portable    Result Date: 12/7/2020  EXAMINATION: ONE XRAY VIEW OF THE CHEST 12/7/2020 8:12 am COMPARISON: 11/28/2020 HISTORY: Acute shortness of breath. FINDINGS: Sternotomy changes with prosthetic cardiac valves and left atrial appendage clip. Heart remains enlarged. Increased moderate left pleural effusion with adjacent airspace disease. Mild right basilar atelectasis. No pneumothorax. Increased moderate left pleural effusion. Objective:   Vitals: BP (!) 93/59   Pulse 85   Temp 97 °F (36.1 °C) (Infrared)   Resp 16   Ht 5' 3\" (1.6 m)   Wt 228 lb 13.4 oz (103.8 kg)   SpO2 100%   BMI 40.54 kg/m²   General appearance: appears stated age   Skin:  No rashes or lesions  HEENT: Head: Normocephalic, no lesions, without obvious abnormality.   Neck: no adenopathy, no carotid bruit, no JVD, supple, symmetrical, trachea midline and thyroid not enlarged, symmetric, no tenderness/mass/nodules  Lungs: clear to auscultation bilaterally  Heart: regular rate and rhythm, S1, S2 normal, no murmur, click, rub or gallop  Abdomen: soft, non-tender; bowel sounds normal; no masses,  no organomegaly  Extremities: extremities normal, atraumatic, no cyanosis or edema  Neurologic: Mental status: Alert, oriented, thought content appropriate      Assessment:   Patient Active Problem List:     Exacerbation of asthma     Renal failure (ARF), acute on chronic (HCC)     HTN (hypertension)     YUNIOR on CPAP     Hypomagnesemia     Hypocalcemia     Hypothyroid     Cellulitis of right lower extremity     Non-rheumatic mitral regurgitation     Respiratory arrest ([address]     Hypervolemia     Acute on chronic diastolic congestive heart failure ([address]     History of mitral valve repair     History of tricuspid valve repair     MANUEL (acute kidney injury) ([address]     Chronic renal failure, stage 5 ([address]     Problem with dialysis access ([address]     ESRD on dialysis ([address]     Thrombocytopenia ([address]     Hematoma     Traumatic hematoma of left upper arm     Complication of AV dialysis fistula, initial encounter     (HFpEF) heart failure with preserved ejection fraction (HCC)     Hypotension     Dizziness     PAT (paroxysmal atrial tachycardia) ([facility])     Hypokalemia    Plan:   Assessment: / Plan:     1.  Chronic kidney disease stage G5/ESRD. .  We will follow the patient for intermittent dialytic support.  Patient has significant GI fluid losses .  Next hemodialysis today for 2 hrs mainly UF -- with albumin      2.  Hypotension/shock.  Improved.         3.  Hypokalemia.  Severe hypokalemia in the setting of short gut syndrome.  Improved. Will use high potassium dialysate. .       4.   Metabolic acidosis.  Metabolic acidosis with a lactic acidosis and and possibility of undiagnosed D lactic acidosis due to short gut syndrome.  Bicarbonate levels improved.  Will adjust dialysate. Await D- lactic acid level.     5.  Anemia with chronic kidney disease.  Will use JEWELL and titrate for target hemoglobin goal of 10 g/dL.      6.   Secondary hyperparathyroidism due to renal insufficiency with renal osteodystrophy in the setting of hypocalcemia with hypoalbuminemia.  Will follow calcium phosphorus levels.     Her s albumin is at 1.5 (

## 2020-12-15 NOTE — PROGRESS NOTES
Critical Care Team - Daily Progress Note         Date and time: 12/15/2020 10:22 AM  Patient's name:  Werner Ware Record Number: [id number]  Patient's account/billing number: [de-identified]  Patient's YOB: 1955  Age: 77 y.o. Date of Admission: 2020  7:50 AM  Length of stay during current admission: 8      Primary Care Physician: Alfredo Gregory MD  ICU Attending Physician: Dr. Brittny Walters Status: Prior    Reason for ICU admission: shock       SUBJECTIVE:     OVERNIGHT EVENTS:         Still on pressors   Saturations low on bipap ?  Bad pulse ox probe       CURRENT VENTILATION STATUS:     [] Ventilator  [x] BIPAP  [] Nasal Cannula [] Room Air      IF INTUBATED, ET TUBE MARKING AT LOWER LIP:       cms    SECRETIONS Amount:  [] Small [] Moderate  [] Large  [x] None  Color:     [] White [] Colored  [] Bloody    SEDATION:  RAAS Score:  [] Propofol gtt  [] Versed gtt  [] Ativan gtt   [x] No Sedation    PARALYZED:  [x] No    [] Yes      VASOPRESSORS:  [] No    [x] Yes    If yes -   [] Levophed       [] Dopamine     [] Vasopressin       [] Dobutamine  [x] Phenylephrine         [] Epinephrine    CENTRAL LINES:     [] No   [x] Yes   (Date of Insertion:   )           If yes -     [] Right IJ     [] Left IJ [x] Right Femoral [] Left Femoral                   [] Right Subclavian [] Left Subclavian       PORTILLO'S CATHETER:   [] No   [x] Yes  (Date of Insertion:   )     URINE OUTPUT:            [x] Good   [] Low              [] Anuric      OBJECTIVE:     VITAL SIGNS:  BP (!) 93/59   Pulse 90   Temp 97.4 °F (36.3 °C) (Axillary)   Resp 23   Ht 5' 3\" (1.6 m)   Wt 228 lb 13.4 oz (103.8 kg)   SpO2 98%   BMI 40.54 kg/m²   Tmax over 24 hours:  Temp (24hrs), Av.6 °F (35.3 °C), Min:95 °F (35 °C), Max:97.4 °F (36.3 °C)      Patient Vitals for the past 6 hrs:   Pulse Resp SpO2   12/15/20 0800 90 23 98 %   12/15/20 0700 73 25 98 %   12/15/20 0500 75 15 96 %         Intake/Output Summary (Last 24 hours) at 12/15/2020 1022  Last data filed at 12/14/2020 1900  Gross per 24 hour   Intake 1037 ml   Output 1100 ml   Net -63 ml     Wt Readings from Last 2 Encounters:   12/14/20 228 lb 13.4 oz (103.8 kg)   11/28/20 175 lb (79.4 kg)     Body mass index is 40.54 kg/m².         PHYSICAL EXAMINATION:    General appearance -ill appearing aaox 3, mild accessory muscle use   Mental status - alert, oriented to person, place, and time, normal mood, behavior, speech, dress, motor activity, and thought processes  Eyes - pupils equal and reactive, extraocular eye movements intact, sclera anicteric  Ears - external ear normal  Nose - normal and patent, no erythema, discharge or polyps  Mouth - mucous membranes moist, pharynx normal without lesions  Neck - supple, no significant adenopathy  Chest - decreased left base, no wheezes, rales or rhonchi, symmetric air entry  Heart -systolic heart murmur present,tachycardiac rate, normal rhythm, no rubs, gallops  Abdomen - soft, nontender, nondistended, no masses or organomegaly  Neurological - alert, oriented, normal speech, no focal findings or movement disorder noted  Extremities - peripheral pulses normal, right femoral TLC, left arm AV fistula, Fingers not dusky any longer  Skin - normal coloration, appears to have some bruises     Any additional physical findings:    MEDICATIONS:    Scheduled Meds:   hydrocortisone sodium succinate PF  100 mg Intravenous Q8H    sodium chloride  20 mL Intravenous Once    pregabalin  25 mg Oral BID    atorvastatin  20 mg Oral Once per day on Mon Wed Fri    psyllium  1 packet Oral BID    cholestyramine  1 packet Oral BID    epoetin izabella-epbx  3,000 Units Subcutaneous Once per day on Mon Wed Fri    And    epoetin izabella-epbx  2,000 Units Subcutaneous Once per day on Mon Wed Fri    sodium bicarbonate  1,300 mg Oral TID    pantoprazole  40 mg Oral QAM AC    midodrine  20 mg Oral TID WC    sodium chloride flush  10 mL Intravenous 2 times per day    aspirin  81 mg Oral Daily    calcium carbonate  3 tablet Oral BID    fenofibrate  160 mg Oral Once per day on Mon Wed Fri    folic acid  1 mg Oral Daily    levothyroxine  137 mcg Oral Daily    liothyronine  5 mcg Oral Daily    magnesium oxide  400 mg Oral Nightly    melatonin  15 mg Oral Nightly    montelukast  10 mg Oral Daily     Continuous Infusions:   norepinephrine 9 mcg/min (12/15/20 0334)     PRN Meds:       artificial tears, , PRN      phenol, 1 spray, Q2H PRN      menthol-zinc oxide, , BID PRN      acetaminophen, 650 mg, Q4H PRN      sodium chloride flush, 10 mL, PRN      albuterol, 0.63 mg, 4x Daily PRN      rOPINIRole, 0.5 mg, Nightly PRN          VENT SETTINGS (Comprehensive) (if applicable):  Vent Information  Skin Assessment: Clean, dry, & intact  SpO2: 98 %  Mask Type:  Other (Comment)(pt's nasal pillows)  Additional Respiratory  Assessments  Pulse: 90  Resp: 23  SpO2: 98 %  Oral Care: Mouth swabbed, Mouth moisturizer    ABGs:   Recent Labs     12/15/20  0933   PH 7.239*   PCO2 54.7*   PO2 128.7*   HCO3 22.9   BE -4.5*   O2SAT 97.6       Laboratory findings:    Complete Blood Count:   Recent Labs     12/13/20  0830 12/13/20  1650 12/14/20  0530 12/15/20  0552   WBC 17.4*  --  16.7* 12.1*   HGB 7.2* 8.6* 8.2* 7.6*   HCT 23.0* 26.6* 26.4* 23.5*   PLT 71*  --  59* 36*        Last 3 Blood Glucose:   Recent Labs     12/13/20  0500 12/14/20  0530 12/15/20  0552   GLUCOSE 95 112* 133*        PT/INR:    Lab Results   Component Value Date    PROTIME 15.1 10/21/2020    INR 1.3 10/21/2020     PTT:    Lab Results   Component Value Date    APTT 36.6 06/02/2018       Comprehensive Metabolic Profile:   Recent Labs     12/13/20  0500 12/14/20  0530 12/15/20  0552    138 137   K 4.2  4.2 4.6 4.9    109* 106   CO2 21* 21* 26   BUN 12 14 11   CREATININE 3.7* 4.0* 3.1*   GLUCOSE 95 112* 133*   CALCIUM 7.6* 7.7* 8.1*   PROT 5.3* 5.4* 5.6*   LABALBU 1.7* 1.5* 2.1*   BILITOT 1.1 1.2 2.1* ALKPHOS 149* 171* 166*   AST 31 38* 37*   ALT 22 25 22      Magnesium:   Lab Results   Component Value Date    MG 1.7 12/15/2020     Phosphorus:   Lab Results   Component Value Date    PHOS 3.2 12/15/2020     Ionized Calcium:   Lab Results   Component Value Date    CAION 1.25 12/14/2020        Urinalysis:     Troponin: No results for input(s): TROPONINI in the last 72 hours.     Microbiology:    Cultures during this admission:     Blood cultures:                 [] None drawn      [] Negative             []  Positive (Details:  )  Urine Culture:                   [] None drawn      [] Negative             []  Positive (Details:  )  Sputum Culture:               [] None drawn       [] Negative             []  Positive (Details:  )   Endotracheal aspirate:     [] None drawn       [] Negative             []  Positive (Details:  )     Other pertinent Labs:       Radiology/Imaging:     Chest Xray (12/15/2020):    na    ASSESSMENT:     Neuro   Lightheadedness-resolved   Peripheral neuropathy c/w  Lyrica, ropinirole  Respiratory   AE asthma   Laba/ics   Albuterol   singulair   Hypoxia today likely a result of   YUNIOR-CPAP at night    Cardiovascular        Shock septic vs hypovolemia vs cardiogenic vs AI:   titrate pressors   Check nicom   Trend h/h   Albumin  ivf   Midodrine, hydrocortisone     Gastrointestinal   Short gut syndrome-monitor, on stool thickening agent as this may be the source of hypovolemia, FMS placed 12/10 for frequent stooling  GI prophylaxis-Protonix     Renal   ESRD on dialysis-Monday Wednesday Friday dialysis, will need to improve blood pressure before resuming  Electrolyte abnormalities resolved  Oral bicarb supplementation   Renal following      Infectious Disease   Wbc trending down likely stress response   Agree with ID watch off abx     Hematology/Oncology   DVT prophylaxis-Heparin  Acute onChronic anemia  Trend h/h q 12 hours   Thrombocytopenia acute   Check hit panel, platelet smear     dvt prophylaxis pcd     Endocrine   Synthroid     Social/Spiritual/DNR/Other   Full code    Amanda Santiago M.D.    Pulmonary/Critical Care Medicine   37 min cct excluding procedures

## 2020-12-15 NOTE — PROGRESS NOTES
Date: 12/14/2020    Time: 10:46 PM    Patient Placed On BIPAP/CPAP/ Non-Invasive Ventilation? Yes    If no must comment. Facial area red/color change? No           If YES are Blister/Lesion present? No   If yes must notify nursing staff  BIPAP/CPAP skin barrier? No    Skin barrier type:na       Comments: Pt placed on BiPAP post ABG results. Pt using home nasal pillows at this time, skin barrier not needed. Will wean oxygen as tolerated.         Kendy Jan 12/14/20 1326   NIV Type   Mode Bilevel   Mask Type Other (Comment)  (pt's nasal pillows)   Settings/Measurements   IPAP 15 cmH20   CPAP/EPAP 8 cmH2O   Rate Ordered 14   Resp 19   O2 Flow Rate (L/min) 10 L/min   Vt Exhaled 437 mL   Minute Volume 9.6 Liters   Mask Leak (lpm) 32 lpm   Comfort Level Good   Using Accessory Muscles No   SpO2 93

## 2020-12-15 NOTE — PROGRESS NOTES
Internal Medicine Progress Note      Synopsis: Patient admitted on 12/7/2020    Subjective  Dec 9 th  Feeling slightly better today. Pressors are less and hoping to be weaned off today. Still with lots of frequent stools    December 10, 2020  Still on pressors though less so. Recovering better and better  Feeling short of breath and swollen and weak but does not want to consider discharge to any other placement home  December 11, 2020  Now with fecal management system. Pressors were weaned off and her blood pressure dropped. Almost systolic of 50. She was symptomatically lightheaded and felt weak  December 12, 2020  Still  on pressors  Still with loose stools. Now with FMS since yesterday  Ded 13 th   Feels dizzy  Feels swollen  She  Will be getting bt   December 14, 2020  Still on pressors and still feeling dizzy. December 15, 2020  Less dizzy today. Feeling slightly better she tells me    Exam:  BP (!) 93/59   Pulse 85   Temp 97 °F (36.1 °C) (Infrared)   Resp 16   Ht 5' 3\" (1.6 m)   Wt 228 lb 13.4 oz (103.8 kg)   SpO2 98%   BMI 40.54 kg/m²   General appearance: No apparent distress, appears stated age and cooperative. HEENT: Pupils equal, round, and reactive to light. Conjunctivae/corneas clear. Neck: Supple. No jugular venous distention. Trachea midline. Respiratory:  Normal respiratory effort. Clear to auscultation, bilaterally without Rales/Wheezes/Rhonchi. Cardiovascular: Regular rate and rhythm with normal S1/S2 without murmurs, rubs or gallops. Abdomen: Soft, non-tender, non-distended with normal bowel sounds.   Musculoskeletal: No clubbing, cyanosis 2+ edema bilateral lower extremities  Skin:  No rashes    Neurologic: awake, alert and following commands     Medications:  Reviewed    Infusion Medications    norepinephrine 6 mcg/min (12/15/20 0800)     Scheduled Medications    albumin human  25 g Intravenous Once    hydrocortisone sodium succinate PF  100 mg Intravenous Q8H    sodium chloride  20 mL Intravenous Once    pregabalin  25 mg Oral BID    atorvastatin  20 mg Oral Once per day on Mon Wed Fri    psyllium  1 packet Oral BID    cholestyramine  1 packet Oral BID    epoetin izabella-epbx  3,000 Units Subcutaneous Once per day on Mon Wed Fri    And    epoetin izabella-epbx  2,000 Units Subcutaneous Once per day on Mon Wed Fri    sodium bicarbonate  1,300 mg Oral TID    pantoprazole  40 mg Oral QAM AC    midodrine  20 mg Oral TID WC    sodium chloride flush  10 mL Intravenous 2 times per day    aspirin  81 mg Oral Daily    calcium carbonate  3 tablet Oral BID    fenofibrate  160 mg Oral Once per day on Mon Wed Fri    folic acid  1 mg Oral Daily    levothyroxine  137 mcg Oral Daily    liothyronine  5 mcg Oral Daily    magnesium oxide  400 mg Oral Nightly    melatonin  15 mg Oral Nightly    montelukast  10 mg Oral Daily     PRN Meds: artificial tears, phenol, menthol-zinc oxide, acetaminophen, sodium chloride flush, albuterol, rOPINIRole    I/O    Intake/Output Summary (Last 24 hours) at 12/15/2020 1512  Last data filed at 12/14/2020 1900  Gross per 24 hour   Intake 887 ml   Output 1100 ml   Net -213 ml       Labs:   Recent Labs     12/13/20  0830 12/13/20  1650 12/14/20  0530 12/15/20  0552   WBC 17.4*  --  16.7* 12.1*   HGB 7.2* 8.6* 8.2* 7.6*   HCT 23.0* 26.6* 26.4* 23.5*   PLT 71*  --  59* 36*       Recent Labs     12/13/20  0500 12/14/20  0530 12/15/20  0552    138 137   K 4.2  4.2 4.6 4.9    109* 106   CO2 21* 21* 26   BUN 12 14 11   CREATININE 3.7* 4.0* 3.1*   CALCIUM 7.6* 7.7* 8.1*   PHOS 3.0 3.6 3.2       Recent Labs     12/13/20  0500 12/14/20  0530 12/15/20  0552   PROT 5.3* 5.4* 5.6*   ALKPHOS 149* 171* 166*   ALT 22 25 22   AST 31 38* 37*   BILITOT 1.1 1.2 2.1*       No results for input(s): INR in the last 72 hours. No results for input(s): Sandip Clap in the last 72 hours.     Chronic labs:  Lab Results   Component Value Date    CHOL 96 02/27/2020    TRIG 131 02/27/2020    HDL 36 02/27/2020    LDLCALC 34 02/27/2020    TSH 6.430 (H) 12/13/2020    INR 1.3 10/21/2020    LABA1C <4.0 12/30/2019     Results for Yolande Bello (MRN [id number]) as of 12/9/2020 10:51   Ref. Range 12/8/2020 07:50 12/8/2020 20:00 12/9/2020 00:55 12/9/2020 05:00 12/9/2020 08:40   Lactic Acid Latest Ref Range: 0.5 - 2.2 mmol/L 4.6 (HH) 3.8 (H) 3.2 (H) 2.5 (H) 2.6 (H)     Radiology:  Xr Chest Portable    Result Date: 12/14/2020  EXAMINATION: ONE XRAY VIEW OF THE CHEST 12/14/2020 10:53 am COMPARISON: Comparison November 28-December 13 HISTORY: ORDERING SYSTEM PROVIDED HISTORY: Respiratory distress TECHNOLOGIST PROVIDED HISTORY: Reason for exam:->Respiratory distress FINDINGS: Presence of a large size left-sided pleural effusion causing significant compression of the left lung. There is a mild to moderate right-sided pleural effusion. Heart has mildly increased size. Patient had previous mid sternotomy. No significant changes since the December 13. Bilateral pleural effusions larger on the left side. Xr Chest Portable    Result Date: 12/13/2020  EXAMINATION: ONE XRAY VIEW OF THE CHEST 12/13/2020 1:17 pm COMPARISON: Multiple priors, most recent from December 12, 2020 at 13:25. HISTORY: ORDERING SYSTEM PROVIDED HISTORY: Thoracentesis TECHNOLOGIST PROVIDED HISTORY: Reason for exam:->Thoracentesis FINDINGS: There are postoperative changes related to sternotomy. Heart size is unable to be accurately assessed on this single portable view of the chest, but appears to be stable. There is a persistent moderate-sized left pleural effusion, not significantly changed in size compared with the most recent examination. No evidence of a pneumothorax following thoracentesis. There is a small right pleural effusion as well. Prominence of the pulmonary vasculature suggests pulmonary edema.     Findings suggestive of pulmonary edema with a persistent moderate left and small right pleural effusion. No evidence of pneumothorax following thoracentesis. Xr Chest Portable    Result Date: 12/12/2020  EXAMINATION: ONE XRAY VIEW OF THE CHEST 12/12/2020 2:20 pm COMPARISON: 12/07/2020 and 11/28/2020 HISTORY: ORDERING SYSTEM PROVIDED HISTORY: SOB TECHNOLOGIST PROVIDED HISTORY: Reason for exam:->SOB FINDINGS: The heart is enlarged. Postoperative sternotomy changes are noted. There is a trace right pleural effusion and minimal right lung base atelectasis. There is a moderate to large left pleural effusion which has increased in size when compared with the patient's prior study. Patchy left perihilar pulmonary infiltration is noted which could represent atelectasis pneumonia or edema. 1. Enlarging left pleural effusion which is now moderate to large in size. 2. New patchy left perihilar infiltration which could represent pneumonia ,edema or less likely atelectasis. 3. Trace right pleural effusion    Xr Chest Portable    Result Date: 12/7/2020  EXAMINATION: ONE XRAY VIEW OF THE CHEST 12/7/2020 8:12 am COMPARISON: 11/28/2020 HISTORY: Acute shortness of breath. FINDINGS: Sternotomy changes with prosthetic cardiac valves and left atrial appendage clip. Heart remains enlarged. Increased moderate left pleural effusion with adjacent airspace disease. Mild right basilar atelectasis. No pneumothorax. Increased moderate left pleural effusion. Xr Chest Portable    Result Date: 11/28/2020  EXAMINATION: ONE XRAY VIEW OF THE CHEST 11/28/2020 6:07 pm COMPARISON: Chest series from October 21, 2020. HISTORY: ORDERING SYSTEM PROVIDED HISTORY: sob TECHNOLOGIST PROVIDED HISTORY: Reason for exam:->sob FINDINGS: Postsurgical changes overlie the mediastinum. Lung hyperinflation and coarse interstitial markings. Elevation of the left hemidiaphragm versus loculated left pleural effusion is unchanged. Small right pleural effusion or scarring. No new airspace disease. No pneumothorax.   Atherosclerotic disease in the aortic arch. Cardiac silhouette is enlarged. Normal pulmonary vascularity. Osseous and thoracic soft tissue structures demonstrate no acute findings. 1.  Stable lung hyperinflation and coarse interstitial markings. Stable left greater than right pleural effusions or potentially scarring. No new pulmonary pathology. 2.  Atherosclerotic disease and cardiomegaly. Postsurgical changes. Normal pulmonary vascularity on this exam    Us Thoracentesis Which Side Should The Procedure Be Performed? Left    Result Date: 12/14/2020  PROCEDURE: ULTRASOUNDGUIDED RIGHT THORACENTESIS 12/13/2020 HISTORY: ORDERING SYSTEM PROVIDED HISTORY: left effusion TECHNOLOGIST PROVIDED HISTORY: Reason for exam:->left effusion Which side should the procedure be performed? ->Left What reading provider will be dictating this exam?->CRC TECHNIQUE: Limited ultrasound of the left thorax was performed combine image was saved. The site thoracentesis was then performed by Dr. Alexandra Diane. FINDINGS: Left-sided pleural effusion. Left-sided thoracentesis was performed by Dr. Alexandra Diane at the bedside. Left-sided pleural effusion. Please refer to separate procedure report from Dr. Alexandra Diane for further details. Us Dup Upper Extremity Right Venous    Result Date: 12/12/2020  EXAMINATION: DUPLEX ULTRASOUND OF THE RIGHT UPPER EXTREMITY FOR DVT, 12/12/2020 2:18 pm TECHNIQUE: Duplex ultrasound and Doppler images were obtained of the deep venous structures of the upper extremity. COMPARISON: None. HISTORY: ORDERING SYSTEM PROVIDED HISTORY: right arm swelling, r/o dvt TECHNOLOGIST PROVIDED HISTORY: Reason for exam:->right arm swelling, r/o dvt What reading provider will be dictating this exam?->CRC FINDINGS: There is normal flow and compressibility of the visualized venous structures. There is no evidence of echogenic thrombus. No evidence of DVT.     Us Dup Upper Extremity Right Arteries    Result Date: 12/12/2020  EXAMINATION: DUPLEX ARTERAL ULTRASOUND OF THE RIGHT UPPER EXTREMITY 12/12/2020 2:22 pm TECHNIQUE: Arterial Doppler evaluation of the right upper extremity was performed using a combination of grayscale, color Doppler, and spectral Doppler technique. COMPARISON: None used HISTORY: ORDERING SYSTEM PROVIDED HISTORY: Right arm swelling, r/o occlusion TECHNOLOGIST PROVIDED HISTORY: Reason for exam:->Right arm swelling, r/o occlusion What reading provider will be dictating this exam?->CRC FINDINGS: Scattered areas of atherosclerotic plaque in the arterial vessels of the right upper extremity. Multiphasic waveforms identified in all areas. No soft tissue abnormality. Peak flow velocities were measured as follows: Subclavian 79 cm/sec Axillary     34 cm/sec Brachial    92 cm/sec Radial       62 cm/sec Ulnar       27 cm/sec All spectral waveforms show brisk upstroke. There is slight spectral broadening in the radial and ulnar arteries. 1.  No evidence of hemodynamically significant stenosis. Multiphasic waveforms identified in all areas. 2.  Spectral broadening in the radial and ulnar arteries suggests early underlying peripheral vascular disease. Us Dup Lower Extremities Bilateral Venous    Result Date: 12/12/2020  EXAMINATION: DUPLEX VENOUS ULTRASOUND OF THE BILATERAL LOWER EXTREMITIES, 12/12/2020 12:54 pm TECHNIQUE: Duplex ultrasound and Doppler images were obtained of the bilateral lower extremities COMPARISON: None. HISTORY: ORDERING SYSTEM PROVIDED HISTORY: re dvt TECHNOLOGIST PROVIDED HISTORY: Reason for exam:->re dvt What reading provider will be dictating this exam?->CRC FINDINGS: Limitations: Right iliac vein, greater saphenous vein, and common femoral vein not seen secondary to a peripheral IV in the right groin region. There is dialysis access in the left groin region which also limits evaluation of these vessels. Calf vessels suboptimally imaged secondary to superficial edema.  There are bilateral popliteal fossa fluid collections measuring 3.7 cm on the right and 2.1 cm on the left. The remaining visualized veins of the bilateral lower extremities are patent and free of echogenic thrombus. The veins are normally compressible and have normal phasic flow. 1.  No evidence of DVT in either lower extremity given the limitations detailed above. 2.  Bilateral superficial soft tissue edema. 3.  Bilateral popliteal fossa fluid collections (Baker's cyst)    ASSESSMENT:    Active Problems:    Hypotension    Hypokalemia  Resolved Problems:    * No resolved hospital problems. *  Lactic acidosis  Hemodialysis dependence  Chronic pain  Restless legs  Anemia  Asthma  Hypothyroidism     PLAN:    Appreciate input of renal services  Replace potassium  Remains on pressors  Still acidotic. D lactic acid being looked for. No results yet    December 9, 2020  Await decrease of pressors and her toleration of that  The lactic acid levels are pending and will need to be waited on  She is tired but otherwise mentally well  Less and lowered lactic acid level today    December 10, 2020  Mildly acidotic still. Continue to wean pressors. Dialysis per renal services. Resumption of premorbid medications. Midodrine increase as tolerated. Fluid losses from GI tract still considered being likely the source of her profound hypotension  C. difficile negative    December 11, 2020  Continue to provide pressor support. Patient still in the ICU. Await work-up for the lactic acidosis  Cholestyramine started for loose stools but doubt that it has been effective. We can request GI services to see if they can give us a new thought process  Continue to follow labs  Lower dose of Lyrica to avoid hypotension from that  December 12, 2020  Await GI services to see her. Discussed with family at length on the phone leukocytosis is getting worse  Request infectious disease to see her?   Lactic acid still fluctuating and not normal yet    Dec 13 th 2020  Blood pressure still low. Pressors still in place  Note ID and Dr Deisy Sequeira input   BT now ordered  No blood loss seen but s/s with her bp  D/w family. Await hemo  Low plts still  3 rd spaced   December 14, 2020  Still on pressors and fluctuating with blood pressure. Infectious disease did see. Received blood yesterday. Pleural effusion tapped transudative  Still massively third spaced and still feeling extremely weak  Palliative care would be appropriate  The lactic acid level awaited  Psyllium husk for bulking stool. Still tons of stool in [facility]  December 15, 2020  Still on pressors. Renal his focusing on ultrafiltration. The lactic acid is still pending. Cortisol level this morning was 13. Continues with significant losses from GI tract with no respiratory  Remains a full code  Speech did see her for the purposes of choking and appeared to have helped her start to focus on better ways to swallow  Diet: Dietary Nutrition Supplements: Protein Modular  Dietary Nutrition Supplements: Low Calorie High Protein Supplement  DIET GENERAL;  Code Status: Prior  PT/OT Eval Status:   Once off of pressors  DVT Prophylaxis:    In place   recommended disposition at discharge:   Likely home  +++++++++++++++++++++++++++++++++++++++++++++++++  Daphne Huizar MD   [facility].  +++++++++++++++++++++++++++++++++++++++++++++++++  NOTE: This report was transcribed using voice recognition software. Every effort was made to ensure accuracy; however, inadvertent computerized transcription errors may be present.

## 2020-12-15 NOTE — PLAN OF CARE
Problem: Falls - Risk of:  Goal: Will remain free from falls  Description: Will remain free from falls  12/15/2020 0826 by Stacie Yao RN  Outcome: Met This Shift  12/14/2020 2043 by Jaspreet Erwin RN  Outcome: Met This Shift  Goal: Absence of physical injury  Description: Absence of physical injury  Outcome: Met This Shift     Problem: Pain:  Goal: Pain level will decrease  Description: Pain level will decrease  12/15/2020 0826 by Stacie Yao RN  Outcome: Met This Shift  12/14/2020 2043 by Jaspreet Erwin RN  Outcome: Met This Shift  Goal: Control of acute pain  Description: Control of acute pain  Outcome: Met This Shift  Goal: Control of chronic pain  Description: Control of chronic pain  Outcome: Met This Shift     Problem: Fluid and Electrolyte Imbalance  Goal: Fluid and electrolyte balance are achieved/maintained  12/15/2020 0826 by Stacie Yao RN  Outcome: Met This Shift  12/14/2020 2043 by Jaspreet Erwin RN  Outcome: Ongoing     Problem: HEMODYNAMIC STATUS  Goal: Hemoglobin within specified parameters  12/15/2020 0826 by Stacie Yao RN  Outcome: Met This Shift  12/14/2020 2043 by Jaspreet Erwin RN  Outcome: Met This Shift     Problem: Mobility - Impaired  Goal: Mobility/activity is maintained at optimum level for patient  12/15/2020 0826 by Stacie Yao RN  Outcome: Met This Shift  12/14/2020 2043 by Jaspreet Erwin RN  Outcome: Not Met This Shift     Problem: Cardiac Output - Decreased:  Goal: Hemodynamic stability will improve  Description: Hemodynamic stability will improve  12/15/2020 0826 by Stacie Yao RN  Outcome: Met This Shift  12/14/2020 2043 by Jaspreet Erwin RN  Outcome: Met This Shift     Problem: Skin Integrity:  Goal: Will show no infection signs and symptoms  Description: Will show no infection signs and symptoms  Outcome: Met This Shift  Goal: Absence of new skin breakdown  Description: Absence of new skin breakdown  Outcome: Met This Shift

## 2020-12-15 NOTE — PROGRESS NOTES
SPEECH LANGUAGE PATHOLOGY  DAILY PROGRESS NOTE        PATIENT NAME:  Ricardo Nguyen      :  1955          TODAY'S DATE:  12/15/2020 ROOM:  [address] [place] [address]        SWALLOWING    Pt upright in bed and alert. Pt and RN report toleration of current diet. Pt accepted thin liquid per straw during session. DYSPHAGIA DIAGNOSIS:  Oropharyngeal dysphagia                            DIET RECOMMENDATIONS:  Dysphagia 3, Soft/advanced (Soft & Bite-sized) solids with small sips thin liquids-NO EGGS     Pt to be upright as able for all oral intake. Recommend pills to be presented in pudding/applesauce.                  FEEDING RECOMMENDATIONS:                              Assistance level:  Stand by assistance is needed during all oral intake as needed                             Compensatory strategies recommended: Small bites/sips and Alternate solids and liquids        Oral- adequate labial seal and A-P transfer, no oral residue      Pharyngeal- No overt s/s of aspiration, no multiple swallows      Education- Pt educated on results and POC. Pt trained on compensatory strategies for safe swallow with good outcome. Questions answered. Will continue SP intervention as per previously established POC. Shalini SANCHEZ CCC/SLP [id number]  Speech Pathologist              CPT code(s) 64032  dysphagia tx  Total minutes :  15 minutes

## 2020-12-15 NOTE — PROGRESS NOTES
CREATININE 3.1 12/15/2020    GFRAA 18 12/15/2020    LABGLOM 15 12/15/2020    GLUCOSE 133 12/15/2020    PROT 5.6 12/15/2020    LABALBU 2.1 12/15/2020    CALCIUM 8.1 12/15/2020    BILITOT 2.1 12/15/2020    ALKPHOS 166 12/15/2020    AST 37 12/15/2020    ALT 22 12/15/2020          Microbiology :  No results for input(s): BC in the last 72 hours. No results for input(s): Caralee Roller in the last 72 hours. No results for input(s): LABURIN in the last 72 hours. No results for input(s): CULTRESP in the last 72 hours. No results for input(s): WNDABS in the last 72 hours. Radiology :  XR CHEST PORTABLE   Final Result   No significant changes since the December 13. Bilateral pleural effusions   larger on the left side. XR CHEST PORTABLE   Final Result   Findings suggestive of pulmonary edema with a persistent moderate left and   small right pleural effusion. No evidence of pneumothorax following   thoracentesis. US THORACENTESIS Which side should the procedure be performed? Left   Final Result   Left-sided pleural effusion. Please refer to separate procedure report from   Dr. Thelma Walsh for further details. US DUP LOWER EXTREMITIES BILATERAL VENOUS   Final Result   1. No evidence of DVT in either lower extremity given the limitations   detailed above. 2.  Bilateral superficial soft tissue edema. 3.  Bilateral popliteal fossa fluid collections (Baker's cyst)      US DUP UPPER EXTREMITY RIGHT ARTERIES   Final Result   1. No evidence of hemodynamically significant stenosis. Multiphasic   waveforms identified in all areas. 2.  Spectral broadening in the radial and ulnar arteries suggests early   underlying peripheral vascular disease. XR CHEST PORTABLE   Final Result   1. Enlarging left pleural effusion which is now moderate to large in size. 2. New patchy left perihilar infiltration which could represent pneumonia   ,edema or less likely atelectasis.    3. Trace right pleural effusion      US DUP UPPER EXTREMITY RIGHT VENOUS   Final Result   No evidence of DVT. XR CHEST PORTABLE   Final Result   Increased moderate left pleural effusion.             Assessment and Plan:      · Hypotension/leukocytosis-sepsis versus reactive (excess volume loss, diarrhea)  · End-stage renal disease on hemodialysis  · Status post gastric bypass surgery with short gut syndrome  · Obstructive sleep apnea  · Polymyalgia rheumatica  · Left-sided pleural effusion- bloody tap     Plan  - CXR worsening and so is oxygenation.  Hopefully will improve after dialysis  -off abx , CRP- 11.9   -normal robin-  stool cultures  -D lactic acid level pending (short gut syndrome)  -Blood cultures have been negative so far  -UA in setting of diarrhea and on top of that in the renal disease patient is not very reliable   - will watch off abx           Electronically signed by Khanh Franklin MD on 12/15/2020 at 10:16 AM

## 2020-12-16 NOTE — FLOWSHEET NOTE
12/16/20 1745   Vital Signs   BP (!) 114/59   Temp 93 °F (33.9 °C)   Pulse 78   Resp 20   Pain Assessment   Pain Assessment 0-10   Pain Level 0   Post-Hemodialysis Assessment   Post-Treatment Procedures Access bleeding time > 10 minutes   Machine Disinfection Process Acid/Vinegar Clean;Heat Disinfect; Exterior Machine Disinfection   Rinseback Volume (ml) 300 ml   Total Liters Processed (l/min) 19.3 l/min   Dialyzer Clearance Moderately streaked   Duration of Treatment (minutes) 90 minutes   Hemodialysis Intake (ml) 700 ml   Hemodialysis Output (ml) 800 ml   NET Removed (ml) 100 ml   Tolerated Treatment Fair   Patient Response to Treatment Pt tolerated tx mildly well. Levo increased throughout tx. Pt received 1u PRBC with HD. 100mL net UF removed. Arterial site bled for 40 minutes, Radha aware. Bilateral Breath Sounds Diminished   Edema Generalized;Right upper extremity; Left upper extremity;Right lower extremity; Left lower extremity   Edema Generalized +1;Non-pitting   RUE Edema +2;Pitting   LUE Edema +2;Pitting   RLE Edema +3;Pitting   LLE Edema +3;Pitting

## 2020-12-16 NOTE — PROGRESS NOTES
12/16/2020  3:59 PM      Comprehensive Nutrition Assessment    Type and Reason for Visit:  Reassess    Nutrition Recommendations/Plan: When medically feasible for PO diet, recommend diet per SLP  :Dysphagia 3, Soft/advanced (Soft & Bite-sized) solids with small sips thin liquids-NO EGGS as tolerated     Nutrition Assessment:  Pt w/increased AMS today and having stat CT currently. Elevated ammonia may also contribute. SLP recommended holding oral feedings until mentation/resp status improve. When able to resume PO diet, recommend diet as per SLP-Dysphagia 3, Soft/advanced (Soft & Bite-sized) solids with small sips thin liquids-NO EGGS as tolerated. Pt remains on HD as well. Continue to monitor status changes. Malnutrition Assessment:  Malnutrition Status: At risk for malnutrition (Comment)    Context:  Chronic Illness     Findings of the 6 clinical characteristics of malnutrition:  Energy Intake:  Mild decrease in energy intake (Comment)(since admit inconsistent intake doc)  Weight Loss:  No significant weight loss     Body Fat Loss:  Unable to assess     Muscle Mass Loss:  Unable to assess    Fluid Accumulation:  No significant fluid accumulation(multiple factors (ESRD))     Strength:  Not Performed    Estimated Daily Nutrient Needs:  Energy (kcal):  ; Weight Used for Energy Requirements:  Admission     Protein (g):  75-85 (1.4-1.6 g/kg) as robbie w/hyperammonemia; Weight Used for Protein Requirements:  Ideal        Fluid (ml/day):  Critical care or Renal management; Method Used for Fluid Requirements:  Other (Comment)      Nutrition Related Findings:  AMS, pressor (MAP 72), I/O +19L, FMS/loose (lactulose), edema +2-+3,      Wounds:  Skin Tears       Current Nutrition Therapies:    Dietary Nutrition Supplements: Protein Modular  Dietary Nutrition Supplements: Low Calorie High Protein Supplement  DIET GENERAL;     Anthropometric Measures:  · Height: 5' 3\" (160 cm)  · Current Body Weight: 228 lb (103.4 kg)(12/14 - wt gain with +fluid balance 19L)   · Admission Body Weight: 182 lb (82.6 kg)(12/8 bedscale)    · Usual Body Weight: 187 lb (84.8 kg)(per EMR x 1 yr - likely fluctuations over time w/fluid d/t ESRD/HD)     · Ideal Body Weight: 115 lbs; % Ideal Body Weight 173.9 %   · BMI: 40.4  · BMI Categories: Obese Class 2 (BMI 35.0 -39.9)       Nutrition Diagnosis:   · Increased nutrient needs related to renal dysfunction(chronic losses via HD and compromised absorption (hx RYGB)) as evidenced by intake 26-50%, dialysis, GI abnormality      Nutrition Interventions:   Food and/or Nutrient Delivery:  Modify Current Diet, Continue Oral Nutrition Supplement(When PO medically feasible, [facility], Soft/advanced (Soft & Bite-sized) solids with small sips thin liquids-NO EGGS as tolerated/)  Nutrition Education/Counseling:  No recommendation at this time   Coordination of Nutrition Care:  Continue to monitor while inpatient    Goals:  PO >75% at meals/ONS       Nutrition Monitoring and Evaluation:   Behavioral-Environmental Outcomes:  None Identified   Food/Nutrient Intake Outcomes:  Food and Nutrient Intake, Supplement Intake  Physical Signs/Symptoms Outcomes:  Biochemical Data, GI Status, Fluid Status or Edema, Hemodynamic Status, Weight, Skin, Nutrition Focused Physical Findings     Discharge Planning:     Too soon to determine     Electronically signed by Fernando Singleton RD, CNSC, LD on 12/16/20 at 4:00 PM EST    Contact: [phone]

## 2020-12-16 NOTE — FLOWSHEET NOTE
12/15/20 2000   Vital Signs   BP (!) 114/53   Temp 98.5 °F (36.9 °C)   Pulse 93   Resp 16   Pain Assessment   Pain Assessment 0-10   Pain Level 0   Post-Hemodialysis Assessment   Post-Treatment Procedures Blood returned   Machine Disinfection Process Acid/Vinegar Clean;Exterior Machine Disinfection; Heat Disinfect   Rinseback Volume (ml) 300 ml   Dialyzer Clearance Lightly streaked   Duration of Treatment (minutes) 120 minutes   Hemodialysis Intake (ml) 300 ml   Hemodialysis Output (ml) 300 ml   NET Removed (ml) 0 ml   Tolerated Treatment Fair   Patient Response to Treatment tx complete. no fluid removal. pt hypotensive at times during tx. sites held stasis achieved.    Bilateral Breath Sounds Diminished   Edema Generalized   Edema Generalized +1

## 2020-12-16 NOTE — CONSULTS
Lexi and Rachel Olvieira      Patient Name: Elliot Jo  YOB: 1955  PCP: Alfredo Gregory MD   Referring Provider:      Reason for Consultation:   Chief Complaint   Patient presents with    Hypotension     pt was at dialysis (recieved 40 min)    Fatigue    Dizziness        History of Present Illness: This pt is a pleasant 78 yo female who follows with me outpatient for cytopenias related to anemia of CKD (ESRD on HD and getting epo supplements) and IRVING (s/p gastrectomy) as well as related to BUCKLEY and splenomegaly with platelet sequestration. She is now admitted for hypotension (recurrent reason for admission) and hypokalemia as well as lactic acidosis. She required admission to IVU and pressors support. She is on HD. ID following though blood cultures negative at this time. CT A/P with cirrhotic liver (interestingly normal spleen). Hematology consulted for worsening thrombocytopenia (improved to 62 today). Hgb also dropped to 5.7.  Ammonia elevated at 109    Diagnostic Data:     Past Medical History:   Diagnosis Date    (HFpEF) heart failure with preserved ejection fraction ([facility] 01/25/2017 4/11/17- echo- LVEF 55-60%, stage II DD, severely dilated LA, severe MR, mild TR, LVDD: 5.6 cm    Anemia     hx  / takes aranesp injections every 2 weeks    Asthma     well controlled with inhalers     CAD (coronary artery disease)     Chronic kidney disease     stage 4    Complication of AV dialysis fistula, initial encounter 6/2/2018    COPD (chronic obstructive pulmonary disease) (HCC)     Fibromyalgia     GERD (gastroesophageal reflux disease)     Hemodialysis patient ([facility]     chato Tarango    History of blood transfusion spring 2017    Hx of blood clots     h/o DVT in leg at age 25yrs    Hyperlipidemia     Hypertension     well controlled with medications     Kidney failure     Kidney stone     multiple issues    YUNIOR on CPAP  Polymyalgia rheumatica (HCC)     Restless leg syndrome     Short gut syndrome     Thyroid disease     Traumatic hematoma of left upper arm 6/2/2018    Wears dentures     upper partial       Patient Active Problem List    Diagnosis Date Noted    Hypokalemia 12/07/2020    Dizziness     PAT (paroxysmal atrial tachycardia) (HCC)     Hypotension 10/21/2020    Thrombocytopenia ([address] 06/02/2018    Hematoma 06/02/2018    Traumatic hematoma of left upper arm 12/97/2218    Complication of AV dialysis fistula, initial encounter 06/02/2018    ESRD on dialysis ([address] 03/07/2018    Problem with dialysis access ([address] 01/17/2018    Hypervolemia     Acute on chronic diastolic congestive heart failure (HCC)     History of mitral valve repair     History of tricuspid valve repair     MANUEL (acute kidney injury) ([address]     Chronic renal failure, stage 5 ([address]     Respiratory arrest ([address]     Non-rheumatic mitral regurgitation 02/09/2017    (HFpEF) heart failure with preserved ejection fraction ([address] 01/25/2017    Cellulitis of right lower extremity 01/31/2016    Exacerbation of asthma 01/06/2016    Renal failure (ARF), acute on chronic ([address] 01/06/2016    HTN (hypertension) 01/06/2016    YUNIOR on CPAP 01/06/2016    Hypomagnesemia 01/06/2016    Hypocalcemia 01/06/2016    Hypothyroid 01/06/2016        Past Surgical History:   Procedure Laterality Date    APPENDECTOMY      AV FISTULA REPAIR Left 03/22/2018    Superficialization, Delatore    AV FISTULA REPAIR  11/21/2018    Dr Therese Santillan 10x38 iCast Subclavian    BREAST BIOPSY Right 2000    BRONCHOSCOPY  2010    CARDIAC CATHETERIZATION  02/17/2017    Dr Bryant Pew REPLACEMENT  03/21/2017    MVR, TVR    COLONOSCOPY      COLONOSCOPY  4/21/15    COLONOSCOPY N/A 9/3/2019    COLORECTAL CANCER SCREENING, NOT HIGH RISK performed by Chandra Gutierrez MD at [address] Left 11/16/2017 radiocephalic, Delatore    DIALYSIS FISTULA CREATION Left 01/25/2018    brachioecephalic, ligation radiocephalic, Delatore    ENDOSCOPY, COLON, DIAGNOSTIC      EYE SURGERY Right     CATARACT    INTESTINAL BYPASS  1973    for weight loss    KIDNEY STONE SURGERY      x 3    OTHER SURGICAL HISTORY Left 11/16/2017    AV fistula creation left arm    OTHER SURGICAL HISTORY  01/17/2018    Left arm fistulogram by Dr Nely Guillen.  OTHER SURGICAL HISTORY  10/17/2018    Dr Tracey-Left arm fistuloplasty    OH CREAT AV FISTULA,AUTOGENOUS GRAFT Left 3/22/2018    AV FISTULA  REVISION LEFT ARM performed by Fiorella Sadler MD at [facility] TUNNELED CV CATH Right 8/9/2018    REMOVAL OF TESIO CATHETER, REMOVAL OF MEDIPORT performed by Fiorella Sadler MD at [address] Right 2000    RIGHT BREAST MOLE REMOVED    TRANSESOPHAGEAL ECHOCARDIOGRAM  02/03/2017    TUNNELED VENOUS CATHETER PLACEMENT Right     TUNNELED VENOUS PORT PLACEMENT Right     Powerport / x 4 / at right chest; since short gut syndrome received magnesium & sodium bicarb in past    VASC UPPER EXTREMITY VENOUS  UNI Left 32/21/7408    Brachiocephalic covered stent, iCast, 10 mm x 38 mm       Family History  Family History   Problem Relation Age of Onset    Obesity Mother     Cirrhosis Mother     Heart Failure Mother     High Blood Pressure Mother     Diabetes Father     High Blood Pressure Sister     Depression Sister     Diabetes Brother     High Blood Pressure Brother        Social History    TOBACCO:   reports that she quit smoking about 23 years ago. Her smoking use included cigarettes. She started smoking about 29 years ago. She has a 12.00 pack-year smoking history. She has never used smokeless tobacco.  ETOH:   reports no history of alcohol use. Home Medications  Prior to Admission medications    Medication Sig Start Date End Date Taking?  Authorizing Provider   Biotin w/ Vitamins C & E (HAIR SKIN & NAILS GUMMIES PO) Take 5,000 mcg by mouth 2 times daily   Yes Historical Provider, MD   montelukast (SINGULAIR) 10 MG tablet TAKE ONE TABLET BY MOUTH EVERY DAY 12/1/20  Yes Edu Michael MD   rOPINIRole (REQUIP) 0.5 MG tablet TAKE ONE TABLET BY MOUTH AT NIGHT TIME 12/1/20  Yes Edu Michael MD   midodrine (PROAMATINE) 10 MG tablet Take 1 tablet by mouth 3 times daily (with meals)  Patient taking differently: Take 20 mg by mouth 3 times daily (with meals) 20 mg  three times on each day 10/24/20  Yes Cleo Blair MD   albuterol (ACCUNEB) 0.63 MG/3ML nebulizer solution INHALE 1 VIAL VIA NEBULIZER EVERY 4 HOURS AS NEEDED FOR WHEEZING OR SHORTNESS OF BREATH  7/6/20  Yes Edu Michael MD   albuterol sulfate HFA (VENTOLIN HFA) 108 (90 Base) MCG/ACT inhaler Inhale 2 puffs into the lungs every 6 hours as needed for Wheezing 12/19/19  Yes Edu Michael MD   Acetaminophen (TYLENOL ARTHRITIS PAIN PO) Take 3 tablets by mouth 2 times daily    Yes Historical Provider, MD   calcium carbonate (TUMS) 500 MG chewable tablet Take 3 tablets by mouth 2 times daily    Yes Historical Provider, MD   melatonin 5 MG TABS tablet Take 15 mg by mouth nightly    Yes Historical Provider, MD   sodium bicarbonate 650 MG tablet Take 650 mg by mouth 2 times daily    Yes Historical Provider, MD   magnesium oxide (MAG-OX) 400 MG tablet Take 400 mg by mouth nightly    Yes Historical Provider, MD   levothyroxine (SYNTHROID) 137 MCG tablet Take 137 mcg by mouth Daily   Yes Historical Provider, MD   calcium acetate (PHOSLO) 667 MG capsule Take 667 mg by mouth 3 times daily (with meals)    Yes Historical Provider, MD   liothyronine (CYTOMEL) 5 MCG tablet Take 5 mcg by mouth daily   Yes Historical Provider, MD   aspirin 81 MG EC tablet Take 1 tablet by mouth daily 3/30/17  Yes Elodia Squires APRN - CNP   Coenzyme Q10 (COQ10 PO) Take 200 mg by mouth nightly    Yes Historical Provider, MD   vitamin B-12 (CYANOCOBALAMIN) 1000 MCG tablet Take 3,000 mcg by mouth 2 times daily Ld 8/28/2019   Yes Historical Provider, MD   darbepoetin izabella-polysorbate (ARANESP, ALBUMIN FREE,) 60 MCG/ML injection Inject into the skin every 14 days Twice/per month  Dose given 3-16-18 at dialysis   Yes Historical Provider, MD   CPAP Machine MISC Please replace patients CPAP at 8 cm water pressure with heated humidification and C-Flex of 3. Patient states she is due for a new machine and hers is not working. Also provide masks, tubing, filters, head gear, and water chambers as needed. Diagnosis-YUNIOR  Faxed to [facility]  Length of need 99 months 9/23/16  Yes Aleksey Dubois MD   vitamin D-3 (CHOLECALCIFEROL) 5000 UNITS TABS Take 5,000 Units by mouth 2 times daily LD 8/28/2019   Yes Historical Provider, MD   folic acid (FOLVITE) 1 MG tablet Take 1 mg by mouth daily Ld 8/28/2019   Yes Historical Provider, MD   pregabalin (LYRICA) 50 MG capsule Take 50 mg by mouth 3 times daily Instructed to take am of procedure. Yes Historical Provider, MD   lidocaine-prilocaine (EMLA) 2.5-2.5 % cream Apply topically as needed for Pain Apply to AV fistula prior to HD. Historical Provider, MD   Tens Unit [facility] [place] by Does not apply route Indications: Patient with 2-years of back myofascial pain and spasm with good relief from TENS at PT in past.  She has end-stage kidney disease on dialysis so medications are limited.  9/5/19   Eligio Carlson,    Methoxy PEG-Epoetin Beta (MIRCERA) 75 MCG/0.3ML SOSY Infuse 75 mcg intravenously every 14 days    Historical Provider, MD   fenofibrate (TRICOR) 145 MG tablet Take 145 mg by mouth three times a week Patient taking Mon, Wed, Fri 6/19/18   Historical Provider, MD   atorvastatin (LIPITOR) 20 MG tablet Take 20 mg by mouth three times a week [facility]-F at [facility]    Historical Provider, MD       Allergies  Allergies   Allergen Reactions    Coumadin [Warfarin Sodium] Hives    Quinine Derivatives Hives    Turmeric Shortness Of Breath    Other      Patient states cannot take oral antibiotic dt she has short gut syndrome. ... Puts her into electrolyte inbalance       Review of Systems:    Confused. Unable to respond      Objective  BP (!) 80/37   Pulse 70   Temp 92 °F (33.3 °C)   Resp 24   Ht 5' 3\" (1.6 m)   Wt 228 lb 13.4 oz (103.8 kg)   SpO2 94%   BMI 40.54 kg/m²     Physical Exam:   Performance Status:  General: Confused  Head and neck : PERRLA, EOMI . Sclera non icteric. Oropharynx : Clear  Neck: no JVD,  no adenopathy  LYMPHATICS : No LAD  Heart: Regular rate and regular rhythm, no murmur  Lungs: Clear to auscultation   Extremities: No edema,no cyanosis, no clubbing. Abdomen: BL LE edema  Skin:  No rash. Oozing blood from IV sites  Neurologic:Cranial nerves grossly intact. No focal motor or sensory deficits .     Recent Laboratory Data-   Lab Results   Component Value Date    WBC 17.8 (H) 12/16/2020    HGB 5.7 (L) 12/16/2020    HCT 17.3 (L) 12/16/2020    .0 (H) 12/16/2020    PLT 62 (L) 12/16/2020    LYMPHOPCT 5.5 (L) 12/16/2020    RBC 1.68 (L) 12/16/2020    MCH 33.9 12/16/2020    MCHC 32.9 12/16/2020    RDW 21.2 (H) 12/16/2020    NEUTOPHILPCT 86.6 (H) 12/16/2020    MONOPCT 6.8 12/16/2020    BASOPCT 0.1 12/16/2020    NEUTROABS 12.96 (H) 12/16/2020    LYMPHSABS 0.83 (L) 12/16/2020    MONOSABS 1.01 (H) 12/16/2020    EOSABS 0.00 (L) 12/16/2020    BASOSABS 0.01 12/16/2020       Lab Results   Component Value Date     12/16/2020    K 4.7 12/16/2020     12/16/2020    CO2 24 12/16/2020    BUN 11 12/16/2020    CREATININE 2.8 (H) 12/16/2020    GLUCOSE 131 (H) 12/16/2020    CALCIUM 8.4 (L) 12/16/2020    PROT 5.5 (L) 12/16/2020    LABALBU 2.2 (L) 12/16/2020    BILITOT 2.2 (H) 12/16/2020    ALKPHOS 164 (H) 12/16/2020    AST 37 (H) 12/16/2020    ALT 24 12/16/2020    LABGLOM 17 12/16/2020    GFRAA 20 12/16/2020       Lab Results   Component Value Date    IRON 79 10/23/2020    TIBC 80 (L) 10/23/2020    FERRITIN 2,068 10/23/2020           Radiology-    CT ABDOMEN PELVIS W IV

## 2020-12-16 NOTE — PROGRESS NOTES
0245: Pt had ripped off dressings of CVC and A-line and was pulling on tubing-which was almost ripping the lines out. Multiple efforts to reorient pt and hide lines made with no success. Order obtained for Mits in effort to keep pt from pulling out lines. 0330: Pt reports some discomfort in neck, tylenol given. 0630: Pt increasingly confused and attempting to take mits off. Reoriented pt as to why they are on her at this time. Will continue to monitor, however, 0700 morning meds held d/t increased confusion. Awaiting MD response for orders.

## 2020-12-16 NOTE — PROGRESS NOTES
Progress Note  12/16/2020 12:37 PM  Subjective:   Admit Date: 12/7/2020  PCP: Moody Perry MD  Interval History: patient examined on BIPAP , BP better , was dialysed yesterday     Diet: Dietary Nutrition Supplements: Protein Modular  Dietary Nutrition Supplements: Low Calorie High Protein Supplement  DIET GENERAL;    Data:   Scheduled Meds:   sodium chloride  20 mL Intravenous Once    sodium chloride  20 mL Intravenous Once    lactulose  30 g Oral TID    hydrocortisone sodium succinate PF  100 mg Intravenous Q8H    sodium chloride  20 mL Intravenous Once    pregabalin  25 mg Oral BID    atorvastatin  20 mg Oral Once per day on Mon Wed Fri    psyllium  1 packet Oral BID    cholestyramine  1 packet Oral BID    epoetin izabella-epbx  3,000 Units Subcutaneous Once per day on Mon Wed Fri    And    epoetin izabella-epbx  2,000 Units Subcutaneous Once per day on Mon Wed Fri    sodium bicarbonate  1,300 mg Oral TID    pantoprazole  40 mg Oral QAM AC    midodrine  20 mg Oral TID WC    sodium chloride flush  10 mL Intravenous 2 times per day    aspirin  81 mg Oral Daily    calcium carbonate  3 tablet Oral BID    fenofibrate  160 mg Oral Once per day on Mon Wed Fri    folic acid  1 mg Oral Daily    levothyroxine  137 mcg Oral Daily    liothyronine  5 mcg Oral Daily    magnesium oxide  400 mg Oral Nightly    melatonin  15 mg Oral Nightly    montelukast  10 mg Oral Daily     Continuous Infusions:   norepinephrine 2 mcg/min (12/16/20 1038)     PRN Meds:artificial tears, phenol, menthol-zinc oxide, acetaminophen, sodium chloride flush, albuterol, rOPINIRole  I/O last 3 completed shifts: In: 895.8 [P.O.:60; I.V.:435.8; IV Piggyback:100]  Out: 500 [Stool:200]  No intake/output data recorded.     Intake/Output Summary (Last 24 hours) at 12/16/2020 1237  Last data filed at 12/16/2020 0600  Gross per 24 hour   Intake 895.81 ml   Output 500 ml   Net 395.81 ml     CBC:   Recent Labs     12/14/20  0530 12/15/20  0552 12/16/20  0546   WBC 16.7* 12.1* 15.0*   HGB 8.2* 7.6* 6.5*   PLT 59* 36* 37*     BMP:    Recent Labs     12/14/20  0530 12/15/20  0552 12/16/20  0546    137 137   K 4.6 4.9 4.7   * 106 105   CO2 21* 26 24   BUN 14 11 11   CREATININE 4.0* 3.1* 2.8*   GLUCOSE 112* 133* 131*     Hepatic:   Recent Labs     12/14/20  0530 12/15/20  0552 12/16/20  0546   AST 38* 37* 37*   ALT 25 22 24   BILITOT 1.2 2.1* 2.2*   ALKPHOS 171* 166* 164*     Troponin: No results for input(s): TROPONINI in the last 72 hours. BNP: No results for input(s): BNP in the last 72 hours. Lipids: No results for input(s): CHOL, HDL in the last 72 hours. Invalid input(s): LDLCALCU  ABGs: No results found for: PHART, PO2ART, [id number]  INR: No results for input(s): INR in the last 72 hours. -----------------------------------------------------------------  RAD: Xr Chest Portable    Result Date: 12/7/2020  EXAMINATION: ONE XRAY VIEW OF THE CHEST 12/7/2020 8:12 am COMPARISON: 11/28/2020 HISTORY: Acute shortness of breath. FINDINGS: Sternotomy changes with prosthetic cardiac valves and left atrial appendage clip. Heart remains enlarged. Increased moderate left pleural effusion with adjacent airspace disease. Mild right basilar atelectasis. No pneumothorax. Increased moderate left pleural effusion. Objective:   Vitals: BP (!) 105/51   Pulse 88   Temp 96.8 °F (36 °C) (Axillary)   Resp 16   Ht 5' 3\" (1.6 m)   Wt 228 lb 13.4 oz (103.8 kg)   SpO2 94%   BMI 40.54 kg/m²   General appearance: appears stated age   Skin:  No rashes or lesions  HEENT: Head: Normocephalic, no lesions, without obvious abnormality.   Neck: no adenopathy, no carotid bruit, no JVD, supple, symmetrical, trachea midline and thyroid not enlarged, symmetric, no tenderness/mass/nodules  Lungs: clear to auscultation bilaterally  Heart: regular rate and rhythm, S1, S2 normal, no murmur, click, rub or gallop  Abdomen: large abdo BS +   Extremities: edema better is at 1.5 ( severe hypoalbuminemia ) better post infusion ( 2,2)  , she has thrombocytopenia , blood loss anemia , has been oozing from all the line sites , s ammonia is High , leucocytosis     Plan is for blood + platelet transfusion , will consider HD for blood transfusion   After platelets are transfused , underlying cirrhosis , high ammonia on lactulose     Kindra Kee

## 2020-12-16 NOTE — PLAN OF CARE
Problem: Inadequate oral food/beverage intake (NI-2.1)  Goal: Food and/or Nutrient Delivery  Pt will tolerate PO diet with >50% intake at meals and ONS  Description: Individualized approach for food/nutrient provision.   Outcome: Ongoing

## 2020-12-16 NOTE — PROGRESS NOTES
Occupational Therapy  OCCUPATIONAL THERAPY INITIAL EVALUATION      Date:2020  Patient Name: Sumi Madden  MRN: [id number]  : 1955  Room: [address] [place] [address]    OK per nursing initially for attempt at EOB activity, determined unsafe at this time due cognitive status, edema with significant seeping, placement of arterial line and weeping from groin dressings. Referring Provider: Patrick Rosas MD    Evaluating OT: Siomara Vazquez OTR/L [id number]    AM-PAC Daily Activity Raw Score:     Recommended Adaptive Equipment: TBD    Diagnosis: hypokalemia. Pt presents to ED with complaint of lightheadedness/dizziness. Surgery: 12/15 central line,   arterial line   Pertinent Medical History: asthma, ESRD on HD, COPD, CAD, HTN, polymyalgia rheumatica  Precautions:  Falls, O2, R groin arterial line, fecal management system, central line R neck     Home Living: Pt unable to provide PLOF. Per medical chart pt lives with , son and daughter. Prior Level of Function: unknown level of functioning in ADL/IADLs; completed functional mobility with WW/cane    Pain Level: pt reports discomfort with movement but unable to identify location    Cognition: A&O: 1-2/4. Pt is oriented to self, with increased time and cues able to state Dec 2020. Pt then perseverates on Dec 2020 and uses this as answer for all other questions. Max cues to redirect back to task. Confusion throughout session.    Problem solving:  poor   Judgement/safety:  poor     Functional Assessment:   Initial Eval Status  Date: 20 Treatment session:  Short Term Goals     Feeding Max A  SBA   Grooming Max A  Hand over hand  Min A   UB Dressing Dependent  Management of hospital gown  Min A   LB Dressing Dependent  Mod A    Bathing Dependent  Bed level  Mod A   Toileting Dependent  Mod A   Bed Mobility  Rolling: Dependent  Repositioning in bed: Dependent  Long Sitting: Dependent, unable to toelrate     Functional Transfers STS: unable to complete Mod A   Functional Mobility Unable to complete  Mod A during ADLs   Balance Long Sitting: Unable to tolerate, dependent    Standing: unable to complete     Activity Tolerance poor  sitting robbie x10-15 min with fair balance during self care tasks    Standing robbie x1-2 min with poor plus/fair minus balance during self care tasks   B UE PROM WFL  Limited active attempts to utilize UEs  4-/5     Pt found with sheets soiled, dependent x2 for cleaning and changing of linens. Assist required to maintain sidelying for completion of tasks. Max increased time provided d/t SOB and pt verbalizes \"I cant breathe\" 5L O2 maintained >90%, nursing present to assist and planned to place on bipap post session. Treatment: Patient educated on techniques for completion of ADL, safe functional transfers and functional mobility. Patient required cues for follow through with proper hand/foot placement, pacing, safety, attention, sequencing, active participation and technique in bed mobility, ramu care and LB dressing in preparation for maximum independence in all self care tasks.      Hand Dominance: Right []  Left []   Strength ROM Additional Info:    BUE  Does not follow commands for formal MMT    Limited attempted functional use of UEs during session, hand over hand provided to encourage use PROM WFL poor  and FMC/dexterity noted during ADL tasks         Hearing: [place] [facility]  Vision: appears functional  Sensation: unable to determine d/t cognitive status  Tone: hypotonic B UE/LEs  Edema: B UE/LE and abdomen                            Long Term Goal (1-3 wks): Pt will maximize functional performance in all self care tasks/functional transfers with good follow through of all trained techniques for safe transition to next level of care    Assessment of current deficits   Functional mobility [x]  ADLs [x] Strength [x]  Cognition [x]  Functional transfers  [x] IADLs [x] Safety Awareness [x]  Endurance [x]  Fine Motor Coordination [x] Balance [x] Vision/perception [] Sensation []   Gross Motor Coordination [x] ROM [x] Delirium [x]                  Motor Control [x]    Plan of Care: 1-3 days/week for 1-2 weeks PRN   [x]ADL retraining/adaptive techniques and AE recommendations to increase functional independence within precautions                    [x]Energy conservation techniques to improve tolerance for ADL/IADLs  [x]Functional transfer/mobility training/DME recommendations for increased independence, safety and fall prevention         [x]Patient/family education to increase safety and functional independence during daily routine          [x]Environmental modifications for safe mobility and completion of ADLs                             [x]Cognitive retraining to improve problem solving skills & safe participation in ADLs/IADLs     []Sensory re-education techniques to improve extremity awareness, maintain skin integrity and improve hand function                             []Visual/Perceptual retraining to improve body awareness and safety during transfers and ADLs  []Splinting/positioning needs to maintain joint/skin integrity and contracture prevention  [x]Therapeutic activity to improve functional performance during ADLs                                        [x]Therapeutic exercise to improve tolerance and functional strength for ADLs   [x]Balance retraining/tolerance tasks for facilitation of postural control with dynamic challenges during ADLs  []Neuromuscular re-education to facilitate righting/equilibrium reactions, midline orientation, scapular stability/mobility, normalize muscle tone and facilitate active functional movement                        [x]Delirium prevention/treatment    [x]Positioning to improve functional independence and decrease risk of skin breakdown  []Other:     Rehab Potential: Guarded for established goals     Patient/Family Goal: Pt unable to state goal.      Patient and/or family were instructed on functional diagnosis, prognosis/goals and OT plan of care. Pt verbalized poor understanding. Upon arrival, patient supine in bed. At end of session, patient supine in bed with call light and phone within reach, all lines and tubes intact. Pt would benefit from continued skilled OT to increase safety and independence with completion of ADL/IADL tasks for functional independence and quality of life. Bed/chair alarm: ON    Eval Complexity: Mod [id number]  - Profile and History- expanded: review of medical records and additional review of physical, cognitive, or psychosocial history related to current functional performance  - Assessment of Occupational Performance and Identification of Deficits- unknown PLOF, prolonged hospital stay, medically complicated hospital stay, dependent status, decreased cognitive status, poor motor control, edema, O2  - Clinical Decision Making- Mod assistance or modifications required to perform tasks. May have comorbidities that affect occupational performance.     Time In: 443   Time Out: Dina 9293 Units   Therapeutic Ex 56497     Therapeutic Activities 48005 8 1   ADL/Self Care 67914 2    Orthotic Management 07231     Neuro Re-Ed [id number]     TOTAL TIMED TREATMENT 10 1       Evaluation time includes thorough review of current medical information, gathering information on past medical history/social history and prior level of function, completion of standardized testing/informal observation of tasks, assessment of data, and development of POC/Goals    Lori Clifton OTR/L  [id number]

## 2020-12-16 NOTE — PROGRESS NOTES
Internal Medicine Progress Note      Synopsis: Patient admitted on 12/7/2020    Subjective  Dec 9 th  Feeling slightly better today. Pressors are less and hoping to be weaned off today. Still with lots of frequent stools    December 10, 2020  Still on pressors though less so. Recovering better and better  Feeling short of breath and swollen and weak but does not want to consider discharge to any other placement home  December 11, 2020  Now with fecal management system. Pressors were weaned off and her blood pressure dropped. Almost systolic of 50. She was symptomatically lightheaded and felt weak  December 12, 2020  Still  on pressors  Still with loose stools. Now with FMS since yesterday  Ded 13 th   Feels dizzy  Feels swollen  She  Will be getting bt   December 14, 2020  Still on pressors and still feeling dizzy. December 15, 2020  Less dizzy today. Feeling slightly better she tells me  Dec 16 2020  Now on monitored which is an extension of the ICUbed sl confused  Ammonia elevated     Exam:  BP (!) 105/51   Pulse 88   Temp 96.8 °F (36 °C) (Axillary)   Resp 18   Ht 5' 3\" (1.6 m)   Wt 228 lb 13.4 oz (103.8 kg)   SpO2 95%   BMI 40.54 kg/m²   General appearance: bipap in place  Looks pale  HEENT: Pupils equal, round, and reactive to light. Conjunctivae/corneas clear. Neck: Supple. No jugular venous distention. Trachea midline. Respiratory:  Normal respiratory effort. Clear to auscultation, bilaterally without Rales/Wheezes/Rhonchi. Cardiovascular: Regular rate and rhythm with normal S1/S2 without murmurs, rubs or gallops. Abdomen: Soft, non-tender, non-distended with normal bowel sounds.   Musculoskeletal: No clubbing, cyanosis 2+ edema bilateral lower extremities  Skin:  No rashes    Neurologic: awake, alert and following commands     Medications:  Reviewed    Infusion Medications    norepinephrine 5 mcg/min (12/16/20 1345)     Scheduled Medications    sodium chloride  20 mL Intravenous Once  lactulose  30 g Oral TID    hydrocortisone sodium succinate PF  100 mg Intravenous Q8H    sodium chloride  20 mL Intravenous Once    pregabalin  25 mg Oral BID    atorvastatin  20 mg Oral Once per day on Mon Wed Fri    psyllium  1 packet Oral BID    cholestyramine  1 packet Oral BID    epoetin izabella-epbx  3,000 Units Subcutaneous Once per day on Mon Wed Fri    And    epoetin izabella-epbx  2,000 Units Subcutaneous Once per day on Mon Wed Fri    sodium bicarbonate  1,300 mg Oral TID    pantoprazole  40 mg Oral QAM AC    midodrine  20 mg Oral TID WC    sodium chloride flush  10 mL Intravenous 2 times per day    aspirin  81 mg Oral Daily    calcium carbonate  3 tablet Oral BID    fenofibrate  160 mg Oral Once per day on Mon Wed Fri    folic acid  1 mg Oral Daily    levothyroxine  137 mcg Oral Daily    liothyronine  5 mcg Oral Daily    magnesium oxide  400 mg Oral Nightly    melatonin  15 mg Oral Nightly    montelukast  10 mg Oral Daily     PRN Meds: artificial tears, phenol, menthol-zinc oxide, acetaminophen, sodium chloride flush, albuterol, rOPINIRole    I/O    Intake/Output Summary (Last 24 hours) at 12/16/2020 1413  Last data filed at 12/16/2020 1304  Gross per 24 hour   Intake 1195.81 ml   Output 500 ml   Net 695.81 ml       Labs:   Recent Labs     12/14/20 0530 12/15/20  0552 12/16/20  0546   WBC 16.7* 12.1* 15.0*   HGB 8.2* 7.6* 6.5*   HCT 26.4* 23.5* 19.8*   PLT 59* 36* 37*       Recent Labs     12/14/20  0530 12/15/20  0552 12/16/20  0546    137 137   K 4.6 4.9 4.7   * 106 105   CO2 21* 26 24   BUN 14 11 11   CREATININE 4.0* 3.1* 2.8*   CALCIUM 7.7* 8.1* 8.4*   PHOS 3.6 3.2 3.2       Recent Labs     12/14/20 0530 12/15/20  0552 12/16/20  0546   PROT 5.4* 5.6* 5.5*   ALKPHOS 171* 166* 164*   ALT 25 22 24   AST 38* 37* 37*   BILITOT 1.2 2.1* 2.2*       No results for input(s): INR in the last 72 hours.     No results for input(s): Mayuri Wall in the last 72 hours.    Chronic labs:  Lab Results   Component Value Date    CHOL 96 02/27/2020    TRIG 131 02/27/2020    HDL 36 02/27/2020    LDLCALC 34 02/27/2020    TSH 6.430 (H) 12/13/2020    INR 1.3 10/21/2020    LABA1C <4.0 12/30/2019     Results for Ariana Mcbride (MRN [id number]) as of 12/9/2020 10:51   Ref. Range 12/8/2020 07:50 12/8/2020 20:00 12/9/2020 00:55 12/9/2020 05:00 12/9/2020 08:40   Lactic Acid Latest Ref Range: 0.5 - 2.2 mmol/L 4.6 (HH) 3.8 (H) 3.2 (H) 2.5 (H) 2.6 (H)     Radiology:  Xr Chest Portable    Result Date: 12/14/2020  EXAMINATION: ONE XRAY VIEW OF THE CHEST 12/14/2020 10:53 am COMPARISON: Comparison November 28-December 13 HISTORY: ORDERING SYSTEM PROVIDED HISTORY: Respiratory distress TECHNOLOGIST PROVIDED HISTORY: Reason for exam:->Respiratory distress FINDINGS: Presence of a large size left-sided pleural effusion causing significant compression of the left lung. There is a mild to moderate right-sided pleural effusion. Heart has mildly increased size. Patient had previous mid sternotomy. No significant changes since the December 13. Bilateral pleural effusions larger on the left side. Xr Chest Portable    Result Date: 12/13/2020  EXAMINATION: ONE XRAY VIEW OF THE CHEST 12/13/2020 1:17 pm COMPARISON: Multiple priors, most recent from December 12, 2020 at 13:25. HISTORY: ORDERING SYSTEM PROVIDED HISTORY: Thoracentesis TECHNOLOGIST PROVIDED HISTORY: Reason for exam:->Thoracentesis FINDINGS: There are postoperative changes related to sternotomy. Heart size is unable to be accurately assessed on this single portable view of the chest, but appears to be stable. There is a persistent moderate-sized left pleural effusion, not significantly changed in size compared with the most recent examination. No evidence of a pneumothorax following thoracentesis. There is a small right pleural effusion as well. Prominence of the pulmonary vasculature suggests pulmonary edema.     Findings suggestive of pulmonary edema with a persistent moderate left and small right pleural effusion. No evidence of pneumothorax following thoracentesis. Xr Chest Portable    Result Date: 12/12/2020  EXAMINATION: ONE XRAY VIEW OF THE CHEST 12/12/2020 2:20 pm COMPARISON: 12/07/2020 and 11/28/2020 HISTORY: ORDERING SYSTEM PROVIDED HISTORY: SOB TECHNOLOGIST PROVIDED HISTORY: Reason for exam:->SOB FINDINGS: The heart is enlarged. Postoperative sternotomy changes are noted. There is a trace right pleural effusion and minimal right lung base atelectasis. There is a moderate to large left pleural effusion which has increased in size when compared with the patient's prior study. Patchy left perihilar pulmonary infiltration is noted which could represent atelectasis pneumonia or edema. 1. Enlarging left pleural effusion which is now moderate to large in size. 2. New patchy left perihilar infiltration which could represent pneumonia ,edema or less likely atelectasis. 3. Trace right pleural effusion    Xr Chest Portable    Result Date: 12/7/2020  EXAMINATION: ONE XRAY VIEW OF THE CHEST 12/7/2020 8:12 am COMPARISON: 11/28/2020 HISTORY: Acute shortness of breath. FINDINGS: Sternotomy changes with prosthetic cardiac valves and left atrial appendage clip. Heart remains enlarged. Increased moderate left pleural effusion with adjacent airspace disease. Mild right basilar atelectasis. No pneumothorax. Increased moderate left pleural effusion. Xr Chest Portable    Result Date: 11/28/2020  EXAMINATION: ONE XRAY VIEW OF THE CHEST 11/28/2020 6:07 pm COMPARISON: Chest series from October 21, 2020. HISTORY: ORDERING SYSTEM PROVIDED HISTORY: sob TECHNOLOGIST PROVIDED HISTORY: Reason for exam:->sob FINDINGS: Postsurgical changes overlie the mediastinum. Lung hyperinflation and coarse interstitial markings. Elevation of the left hemidiaphragm versus loculated left pleural effusion is unchanged. Small right pleural effusion or scarring.   No new airspace disease. No pneumothorax. Atherosclerotic disease in the aortic arch. Cardiac silhouette is enlarged. Normal pulmonary vascularity. Osseous and thoracic soft tissue structures demonstrate no acute findings. 1.  Stable lung hyperinflation and coarse interstitial markings. Stable left greater than right pleural effusions or potentially scarring. No new pulmonary pathology. 2.  Atherosclerotic disease and cardiomegaly. Postsurgical changes. Normal pulmonary vascularity on this exam    Us Thoracentesis Which Side Should The Procedure Be Performed? Left    Result Date: 12/14/2020  PROCEDURE: ULTRASOUNDGUIDED RIGHT THORACENTESIS 12/13/2020 HISTORY: ORDERING SYSTEM PROVIDED HISTORY: left effusion TECHNOLOGIST PROVIDED HISTORY: Reason for exam:->left effusion Which side should the procedure be performed? ->Left What reading provider will be dictating this exam?->CRC TECHNIQUE: Limited ultrasound of the left thorax was performed combine image was saved. The site thoracentesis was then performed by Dr. Didi Teresa. FINDINGS: Left-sided pleural effusion. Left-sided thoracentesis was performed by Dr. Didi Teresa at the bedside. Left-sided pleural effusion. Please refer to separate procedure report from Dr. Didi Teresa for further details. Us Dup Upper Extremity Right Venous    Result Date: 12/12/2020  EXAMINATION: DUPLEX ULTRASOUND OF THE RIGHT UPPER EXTREMITY FOR DVT, 12/12/2020 2:18 pm TECHNIQUE: Duplex ultrasound and Doppler images were obtained of the deep venous structures of the upper extremity. COMPARISON: None. HISTORY: ORDERING SYSTEM PROVIDED HISTORY: right arm swelling, r/o dvt TECHNOLOGIST PROVIDED HISTORY: Reason for exam:->right arm swelling, r/o dvt What reading provider will be dictating this exam?->CRC FINDINGS: There is normal flow and compressibility of the visualized venous structures. There is no evidence of echogenic thrombus. No evidence of DVT.     Us Dup Upper Extremity Right Arteries    Result Date: 12/12/2020  EXAMINATION: DUPLEX ARTERAL ULTRASOUND OF THE RIGHT UPPER EXTREMITY 12/12/2020 2:22 pm TECHNIQUE: Arterial Doppler evaluation of the right upper extremity was performed using a combination of grayscale, color Doppler, and spectral Doppler technique. COMPARISON: None used HISTORY: ORDERING SYSTEM PROVIDED HISTORY: Right arm swelling, r/o occlusion TECHNOLOGIST PROVIDED HISTORY: Reason for exam:->Right arm swelling, r/o occlusion What reading provider will be dictating this exam?->CRC FINDINGS: Scattered areas of atherosclerotic plaque in the arterial vessels of the right upper extremity. Multiphasic waveforms identified in all areas. No soft tissue abnormality. Peak flow velocities were measured as follows: Subclavian 79 cm/sec Axillary     34 cm/sec Brachial    92 cm/sec Radial       62 cm/sec Ulnar       27 cm/sec All spectral waveforms show brisk upstroke. There is slight spectral broadening in the radial and ulnar arteries. 1.  No evidence of hemodynamically significant stenosis. Multiphasic waveforms identified in all areas. 2.  Spectral broadening in the radial and ulnar arteries suggests early underlying peripheral vascular disease. Us Dup Lower Extremities Bilateral Venous    Result Date: 12/12/2020  EXAMINATION: DUPLEX VENOUS ULTRASOUND OF THE BILATERAL LOWER EXTREMITIES, 12/12/2020 12:54 pm TECHNIQUE: Duplex ultrasound and Doppler images were obtained of the bilateral lower extremities COMPARISON: None. HISTORY: ORDERING SYSTEM PROVIDED HISTORY: re dvt TECHNOLOGIST PROVIDED HISTORY: Reason for exam:->re dvt What reading provider will be dictating this exam?->CRC FINDINGS: Limitations: Right iliac vein, greater saphenous vein, and common femoral vein not seen secondary to a peripheral IV in the right groin region. There is dialysis access in the left groin region which also limits evaluation of these vessels.   Calf vessels suboptimally imaged secondary to fluctuating and not normal yet    Dec 13 th 2020  Blood pressure still low. Pressors still in place  Note ID and Dr Karolina Arellano input   BT now ordered  No blood loss seen but s/s with her bp  D/w family. Await hemo  Low plts still  3 rd spaced   December 14, 2020  Still on pressors and fluctuating with blood pressure. Infectious disease did see. Received blood yesterday. Pleural effusion tapped transudative  Still massively third spaced and still feeling extremely weak  Palliative care would be appropriate  The lactic acid level awaited  Psyllium husk for bulking stool. Still tons of stool in [facility]  December 15, 2020  Still on pressors. Renal  focusing on ultrafiltration. The lactic acid is still pending. Cortisol level this morning was 13. Continues with significant losses from GI tract with no respiratory  Remains a full code  Speech did see her for the purposes of choking and appeared to have helped her start to focus on better ways to swallow  12/16/2020  Significantly low with her platelets. She is third spaced but less so. Dialysis has been working with her to take fluid off. Blood pressure just a little bit better. At this point still low with her hemoglobin and she will need more blood support. We can ask hematology to see her. She has seen Dr. Dorothy Lion oncology hematology services in the outpatient setting  Likely etiology is based on liver  Start lactulose. Discussed with her and she is agreeable to DNR CCA. I  have discussed with her the chest compressions or mechanical compression of her body in any formal way currently with low platelets is likely to cause more hemorrhage.   She is agreeable to DNR CCA  Diet: Dietary Nutrition Supplements: Protein Modular  Dietary Nutrition Supplements: Low Calorie High Protein Supplement  DIET GENERAL;  Code Status: DNR-CCA  PT/OT Eval Status:   Once off of pressors  DVT Prophylaxis:    In place scds  recommended disposition at discharge:   Likely

## 2020-12-16 NOTE — PROGRESS NOTES
Date: 12/15/2020    Time: 11:59 PM    Patient Placed On BIPAP/CPAP/ Non-Invasive Ventilation? Yes    If no must comment. Facial area red/color change? No           If YES are Blister/Lesion present? No   If yes must notify nursing staff  BIPAP/CPAP skin barrier?   No    Skin barrier type:nasal pillows       Comments: Her circuit, our machine        Lisa Nava

## 2020-12-16 NOTE — PROGRESS NOTES
96.6 °F (35.9 °C) Axillary 79 16 97 %   12/16/20 0600 (!) 110/57 -- -- 93 16 92 %         Intake/Output Summary (Last 24 hours) at 12/16/2020 1050  Last data filed at 12/16/2020 0600  Gross per 24 hour   Intake 895.81 ml   Output 500 ml   Net 395.81 ml     Wt Readings from Last 2 Encounters:   12/14/20 228 lb 13.4 oz (103.8 kg)   11/28/20 175 lb (79.4 kg)     Body mass index is 40.54 kg/m².         PHYSICAL EXAMINATION:    General appearance -ill appearing aaox 3, mild accessory muscle use   Mental status - alert, oriented to person, place, and time, normal mood, behavior, speech, dress, motor activity, and thought processes  Eyes - pupils equal and reactive, extraocular eye movements intact, sclera anicteric  Ears - external ear normal  Nose - normal and patent, no erythema, discharge or polyps  Mouth - mucous membranes moist, pharynx normal without lesions  Neck - supple, new ecchymosis over the right neck   Chest - decreased left base, no wheezes, rales or rhonchi, symmetric air entry  Heart -systolic heart murmur present,tachycardiac rate, normal rhythm, no rubs, gallops  Abdomen - soft, nontender, nondistended, no masses or organomegaly  Neurological - alert, oriented, normal speech, no focal findings or movement disorder noted  Extremities - peripheral pulses normal, right femoral TLC, left arm AV fistula, Fingers not dusky any longer  Skin - normal coloration, appears to have some bruises     Any additional physical findings:    MEDICATIONS:    Scheduled Meds:   hydrocortisone sodium succinate PF  100 mg Intravenous Q8H    sodium chloride  20 mL Intravenous Once    pregabalin  25 mg Oral BID    atorvastatin  20 mg Oral Once per day on Mon Wed Fri    psyllium  1 packet Oral BID    cholestyramine  1 packet Oral BID    epoetin izabella-epbx  3,000 Units Subcutaneous Once per day on Mon Wed Fri    And    epoetin izabella-epbx  2,000 Units Subcutaneous Once per day on Mon Wed Fri    sodium bicarbonate  1,300 mg Oral TID    pantoprazole  40 mg Oral QAM AC    midodrine  20 mg Oral TID WC    sodium chloride flush  10 mL Intravenous 2 times per day    aspirin  81 mg Oral Daily    calcium carbonate  3 tablet Oral BID    fenofibrate  160 mg Oral Once per day on Mon Wed Fri    folic acid  1 mg Oral Daily    levothyroxine  137 mcg Oral Daily    liothyronine  5 mcg Oral Daily    magnesium oxide  400 mg Oral Nightly    melatonin  15 mg Oral Nightly    montelukast  10 mg Oral Daily     Continuous Infusions:   norepinephrine 2 mcg/min (12/16/20 1038)     PRN Meds:       artificial tears, , PRN      phenol, 1 spray, Q2H PRN      menthol-zinc oxide, , BID PRN      acetaminophen, 650 mg, Q4H PRN      sodium chloride flush, 10 mL, PRN      albuterol, 0.63 mg, 4x Daily PRN      rOPINIRole, 0.5 mg, Nightly PRN          VENT SETTINGS (Comprehensive) (if applicable):  Vent Information  Skin Assessment: Clean, dry, & intact  SpO2: 95 %  I Time/ I Time %: 1 s  Mask Type: Nasal pillows  Additional Respiratory  Assessments  Pulse: 84  Resp: 16  SpO2: 95 %  Oral Care: Mouth swabbed, Mouth suctioned    ABGs:   Recent Labs     12/16/20  0749   PH 7.292*   PCO2 52.7*   PO2 154.5*   HCO3 24.9   BE -1.6   O2SAT 98.2       Laboratory findings:    Complete Blood Count:   Recent Labs     12/14/20  0530 12/15/20  0552 12/16/20  0546   WBC 16.7* 12.1* 15.0*   HGB 8.2* 7.6* 6.5*   HCT 26.4* 23.5* 19.8*   PLT 59* 36* 37*        Last 3 Blood Glucose:   Recent Labs     12/14/20  0530 12/15/20  0552 12/16/20  0546   GLUCOSE 112* 133* 131*        PT/INR:    Lab Results   Component Value Date    PROTIME 15.1 10/21/2020    INR 1.3 10/21/2020     PTT:    Lab Results   Component Value Date    APTT 36.6 06/02/2018       Comprehensive Metabolic Profile:   Recent Labs     12/14/20  0530 12/15/20  0552 12/16/20  0546    137 137   K 4.6 4.9 4.7   * 106 105   CO2 21* 26 24   BUN 14 11 11   CREATININE 4.0* 3.1* 2.8*   GLUCOSE 112* 133* 131* CALCIUM 7.7* 8.1* 8.4*   PROT 5.4* 5.6* 5.5*   LABALBU 1.5* 2.1* 2.2*   BILITOT 1.2 2.1* 2.2*   ALKPHOS 171* 166* 164*   AST 38* 37* 37*   ALT 25 22 24      Magnesium:   Lab Results   Component Value Date    MG 1.8 12/16/2020     Phosphorus:   Lab Results   Component Value Date    PHOS 3.2 12/16/2020     Ionized Calcium:   Lab Results   Component Value Date    CAION 1.25 12/14/2020        Urinalysis:     Troponin: No results for input(s): TROPONINI in the last 72 hours. Microbiology:    Cultures during this admission:     Blood cultures:                 [] None drawn      [] Negative             []  Positive (Details:  )  Urine Culture:                   [] None drawn      [] Negative             []  Positive (Details:  )  Sputum Culture:               [] None drawn       [] Negative             []  Positive (Details:  )   Endotracheal aspirate:     [] None drawn       [] Negative             []  Positive (Details:  )     Other pertinent Labs:       Radiology/Imaging:     Chest Xray (12/16/2020):    na    ASSESSMENT:     Neuro   Hepatic encephalopathy secondary to hyperammonemia   Back pain chronic - tylenol prn   Peripheral neuropathy c/w  Lyrica, ropinirole  Respiratory   AE asthma   Laba/ics   Albuterol   singulair   Hypoxia today likely a result of   YUNIOR-CPAP at night  Cxr today     Cardiovascular        Shock septic vs hypovolemia vs cardiogenic vs AI   titrate pressors   Trend h/h   Albumin  ivf   Midodrine, hydrocortisone     Gastrointestinal   Short gut syndrome-monitor  GI prophylaxis-Protonix  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  Should improve hyperammonemia   Oral bicarb supplementation   Renal following      Infectious Disease   Wbc trending down likely stress response   Agree with ID watch off abx     Hematology/Oncology     Acute on Chronic anemia  Prbc today and platelets x 1 each   Trend h/h q 8 hours   Thrombocytopenia acute, peripheral smear reviewed   HIT panel pending     dvt prophylaxis pcd     Endocrine   Synthroid  Cytomel      Social/Spiritual/DNR/Other   Full code    Anahi Franklin M.D.    Pulmonary/Critical Care Medicine   37 min cct excluding procedures

## 2020-12-16 NOTE — PROGRESS NOTES
SPEECH LANGUAGE PATHOLOGY  DAILY PROGRESS NOTE        PATIENT NAME:  Sumi Madden      :  1955          TODAY'S DATE:  2020 ROOM:  [address] [place] [address]        SWALLOWING    Pt  in bed and increased confusion noted. Pt currently on BiPAP. RN present and reports recent administration of morphine. Recommend hold oral feeds until improved respiratory status and mentation. Discussed with RN. DYSPHAGIA DIAGNOSIS:  Oropharyngeal dysphagia                            DIET RECOMMENDATIONS:  Resume Dysphagia 3, Soft/advanced (Soft & Bite-sized) solids with small sips thin liquids-NO EGGS as tolerated with improved respiratory status and mentation.       Pt to be upright as able for all oral intake. Recommend pills to be presented in pudding/applesauce when appropriate for oral intake.                  FEEDING RECOMMENDATIONS:                               Assistance level:  Stand by assistance is needed during all oral intake as needed                             Compensatory strategies recommended: Small bites/sips and Alternate solids and liquids                                       Will continue SP intervention as per previously established POC. Susi SANCHEZ CCC/SLP [id number]  Speech Pathologist              CPT code(s) 99662  dysphagia tx  Total minutes :  15 minutes

## 2020-12-16 NOTE — CARE COORDINATION
Social Work/Discharge Planning:  Chart reviewed. Patient requiring 10 liters of oxygen. Called patient  Pierre Sauceda (ph: [phone]) to discharge planning and spoke to patient daughter Marietta Tyler. Marietta Tyler states her mother has a snf history at [facility] and they prefer [place] when patient is stable for discharge. Will continue to follow.   Electronically signed by HANANE Gong on 12/16/2020 at 3:59 PM

## 2020-12-16 NOTE — PROGRESS NOTES
ID Progress Note                [address] [facility] [address], [place], [address]            Phone [phone]     Fax [phone]      Chief complaint   fatigue    Subjective:  Still on Levophed 3 mics, confused with elevated ammonia levels and low hemoglobin  Afebrile  She has been taken for a stat CT abdomen      Objective:    Vitals:    12/16/20 1400   BP:    Pulse: 83   Resp: 16   Temp:    SpO2: 94%     General appearance: Confused this morning  skin: Warm and dry  Eyes: Pink palpebral conjunctivae. PERRL  Ears: External ears normal.  Nose/Sinuses: Nares normal. Septum midline. Mucosa normal. No sinus tenderness. Oropharynx: Oropharynx clear with no exudates seen  Neck: Neck supple. No jugular venous distension, lymphadenopathy or thyromegaly Trachea midline  Lungs: Lungs clear to auscultation bilaterally. No rhonchi, crackles or wheezes  Heart: S1 S2  Regular rate and rhythm. No rub, murmur or gallop  Abdomen: Abdomen soft, non-tender. BS normal. No masses, organomegaly  Extremities: No edema, Peripheral pulses palpable  Musculoskeletal: Muscular strength appears intact.  No joint effusion, tenderness, swelling or warmth  Fecal management system in place and has rather more formed stools now       Labs:  Recent Labs     12/15/20  0552 12/16/20  0546 12/16/20  1427   WBC 12.1* 15.0* 17.8*   RBC 2.26* 1.92* 1.68*   HGB 7.6* 6.5* 5.7*   HCT 23.5* 19.8* 17.3*   .0* 103.1* 103.0*   MCH 33.6 33.9 33.9   MCHC 32.3 32.8 32.9   RDW 21.4* 21.2* 21.2*   PLT 36* 37* 62*   MPV 12.5* NOT CALC 12.6*     CMP:    Lab Results   Component Value Date     12/16/2020    K 4.7 12/16/2020    K 4.2 12/13/2020     12/16/2020    CO2 24 12/16/2020    BUN 11 12/16/2020    CREATININE 2.8 12/16/2020    GFRAA 20 12/16/2020    LABGLOM 17 12/16/2020    GLUCOSE 131 12/16/2020    PROT 5.5 12/16/2020    LABALBU 2.2 12/16/2020    CALCIUM 8.4 12/16/2020    BILITOT 2.2 12/16/2020    ALKPHOS 164 12/16/2020    AST 37 12/16/2020    ALT 24 12/16/2020          Microbiology :  No results for input(s): BC in the last 72 hours. No results for input(s): Starlene Ewings in the last 72 hours. No results for input(s): LABURIN in the last 72 hours. No results for input(s): CULTRESP in the last 72 hours. No results for input(s): WNDABS in the last 72 hours. Radiology :  XR CHEST PORTABLE   Preliminary Result   1. Moderate size left pleural effusion. 2. Trace right pleural effusion and right lung base opacity favored to   represent atelectasis. XR CHEST PORTABLE   Final Result   No significant changes since the December 13. Bilateral pleural effusions   larger on the left side. XR CHEST PORTABLE   Final Result   Findings suggestive of pulmonary edema with a persistent moderate left and   small right pleural effusion. No evidence of pneumothorax following   thoracentesis. US THORACENTESIS Which side should the procedure be performed? Left   Final Result   Left-sided pleural effusion. Please refer to separate procedure report from   Dr. Kiarra Pablo for further details. US DUP LOWER EXTREMITIES BILATERAL VENOUS   Final Result   1. No evidence of DVT in either lower extremity given the limitations   detailed above. 2.  Bilateral superficial soft tissue edema. 3.  Bilateral popliteal fossa fluid collections (Baker's cyst)      US DUP UPPER EXTREMITY RIGHT ARTERIES   Final Result   1. No evidence of hemodynamically significant stenosis. Multiphasic   waveforms identified in all areas. 2.  Spectral broadening in the radial and ulnar arteries suggests early   underlying peripheral vascular disease. XR CHEST PORTABLE   Final Result   1. Enlarging left pleural effusion which is now moderate to large in size. 2. New patchy left perihilar infiltration which could represent pneumonia   ,edema or less likely atelectasis.    3. Trace right pleural effusion      US DUP UPPER EXTREMITY RIGHT VENOUS   Final Result   No evidence of DVT. XR CHEST PORTABLE   Final Result   Increased moderate left pleural effusion.       CT ABDOMEN PELVIS W IV CONTRAST Additional Contrast? Radiologist Recommendation    (Results Pending)          Assessment and Plan:      · Hypotension/leukocytosis-sepsis versus reactive (excess volume loss, diarrhea)  · End-stage renal disease on hemodialysis  · Status post gastric bypass surgery with short gut syndrome  · Obstructive sleep apnea  · Polymyalgia rheumatica  · Left-sided pleural effusion- bloody tap  · No concern for UTI     Plan  -Hemoglobin of 5.7, primary team to follow  -Chest x-ray shows persistent left-sided pleural effusion, pulmonology following as well  -normal robin-  stool cultures  -D lactic acid level pending (short gut syndrome)-not back yet  -Blood cultures have been negative so far  -Follow with the results of the CT abdomen and pelvis        Electronically signed by Ran Cochran MD on 12/16/2020 at 2:51 PM

## 2020-12-17 NOTE — PROGRESS NOTES
This nurse was able to get in touch with , Hellen Challenger to let him know he is able to come up to the hospital. This nurse also stressed to him that roads are getting bad and to be careful driving. Support given.

## 2020-12-17 NOTE — PROGRESS NOTES
Dr. Amparo Guevara was called and notified of the following findings:  patient is unresponsive with non-reactive pupils; no pulses palpated; no spontaneous breaths; no heart sounds auscultated. Two RNs  pronounced patient dead at [phone] on 12/17/2020. Dr. Malu Barillas will sign the death certificate.

## 2020-12-17 NOTE — PROGRESS NOTES
This nurse spoke to , Deirdre Farfan and daughter, Lele Evans about patients code status. Both, Lele Evans and Deirdre Farfan, want patient to be intubated if anything were to happen. Continue DNR-CCA status also at this time.

## 2020-12-17 NOTE — PROGRESS NOTES
Dr. Anderson Lion returned phone call. Dr. Mendieta Sales family to discuss code status. Code status has been changed to [facility]. New orders to start on Morphine and Ativan Q2hrs and orders to decrease the levophed at this time. Attempted to call family to let them know they are able to come up to hospital, unable to reach them at this time will try again. From: Adonis Anderson  To: Juli Ortiz MD  Sent: 10/15/2020 8:17 PM EDT  Subject: Non-Urgent Medical Question    I have moved to [facility] and have a primary physician here. Please remove me from automatic messages. Thank you and stay safe.     [facility]

## 2020-12-17 NOTE — PROGRESS NOTES
7 rings, one set of earrings, silver in color watch, cell phone and , cpap machine, and clothes all sent home with , Hellen Silvar.

## 2020-12-17 NOTE — DEATH NOTES
Internal Medicine Discharge Summary and death note    Patient ID: Filipe Medina      Patient's PCP: Michaeline Kawasaki, MD    Admit Date: 12/7/2020     Discharge Date: 12/17/2020     Admitting Physician: Michaeline Kawasaki, MD     Discharge Physician: Michaeline Kawasaki, MD     Discharge Diagnoses: Active Hospital Problems    Diagnosis Date Noted    Hypokalemia [E87.6] 12/07/2020    Hypotension [I95.9] 10/21/2020           Hospital Course:     History of Present Illness: This is a 66-year-old female. She has a history of renal failure that has been hemodialysis dependent with a left arm AV fistula. She has a history of hypothyroidism. She also has had a history of heart failure with preserved ejection fraction and anemia and asthma and heart disease and mitral and tricuspid valve surgery at [facility]. She has had consecutive admissions for hypotension. This morning she went to dialysis and did not even have fluid removal. She was sitting in her hot tub which she normally does and started to feel extremely unwell. She was seen in the ER with profound hypotension and severe hypokalemia and symptomatic of course and was found to be lactic acidotic. Patient had been just admitted and discharged for observation stay from the 29th of last month at the 30th. She was seen by cardiology and her renal services had adjusted her dry weight at hemodialysis center. She had also seen me in the outpatient setting and we had raised her midodrine from 10 3 times daily to 20 3 times daily to allow for blood pressure to be better. She had been gradually taken off the beta-blocker over the last 6 months. She now comes in with need for pressors. She is in the ICU with pressors. She was admitted about a month and a half ago and at that point she had been ruled out for adrenal insufficiency and pericardial effusion as well. Patient was in the ICU during her entire stay. She was supported with pressors.   She was supported with fluids and dialysis. She continued to exhibit significant hypotension. She was started on steroids. She had labs that indicated increased ammonia level and further investigation revealed a cirrhotic liver. Patient was made DNR CC with comfort measures and passed away early this morning        IP CONSULT TO INTERNAL MEDICINE  IP CONSULT TO CRITICAL CARE  IP CONSULT TO DIETITIAN  IP CONSULT TO NEPHROLOGY  IP CONSULT TO GI  IP CONSULT TO INFECTIOUS DISEASES  IP CONSULT TO PALLIATIVE CARE  IP CONSULT TO ONCOLOGY    Significant Diagnostic Studies:  Ct Abdomen Pelvis W Iv Contrast Additional Contrast? Radiologist Recommendation    Result Date: 12/16/2020  1. Liver cirrhosis with large volume ascites. Prominent anasarca. No splenomegaly. 2. Markedly atrophic kidneys. 3. Prominent gaseous distension of the proximal colon. No bowel wall thickening or obstruction seen. 4. Cholelithiasis. 5. Large bilateral pleural effusions, left greater than right. 6. No evidence of hemorrhage in the abdomen or pelvis. Xr Chest Portable    Result Date: 12/16/2020  1. Moderate size left pleural effusion. 2. Trace right pleural effusion and right lung base opacity favored to represent atelectasis. Xr Chest Portable    Result Date: 12/14/2020  No significant changes since the December 13. Bilateral pleural effusions larger on the left side. Xr Chest Portable    Result Date: 12/13/2020  Findings suggestive of pulmonary edema with a persistent moderate left and small right pleural effusion. No evidence of pneumothorax following thoracentesis. Xr Chest Portable    Result Date: 12/12/2020  1. Enlarging left pleural effusion which is now moderate to large in size. 2. New patchy left perihilar infiltration which could represent pneumonia ,edema or less likely atelectasis. 3. Trace right pleural effusion    Xr Chest Portable    Result Date: 12/7/2020  Increased moderate left pleural effusion.     Xr Chest Portable    Result Date:

## 2020-12-17 NOTE — DISCHARGE SUMMARY
Internal Medicine Discharge Summary and death note    Patient ID: Carol Alvarenga      Patient's PCP: Abel Burkett MD    Admit Date: 12/7/2020     Discharge Date: 12/17/2020     Admitting Physician: Abel Burkett MD     Discharge Physician: Abel Burkett MD     Discharge Diagnoses: Active Hospital Problems    Diagnosis Date Noted    Hypokalemia [E87.6] 12/07/2020    Hypotension [I95.9] 10/21/2020           Hospital Course:     History of Present Illness: This is a 49-year-old female. She has a history of renal failure that has been hemodialysis dependent with a left arm AV fistula. She has a history of hypothyroidism. She also has had a history of heart failure with preserved ejection fraction and anemia and asthma and heart disease and mitral and tricuspid valve surgery at [facility]. She has had consecutive admissions for hypotension. This morning she went to dialysis and did not even have fluid removal. She was sitting in her hot tub which she normally does and started to feel extremely unwell. She was seen in the ER with profound hypotension and severe hypokalemia and symptomatic of course and was found to be lactic acidotic. Patient had been just admitted and discharged for observation stay from the 29th of last month at the 30th. She was seen by cardiology and her renal services had adjusted her dry weight at hemodialysis center. She had also seen me in the outpatient setting and we had raised her midodrine from 10 3 times daily to 20 3 times daily to allow for blood pressure to be better. She had been gradually taken off the beta-blocker over the last 6 months. She now comes in with need for pressors. She is in the ICU with pressors. She was admitted about a month and a half ago and at that point she had been ruled out for adrenal insufficiency and pericardial effusion as well. Patient was in the ICU during her entire stay. She was supported with pressors.   She was supported with fluids and dialysis. She continued to exhibit significant hypotension. She was started on steroids. She had labs that indicated increased ammonia level and further investigation revealed a cirrhotic liver. Patient was made DNR CC with comfort measures and passed away early this morning        IP CONSULT TO INTERNAL MEDICINE  IP CONSULT TO CRITICAL CARE  IP CONSULT TO DIETITIAN  IP CONSULT TO NEPHROLOGY  IP CONSULT TO GI  IP CONSULT TO INFECTIOUS DISEASES  IP CONSULT TO PALLIATIVE CARE  IP CONSULT TO ONCOLOGY    Significant Diagnostic Studies:  Ct Abdomen Pelvis W Iv Contrast Additional Contrast? Radiologist Recommendation    Result Date: 12/16/2020  1. Liver cirrhosis with large volume ascites. Prominent anasarca. No splenomegaly. 2. Markedly atrophic kidneys. 3. Prominent gaseous distension of the proximal colon. No bowel wall thickening or obstruction seen. 4. Cholelithiasis. 5. Large bilateral pleural effusions, left greater than right. 6. No evidence of hemorrhage in the abdomen or pelvis. Xr Chest Portable    Result Date: 12/16/2020  1. Moderate size left pleural effusion. 2. Trace right pleural effusion and right lung base opacity favored to represent atelectasis. Xr Chest Portable    Result Date: 12/14/2020  No significant changes since the December 13. Bilateral pleural effusions larger on the left side. Xr Chest Portable    Result Date: 12/13/2020  Findings suggestive of pulmonary edema with a persistent moderate left and small right pleural effusion. No evidence of pneumothorax following thoracentesis. Xr Chest Portable    Result Date: 12/12/2020  1. Enlarging left pleural effusion which is now moderate to large in size. 2. New patchy left perihilar infiltration which could represent pneumonia ,edema or less likely atelectasis. 3. Trace right pleural effusion    Xr Chest Portable    Result Date: 12/7/2020  Increased moderate left pleural effusion.     Xr Chest Portable    Result Date: 11/28/2020  1. Stable lung hyperinflation and coarse interstitial markings. Stable left greater than right pleural effusions or potentially scarring. No new pulmonary pathology. 2.  Atherosclerotic disease and cardiomegaly. Postsurgical changes. Normal pulmonary vascularity on this exam    Us Thoracentesis Which Side Should The Procedure Be Performed? Left    Result Date: 12/14/2020  Left-sided pleural effusion. Please refer to separate procedure report from Dr. Dhara Ware for further details. Us Dup Upper Extremity Right Venous    Result Date: 12/12/2020  No evidence of DVT. Us Dup Upper Extremity Right Arteries    Result Date: 12/12/2020  1. No evidence of hemodynamically significant stenosis. Multiphasic waveforms identified in all areas. 2.  Spectral broadening in the radial and ulnar arteries suggests early underlying peripheral vascular disease. Us Dup Lower Extremities Bilateral Venous    Result Date: 12/12/2020  1. No evidence of DVT in either lower extremity given the limitations detailed above. 2.  Bilateral superficial soft tissue edema. 3.  Bilateral popliteal fossa fluid collections (Baker's cyst)            Labs:      CBC:    Lab Results   Component Value Date    WBC 17.8 12/16/2020    HGB 7.6 12/16/2020    HCT 22.5 12/16/2020    PLT 62 12/16/2020       Renal:    Lab Results   Component Value Date     12/16/2020    K 4.7 12/16/2020    K 4.2 12/13/2020     12/16/2020    CO2 24 12/16/2020    BUN 11 12/16/2020    CREATININE 2.8 12/16/2020    CALCIUM 8.4 12/16/2020    PHOS 3.2 12/16/2020       Discharge Medications:       Time Spent on discharge is more than 45 minutes in the examination, evaluation, counseling and review of medications and discharge plan.       Signed:    Cleo Blair MD   12/17/2020

## 2020-12-17 NOTE — PROGRESS NOTES
Call placed to [phone] regarding lab results. Awaiting return call at this time.

## 2020-12-18 LAB — HEPARIN PF4 ANTIBODY: 0.14 OD

## 2020-12-19 LAB — HEPARIN PF4 ANTIBODY: 0.09 OD

## 2020-12-28 NOTE — PROGRESS NOTES
Physician Progress Note      PATIENT:               Mino Hill  CSN #:                  [id number]  :                       1955  ADMIT DATE:       2020 7:50 AM  DISCH DATE:        2020 4:09 AM  RESPONDING  PROVIDER #:        Luis Olivier MD          QUERY TEXT:    Dear Attending Physician,    Pt admitted with Hypotension ,Hypovolemia Hypokalemia ,Lactic Acidosis and   Shock. Pt noted to have Short Gut Syndrome per ordered Renal consultant notes   . If possible, please document in progress notes and discharge summary the   relationship, if any, between Hypotension ,Hypovolemia Hypokalemia ,Lactic   Acidosis and Short Gut Syndrome    The medical record reflects the following:  Risk Factors: short gut syndrome, liver cirrhosis,ESRD  Clinical Indicators: Per H/P,\". .profound hypotension and severe hypokalemia   and symptomatic of course and was found to be lactic acidotic. ..comes in with   need for pressors. .short gut syndrome ? Chidi Ozzy \"     Per ICU,\". Chidi Ozzy Chidi Ozzy Short gut   syndrome-monitor, may require stool thickening agent as this may be the source   of hypovolemia . Chidi Ozzy \"    Per Renal , '. .. Lactic acidosis most probably a   Type A Lactic Acidosis due to the hypotension and tissue hypoxia-concern   however for an undiagnosed D-Lactic Acidosis due to the Short Gut   Syndrome-with this possibly exacerbating the recurrent hypotensio  Treatment: pressors. Albumin IVF  Midodrine, hydrocortisone    Thank you,  Hari Moran RN [facility]  Clinical Documentation Improvement Specialist  [phone]  Options provided:  -- Hypotension ,Hypovolemia Hypokalemia ,Lactic Acidosis due to Short Gut   Syndrome  -- Hypotension ,Hypovolemia Hypokalemia ,Lactic Acidosis unrelated to Short   Gut Syndrome  -- Hypotension ,Hypovolemia Hypokalemia ,Lactic Acidosis due to other, Please   document other etiology of Hypotension, Hypovolemia, Hypokalemia, Lactic   Acidosis.   -- Other - I will add my own diagnosis -- Disagree - Not applicable / Not valid  -- Disagree - Clinically unable to determine / Unknown  -- Refer to Clinical Documentation Reviewer    PROVIDER RESPONSE TEXT:    This patient has Hypotension ,Hypovolemia Hypokalemia ,Lactic Acidosis due to   Short Gut Syndrome.     Query created by: Nina Cho on 12/23/2020 11:45 AM      Electronically signed by:  Trice Estrada MD 12/28/2020 10:19 AM

## 2021-01-18 LAB
FUNGUS (MYCOLOGY) CULTURE: NORMAL
FUNGUS STAIN: NORMAL

## 2021-02-02 LAB
AFB CULTURE (MYCOBACTERIA): NORMAL
AFB SMEAR: NORMAL

## (undated) DEVICE — SYRINGE MED 10ML LUERLOCK TIP W/O SFTY DISP

## (undated) DEVICE — SOLUTION IV IRRIG WATER 1000ML POUR BRL 2F7114

## (undated) DEVICE — STANDARD HYPODERMIC NEEDLE,POLYPROPYLENE HUB: Brand: MONOJECT

## (undated) DEVICE — TOWEL,OR,DSP,ST,BLUE,STD,6/PK,12PK/CS: Brand: MEDLINE

## (undated) DEVICE — 1.5L THIN WALL CAN: Brand: CRD

## (undated) DEVICE — Z INACTIVE USE 2641837 CLIP LIG M BLU TI HRT SHP WIRE HORZ 600 PER BX

## (undated) DEVICE — GLOVE ORANGE PI 8   MSG9080

## (undated) DEVICE — Device

## (undated) DEVICE — VESSEL LOOPS X-RAY DETECTABLE: Brand: DEROYAL

## (undated) DEVICE — GRADUATE TRIANG MEASURE 1000ML BLK PRNT

## (undated) DEVICE — SKIN AFFIX SURG ADHESIVE 72/CS 0.55ML: Brand: MEDLINE

## (undated) DEVICE — DILATOR ART

## (undated) DEVICE — BLADE CLIPPER GEN PURP NS

## (undated) DEVICE — SURGICAL PROCEDURE PACK BASIC

## (undated) DEVICE — GLOVE SURG SZ 75 STD WHT LTX SYN POLYMER BEAD REINF ANTI RL

## (undated) DEVICE — SURGICAL PROCEDURE PACK VASC MAJ CUST

## (undated) DEVICE — CHLORAPREP 26ML ORANGE

## (undated) DEVICE — SET SURG INSTR MINI VASC

## (undated) DEVICE — INTENDED FOR TISSUE SEPARATION, AND OTHER PROCEDURES THAT REQUIRE A SHARP SURGICAL BLADE TO PUNCTURE OR CUT.: Brand: BARD-PARKER ® STAINLESS STEEL BLADES

## (undated) DEVICE — SCANLAN® VASCU-STATT® SINGLE-USE BULLDOG CLAMP - MIDI ANGLED 45° (WHITE), CLAMPING PRESSURE 25-30 G (2/STERILE PKG): Brand: SCANLAN® VASCU-STATT® SINGLE-USE BULLDOG CLAMP

## (undated) DEVICE — GOWN,AURORA,NONREINF,RAGLAN,L,STERILE: Brand: MEDLINE

## (undated) DEVICE — CATHETER ETER IV 20GA L1IN POLYUR STR RADPQ INTROCAN SFTY

## (undated) DEVICE — ELECTRODE PT RET AD L9FT HI MOIST COND ADH HYDRGEL CORDED

## (undated) DEVICE — CLIP INT SM TI EZ LD LIG SYS WECK HORZ

## (undated) DEVICE — DRAPE SURGICAL HAND PROX AURORA

## (undated) DEVICE — SHEET, T, LAPAROTOMY, STERILE: Brand: MEDLINE

## (undated) DEVICE — GLOVE ORANGE PI 7 1/2   MSG9075

## (undated) DEVICE — SOLUTION IV 500ML 0.9% SOD CHL INJ USP CONT EXCEL

## (undated) DEVICE — CLIP SURG 6 MM BULLDOG

## (undated) DEVICE — LABEL MED 4 IN SURG PANEL W/ PEN STRL

## (undated) DEVICE — SOLUTION IV IRRIG POUR BRL 0.9% SODIUM CHL 2F7124